# Patient Record
Sex: MALE | Race: BLACK OR AFRICAN AMERICAN | URBAN - METROPOLITAN AREA
[De-identification: names, ages, dates, MRNs, and addresses within clinical notes are randomized per-mention and may not be internally consistent; named-entity substitution may affect disease eponyms.]

---

## 2016-12-12 RX ORDER — OXYCODONE HYDROCHLORIDE 5 MG/1
1 TABLET ORAL
Qty: 0 | Refills: 0 | COMMUNITY
Start: 2016-12-12

## 2017-01-16 ENCOUNTER — INPATIENT (INPATIENT)
Facility: HOSPITAL | Age: 22
LOS: 2 days | Discharge: HOME | End: 2017-01-19
Attending: HOSPITALIST | Admitting: HOSPITALIST
Payer: COMMERCIAL

## 2017-01-16 VITALS
RESPIRATION RATE: 20 BRPM | OXYGEN SATURATION: 94 % | HEART RATE: 96 BPM | DIASTOLIC BLOOD PRESSURE: 62 MMHG | TEMPERATURE: 98 F | SYSTOLIC BLOOD PRESSURE: 134 MMHG

## 2017-01-17 DIAGNOSIS — E83.111 HEMOCHROMATOSIS DUE TO REPEATED RED BLOOD CELL TRANSFUSIONS: ICD-10-CM

## 2017-01-17 DIAGNOSIS — D57.00 HB-SS DISEASE WITH CRISIS, UNSPECIFIED: ICD-10-CM

## 2017-01-17 LAB
ALBUMIN SERPL ELPH-MCNC: 4.6 G/DL — SIGNIFICANT CHANGE UP (ref 3.3–5)
ALP SERPL-CCNC: 88 U/L — SIGNIFICANT CHANGE UP (ref 40–120)
ALT FLD-CCNC: 21 U/L — SIGNIFICANT CHANGE UP (ref 4–41)
ANISOCYTOSIS BLD QL: SIGNIFICANT CHANGE UP
ANTIBODY ID 1_1: SIGNIFICANT CHANGE UP
ANTIBODY ID 1_2: SIGNIFICANT CHANGE UP
AST SERPL-CCNC: 74 U/L — HIGH (ref 4–40)
BASOPHILS # BLD AUTO: 0.07 K/UL — SIGNIFICANT CHANGE UP (ref 0–0.2)
BASOPHILS NFR BLD AUTO: 0.6 % — SIGNIFICANT CHANGE UP (ref 0–2)
BASOPHILS NFR SPEC: 0 % — SIGNIFICANT CHANGE UP (ref 0–2)
BILIRUB SERPL-MCNC: 3.8 MG/DL — HIGH (ref 0.2–1.2)
BLD GP AB SCN SERPL QL: POSITIVE — SIGNIFICANT CHANGE UP
BUN SERPL-MCNC: 6 MG/DL — LOW (ref 7–23)
CALCIUM SERPL-MCNC: 9.2 MG/DL — SIGNIFICANT CHANGE UP (ref 8.4–10.5)
CHLORIDE SERPL-SCNC: 103 MMOL/L — SIGNIFICANT CHANGE UP (ref 98–107)
CO2 SERPL-SCNC: 24 MMOL/L — SIGNIFICANT CHANGE UP (ref 22–31)
CREAT SERPL-MCNC: 0.71 MG/DL — SIGNIFICANT CHANGE UP (ref 0.5–1.3)
EOSINOPHIL # BLD AUTO: 0.14 K/UL — SIGNIFICANT CHANGE UP (ref 0–0.5)
EOSINOPHIL NFR BLD AUTO: 1.2 % — SIGNIFICANT CHANGE UP (ref 0–6)
EOSINOPHIL NFR FLD: 1 % — SIGNIFICANT CHANGE UP (ref 0–6)
GLUCOSE SERPL-MCNC: 75 MG/DL — SIGNIFICANT CHANGE UP (ref 70–99)
HCT VFR BLD CALC: 19.6 % — CRITICAL LOW (ref 39–50)
HGB BLD-MCNC: 7 G/DL — CRITICAL LOW (ref 13–17)
HYPOCHROMIA BLD QL: SIGNIFICANT CHANGE UP
IMM GRANULOCYTES NFR BLD AUTO: 1.2 % — SIGNIFICANT CHANGE UP (ref 0–1.5)
LG PLATELETS BLD QL AUTO: SLIGHT — SIGNIFICANT CHANGE UP
LYMPHOCYTES # BLD AUTO: 42.4 % — SIGNIFICANT CHANGE UP (ref 13–44)
LYMPHOCYTES # BLD AUTO: 5.15 K/UL — HIGH (ref 1–3.3)
LYMPHOCYTES NFR SPEC AUTO: 45 % — HIGH (ref 13–44)
MANUAL SMEAR VERIFICATION: SIGNIFICANT CHANGE UP
MCHC RBC-ENTMCNC: 31.5 PG — SIGNIFICANT CHANGE UP (ref 27–34)
MCHC RBC-ENTMCNC: 35.7 % — SIGNIFICANT CHANGE UP (ref 32–36)
MCV RBC AUTO: 88.3 FL — SIGNIFICANT CHANGE UP (ref 80–100)
MONOCYTES # BLD AUTO: 1.65 K/UL — HIGH (ref 0–0.9)
MONOCYTES NFR BLD AUTO: 13.6 % — SIGNIFICANT CHANGE UP (ref 2–14)
MONOCYTES NFR BLD: 10 % — HIGH (ref 2–9)
MYELOCYTES NFR BLD: 2 % — HIGH (ref 0–0)
NEUTROPHIL AB SER-ACNC: 39 % — LOW (ref 43–77)
NEUTROPHILS # BLD AUTO: 4.99 K/UL — SIGNIFICANT CHANGE UP (ref 1.8–7.4)
NEUTROPHILS NFR BLD AUTO: 41 % — LOW (ref 43–77)
NRBC # BLD: 2 /100WBC — SIGNIFICANT CHANGE UP
PLATELET # BLD AUTO: 529 K/UL — HIGH (ref 150–400)
PLATELET COUNT - ESTIMATE: SIGNIFICANT CHANGE UP
PMV BLD: 10.2 FL — SIGNIFICANT CHANGE UP (ref 7–13)
POIKILOCYTOSIS BLD QL AUTO: SIGNIFICANT CHANGE UP
POLYCHROMASIA BLD QL SMEAR: SLIGHT — SIGNIFICANT CHANGE UP
POTASSIUM SERPL-MCNC: 4.2 MMOL/L — SIGNIFICANT CHANGE UP (ref 3.5–5.3)
POTASSIUM SERPL-SCNC: 4.2 MMOL/L — SIGNIFICANT CHANGE UP (ref 3.5–5.3)
PROT SERPL-MCNC: 7.5 G/DL — SIGNIFICANT CHANGE UP (ref 6–8.3)
RBC # BLD: 2.22 M/UL — LOW (ref 4.2–5.8)
RBC # FLD: 24.9 % — HIGH (ref 10.3–14.5)
RETICS #: 207.3 10X3/UL — HIGH (ref 17–73)
RETICS/RBC NFR: 9.3 % — HIGH (ref 0.5–2.5)
RH IG SCN BLD-IMP: POSITIVE — SIGNIFICANT CHANGE UP
SCHISTOCYTES BLD QL AUTO: SLIGHT — SIGNIFICANT CHANGE UP
SICKLE CELLS BLD QL SMEAR: SIGNIFICANT CHANGE UP
SODIUM SERPL-SCNC: 140 MMOL/L — SIGNIFICANT CHANGE UP (ref 135–145)
SPHEROCYTES BLD QL SMEAR: SLIGHT — SIGNIFICANT CHANGE UP
TARGETS BLD QL SMEAR: SLIGHT — SIGNIFICANT CHANGE UP
TROPONIN T SERPL-MCNC: < 0.06 NG/ML — SIGNIFICANT CHANGE UP (ref 0–0.06)
VARIANT LYMPHS # BLD: 3 % — SIGNIFICANT CHANGE UP
WBC # BLD: 12.15 K/UL — HIGH (ref 3.8–10.5)
WBC # FLD AUTO: 12.15 K/UL — HIGH (ref 3.8–10.5)

## 2017-01-17 PROCEDURE — 71020: CPT | Mod: 26

## 2017-01-17 PROCEDURE — 86077 PHYS BLOOD BANK SERV XMATCH: CPT

## 2017-01-17 PROCEDURE — 99223 1ST HOSP IP/OBS HIGH 75: CPT

## 2017-01-17 RX ORDER — FOLIC ACID 0.8 MG
1 TABLET ORAL DAILY
Qty: 0 | Refills: 0 | Status: DISCONTINUED | OUTPATIENT
Start: 2017-01-17 | End: 2017-01-19

## 2017-01-17 RX ORDER — ONDANSETRON 8 MG/1
4 TABLET, FILM COATED ORAL ONCE
Qty: 0 | Refills: 0 | Status: COMPLETED | OUTPATIENT
Start: 2017-01-17 | End: 2017-01-17

## 2017-01-17 RX ORDER — HYDROMORPHONE HYDROCHLORIDE 2 MG/ML
2 INJECTION INTRAMUSCULAR; INTRAVENOUS; SUBCUTANEOUS ONCE
Qty: 0 | Refills: 0 | Status: DISCONTINUED | OUTPATIENT
Start: 2017-01-17 | End: 2017-01-17

## 2017-01-17 RX ORDER — ENOXAPARIN SODIUM 100 MG/ML
40 INJECTION SUBCUTANEOUS EVERY 24 HOURS
Qty: 0 | Refills: 0 | Status: DISCONTINUED | OUTPATIENT
Start: 2017-01-17 | End: 2017-01-19

## 2017-01-17 RX ORDER — HYDROMORPHONE HYDROCHLORIDE 2 MG/ML
1 INJECTION INTRAMUSCULAR; INTRAVENOUS; SUBCUTANEOUS
Qty: 0 | Refills: 0 | Status: DISCONTINUED | OUTPATIENT
Start: 2017-01-17 | End: 2017-01-17

## 2017-01-17 RX ORDER — SODIUM CHLORIDE 9 MG/ML
1000 INJECTION, SOLUTION INTRAVENOUS
Qty: 0 | Refills: 0 | Status: DISCONTINUED | OUTPATIENT
Start: 2017-01-17 | End: 2017-01-19

## 2017-01-17 RX ORDER — HYDROMORPHONE HYDROCHLORIDE 2 MG/ML
30 INJECTION INTRAMUSCULAR; INTRAVENOUS; SUBCUTANEOUS
Qty: 0 | Refills: 0 | Status: DISCONTINUED | OUTPATIENT
Start: 2017-01-17 | End: 2017-01-19

## 2017-01-17 RX ORDER — SODIUM CHLORIDE 9 MG/ML
1000 INJECTION INTRAMUSCULAR; INTRAVENOUS; SUBCUTANEOUS
Qty: 0 | Refills: 0 | Status: DISCONTINUED | OUTPATIENT
Start: 2017-01-17 | End: 2017-01-17

## 2017-01-17 RX ORDER — KETOROLAC TROMETHAMINE 30 MG/ML
30 SYRINGE (ML) INJECTION ONCE
Qty: 0 | Refills: 0 | Status: DISCONTINUED | OUTPATIENT
Start: 2017-01-17 | End: 2017-01-17

## 2017-01-17 RX ORDER — NALOXONE HYDROCHLORIDE 4 MG/.1ML
0.1 SPRAY NASAL
Qty: 0 | Refills: 0 | Status: DISCONTINUED | OUTPATIENT
Start: 2017-01-17 | End: 2017-01-19

## 2017-01-17 RX ORDER — HYDROMORPHONE HYDROCHLORIDE 2 MG/ML
1 INJECTION INTRAMUSCULAR; INTRAVENOUS; SUBCUTANEOUS ONCE
Qty: 0 | Refills: 0 | Status: DISCONTINUED | OUTPATIENT
Start: 2017-01-17 | End: 2017-01-17

## 2017-01-17 RX ORDER — DIPHENHYDRAMINE HCL 50 MG
25 CAPSULE ORAL EVERY 4 HOURS
Qty: 0 | Refills: 0 | Status: DISCONTINUED | OUTPATIENT
Start: 2017-01-17 | End: 2017-01-19

## 2017-01-17 RX ORDER — ONDANSETRON 8 MG/1
4 TABLET, FILM COATED ORAL EVERY 6 HOURS
Qty: 0 | Refills: 0 | Status: DISCONTINUED | OUTPATIENT
Start: 2017-01-17 | End: 2017-01-17

## 2017-01-17 RX ORDER — KETOROLAC TROMETHAMINE 30 MG/ML
15 SYRINGE (ML) INJECTION EVERY 6 HOURS
Qty: 0 | Refills: 0 | Status: DISCONTINUED | OUTPATIENT
Start: 2017-01-17 | End: 2017-01-19

## 2017-01-17 RX ORDER — SODIUM CHLORIDE 9 MG/ML
1000 INJECTION INTRAMUSCULAR; INTRAVENOUS; SUBCUTANEOUS ONCE
Qty: 0 | Refills: 0 | Status: COMPLETED | OUTPATIENT
Start: 2017-01-17 | End: 2017-01-17

## 2017-01-17 RX ORDER — ONDANSETRON 8 MG/1
4 TABLET, FILM COATED ORAL EVERY 6 HOURS
Qty: 0 | Refills: 0 | Status: DISCONTINUED | OUTPATIENT
Start: 2017-01-17 | End: 2017-01-19

## 2017-01-17 RX ADMIN — HYDROMORPHONE HYDROCHLORIDE 30 MILLILITER(S): 2 INJECTION INTRAMUSCULAR; INTRAVENOUS; SUBCUTANEOUS at 20:04

## 2017-01-17 RX ADMIN — HYDROMORPHONE HYDROCHLORIDE 1 MILLIGRAM(S): 2 INJECTION INTRAMUSCULAR; INTRAVENOUS; SUBCUTANEOUS at 01:30

## 2017-01-17 RX ADMIN — HYDROMORPHONE HYDROCHLORIDE 1 MILLIGRAM(S): 2 INJECTION INTRAMUSCULAR; INTRAVENOUS; SUBCUTANEOUS at 00:49

## 2017-01-17 RX ADMIN — HYDROMORPHONE HYDROCHLORIDE 30 MILLILITER(S): 2 INJECTION INTRAMUSCULAR; INTRAVENOUS; SUBCUTANEOUS at 16:51

## 2017-01-17 RX ADMIN — ONDANSETRON 4 MILLIGRAM(S): 8 TABLET, FILM COATED ORAL at 06:22

## 2017-01-17 RX ADMIN — HYDROMORPHONE HYDROCHLORIDE 1 MILLIGRAM(S): 2 INJECTION INTRAMUSCULAR; INTRAVENOUS; SUBCUTANEOUS at 07:37

## 2017-01-17 RX ADMIN — HYDROMORPHONE HYDROCHLORIDE 1 MILLIGRAM(S): 2 INJECTION INTRAMUSCULAR; INTRAVENOUS; SUBCUTANEOUS at 08:30

## 2017-01-17 RX ADMIN — SODIUM CHLORIDE 1000 MILLILITER(S): 9 INJECTION INTRAMUSCULAR; INTRAVENOUS; SUBCUTANEOUS at 00:49

## 2017-01-17 RX ADMIN — HYDROMORPHONE HYDROCHLORIDE 2 MILLIGRAM(S): 2 INJECTION INTRAMUSCULAR; INTRAVENOUS; SUBCUTANEOUS at 02:20

## 2017-01-17 RX ADMIN — Medication 15 MILLIGRAM(S): at 15:01

## 2017-01-17 RX ADMIN — SODIUM CHLORIDE 125 MILLILITER(S): 9 INJECTION, SOLUTION INTRAVENOUS at 17:00

## 2017-01-17 RX ADMIN — SODIUM CHLORIDE 125 MILLILITER(S): 9 INJECTION, SOLUTION INTRAVENOUS at 15:02

## 2017-01-17 RX ADMIN — Medication 1 MILLIGRAM(S): at 15:01

## 2017-01-17 RX ADMIN — Medication 30 MILLIGRAM(S): at 04:05

## 2017-01-17 RX ADMIN — Medication 15 MILLIGRAM(S): at 15:30

## 2017-01-17 RX ADMIN — Medication 30 MILLIGRAM(S): at 05:00

## 2017-01-17 RX ADMIN — HYDROMORPHONE HYDROCHLORIDE 2 MILLIGRAM(S): 2 INJECTION INTRAMUSCULAR; INTRAVENOUS; SUBCUTANEOUS at 03:11

## 2017-01-17 RX ADMIN — SODIUM CHLORIDE 100 MILLILITER(S): 9 INJECTION INTRAMUSCULAR; INTRAVENOUS; SUBCUTANEOUS at 06:22

## 2017-01-17 NOTE — ED PROVIDER NOTE - PROGRESS NOTE DETAILS
Caitlyn PGY-2: Admitted patient to hospitalist, text paged MAR. Pt initially requested stay in CDU however now notifies staff he has usually required several days for admission to treat SCC. Admitted.

## 2017-01-17 NOTE — ED PROVIDER NOTE - OBJECTIVE STATEMENT
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 Pain in arms, legs, waist, chest. CP is mid-sternal. Denies fever, cough, sick contacts, nausea, vomiting, diarrhea, dysuria, hematuria. This is his typical pain crisis. History of acute chest a few years ago, last transfusion a few months ago. Does not feel like when he had acute chest.   Took motrin and oxycodone at home with some relief.  PMD: Dr Nguyễn

## 2017-01-17 NOTE — ED CDU PROVIDER NOTE - ATTENDING CONTRIBUTION TO CARE
I was physically present for the E/M service provided. I agree with above history, physical, and plan which I have reviewed and edited where appropriate. I was physically present for the key portions of the service provided.    See MDM

## 2017-01-17 NOTE — ED ADULT NURSE NOTE - OBJECTIVE STATEMENT
Jeramie Nurse Note- pt presents to 26 A&Ox4 c/o SCC pain x 2 hours. Motrin and oxycodone provided only minor relief. hx of chest syndrome in the past,. states he has only mild cp now. rsr on monitor. no ectopy. vitals as noted. iv placed, labs drawn and sent, 20 ga r fa. Dr. Brady at bedside, ordered ivf and analgesia. both given. will continue to monitor awaiting lab results. endorsed to primary RN Vale

## 2017-01-17 NOTE — H&P ADULT. - HISTORY OF PRESENT ILLNESS
21M with sickle cell (SS) disease with history of ACS, exchange transfusion, Iron overload,  presenting to the ED with one day hx of diffuse body pain. Pt states he was in his usual state of health when the diffuse pain started. He tried taking a dose of oxycodone 10mg and motrin at home, however the pain persisted. He denies CP/SOB/Nausea/Vomiting/Joint pain. Pt states he’ll like to have blood transfusion. Per pt, since his arrival to the ED, his pain has improved; he only has residual back pain, with no radiation. Last PMD visit was 12/2016.    In the ED, pt was given toradol, IVF, dilaudid 2mg IV x 1 and dilaudid 1mg x 3 and Zofran

## 2017-01-17 NOTE — H&P ADULT. - ASSESSMENT
21M with sickle cell (SS) disease with history of ACS, exchange transfusion, Iron overload,  presenting to the ED with one day hx of diffuse body pain. Pt admitted for sickle cell crisis

## 2017-01-17 NOTE — H&P ADULT. - PROBLEM SELECTOR PLAN 1
- c/w IVF and pain control with IV PCA pump  - I explained to pt that there is no indication for transfusion at this time, also explained risk of worsening iron overload and blood antibodies  - c/w Hb trending, folate

## 2017-01-17 NOTE — ED PROVIDER NOTE - ATTENDING CONTRIBUTION TO CARE
I was physically present for the E/M service provided. I agree with above history, physical, and plan which I have reviewed and edited where appropriate. I was physically present for the key portions of the service provided.    20 y/o male h/o SCC SS p/w chest + extremity pain typical of pain crisis. h/o acute chest syndrome, however, no cough, SOB or fever.  EKG + troponin + CXR r/o chest syndrome  Check Hgb + Retic Counts  Pain control + Hydration I was physically present for the E/M service provided. I agree with above history, physical, and plan which I have reviewed and edited where appropriate. I was physically present for the key portions of the service provided.    22 y/o male h/o SCC SS p/w chest + extremity pain typical of pain crisis. h/o acute chest syndrome, however, no cough, SOB or fever.  EKG + troponin + CXR r/o chest syndrome  Check Hgb + Retic Counts: stable compared to baseline  Pain control + Hydration

## 2017-01-17 NOTE — H&P ADULT. - PROBLEM SELECTOR PLAN 2
- avoid unnecessary transfusion  - c/w Jadenu if available with pharmacy, if not pt can bring own medication

## 2017-01-17 NOTE — H&P ADULT. - PMH
Acute chest syndrome    Iron overload due to repeated red blood cell transfusions    Sickle cell anemia    Sickle Cell Disease  HbSS

## 2017-01-17 NOTE — ED CDU PROVIDER NOTE - MEDICAL DECISION MAKING DETAILS
20 y/o male w/ PMH of sickle cell type SS and h/o acute chest syndrome p/w chest an d extremity pain. No SOB or cough. EKG unchanged. CXR clear. Normal respirations on RA. Hgb 7, baseline per MR 5.8-7, retic count 9, baseline per MR 8-9. Persistent pain despite 2 rounds Dilaudid. Pt requested stay in CDU for continued pain control and IVF. ERIC.

## 2017-01-18 LAB
ALBUMIN SERPL ELPH-MCNC: 3.9 G/DL — SIGNIFICANT CHANGE UP (ref 3.3–5)
ALP SERPL-CCNC: 74 U/L — SIGNIFICANT CHANGE UP (ref 40–120)
ALT FLD-CCNC: 20 U/L — SIGNIFICANT CHANGE UP (ref 4–41)
AST SERPL-CCNC: 69 U/L — HIGH (ref 4–40)
BASOPHILS # BLD AUTO: 0.05 K/UL — SIGNIFICANT CHANGE UP (ref 0–0.2)
BASOPHILS NFR BLD AUTO: 0.4 % — SIGNIFICANT CHANGE UP (ref 0–2)
BILIRUB SERPL-MCNC: 3.2 MG/DL — HIGH (ref 0.2–1.2)
BUN SERPL-MCNC: 6 MG/DL — LOW (ref 7–23)
CALCIUM SERPL-MCNC: 8.8 MG/DL — SIGNIFICANT CHANGE UP (ref 8.4–10.5)
CHLORIDE SERPL-SCNC: 101 MMOL/L — SIGNIFICANT CHANGE UP (ref 98–107)
CK MB BLD-MCNC: 1 NG/ML — SIGNIFICANT CHANGE UP (ref 1–6.6)
CK MB BLD-MCNC: SIGNIFICANT CHANGE UP (ref 0–2.5)
CK SERPL-CCNC: 139 U/L — SIGNIFICANT CHANGE UP (ref 30–200)
CO2 SERPL-SCNC: 22 MMOL/L — SIGNIFICANT CHANGE UP (ref 22–31)
CREAT SERPL-MCNC: 0.58 MG/DL — SIGNIFICANT CHANGE UP (ref 0.5–1.3)
EOSINOPHIL # BLD AUTO: 0.21 K/UL — SIGNIFICANT CHANGE UP (ref 0–0.5)
EOSINOPHIL NFR BLD AUTO: 1.8 % — SIGNIFICANT CHANGE UP (ref 0–6)
GLUCOSE SERPL-MCNC: 67 MG/DL — LOW (ref 70–99)
HCT VFR BLD CALC: 17 % — CRITICAL LOW (ref 39–50)
HGB BLD-MCNC: 6.2 G/DL — CRITICAL LOW (ref 13–17)
IMM GRANULOCYTES NFR BLD AUTO: 0.3 % — SIGNIFICANT CHANGE UP (ref 0–1.5)
LDH SERPL L TO P-CCNC: 1089 U/L — HIGH (ref 135–225)
LYMPHOCYTES # BLD AUTO: 34.9 % — SIGNIFICANT CHANGE UP (ref 13–44)
LYMPHOCYTES # BLD AUTO: 4.07 K/UL — HIGH (ref 1–3.3)
MANUAL SMEAR VERIFICATION: SIGNIFICANT CHANGE UP
MCHC RBC-ENTMCNC: 32.1 PG — SIGNIFICANT CHANGE UP (ref 27–34)
MCHC RBC-ENTMCNC: 36.5 % — HIGH (ref 32–36)
MCV RBC AUTO: 88.1 FL — SIGNIFICANT CHANGE UP (ref 80–100)
MONOCYTES # BLD AUTO: 1.44 K/UL — HIGH (ref 0–0.9)
MONOCYTES NFR BLD AUTO: 12.3 % — SIGNIFICANT CHANGE UP (ref 2–14)
NEUTROPHILS # BLD AUTO: 5.85 K/UL — SIGNIFICANT CHANGE UP (ref 1.8–7.4)
NEUTROPHILS NFR BLD AUTO: 50.3 % — SIGNIFICANT CHANGE UP (ref 43–77)
PLATELET # BLD AUTO: 464 K/UL — HIGH (ref 150–400)
PMV BLD: 9.9 FL — SIGNIFICANT CHANGE UP (ref 7–13)
POTASSIUM SERPL-MCNC: 4.2 MMOL/L — SIGNIFICANT CHANGE UP (ref 3.5–5.3)
POTASSIUM SERPL-SCNC: 4.2 MMOL/L — SIGNIFICANT CHANGE UP (ref 3.5–5.3)
PROT SERPL-MCNC: 6.7 G/DL — SIGNIFICANT CHANGE UP (ref 6–8.3)
RBC # BLD: 1.93 M/UL — LOW (ref 4.2–5.8)
RBC # FLD: 24.4 % — HIGH (ref 10.3–14.5)
RETICS #: 244.3 10X3/UL — HIGH (ref 17–73)
RETICS/RBC NFR: 12.7 % — HIGH (ref 0.5–2.5)
SODIUM SERPL-SCNC: 140 MMOL/L — SIGNIFICANT CHANGE UP (ref 135–145)
TROPONIN T SERPL-MCNC: < 0.06 NG/ML — SIGNIFICANT CHANGE UP (ref 0–0.06)
WBC # BLD: 11.66 K/UL — HIGH (ref 3.8–10.5)
WBC # FLD AUTO: 11.66 K/UL — HIGH (ref 3.8–10.5)

## 2017-01-18 PROCEDURE — 99222 1ST HOSP IP/OBS MODERATE 55: CPT | Mod: GC

## 2017-01-18 PROCEDURE — 99233 SBSQ HOSP IP/OBS HIGH 50: CPT

## 2017-01-18 RX ORDER — SENNA PLUS 8.6 MG/1
2 TABLET ORAL AT BEDTIME
Qty: 0 | Refills: 0 | Status: DISCONTINUED | OUTPATIENT
Start: 2017-01-18 | End: 2017-01-19

## 2017-01-18 RX ORDER — HYDROXYUREA 500 MG/1
2000 CAPSULE ORAL DAILY
Qty: 0 | Refills: 0 | Status: DISCONTINUED | OUTPATIENT
Start: 2017-01-18 | End: 2017-01-19

## 2017-01-18 RX ORDER — DOCUSATE SODIUM 100 MG
100 CAPSULE ORAL THREE TIMES A DAY
Qty: 0 | Refills: 0 | Status: DISCONTINUED | OUTPATIENT
Start: 2017-01-18 | End: 2017-01-19

## 2017-01-18 RX ADMIN — HYDROMORPHONE HYDROCHLORIDE 30 MILLILITER(S): 2 INJECTION INTRAMUSCULAR; INTRAVENOUS; SUBCUTANEOUS at 08:09

## 2017-01-18 RX ADMIN — Medication 100 MILLIGRAM(S): at 23:02

## 2017-01-18 RX ADMIN — Medication 15 MILLIGRAM(S): at 06:00

## 2017-01-18 RX ADMIN — SODIUM CHLORIDE 125 MILLILITER(S): 9 INJECTION, SOLUTION INTRAVENOUS at 08:30

## 2017-01-18 RX ADMIN — Medication 15 MILLIGRAM(S): at 00:11

## 2017-01-18 RX ADMIN — HYDROXYUREA 2000 MILLIGRAM(S): 500 CAPSULE ORAL at 23:02

## 2017-01-18 RX ADMIN — SENNA PLUS 2 TABLET(S): 8.6 TABLET ORAL at 23:02

## 2017-01-18 RX ADMIN — Medication 1 MILLIGRAM(S): at 12:02

## 2017-01-18 RX ADMIN — HYDROMORPHONE HYDROCHLORIDE 30 MILLILITER(S): 2 INJECTION INTRAMUSCULAR; INTRAVENOUS; SUBCUTANEOUS at 19:59

## 2017-01-18 RX ADMIN — SODIUM CHLORIDE 125 MILLILITER(S): 9 INJECTION, SOLUTION INTRAVENOUS at 06:01

## 2017-01-19 ENCOUNTER — TRANSCRIPTION ENCOUNTER (OUTPATIENT)
Age: 22
End: 2017-01-19

## 2017-01-19 VITALS
SYSTOLIC BLOOD PRESSURE: 109 MMHG | OXYGEN SATURATION: 96 % | TEMPERATURE: 98 F | RESPIRATION RATE: 18 BRPM | DIASTOLIC BLOOD PRESSURE: 51 MMHG | HEART RATE: 65 BPM

## 2017-01-19 LAB
ALBUMIN SERPL ELPH-MCNC: 4.2 G/DL — SIGNIFICANT CHANGE UP (ref 3.3–5)
ALP SERPL-CCNC: 72 U/L — SIGNIFICANT CHANGE UP (ref 40–120)
ALT FLD-CCNC: 16 U/L — SIGNIFICANT CHANGE UP (ref 4–41)
AST SERPL-CCNC: 65 U/L — HIGH (ref 4–40)
BASOPHILS # BLD AUTO: 0.04 K/UL — SIGNIFICANT CHANGE UP (ref 0–0.2)
BASOPHILS NFR BLD AUTO: 0.4 % — SIGNIFICANT CHANGE UP (ref 0–2)
BILIRUB SERPL-MCNC: 3.5 MG/DL — HIGH (ref 0.2–1.2)
BUN SERPL-MCNC: 8 MG/DL — SIGNIFICANT CHANGE UP (ref 7–23)
CALCIUM SERPL-MCNC: 9 MG/DL — SIGNIFICANT CHANGE UP (ref 8.4–10.5)
CHLORIDE SERPL-SCNC: 101 MMOL/L — SIGNIFICANT CHANGE UP (ref 98–107)
CO2 SERPL-SCNC: 24 MMOL/L — SIGNIFICANT CHANGE UP (ref 22–31)
CREAT SERPL-MCNC: 0.6 MG/DL — SIGNIFICANT CHANGE UP (ref 0.5–1.3)
EOSINOPHIL # BLD AUTO: 0.19 K/UL — SIGNIFICANT CHANGE UP (ref 0–0.5)
EOSINOPHIL NFR BLD AUTO: 1.8 % — SIGNIFICANT CHANGE UP (ref 0–6)
GLUCOSE SERPL-MCNC: 83 MG/DL — SIGNIFICANT CHANGE UP (ref 70–99)
HCT VFR BLD CALC: 17.9 % — CRITICAL LOW (ref 39–50)
HGB BLD-MCNC: 6.5 G/DL — CRITICAL LOW (ref 13–17)
IMM GRANULOCYTES NFR BLD AUTO: 0.5 % — SIGNIFICANT CHANGE UP (ref 0–1.5)
LDH SERPL L TO P-CCNC: 1083 U/L — HIGH (ref 135–225)
LYMPHOCYTES # BLD AUTO: 3.9 K/UL — HIGH (ref 1–3.3)
LYMPHOCYTES # BLD AUTO: 36.5 % — SIGNIFICANT CHANGE UP (ref 13–44)
MAGNESIUM SERPL-MCNC: 1.8 MG/DL — SIGNIFICANT CHANGE UP (ref 1.6–2.6)
MANUAL SMEAR VERIFICATION: SIGNIFICANT CHANGE UP
MCHC RBC-ENTMCNC: 31.9 PG — SIGNIFICANT CHANGE UP (ref 27–34)
MCHC RBC-ENTMCNC: 36.3 % — HIGH (ref 32–36)
MCV RBC AUTO: 87.7 FL — SIGNIFICANT CHANGE UP (ref 80–100)
MONOCYTES # BLD AUTO: 1.66 K/UL — HIGH (ref 0–0.9)
MONOCYTES NFR BLD AUTO: 15.5 % — HIGH (ref 2–14)
NEUTROPHILS # BLD AUTO: 4.84 K/UL — SIGNIFICANT CHANGE UP (ref 1.8–7.4)
NEUTROPHILS NFR BLD AUTO: 45.3 % — SIGNIFICANT CHANGE UP (ref 43–77)
PHOSPHATE SERPL-MCNC: 5.6 MG/DL — HIGH (ref 2.5–4.5)
PLATELET # BLD AUTO: 488 K/UL — HIGH (ref 150–400)
PMV BLD: 10 FL — SIGNIFICANT CHANGE UP (ref 7–13)
POTASSIUM SERPL-MCNC: 4.1 MMOL/L — SIGNIFICANT CHANGE UP (ref 3.5–5.3)
POTASSIUM SERPL-SCNC: 4.1 MMOL/L — SIGNIFICANT CHANGE UP (ref 3.5–5.3)
PROT SERPL-MCNC: 7.1 G/DL — SIGNIFICANT CHANGE UP (ref 6–8.3)
RBC # BLD: 2.04 M/UL — LOW (ref 4.2–5.8)
RBC # FLD: 24.9 % — HIGH (ref 10.3–14.5)
RETICS #: 252.3 10X3/UL — HIGH (ref 17–73)
RETICS/RBC NFR: 12.4 % — HIGH (ref 0.5–2.5)
SODIUM SERPL-SCNC: 139 MMOL/L — SIGNIFICANT CHANGE UP (ref 135–145)
WBC # BLD: 10.68 K/UL — HIGH (ref 3.8–10.5)
WBC # FLD AUTO: 10.68 K/UL — HIGH (ref 3.8–10.5)

## 2017-01-19 PROCEDURE — 99239 HOSP IP/OBS DSCHRG MGMT >30: CPT

## 2017-01-19 RX ORDER — OXYCODONE HYDROCHLORIDE 5 MG/1
1 TABLET ORAL
Qty: 30 | Refills: 0 | OUTPATIENT
Start: 2017-01-19 | End: 2017-01-24

## 2017-01-19 RX ORDER — IBUPROFEN 200 MG
1 TABLET ORAL
Qty: 0 | Refills: 0 | COMMUNITY

## 2017-01-19 RX ORDER — OXYCODONE HYDROCHLORIDE 5 MG/1
2 TABLET ORAL
Qty: 30 | Refills: 0 | COMMUNITY
Start: 2017-01-19 | End: 2017-01-24

## 2017-01-19 RX ORDER — FOLIC ACID 0.8 MG
1 TABLET ORAL
Qty: 30 | Refills: 0 | OUTPATIENT
Start: 2017-01-19 | End: 2017-02-18

## 2017-01-19 RX ADMIN — HYDROMORPHONE HYDROCHLORIDE 30 MILLILITER(S): 2 INJECTION INTRAMUSCULAR; INTRAVENOUS; SUBCUTANEOUS at 08:12

## 2017-01-19 RX ADMIN — Medication 1 MILLIGRAM(S): at 12:42

## 2017-01-19 NOTE — DISCHARGE NOTE ADULT - CARE PROVIDER_API CALL
Kenia Nguyễn), Internal Medicine  [address] [place] [address]  Phone: [phone]  Fax: [phone]

## 2017-01-19 NOTE — DISCHARGE NOTE ADULT - PLAN OF CARE
contol Keep hydrated .   C/w folic acid and hydrea.  Moltrin prn and oxy 5 mg prn pain. Please f/e with SS MD Rivas in 1 week control Keep hydrated .   C/w folic acid and Hydrea.  Motrin prn and oxy 5 mg prn pain. Please f/e with SS MD Rivas in 1 week

## 2017-01-19 NOTE — DISCHARGE NOTE ADULT - HOSPITAL COURSE
21M with sickle cell (SS) disease with history of ACS, exchange transfusion, Iron overload,  presenting to the ED with one day hx of diffuse body pain. Pt states he was in his usual state of health when the diffuse pain started. He tried taking a dose of oxycodone 10mg and motrin at home, however the pain persisted. He denies CP/SOB/Nausea/Vomiting/Joint pain. Pt states he’ll like to have blood transfusion. Per pt, since his arrival to the ED, his pain has improved; he only has residual back pain, with no radiation. Last PMD visit was 12/2016.    In the ED, pt was given toradol, IVF, dilaudid 2mg IV x 1 and dilaudid 1mg x 3 and Zofran  HOSPITAL COURSE:  Patient was seen by Hematolohy and jennifer rausch.  He was kept on IV / folic acid and pain mng with dilaudid pca at 4 mg /4 hrs.  He noted cp and pain to arms and legs resolved  1/19/17.  He was d/c home 1/19/17 with out patient f/u with Dr Nguyễn 21M with sickle cell (SS) disease with history of ACS, exchange transfusion, Iron overload,  presenting to the ED with one day hx of diffuse body pain. Pt states he was in his usual state of health when the diffuse pain started. He tried taking a dose of oxycodone 10mg and Motrin at home, however the pain persisted. He denies CP/SOB/Nausea/Vomiting/Joint pain. Pt states he’ll like to have blood transfusion. Per pt, since his arrival to the ED, his pain has improved; he only has residual back pain, with no radiation. Last PMD visit was 12/2016.    In the ED, pt was given Toradol IVF, Dilaudid 2mg IV x 1 and Dilaudid 1mg x 3 and Zofran  HOSPITAL COURSE:  Patient was seen by Hematolohy and jennifer rausch.  He was kept on IV / folic acid and pain mng with dilaudid pca at 4 mg /4 hrs.  He noted cp and pain to arms and legs resolved  1/19/17.  He was d/c home 1/19/17 with out patient f/u with Dr Nguyễn  5 days supply for OxyIR issued, ISTOP #[id number]

## 2017-01-19 NOTE — DISCHARGE NOTE ADULT - CARE PROVIDERS DIRECT ADDRESSES
[email or web address]

## 2017-01-19 NOTE — DISCHARGE NOTE ADULT - MEDICATION SUMMARY - MEDICATIONS TO TAKE
I will START or STAY ON the medications listed below when I get home from the hospital:    oxyCODONE 5 mg oral tablet  -- 1 tab(s) by mouth every 4 hours, As Needed ; MDD:6, Moderate and Severe pain  -- Indication: For Pain management    ibuprofen 600 mg oral tablet  -- 1 tab(s) by mouth 3 times a day (with meals), As Needed, Mild pain  -- Indication: For Pain management    hydroxyurea 500 mg oral capsule  -- 4 cap(s) by mouth once a day  -- Indication: For sickle cell crisis    folic acid 1 mg oral tablet  -- 1 tab(s) by mouth once a day  -- Indication: For supplement

## 2017-01-19 NOTE — DISCHARGE NOTE ADULT - CARE PLAN
Principal Discharge DX:	Vasoocclusive sickle cell crisis  Goal:	contol  Instructions for follow-up, activity and diet:	Keep hydrated .   C/w folic acid and hydrea.  Moltrin prn and oxy 5 mg prn pain. Please f/e with SS MD Rivas in 1 week Principal Discharge DX:	Vasoocclusive sickle cell crisis  Goal:	control  Instructions for follow-up, activity and diet:	Keep hydrated .   C/w folic acid and Hydrea.  Motrin prn and oxy 5 mg prn pain. Please f/e with SS MD Rivas in 1 week

## 2017-01-19 NOTE — DISCHARGE NOTE ADULT - PATIENT PORTAL LINK FT
“You can access the [facility] Patient Portal, offered by [facility], by registering with the following website: [email or web address]”

## 2017-01-27 ENCOUNTER — INPATIENT (INPATIENT)
Facility: HOSPITAL | Age: 22
LOS: 13 days | Discharge: HOME | End: 2017-02-10
Attending: INTERNAL MEDICINE | Admitting: INTERNAL MEDICINE
Payer: COMMERCIAL

## 2017-01-27 VITALS
SYSTOLIC BLOOD PRESSURE: 138 MMHG | RESPIRATION RATE: 28 BRPM | DIASTOLIC BLOOD PRESSURE: 63 MMHG | HEART RATE: 69 BPM | OXYGEN SATURATION: 96 %

## 2017-01-27 DIAGNOSIS — E83.111 HEMOCHROMATOSIS DUE TO REPEATED RED BLOOD CELL TRANSFUSIONS: ICD-10-CM

## 2017-01-27 DIAGNOSIS — D57.00 HB-SS DISEASE WITH CRISIS, UNSPECIFIED: ICD-10-CM

## 2017-01-27 LAB
ALBUMIN SERPL ELPH-MCNC: 4.7 G/DL — SIGNIFICANT CHANGE UP (ref 3.3–5)
ALP SERPL-CCNC: 78 U/L — SIGNIFICANT CHANGE UP (ref 40–120)
ALT FLD-CCNC: 19 U/L — SIGNIFICANT CHANGE UP (ref 4–41)
ANISOCYTOSIS BLD QL: SIGNIFICANT CHANGE UP
AST SERPL-CCNC: 90 U/L — HIGH (ref 4–40)
BASE EXCESS BLDV CALC-SCNC: -0.5 MMOL/L — SIGNIFICANT CHANGE UP
BASOPHILS NFR SPEC: 1 % — SIGNIFICANT CHANGE UP (ref 0–2)
BILIRUB SERPL-MCNC: 3.6 MG/DL — HIGH (ref 0.2–1.2)
BLD GP AB SCN SERPL QL: POSITIVE — SIGNIFICANT CHANGE UP
BLOOD GAS VENOUS - CREATININE: 0.62 MG/DL — SIGNIFICANT CHANGE UP (ref 0.5–1.3)
BUN SERPL-MCNC: 8 MG/DL — SIGNIFICANT CHANGE UP (ref 7–23)
CALCIUM SERPL-MCNC: 9.4 MG/DL — SIGNIFICANT CHANGE UP (ref 8.4–10.5)
CHLORIDE BLDV-SCNC: 102 MMOL/L — SIGNIFICANT CHANGE UP (ref 96–108)
CHLORIDE SERPL-SCNC: 101 MMOL/L — SIGNIFICANT CHANGE UP (ref 98–107)
CO2 SERPL-SCNC: 22 MMOL/L — SIGNIFICANT CHANGE UP (ref 22–31)
CREAT SERPL-MCNC: 0.67 MG/DL — SIGNIFICANT CHANGE UP (ref 0.5–1.3)
ELLIPTOCYTES BLD QL SMEAR: SIGNIFICANT CHANGE UP
EOSINOPHIL NFR FLD: 0 % — SIGNIFICANT CHANGE UP (ref 0–6)
GAS PNL BLDV: 139 MMOL/L — SIGNIFICANT CHANGE UP (ref 136–146)
GLUCOSE BLDV-MCNC: 154 — HIGH (ref 70–99)
GLUCOSE SERPL-MCNC: 156 MG/DL — HIGH (ref 70–99)
HCO3 BLDV-SCNC: 23 MMOL/L — SIGNIFICANT CHANGE UP (ref 20–27)
HCT VFR BLD CALC: 18.1 % — CRITICAL LOW (ref 39–50)
HCT VFR BLDV CALC: 20.4 % — CRITICAL LOW (ref 39–51)
HGB BLD-MCNC: 6.5 G/DL — CRITICAL LOW (ref 13–17)
HGB BLDV-MCNC: 6.5 G/DL — CRITICAL LOW (ref 13–17)
HYPOCHROMIA BLD QL: SIGNIFICANT CHANGE UP
LACTATE BLDV-MCNC: 2.3 MMOL/L — HIGH (ref 0.5–2)
LDH SERPL L TO P-CCNC: 1202 U/L — HIGH (ref 135–225)
LYMPHOCYTES NFR SPEC AUTO: 52 % — HIGH (ref 13–44)
MACROCYTES BLD QL: SIGNIFICANT CHANGE UP
MANUAL SMEAR VERIFICATION: SIGNIFICANT CHANGE UP
MCHC RBC-ENTMCNC: 32.5 PG — SIGNIFICANT CHANGE UP (ref 27–34)
MCHC RBC-ENTMCNC: 35.9 % — SIGNIFICANT CHANGE UP (ref 32–36)
MCV RBC AUTO: 90.5 FL — SIGNIFICANT CHANGE UP (ref 80–100)
METAMYELOCYTES # FLD: 1 % — SIGNIFICANT CHANGE UP (ref 0–1)
MONOCYTES NFR BLD: 4 % — SIGNIFICANT CHANGE UP (ref 2–9)
NEUTROPHIL AB SER-ACNC: 40 % — LOW (ref 43–77)
NEUTS BAND # BLD: 2 % — SIGNIFICANT CHANGE UP (ref 0–6)
NRBC # BLD: 16 /100WBC — SIGNIFICANT CHANGE UP
PCO2 BLDV: 61 MMHG — HIGH (ref 41–51)
PH BLDV: 7.25 PH — LOW (ref 7.32–7.43)
PLATELET # BLD AUTO: 542 K/UL — HIGH (ref 150–400)
PLATELET COUNT - ESTIMATE: SIGNIFICANT CHANGE UP
PMV BLD: 9.7 FL — SIGNIFICANT CHANGE UP (ref 7–13)
PO2 BLDV: SIGNIFICANT CHANGE UP MMHG (ref 35–40)
POIKILOCYTOSIS BLD QL AUTO: SIGNIFICANT CHANGE UP
POLYCHROMASIA BLD QL SMEAR: SIGNIFICANT CHANGE UP
POTASSIUM BLDV-SCNC: 3.8 MMOL/L — SIGNIFICANT CHANGE UP (ref 3.4–4.5)
POTASSIUM SERPL-MCNC: 4.1 MMOL/L — SIGNIFICANT CHANGE UP (ref 3.5–5.3)
POTASSIUM SERPL-SCNC: 4.1 MMOL/L — SIGNIFICANT CHANGE UP (ref 3.5–5.3)
PROT SERPL-MCNC: 8 G/DL — SIGNIFICANT CHANGE UP (ref 6–8.3)
RBC # BLD: 2 M/UL — LOW (ref 4.2–5.8)
RBC # FLD: 28 % — HIGH (ref 10.3–14.5)
RETICS #: 321.4 10X3/UL — HIGH (ref 17–73)
RETICS/RBC NFR: 16.4 % — HIGH (ref 0.5–2.5)
RH IG SCN BLD-IMP: POSITIVE — SIGNIFICANT CHANGE UP
SAO2 % BLDV: 38 % — LOW (ref 60–85)
SICKLE CELLS BLD QL SMEAR: SIGNIFICANT CHANGE UP
SODIUM SERPL-SCNC: 138 MMOL/L — SIGNIFICANT CHANGE UP (ref 135–145)
TARGETS BLD QL SMEAR: SIGNIFICANT CHANGE UP
WBC # BLD: 14.57 K/UL — HIGH (ref 3.8–10.5)
WBC # FLD AUTO: 14.57 K/UL — HIGH (ref 3.8–10.5)

## 2017-01-27 PROCEDURE — 71010: CPT | Mod: 26

## 2017-01-27 PROCEDURE — 99223 1ST HOSP IP/OBS HIGH 75: CPT

## 2017-01-27 RX ORDER — MORPHINE SULFATE 50 MG/1
10 CAPSULE, EXTENDED RELEASE ORAL ONCE
Qty: 0 | Refills: 0 | Status: DISCONTINUED | OUTPATIENT
Start: 2017-01-27 | End: 2017-01-27

## 2017-01-27 RX ORDER — NALOXONE HYDROCHLORIDE 4 MG/.1ML
0.1 SPRAY NASAL
Qty: 0 | Refills: 0 | Status: DISCONTINUED | OUTPATIENT
Start: 2017-01-27 | End: 2017-02-10

## 2017-01-27 RX ORDER — SODIUM CHLORIDE 9 MG/ML
1000 INJECTION INTRAMUSCULAR; INTRAVENOUS; SUBCUTANEOUS ONCE
Qty: 0 | Refills: 0 | Status: COMPLETED | OUTPATIENT
Start: 2017-01-27 | End: 2017-01-27

## 2017-01-27 RX ORDER — ACETAMINOPHEN 500 MG
650 TABLET ORAL ONCE
Qty: 0 | Refills: 0 | Status: COMPLETED | OUTPATIENT
Start: 2017-01-27 | End: 2017-01-27

## 2017-01-27 RX ORDER — KETOROLAC TROMETHAMINE 30 MG/ML
30 SYRINGE (ML) INJECTION ONCE
Qty: 0 | Refills: 0 | Status: DISCONTINUED | OUTPATIENT
Start: 2017-01-27 | End: 2017-01-27

## 2017-01-27 RX ORDER — HYDROMORPHONE HYDROCHLORIDE 2 MG/ML
30 INJECTION INTRAMUSCULAR; INTRAVENOUS; SUBCUTANEOUS
Qty: 0 | Refills: 0 | Status: DISCONTINUED | OUTPATIENT
Start: 2017-01-27 | End: 2017-01-28

## 2017-01-27 RX ORDER — ONDANSETRON 8 MG/1
4 TABLET, FILM COATED ORAL EVERY 6 HOURS
Qty: 0 | Refills: 0 | Status: DISCONTINUED | OUTPATIENT
Start: 2017-01-27 | End: 2017-02-10

## 2017-01-27 RX ORDER — FOLIC ACID 0.8 MG
1 TABLET ORAL DAILY
Qty: 0 | Refills: 0 | Status: DISCONTINUED | OUTPATIENT
Start: 2017-01-27 | End: 2017-02-06

## 2017-01-27 RX ORDER — HYDROMORPHONE HYDROCHLORIDE 2 MG/ML
1 INJECTION INTRAMUSCULAR; INTRAVENOUS; SUBCUTANEOUS ONCE
Qty: 0 | Refills: 0 | Status: DISCONTINUED | OUTPATIENT
Start: 2017-01-27 | End: 2017-01-27

## 2017-01-27 RX ORDER — SODIUM CHLORIDE 9 MG/ML
1000 INJECTION, SOLUTION INTRAVENOUS
Qty: 0 | Refills: 0 | Status: DISCONTINUED | OUTPATIENT
Start: 2017-01-27 | End: 2017-02-04

## 2017-01-27 RX ORDER — FENTANYL CITRATE 50 UG/ML
50 INJECTION INTRAVENOUS ONCE
Qty: 0 | Refills: 0 | Status: DISCONTINUED | OUTPATIENT
Start: 2017-01-27 | End: 2017-01-27

## 2017-01-27 RX ORDER — HYDROMORPHONE HYDROCHLORIDE 2 MG/ML
2 INJECTION INTRAMUSCULAR; INTRAVENOUS; SUBCUTANEOUS ONCE
Qty: 0 | Refills: 0 | Status: DISCONTINUED | OUTPATIENT
Start: 2017-01-27 | End: 2017-01-27

## 2017-01-27 RX ORDER — HYDROMORPHONE HYDROCHLORIDE 2 MG/ML
1 INJECTION INTRAMUSCULAR; INTRAVENOUS; SUBCUTANEOUS
Qty: 0 | Refills: 0 | Status: DISCONTINUED | OUTPATIENT
Start: 2017-01-27 | End: 2017-01-28

## 2017-01-27 RX ORDER — KETOROLAC TROMETHAMINE 30 MG/ML
15 SYRINGE (ML) INJECTION EVERY 6 HOURS
Qty: 0 | Refills: 0 | Status: DISCONTINUED | OUTPATIENT
Start: 2017-01-27 | End: 2017-02-01

## 2017-01-27 RX ORDER — DIPHENHYDRAMINE HCL 50 MG
50 CAPSULE ORAL ONCE
Qty: 0 | Refills: 0 | Status: COMPLETED | OUTPATIENT
Start: 2017-01-27 | End: 2017-01-28

## 2017-01-27 RX ORDER — ACETAMINOPHEN 500 MG
650 TABLET ORAL EVERY 6 HOURS
Qty: 0 | Refills: 0 | Status: DISCONTINUED | OUTPATIENT
Start: 2017-01-27 | End: 2017-02-10

## 2017-01-27 RX ORDER — HYDROXYUREA 500 MG/1
2000 CAPSULE ORAL DAILY
Qty: 0 | Refills: 0 | Status: DISCONTINUED | OUTPATIENT
Start: 2017-01-27 | End: 2017-02-10

## 2017-01-27 RX ORDER — ENOXAPARIN SODIUM 100 MG/ML
40 INJECTION SUBCUTANEOUS EVERY 24 HOURS
Qty: 0 | Refills: 0 | Status: DISCONTINUED | OUTPATIENT
Start: 2017-01-27 | End: 2017-02-10

## 2017-01-27 RX ORDER — LIDOCAINE 4 G/100G
1 CREAM TOPICAL ONCE
Qty: 0 | Refills: 0 | Status: COMPLETED | OUTPATIENT
Start: 2017-01-27 | End: 2017-01-27

## 2017-01-27 RX ADMIN — HYDROMORPHONE HYDROCHLORIDE 2 MILLIGRAM(S): 2 INJECTION INTRAMUSCULAR; INTRAVENOUS; SUBCUTANEOUS at 10:24

## 2017-01-27 RX ADMIN — Medication 15 MILLIGRAM(S): at 19:59

## 2017-01-27 RX ADMIN — HYDROMORPHONE HYDROCHLORIDE 2 MILLIGRAM(S): 2 INJECTION INTRAMUSCULAR; INTRAVENOUS; SUBCUTANEOUS at 14:37

## 2017-01-27 RX ADMIN — SODIUM CHLORIDE 1000 MILLILITER(S): 9 INJECTION INTRAMUSCULAR; INTRAVENOUS; SUBCUTANEOUS at 10:24

## 2017-01-27 RX ADMIN — HYDROMORPHONE HYDROCHLORIDE 2 MILLIGRAM(S): 2 INJECTION INTRAMUSCULAR; INTRAVENOUS; SUBCUTANEOUS at 10:45

## 2017-01-27 RX ADMIN — HYDROMORPHONE HYDROCHLORIDE 2 MILLIGRAM(S): 2 INJECTION INTRAMUSCULAR; INTRAVENOUS; SUBCUTANEOUS at 12:30

## 2017-01-27 RX ADMIN — HYDROMORPHONE HYDROCHLORIDE 2 MILLIGRAM(S): 2 INJECTION INTRAMUSCULAR; INTRAVENOUS; SUBCUTANEOUS at 18:21

## 2017-01-27 RX ADMIN — Medication 30 MILLIGRAM(S): at 12:50

## 2017-01-27 RX ADMIN — MORPHINE SULFATE 10 MILLIGRAM(S): 50 CAPSULE, EXTENDED RELEASE ORAL at 17:22

## 2017-01-27 RX ADMIN — Medication 1 MILLIGRAM(S): at 22:28

## 2017-01-27 RX ADMIN — Medication 30 MILLIGRAM(S): at 12:30

## 2017-01-27 RX ADMIN — FENTANYL CITRATE 50 MICROGRAM(S): 50 INJECTION INTRAVENOUS at 17:01

## 2017-01-27 RX ADMIN — HYDROMORPHONE HYDROCHLORIDE 2 MILLIGRAM(S): 2 INJECTION INTRAMUSCULAR; INTRAVENOUS; SUBCUTANEOUS at 12:13

## 2017-01-27 RX ADMIN — Medication 650 MILLIGRAM(S): at 22:29

## 2017-01-27 RX ADMIN — HYDROMORPHONE HYDROCHLORIDE 1 MILLIGRAM(S): 2 INJECTION INTRAMUSCULAR; INTRAVENOUS; SUBCUTANEOUS at 19:10

## 2017-01-27 RX ADMIN — HYDROMORPHONE HYDROCHLORIDE 1 MILLIGRAM(S): 2 INJECTION INTRAMUSCULAR; INTRAVENOUS; SUBCUTANEOUS at 18:30

## 2017-01-27 RX ADMIN — Medication 650 MILLIGRAM(S): at 16:43

## 2017-01-27 RX ADMIN — HYDROMORPHONE HYDROCHLORIDE 1 MILLIGRAM(S): 2 INJECTION INTRAMUSCULAR; INTRAVENOUS; SUBCUTANEOUS at 19:40

## 2017-01-27 RX ADMIN — Medication 15 MILLIGRAM(S): at 21:29

## 2017-01-27 RX ADMIN — HYDROMORPHONE HYDROCHLORIDE 2 MILLIGRAM(S): 2 INJECTION INTRAMUSCULAR; INTRAVENOUS; SUBCUTANEOUS at 15:49

## 2017-01-27 RX ADMIN — HYDROMORPHONE HYDROCHLORIDE 2 MILLIGRAM(S): 2 INJECTION INTRAMUSCULAR; INTRAVENOUS; SUBCUTANEOUS at 15:07

## 2017-01-27 RX ADMIN — LIDOCAINE 1 PATCH: 4 CREAM TOPICAL at 17:22

## 2017-01-27 RX ADMIN — FENTANYL CITRATE 50 MICROGRAM(S): 50 INJECTION INTRAVENOUS at 17:02

## 2017-01-27 RX ADMIN — HYDROMORPHONE HYDROCHLORIDE 1 MILLIGRAM(S): 2 INJECTION INTRAMUSCULAR; INTRAVENOUS; SUBCUTANEOUS at 18:31

## 2017-01-27 RX ADMIN — HYDROMORPHONE HYDROCHLORIDE 30 MILLILITER(S): 2 INJECTION INTRAMUSCULAR; INTRAVENOUS; SUBCUTANEOUS at 20:27

## 2017-01-27 RX ADMIN — Medication 650 MILLIGRAM(S): at 17:23

## 2017-01-27 RX ADMIN — HYDROMORPHONE HYDROCHLORIDE 1 MILLIGRAM(S): 2 INJECTION INTRAMUSCULAR; INTRAVENOUS; SUBCUTANEOUS at 19:47

## 2017-01-27 RX ADMIN — HYDROMORPHONE HYDROCHLORIDE 1 MILLIGRAM(S): 2 INJECTION INTRAMUSCULAR; INTRAVENOUS; SUBCUTANEOUS at 20:17

## 2017-01-27 RX ADMIN — MORPHINE SULFATE 10 MILLIGRAM(S): 50 CAPSULE, EXTENDED RELEASE ORAL at 17:23

## 2017-01-27 NOTE — H&P ADULT. - HISTORY OF PRESENT ILLNESS
21M hx HbSS, acute chest in past (most recently as 1 year ago), s/p splenectomy and lap elsa, previous monthly exchange transfusions (no longer receiving), presents with severe lower back pain and buttock pain. The areas of pain are typical of his sickle cell crises and he believes the trigger this time was the fact that he had no heating in his house.  Has been compliant with all medication including Jadenu, Hydrea and Folate; pain meds were not controlling his pain at all.  Denies dysuria, cough, SOB, fevers, URI symptoms, and no sick contacts.  Pt was recently here earlier this month for a short duration crisis, but states that his pain now is 10x worse than before.  The patient currently is in intolerable pain, barely able to speak to me in complete sentences.      In ER vitals: T 98 , /82 RR 18 O2 sat 100%RA  Pertinent labs: H/H 6.5/18 (Baseline Hb ~7.5) Retic 16%, LDH 1200   Pt received: Total of 8mg IV dilaudid, 10mg IV morphine, Valium 5mgX1 and Fentanyl 50X1 with minimal relief of pain

## 2017-01-27 NOTE — ED ADULT NURSE NOTE - OBJECTIVE STATEMENT
Received pt A&Ox3 in no apparent distress at this time.#20g IVL to R hand, bloods drawn and sent to the lab. no s/s of infiltration noted at this time. medicated for pain as per MD order. vss. family at bedside. dispo pending

## 2017-01-27 NOTE — ED PROVIDER NOTE - ATTENDING CONTRIBUTION TO CARE
Evaluated, examined and discussed at length with staff and agree with continued evaluation and management plans....Iman

## 2017-01-27 NOTE — H&P ADULT. - PROBLEM SELECTOR PLAN 1
- Continue with aggressive IVF hydration with 1/2 normal saline  - IVP Dilaudid PRN while in ER, will initiate orders for PCA pump once on floors   - Folic Acid/Hydrea (needs heme approval)  - Incentive Spirometry   - Spoke with outpatient heme: agree with 1U pRBCs,  keep T&S active; no overt signs of acute chest   - Will need to confirm dose of Jadenu (for iron overload)

## 2017-01-27 NOTE — H&P ADULT. - ASSESSMENT
21M hx HbSS, acute chest in past (most recently as 1 year ago), s/p splenectomy and lap elsa, previous monthly exchange transfusions (no longer receiving), presents with severe lower back pain and buttock pain c/w sickle cell crisis

## 2017-01-27 NOTE — ED ADULT TRIAGE NOTE - CHIEF COMPLAINT QUOTE
Left sided back pain since this am. SSC. Pt endorses taking Oxycodone 10 mg.  Pt appears uncomfortable at this time.

## 2017-01-27 NOTE — ED PROVIDER NOTE - PROGRESS NOTE DETAILS
CRYSTAL Pt with persistent pain medication requirement likely requires hydromorphone pump.  Switched to fentanyl in the interim.  Discussed case with Pt's hematologist Dr. Nguyễn given Pt's H/H at [facility] with no sign of acute chest and labs suggestive of VOC will not transfuse.  Discussed case with hospitalist will likely transition to patient controlled pain medication.  Pt admitted to DR. Main. MAR text paged. Discussed with Dr. Nguyễn regarding recent admission, recurrent symptoms and current painful crisis characteristics, noting H/H levels similar to previous admission and retic count increasing...consideration of transfusion even with stable H/H considering clinical picture may well be warranted.  Will continue hydration, recheck H/H in this regard and plan for this possibility as well as more aggressive pain management.  Discussed with Dr. Valentine and pt admitted accordingly.

## 2017-01-27 NOTE — ED PROVIDER NOTE - OBJECTIVE STATEMENT
21 YOM PMH sickle cell, +acute chest, s/p lap elsa, s/p lap splenectomy p/w lower back pain radiating to the buttocks since last night. Pain typical of sickle cell crisis.  No fevers, chills, sweats, weight loss, fatigue, or malaise. No chest pain or SOB.  Pain typical of prior sickle cell crisis.

## 2017-01-27 NOTE — ED PROVIDER NOTE - MEDICAL DECISION MAKING DETAILS
22 yo man with sickle cell disease presents with pain in low back and left buttock/leg since last night.  This is a typical presentation of his crisis pain, familiar to him from past.  Last admission was just over a week ago.  No cough, fever, chills, chest pain or shortness of breath.  "I think I might need a transfusion"...  Exam here with stable vitals, afebrile.  Pale conj. anicteric, dry muc membs.  CN intact.  Neck supple no nodes.  Lungs clear throughout.  Heart with gr 1-2/6 murmur precordial without radiation.  Back nontneder to palpation.  Abdomen nontender, nondistended.  Extremities withtout focal pain to palpation, weakness, numbness or swelling.  Clinically with worsening sickle disease.  Will hydrate, pain meds, consider admission and will follow with staff...Iman

## 2017-01-28 PROBLEM — E83.111 HEMOCHROMATOSIS DUE TO REPEATED RED BLOOD CELL TRANSFUSIONS: Chronic | Status: ACTIVE | Noted: 2017-01-17

## 2017-01-28 LAB
ALBUMIN SERPL ELPH-MCNC: 4.3 G/DL — SIGNIFICANT CHANGE UP (ref 3.3–5)
ALP SERPL-CCNC: 83 U/L — SIGNIFICANT CHANGE UP (ref 40–120)
ALT FLD-CCNC: 31 U/L — SIGNIFICANT CHANGE UP (ref 4–41)
AST SERPL-CCNC: 188 U/L — HIGH (ref 4–40)
BASOPHILS # BLD AUTO: 0.25 K/UL — HIGH (ref 0–0.2)
BASOPHILS NFR BLD AUTO: 1.4 % — SIGNIFICANT CHANGE UP (ref 0–2)
BILIRUB SERPL-MCNC: 4.4 MG/DL — HIGH (ref 0.2–1.2)
BUN SERPL-MCNC: 18 MG/DL — SIGNIFICANT CHANGE UP (ref 7–23)
CALCIUM SERPL-MCNC: 8.8 MG/DL — SIGNIFICANT CHANGE UP (ref 8.4–10.5)
CHLORIDE SERPL-SCNC: 101 MMOL/L — SIGNIFICANT CHANGE UP (ref 98–107)
CO2 SERPL-SCNC: 22 MMOL/L — SIGNIFICANT CHANGE UP (ref 22–31)
CREAT SERPL-MCNC: 0.91 MG/DL — SIGNIFICANT CHANGE UP (ref 0.5–1.3)
EOSINOPHIL # BLD AUTO: 0.01 K/UL — SIGNIFICANT CHANGE UP (ref 0–0.5)
EOSINOPHIL NFR BLD AUTO: 0.1 % — SIGNIFICANT CHANGE UP (ref 0–6)
GLUCOSE SERPL-MCNC: 101 MG/DL — HIGH (ref 70–99)
HCT VFR BLD CALC: 17.9 % — CRITICAL LOW (ref 39–50)
HGB BLD-MCNC: 6.5 G/DL — CRITICAL LOW (ref 13–17)
IMM GRANULOCYTES NFR BLD AUTO: 2.1 % — HIGH (ref 0–1.5)
LDH SERPL L TO P-CCNC: 2077 U/L — HIGH (ref 135–225)
LYMPHOCYTES # BLD AUTO: 1.55 K/UL — SIGNIFICANT CHANGE UP (ref 1–3.3)
LYMPHOCYTES # BLD AUTO: 8.4 % — LOW (ref 13–44)
MAGNESIUM SERPL-MCNC: 1.6 MG/DL — SIGNIFICANT CHANGE UP (ref 1.6–2.6)
MANUAL SMEAR VERIFICATION: SIGNIFICANT CHANGE UP
MCHC RBC-ENTMCNC: 32.3 PG — SIGNIFICANT CHANGE UP (ref 27–34)
MCHC RBC-ENTMCNC: 36.3 % — HIGH (ref 32–36)
MCV RBC AUTO: 89.1 FL — SIGNIFICANT CHANGE UP (ref 80–100)
MONOCYTES # BLD AUTO: 2.16 K/UL — HIGH (ref 0–0.9)
MONOCYTES NFR BLD AUTO: 11.7 % — SIGNIFICANT CHANGE UP (ref 2–14)
NEUTROPHILS # BLD AUTO: 14.05 K/UL — HIGH (ref 1.8–7.4)
NEUTROPHILS NFR BLD AUTO: 76.3 % — SIGNIFICANT CHANGE UP (ref 43–77)
PHOSPHATE SERPL-MCNC: 5.4 MG/DL — HIGH (ref 2.5–4.5)
PLATELET # BLD AUTO: 403 K/UL — HIGH (ref 150–400)
PMV BLD: 9.5 FL — SIGNIFICANT CHANGE UP (ref 7–13)
POTASSIUM SERPL-MCNC: 4.5 MMOL/L — SIGNIFICANT CHANGE UP (ref 3.5–5.3)
POTASSIUM SERPL-SCNC: 4.5 MMOL/L — SIGNIFICANT CHANGE UP (ref 3.5–5.3)
PROT SERPL-MCNC: 7.4 G/DL — SIGNIFICANT CHANGE UP (ref 6–8.3)
RBC # BLD: 2.01 M/UL — LOW (ref 4.2–5.8)
RBC # FLD: 24.2 % — HIGH (ref 10.3–14.5)
RETICS #: 289 10X3/UL — HIGH (ref 17–73)
RETICS/RBC NFR: 14.4 % — HIGH (ref 0.5–2.5)
SODIUM SERPL-SCNC: 139 MMOL/L — SIGNIFICANT CHANGE UP (ref 135–145)
SPECIMEN SOURCE: SIGNIFICANT CHANGE UP
SPECIMEN SOURCE: SIGNIFICANT CHANGE UP
WBC # BLD: 18.4 K/UL — HIGH (ref 3.8–10.5)
WBC # FLD AUTO: 18.4 K/UL — HIGH (ref 3.8–10.5)

## 2017-01-28 PROCEDURE — 99233 SBSQ HOSP IP/OBS HIGH 50: CPT

## 2017-01-28 RX ORDER — HYDROMORPHONE HYDROCHLORIDE 2 MG/ML
30 INJECTION INTRAMUSCULAR; INTRAVENOUS; SUBCUTANEOUS
Qty: 0 | Refills: 0 | Status: DISCONTINUED | OUTPATIENT
Start: 2017-01-28 | End: 2017-02-02

## 2017-01-28 RX ORDER — HYDROMORPHONE HYDROCHLORIDE 2 MG/ML
30 INJECTION INTRAMUSCULAR; INTRAVENOUS; SUBCUTANEOUS
Qty: 0 | Refills: 0 | Status: DISCONTINUED | OUTPATIENT
Start: 2017-01-28 | End: 2017-01-28

## 2017-01-28 RX ADMIN — LIDOCAINE 1 PATCH: 4 CREAM TOPICAL at 05:36

## 2017-01-28 RX ADMIN — HYDROMORPHONE HYDROCHLORIDE 1 MILLIGRAM(S): 2 INJECTION INTRAMUSCULAR; INTRAVENOUS; SUBCUTANEOUS at 00:38

## 2017-01-28 RX ADMIN — Medication 650 MILLIGRAM(S): at 12:18

## 2017-01-28 RX ADMIN — HYDROMORPHONE HYDROCHLORIDE 30 MILLILITER(S): 2 INJECTION INTRAMUSCULAR; INTRAVENOUS; SUBCUTANEOUS at 07:19

## 2017-01-28 RX ADMIN — HYDROMORPHONE HYDROCHLORIDE 1 MILLIGRAM(S): 2 INJECTION INTRAMUSCULAR; INTRAVENOUS; SUBCUTANEOUS at 00:08

## 2017-01-28 RX ADMIN — SODIUM CHLORIDE 150 MILLILITER(S): 9 INJECTION, SOLUTION INTRAVENOUS at 09:34

## 2017-01-28 RX ADMIN — Medication 50 MILLIGRAM(S): at 00:44

## 2017-01-28 RX ADMIN — Medication 15 MILLIGRAM(S): at 20:05

## 2017-01-28 RX ADMIN — HYDROMORPHONE HYDROCHLORIDE 30 MILLILITER(S): 2 INJECTION INTRAMUSCULAR; INTRAVENOUS; SUBCUTANEOUS at 09:31

## 2017-01-28 RX ADMIN — Medication 650 MILLIGRAM(S): at 05:38

## 2017-01-28 RX ADMIN — Medication 15 MILLIGRAM(S): at 12:47

## 2017-01-28 RX ADMIN — HYDROMORPHONE HYDROCHLORIDE 30 MILLILITER(S): 2 INJECTION INTRAMUSCULAR; INTRAVENOUS; SUBCUTANEOUS at 01:44

## 2017-01-28 RX ADMIN — Medication 1 MILLIGRAM(S): at 12:19

## 2017-01-28 RX ADMIN — HYDROMORPHONE HYDROCHLORIDE 30 MILLILITER(S): 2 INJECTION INTRAMUSCULAR; INTRAVENOUS; SUBCUTANEOUS at 19:08

## 2017-01-28 RX ADMIN — Medication 15 MILLIGRAM(S): at 19:35

## 2017-01-28 RX ADMIN — Medication 15 MILLIGRAM(S): at 12:19

## 2017-01-29 LAB
ALBUMIN SERPL ELPH-MCNC: 4 G/DL — SIGNIFICANT CHANGE UP (ref 3.3–5)
ALP SERPL-CCNC: 129 U/L — HIGH (ref 40–120)
ALT FLD-CCNC: 35 U/L — SIGNIFICANT CHANGE UP (ref 4–41)
AMORPH CRY # UR COMP ASSIST: SIGNIFICANT CHANGE UP (ref 0–0)
APPEARANCE UR: SIGNIFICANT CHANGE UP
AST SERPL-CCNC: 185 U/L — HIGH (ref 4–40)
BASOPHILS # BLD AUTO: 0.29 K/UL — HIGH (ref 0–0.2)
BASOPHILS NFR BLD AUTO: 1.9 % — SIGNIFICANT CHANGE UP (ref 0–2)
BILIRUB DIRECT SERPL-MCNC: 3.1 MG/DL — HIGH (ref 0.1–0.2)
BILIRUB SERPL-MCNC: 8.3 MG/DL — HIGH (ref 0.2–1.2)
BILIRUB UR-MCNC: NEGATIVE — SIGNIFICANT CHANGE UP
BLOOD UR QL VISUAL: HIGH
BUN SERPL-MCNC: 33 MG/DL — HIGH (ref 7–23)
CALCIUM SERPL-MCNC: 9 MG/DL — SIGNIFICANT CHANGE UP (ref 8.4–10.5)
CHLORIDE SERPL-SCNC: 103 MMOL/L — SIGNIFICANT CHANGE UP (ref 98–107)
CO2 SERPL-SCNC: 21 MMOL/L — LOW (ref 22–31)
COLOR SPEC: YELLOW — SIGNIFICANT CHANGE UP
CREAT SERPL-MCNC: 1.46 MG/DL — HIGH (ref 0.5–1.3)
EOSINOPHIL # BLD AUTO: 0 K/UL — SIGNIFICANT CHANGE UP (ref 0–0.5)
EOSINOPHIL NFR BLD AUTO: 0 % — SIGNIFICANT CHANGE UP (ref 0–6)
GLUCOSE SERPL-MCNC: 111 MG/DL — HIGH (ref 70–99)
GLUCOSE UR-MCNC: NEGATIVE — SIGNIFICANT CHANGE UP
HCT VFR BLD CALC: 13.8 % — CRITICAL LOW (ref 39–50)
HGB BLD-MCNC: 5.1 G/DL — CRITICAL LOW (ref 13–17)
IMM GRANULOCYTES NFR BLD AUTO: 0.9 % — SIGNIFICANT CHANGE UP (ref 0–1.5)
KETONES UR-MCNC: NEGATIVE — SIGNIFICANT CHANGE UP
LDH SERPL L TO P-CCNC: > 2500 U/L — HIGH (ref 135–225)
LEUKOCYTE ESTERASE UR-ACNC: NEGATIVE — SIGNIFICANT CHANGE UP
LYMPHOCYTES # BLD AUTO: 1.22 K/UL — SIGNIFICANT CHANGE UP (ref 1–3.3)
LYMPHOCYTES # BLD AUTO: 7.9 % — LOW (ref 13–44)
MAGNESIUM SERPL-MCNC: 1.9 MG/DL — SIGNIFICANT CHANGE UP (ref 1.6–2.6)
MANUAL SMEAR VERIFICATION: SIGNIFICANT CHANGE UP
MCHC RBC-ENTMCNC: 31.9 PG — SIGNIFICANT CHANGE UP (ref 27–34)
MCHC RBC-ENTMCNC: 37 % — HIGH (ref 32–36)
MCV RBC AUTO: 86.3 FL — SIGNIFICANT CHANGE UP (ref 80–100)
MONOCYTES # BLD AUTO: 1.37 K/UL — HIGH (ref 0–0.9)
MONOCYTES NFR BLD AUTO: 8.8 % — SIGNIFICANT CHANGE UP (ref 2–14)
NEUTROPHILS # BLD AUTO: 12.47 K/UL — HIGH (ref 1.8–7.4)
NEUTROPHILS NFR BLD AUTO: 80.5 % — HIGH (ref 43–77)
NITRITE UR-MCNC: NEGATIVE — SIGNIFICANT CHANGE UP
NON-SQ EPI CELLS # UR AUTO: <1 — SIGNIFICANT CHANGE UP
PH UR: 6 — SIGNIFICANT CHANGE UP (ref 4.6–8)
PHOSPHATE SERPL-MCNC: 4.5 MG/DL — SIGNIFICANT CHANGE UP (ref 2.5–4.5)
PLATELET # BLD AUTO: 293 K/UL — SIGNIFICANT CHANGE UP (ref 150–400)
PMV BLD: 10.2 FL — SIGNIFICANT CHANGE UP (ref 7–13)
POTASSIUM SERPL-MCNC: 5.1 MMOL/L — SIGNIFICANT CHANGE UP (ref 3.5–5.3)
POTASSIUM SERPL-SCNC: 5.1 MMOL/L — SIGNIFICANT CHANGE UP (ref 3.5–5.3)
PROT SERPL-MCNC: 7 G/DL — SIGNIFICANT CHANGE UP (ref 6–8.3)
PROT UR-MCNC: 30 — SIGNIFICANT CHANGE UP
RBC # BLD: 1.6 M/UL — LOW (ref 4.2–5.8)
RBC # FLD: 25.5 % — HIGH (ref 10.3–14.5)
RBC CASTS # UR COMP ASSIST: SIGNIFICANT CHANGE UP (ref 0–?)
RETICS #: 273.9 10X3/UL — HIGH (ref 17–73)
RETICS/RBC NFR: 17.1 % — HIGH (ref 0.5–2.5)
SODIUM SERPL-SCNC: 139 MMOL/L — SIGNIFICANT CHANGE UP (ref 135–145)
SP GR SPEC: 1.01 — SIGNIFICANT CHANGE UP (ref 1–1.03)
SQUAMOUS # UR AUTO: SIGNIFICANT CHANGE UP
UROBILINOGEN FLD QL: 1 E.U. — SIGNIFICANT CHANGE UP (ref 0.1–0.2)
WBC # BLD: 15.49 K/UL — HIGH (ref 3.8–10.5)
WBC # FLD AUTO: 15.49 K/UL — HIGH (ref 3.8–10.5)

## 2017-01-29 PROCEDURE — 99233 SBSQ HOSP IP/OBS HIGH 50: CPT | Mod: GC

## 2017-01-29 PROCEDURE — 71010: CPT | Mod: 26

## 2017-01-29 RX ORDER — IBUPROFEN 200 MG
400 TABLET ORAL ONCE
Qty: 0 | Refills: 0 | Status: COMPLETED | OUTPATIENT
Start: 2017-01-29 | End: 2017-01-29

## 2017-01-29 RX ORDER — ACETAMINOPHEN 500 MG
650 TABLET ORAL ONCE
Qty: 0 | Refills: 0 | Status: COMPLETED | OUTPATIENT
Start: 2017-01-29 | End: 2017-01-29

## 2017-01-29 RX ORDER — DOCUSATE SODIUM 100 MG
100 CAPSULE ORAL THREE TIMES A DAY
Qty: 0 | Refills: 0 | Status: DISCONTINUED | OUTPATIENT
Start: 2017-01-29 | End: 2017-02-10

## 2017-01-29 RX ORDER — SODIUM CHLORIDE 9 MG/ML
1000 INJECTION INTRAMUSCULAR; INTRAVENOUS; SUBCUTANEOUS ONCE
Qty: 0 | Refills: 0 | Status: COMPLETED | OUTPATIENT
Start: 2017-01-29 | End: 2017-01-29

## 2017-01-29 RX ORDER — SENNA PLUS 8.6 MG/1
2 TABLET ORAL AT BEDTIME
Qty: 0 | Refills: 0 | Status: DISCONTINUED | OUTPATIENT
Start: 2017-01-29 | End: 2017-02-10

## 2017-01-29 RX ORDER — DIPHENHYDRAMINE HCL 50 MG
25 CAPSULE ORAL ONCE
Qty: 0 | Refills: 0 | Status: COMPLETED | OUTPATIENT
Start: 2017-01-29 | End: 2017-01-29

## 2017-01-29 RX ADMIN — SODIUM CHLORIDE 150 MILLILITER(S): 9 INJECTION, SOLUTION INTRAVENOUS at 10:41

## 2017-01-29 RX ADMIN — Medication 650 MILLIGRAM(S): at 02:08

## 2017-01-29 RX ADMIN — HYDROMORPHONE HYDROCHLORIDE 30 MILLILITER(S): 2 INJECTION INTRAMUSCULAR; INTRAVENOUS; SUBCUTANEOUS at 13:13

## 2017-01-29 RX ADMIN — Medication 15 MILLIGRAM(S): at 02:39

## 2017-01-29 RX ADMIN — Medication 400 MILLIGRAM(S): at 21:02

## 2017-01-29 RX ADMIN — HYDROMORPHONE HYDROCHLORIDE 30 MILLILITER(S): 2 INJECTION INTRAMUSCULAR; INTRAVENOUS; SUBCUTANEOUS at 07:25

## 2017-01-29 RX ADMIN — Medication 650 MILLIGRAM(S): at 18:44

## 2017-01-29 RX ADMIN — ONDANSETRON 4 MILLIGRAM(S): 8 TABLET, FILM COATED ORAL at 01:37

## 2017-01-29 RX ADMIN — Medication 1 MILLIGRAM(S): at 11:40

## 2017-01-29 RX ADMIN — Medication 15 MILLIGRAM(S): at 02:09

## 2017-01-29 RX ADMIN — HYDROMORPHONE HYDROCHLORIDE 30 MILLILITER(S): 2 INJECTION INTRAMUSCULAR; INTRAVENOUS; SUBCUTANEOUS at 20:07

## 2017-01-29 RX ADMIN — SODIUM CHLORIDE 2000 MILLILITER(S): 9 INJECTION INTRAMUSCULAR; INTRAVENOUS; SUBCUTANEOUS at 09:47

## 2017-01-29 RX ADMIN — Medication 25 MILLIGRAM(S): at 18:43

## 2017-01-29 NOTE — PROVIDER CONTACT NOTE (OTHER) - SITUATION
Patient's parents are concerned regarding recent hemoglobin and hematocrit; requesting that he be transfused.

## 2017-01-30 LAB
ALBUMIN SERPL ELPH-MCNC: 3.6 G/DL — SIGNIFICANT CHANGE UP (ref 3.3–5)
ALP SERPL-CCNC: 128 U/L — HIGH (ref 40–120)
ALT FLD-CCNC: 29 U/L — SIGNIFICANT CHANGE UP (ref 4–41)
ANISOCYTOSIS BLD QL: SLIGHT — SIGNIFICANT CHANGE UP
AST SERPL-CCNC: 96 U/L — HIGH (ref 4–40)
BASOPHILS # BLD AUTO: 0.12 K/UL — SIGNIFICANT CHANGE UP (ref 0–0.2)
BASOPHILS NFR BLD AUTO: 0.9 % — SIGNIFICANT CHANGE UP (ref 0–2)
BASOPHILS NFR SPEC: 0 % — SIGNIFICANT CHANGE UP (ref 0–2)
BILIRUB DIRECT SERPL-MCNC: 1.3 MG/DL — HIGH (ref 0.1–0.2)
BILIRUB SERPL-MCNC: 6 MG/DL — HIGH (ref 0.2–1.2)
BLD GP AB SCN SERPL QL: POSITIVE — SIGNIFICANT CHANGE UP
BUN SERPL-MCNC: 21 MG/DL — SIGNIFICANT CHANGE UP (ref 7–23)
CALCIUM SERPL-MCNC: 8.8 MG/DL — SIGNIFICANT CHANGE UP (ref 8.4–10.5)
CHLORIDE SERPL-SCNC: 106 MMOL/L — SIGNIFICANT CHANGE UP (ref 98–107)
CO2 SERPL-SCNC: 23 MMOL/L — SIGNIFICANT CHANGE UP (ref 22–31)
CREAT SERPL-MCNC: 0.97 MG/DL — SIGNIFICANT CHANGE UP (ref 0.5–1.3)
EOSINOPHIL # BLD AUTO: 0.01 K/UL — SIGNIFICANT CHANGE UP (ref 0–0.5)
EOSINOPHIL NFR BLD AUTO: 0.1 % — SIGNIFICANT CHANGE UP (ref 0–6)
EOSINOPHIL NFR FLD: 0 % — SIGNIFICANT CHANGE UP (ref 0–6)
GLUCOSE SERPL-MCNC: 123 MG/DL — HIGH (ref 70–99)
HCT VFR BLD CALC: 14 % — CRITICAL LOW (ref 39–50)
HGB A MFR BLD: 23.8 % — LOW
HGB A2 MFR BLD: 4.2 % — HIGH (ref 2.4–3.5)
HGB BLD-MCNC: 5.1 G/DL — CRITICAL LOW (ref 13–17)
HGB F MFR BLD: 3.4 % — HIGH (ref 0–1.5)
HGB S MFR BLD: 68.6 % — HIGH (ref 0–0)
IMM GRANULOCYTES NFR BLD AUTO: 1 % — SIGNIFICANT CHANGE UP (ref 0–1.5)
LDH SERPL L TO P-CCNC: 2430 U/L — HIGH (ref 135–225)
LYMPHOCYTES # BLD AUTO: 1.51 K/UL — SIGNIFICANT CHANGE UP (ref 1–3.3)
LYMPHOCYTES # BLD AUTO: 11.3 % — LOW (ref 13–44)
LYMPHOCYTES NFR SPEC AUTO: 14 % — SIGNIFICANT CHANGE UP (ref 13–44)
MACROCYTES BLD QL: SLIGHT — SIGNIFICANT CHANGE UP
MAGNESIUM SERPL-MCNC: 2.1 MG/DL — SIGNIFICANT CHANGE UP (ref 1.6–2.6)
MANUAL SMEAR VERIFICATION: SIGNIFICANT CHANGE UP
MCHC RBC-ENTMCNC: 31.5 PG — SIGNIFICANT CHANGE UP (ref 27–34)
MCHC RBC-ENTMCNC: 36.4 % — HIGH (ref 32–36)
MCV RBC AUTO: 86.4 FL — SIGNIFICANT CHANGE UP (ref 80–100)
MONOCYTES # BLD AUTO: 1.67 K/UL — HIGH (ref 0–0.9)
MONOCYTES NFR BLD AUTO: 12.5 % — SIGNIFICANT CHANGE UP (ref 2–14)
MONOCYTES NFR BLD: 10 % — HIGH (ref 2–9)
NEUTROPHIL AB SER-ACNC: 76 % — SIGNIFICANT CHANGE UP (ref 43–77)
NEUTROPHILS # BLD AUTO: 9.96 K/UL — HIGH (ref 1.8–7.4)
NEUTROPHILS NFR BLD AUTO: 74.2 % — SIGNIFICANT CHANGE UP (ref 43–77)
NRBC # BLD: 88 /100WBC — SIGNIFICANT CHANGE UP
PHOSPHATE SERPL-MCNC: 3.2 MG/DL — SIGNIFICANT CHANGE UP (ref 2.5–4.5)
PLATELET # BLD AUTO: 262 K/UL — SIGNIFICANT CHANGE UP (ref 150–400)
PLATELET COUNT - ESTIMATE: NORMAL — SIGNIFICANT CHANGE UP
PMV BLD: 9.5 FL — SIGNIFICANT CHANGE UP (ref 7–13)
POIKILOCYTOSIS BLD QL AUTO: SIGNIFICANT CHANGE UP
POLYCHROMASIA BLD QL SMEAR: SLIGHT — SIGNIFICANT CHANGE UP
POTASSIUM SERPL-MCNC: 4.5 MMOL/L — SIGNIFICANT CHANGE UP (ref 3.5–5.3)
POTASSIUM SERPL-SCNC: 4.5 MMOL/L — SIGNIFICANT CHANGE UP (ref 3.5–5.3)
PROT SERPL-MCNC: 6.6 G/DL — SIGNIFICANT CHANGE UP (ref 6–8.3)
RBC # BLD: 1.62 M/UL — LOW (ref 4.2–5.8)
RBC # FLD: 23.9 % — HIGH (ref 10.3–14.5)
RETICS #: 239.9 10X3/UL — HIGH (ref 17–73)
RETICS/RBC NFR: 14.8 % — HIGH (ref 0.5–2.5)
RH IG SCN BLD-IMP: POSITIVE — SIGNIFICANT CHANGE UP
SICKLE CELLS BLD QL SMEAR: SIGNIFICANT CHANGE UP
SODIUM SERPL-SCNC: 142 MMOL/L — SIGNIFICANT CHANGE UP (ref 135–145)
TARGETS BLD QL SMEAR: SLIGHT — SIGNIFICANT CHANGE UP
WBC # BLD: 13.4 K/UL — HIGH (ref 3.8–10.5)
WBC # FLD AUTO: 13.4 K/UL — HIGH (ref 3.8–10.5)

## 2017-01-30 PROCEDURE — 86077 PHYS BLOOD BANK SERV XMATCH: CPT

## 2017-01-30 PROCEDURE — 99233 SBSQ HOSP IP/OBS HIGH 50: CPT

## 2017-01-30 RX ORDER — KETOROLAC TROMETHAMINE 30 MG/ML
15 SYRINGE (ML) INJECTION ONCE
Qty: 0 | Refills: 0 | Status: DISCONTINUED | OUTPATIENT
Start: 2017-01-30 | End: 2017-01-30

## 2017-01-30 RX ADMIN — Medication 100 MILLIGRAM(S): at 15:01

## 2017-01-30 RX ADMIN — Medication 15 MILLIGRAM(S): at 22:40

## 2017-01-30 RX ADMIN — HYDROMORPHONE HYDROCHLORIDE 30 MILLILITER(S): 2 INJECTION INTRAMUSCULAR; INTRAVENOUS; SUBCUTANEOUS at 19:20

## 2017-01-30 RX ADMIN — Medication 100 MILLIGRAM(S): at 00:00

## 2017-01-30 RX ADMIN — Medication 100 MILLIGRAM(S): at 22:20

## 2017-01-30 RX ADMIN — Medication 100 MILLIGRAM(S): at 06:45

## 2017-01-30 RX ADMIN — HYDROXYUREA 2000 MILLIGRAM(S): 500 CAPSULE ORAL at 22:10

## 2017-01-30 RX ADMIN — Medication 15 MILLIGRAM(S): at 10:40

## 2017-01-30 RX ADMIN — HYDROMORPHONE HYDROCHLORIDE 30 MILLILITER(S): 2 INJECTION INTRAMUSCULAR; INTRAVENOUS; SUBCUTANEOUS at 14:20

## 2017-01-30 RX ADMIN — HYDROXYUREA 2000 MILLIGRAM(S): 500 CAPSULE ORAL at 00:00

## 2017-01-30 RX ADMIN — Medication 650 MILLIGRAM(S): at 22:10

## 2017-01-30 RX ADMIN — SENNA PLUS 2 TABLET(S): 8.6 TABLET ORAL at 00:00

## 2017-01-30 RX ADMIN — SENNA PLUS 2 TABLET(S): 8.6 TABLET ORAL at 22:20

## 2017-01-30 RX ADMIN — Medication 1 MILLIGRAM(S): at 15:01

## 2017-01-30 RX ADMIN — HYDROMORPHONE HYDROCHLORIDE 30 MILLILITER(S): 2 INJECTION INTRAMUSCULAR; INTRAVENOUS; SUBCUTANEOUS at 08:19

## 2017-01-30 RX ADMIN — Medication 15 MILLIGRAM(S): at 22:10

## 2017-01-30 RX ADMIN — Medication 15 MILLIGRAM(S): at 10:55

## 2017-01-31 LAB
ALBUMIN SERPL ELPH-MCNC: 3.6 G/DL — SIGNIFICANT CHANGE UP (ref 3.3–5)
ALP SERPL-CCNC: 138 U/L — HIGH (ref 40–120)
ALT FLD-CCNC: 27 U/L — SIGNIFICANT CHANGE UP (ref 4–41)
AST SERPL-CCNC: 80 U/L — HIGH (ref 4–40)
BASOPHILS # BLD AUTO: 0.14 K/UL — SIGNIFICANT CHANGE UP (ref 0–0.2)
BASOPHILS NFR BLD AUTO: 0.5 % — SIGNIFICANT CHANGE UP (ref 0–2)
BILIRUB DIRECT SERPL-MCNC: 1.6 MG/DL — HIGH (ref 0.1–0.2)
BILIRUB SERPL-MCNC: 5.2 MG/DL — HIGH (ref 0.2–1.2)
BUN SERPL-MCNC: 16 MG/DL — SIGNIFICANT CHANGE UP (ref 7–23)
CALCIUM SERPL-MCNC: 8.6 MG/DL — SIGNIFICANT CHANGE UP (ref 8.4–10.5)
CHLORIDE SERPL-SCNC: 102 MMOL/L — SIGNIFICANT CHANGE UP (ref 98–107)
CO2 SERPL-SCNC: 21 MMOL/L — LOW (ref 22–31)
CREAT SERPL-MCNC: 0.76 MG/DL — SIGNIFICANT CHANGE UP (ref 0.5–1.3)
EOSINOPHIL # BLD AUTO: 0.01 K/UL — SIGNIFICANT CHANGE UP (ref 0–0.5)
EOSINOPHIL NFR BLD AUTO: 0 % — SIGNIFICANT CHANGE UP (ref 0–6)
GLUCOSE SERPL-MCNC: 101 MG/DL — HIGH (ref 70–99)
HCT VFR BLD CALC: 14.7 % — CRITICAL LOW (ref 39–50)
HGB A MFR BLD: 0 % — LOW
HGB A MFR BLD: 10.8 % — LOW
HGB A MFR BLD: 9.6 % — LOW
HGB A2 MFR BLD: 4.3 % — HIGH (ref 2.4–3.5)
HGB A2 MFR BLD: 4.4 % — HIGH (ref 2.4–3.5)
HGB A2 MFR BLD: 4.6 % — HIGH (ref 2.4–3.5)
HGB BLD-MCNC: 5.2 G/DL — CRITICAL LOW (ref 13–17)
HGB ELECT COMMENT: SIGNIFICANT CHANGE UP
HGB F MFR BLD: 3.1 % — HIGH (ref 0–1.5)
HGB F MFR BLD: 3.3 % — HIGH (ref 0–1.5)
HGB F MFR BLD: 3.3 % — HIGH (ref 0–1.5)
HGB S MFR BLD: 81.5 % — HIGH (ref 0–0)
HGB S MFR BLD: 83 % — HIGH (ref 0–0)
HGB S MFR BLD: 92.1 % — HIGH (ref 0–0)
IMM GRANULOCYTES NFR BLD AUTO: 0.5 % — SIGNIFICANT CHANGE UP (ref 0–1.5)
LDH SERPL L TO P-CCNC: 2170 U/L — HIGH (ref 135–225)
LYMPHOCYTES # BLD AUTO: 15 K/UL — HIGH (ref 1–3.3)
LYMPHOCYTES # BLD AUTO: 58 % — HIGH (ref 13–44)
MANUAL SMEAR VERIFICATION: SIGNIFICANT CHANGE UP
MCHC RBC-ENTMCNC: 31 PG — SIGNIFICANT CHANGE UP (ref 27–34)
MCHC RBC-ENTMCNC: 35.4 % — SIGNIFICANT CHANGE UP (ref 32–36)
MCV RBC AUTO: 87.5 FL — SIGNIFICANT CHANGE UP (ref 80–100)
MONOCYTES # BLD AUTO: 1.67 K/UL — HIGH (ref 0–0.9)
MONOCYTES NFR BLD AUTO: 6.5 % — SIGNIFICANT CHANGE UP (ref 2–14)
NEUTROPHILS # BLD AUTO: 8.94 K/UL — HIGH (ref 1.8–7.4)
NEUTROPHILS NFR BLD AUTO: 34.5 % — LOW (ref 43–77)
PLATELET # BLD AUTO: 314 K/UL — SIGNIFICANT CHANGE UP (ref 150–400)
PMV BLD: 9.6 FL — SIGNIFICANT CHANGE UP (ref 7–13)
POTASSIUM SERPL-MCNC: 4.6 MMOL/L — SIGNIFICANT CHANGE UP (ref 3.5–5.3)
POTASSIUM SERPL-SCNC: 4.6 MMOL/L — SIGNIFICANT CHANGE UP (ref 3.5–5.3)
PROT SERPL-MCNC: 7 G/DL — SIGNIFICANT CHANGE UP (ref 6–8.3)
RBC # BLD: 1.68 M/UL — LOW (ref 4.2–5.8)
RBC # FLD: 24.2 % — HIGH (ref 10.3–14.5)
RETICS #: 280.9 10X3/UL — HIGH (ref 17–73)
RETICS/RBC NFR: 16.7 % — HIGH (ref 0.5–2.5)
SODIUM SERPL-SCNC: 141 MMOL/L — SIGNIFICANT CHANGE UP (ref 135–145)
WBC # BLD: 25.88 K/UL — HIGH (ref 3.8–10.5)
WBC # FLD AUTO: 25.88 K/UL — HIGH (ref 3.8–10.5)

## 2017-01-31 PROCEDURE — 30903 CONTROL OF NOSEBLEED: CPT

## 2017-01-31 PROCEDURE — 76700 US EXAM ABDOM COMPLETE: CPT | Mod: 26

## 2017-01-31 PROCEDURE — 99233 SBSQ HOSP IP/OBS HIGH 50: CPT

## 2017-01-31 RX ORDER — AZITHROMYCIN 500 MG/1
TABLET, FILM COATED ORAL
Qty: 0 | Refills: 0 | Status: DISCONTINUED | OUTPATIENT
Start: 2017-01-31 | End: 2017-02-03

## 2017-01-31 RX ORDER — OXYMETAZOLINE HYDROCHLORIDE 0.5 MG/ML
2 SPRAY NASAL THREE TIMES A DAY
Qty: 0 | Refills: 0 | Status: COMPLETED | OUTPATIENT
Start: 2017-01-31 | End: 2017-02-02

## 2017-01-31 RX ORDER — AMPICILLIN SODIUM AND SULBACTAM SODIUM 250; 125 MG/ML; MG/ML
3 INJECTION, POWDER, FOR SUSPENSION INTRAMUSCULAR; INTRAVENOUS EVERY 6 HOURS
Qty: 0 | Refills: 0 | Status: DISCONTINUED | OUTPATIENT
Start: 2017-01-31 | End: 2017-02-05

## 2017-01-31 RX ORDER — AMPICILLIN SODIUM AND SULBACTAM SODIUM 250; 125 MG/ML; MG/ML
3 INJECTION, POWDER, FOR SUSPENSION INTRAMUSCULAR; INTRAVENOUS ONCE
Qty: 0 | Refills: 0 | Status: COMPLETED | OUTPATIENT
Start: 2017-01-31 | End: 2017-01-31

## 2017-01-31 RX ORDER — AZITHROMYCIN 500 MG/1
500 TABLET, FILM COATED ORAL ONCE
Qty: 0 | Refills: 0 | Status: COMPLETED | OUTPATIENT
Start: 2017-01-31 | End: 2017-01-31

## 2017-01-31 RX ORDER — AZITHROMYCIN 500 MG/1
500 TABLET, FILM COATED ORAL EVERY 24 HOURS
Qty: 0 | Refills: 0 | Status: DISCONTINUED | OUTPATIENT
Start: 2017-02-01 | End: 2017-02-03

## 2017-01-31 RX ORDER — AMPICILLIN SODIUM AND SULBACTAM SODIUM 250; 125 MG/ML; MG/ML
INJECTION, POWDER, FOR SUSPENSION INTRAMUSCULAR; INTRAVENOUS
Qty: 0 | Refills: 0 | Status: DISCONTINUED | OUTPATIENT
Start: 2017-01-31 | End: 2017-02-05

## 2017-01-31 RX ADMIN — Medication 100 MILLIGRAM(S): at 21:30

## 2017-01-31 RX ADMIN — Medication 650 MILLIGRAM(S): at 22:22

## 2017-01-31 RX ADMIN — HYDROMORPHONE HYDROCHLORIDE 30 MILLILITER(S): 2 INJECTION INTRAMUSCULAR; INTRAVENOUS; SUBCUTANEOUS at 19:19

## 2017-01-31 RX ADMIN — SODIUM CHLORIDE 150 MILLILITER(S): 9 INJECTION, SOLUTION INTRAVENOUS at 22:36

## 2017-01-31 RX ADMIN — AMPICILLIN SODIUM AND SULBACTAM SODIUM 200 GRAM(S): 250; 125 INJECTION, POWDER, FOR SUSPENSION INTRAMUSCULAR; INTRAVENOUS at 12:34

## 2017-01-31 RX ADMIN — Medication 15 MILLIGRAM(S): at 14:42

## 2017-01-31 RX ADMIN — Medication 1 MILLIGRAM(S): at 11:36

## 2017-01-31 RX ADMIN — OXYMETAZOLINE HYDROCHLORIDE 2 SPRAY(S): 0.5 SPRAY NASAL at 16:13

## 2017-01-31 RX ADMIN — Medication 15 MILLIGRAM(S): at 22:00

## 2017-01-31 RX ADMIN — Medication 15 MILLIGRAM(S): at 21:30

## 2017-01-31 RX ADMIN — Medication 15 MILLIGRAM(S): at 06:47

## 2017-01-31 RX ADMIN — OXYMETAZOLINE HYDROCHLORIDE 2 SPRAY(S): 0.5 SPRAY NASAL at 08:44

## 2017-01-31 RX ADMIN — HYDROMORPHONE HYDROCHLORIDE 30 MILLILITER(S): 2 INJECTION INTRAMUSCULAR; INTRAVENOUS; SUBCUTANEOUS at 07:14

## 2017-01-31 RX ADMIN — Medication 15 MILLIGRAM(S): at 15:00

## 2017-01-31 RX ADMIN — HYDROXYUREA 2000 MILLIGRAM(S): 500 CAPSULE ORAL at 21:30

## 2017-01-31 RX ADMIN — AZITHROMYCIN 250 MILLIGRAM(S): 500 TABLET, FILM COATED ORAL at 11:36

## 2017-01-31 RX ADMIN — Medication 15 MILLIGRAM(S): at 06:17

## 2017-01-31 RX ADMIN — Medication 100 MILLIGRAM(S): at 05:04

## 2017-01-31 RX ADMIN — AMPICILLIN SODIUM AND SULBACTAM SODIUM 200 GRAM(S): 250; 125 INJECTION, POWDER, FOR SUSPENSION INTRAMUSCULAR; INTRAVENOUS at 18:15

## 2017-01-31 RX ADMIN — SENNA PLUS 2 TABLET(S): 8.6 TABLET ORAL at 21:30

## 2017-01-31 RX ADMIN — AMPICILLIN SODIUM AND SULBACTAM SODIUM 200 GRAM(S): 250; 125 INJECTION, POWDER, FOR SUSPENSION INTRAMUSCULAR; INTRAVENOUS at 23:15

## 2017-02-01 LAB
ALBUMIN SERPL ELPH-MCNC: 3.7 G/DL — SIGNIFICANT CHANGE UP (ref 3.3–5)
ALP SERPL-CCNC: 129 U/L — HIGH (ref 40–120)
ALT FLD-CCNC: 27 U/L — SIGNIFICANT CHANGE UP (ref 4–41)
AST SERPL-CCNC: 63 U/L — HIGH (ref 4–40)
BACTERIA BLD CULT: SIGNIFICANT CHANGE UP
BACTERIA BLD CULT: SIGNIFICANT CHANGE UP
BILIRUB SERPL-MCNC: 4.6 MG/DL — HIGH (ref 0.2–1.2)
BUN SERPL-MCNC: 13 MG/DL — SIGNIFICANT CHANGE UP (ref 7–23)
CALCIUM SERPL-MCNC: 8.8 MG/DL — SIGNIFICANT CHANGE UP (ref 8.4–10.5)
CHLORIDE SERPL-SCNC: 102 MMOL/L — SIGNIFICANT CHANGE UP (ref 98–107)
CMV IGG FLD QL: <0.2 U/ML — SIGNIFICANT CHANGE UP
CMV IGG SERPL-IMP: NEGATIVE — SIGNIFICANT CHANGE UP
CMV IGM FLD-ACNC: <8 AU/ML — SIGNIFICANT CHANGE UP
CMV IGM SERPL QL: NEGATIVE — SIGNIFICANT CHANGE UP
CO2 SERPL-SCNC: 20 MMOL/L — LOW (ref 22–31)
CREAT SERPL-MCNC: 0.7 MG/DL — SIGNIFICANT CHANGE UP (ref 0.5–1.3)
EBV EA AB TITR SER IF: POSITIVE — SIGNIFICANT CHANGE UP
EBV EA IGG SER-ACNC: NEGATIVE — SIGNIFICANT CHANGE UP
EBV PATRN SPEC IB-IMP: SIGNIFICANT CHANGE UP
EBV VCA IGG AVIDITY SER QL IA: POSITIVE — SIGNIFICANT CHANGE UP
EBV VCA IGM TITR FLD: NEGATIVE — SIGNIFICANT CHANGE UP
GLUCOSE SERPL-MCNC: 109 MG/DL — HIGH (ref 70–99)
HAV IGM SER-ACNC: NONREACTIVE — SIGNIFICANT CHANGE UP
HBV CORE IGM SER-ACNC: NONREACTIVE — SIGNIFICANT CHANGE UP
HBV SURFACE AG SER-ACNC: NONREACTIVE — SIGNIFICANT CHANGE UP
HCT VFR BLD CALC: 13.8 % — CRITICAL LOW (ref 39–50)
HCV AB S/CO SERPL IA: 0.18 S/CO — SIGNIFICANT CHANGE UP
HCV AB SERPL-IMP: SIGNIFICANT CHANGE UP
HGB BLD-MCNC: 4.7 G/DL — CRITICAL LOW (ref 13–17)
HIV1 AG SER QL: SIGNIFICANT CHANGE UP
HIV1+2 AB SPEC QL: SIGNIFICANT CHANGE UP
LDH SERPL L TO P-CCNC: 1852 U/L — HIGH (ref 135–225)
MCHC RBC-ENTMCNC: 30.5 PG — SIGNIFICANT CHANGE UP (ref 27–34)
MCHC RBC-ENTMCNC: 34.1 % — SIGNIFICANT CHANGE UP (ref 32–36)
MCV RBC AUTO: 89.6 FL — SIGNIFICANT CHANGE UP (ref 80–100)
PLATELET # BLD AUTO: 332 K/UL — SIGNIFICANT CHANGE UP (ref 150–400)
PMV BLD: 10.3 FL — SIGNIFICANT CHANGE UP (ref 7–13)
POTASSIUM SERPL-MCNC: 3.9 MMOL/L — SIGNIFICANT CHANGE UP (ref 3.5–5.3)
POTASSIUM SERPL-SCNC: 3.9 MMOL/L — SIGNIFICANT CHANGE UP (ref 3.5–5.3)
PROT SERPL-MCNC: 7.2 G/DL — SIGNIFICANT CHANGE UP (ref 6–8.3)
RBC # BLD: 1.54 M/UL — LOW (ref 4.2–5.8)
RBC # FLD: 24.4 % — HIGH (ref 10.3–14.5)
RETICS #: 196.8 10X3/UL — HIGH (ref 17–73)
RETICS/RBC NFR: 12.7 % — HIGH (ref 0.5–2.5)
SODIUM SERPL-SCNC: 141 MMOL/L — SIGNIFICANT CHANGE UP (ref 135–145)
WBC # BLD: 16.35 K/UL — HIGH (ref 3.8–10.5)
WBC # FLD AUTO: 16.35 K/UL — HIGH (ref 3.8–10.5)

## 2017-02-01 PROCEDURE — 99233 SBSQ HOSP IP/OBS HIGH 50: CPT

## 2017-02-01 RX ADMIN — AMPICILLIN SODIUM AND SULBACTAM SODIUM 200 GRAM(S): 250; 125 INJECTION, POWDER, FOR SUSPENSION INTRAMUSCULAR; INTRAVENOUS at 18:00

## 2017-02-01 RX ADMIN — Medication 100 MILLIGRAM(S): at 05:52

## 2017-02-01 RX ADMIN — AMPICILLIN SODIUM AND SULBACTAM SODIUM 200 GRAM(S): 250; 125 INJECTION, POWDER, FOR SUSPENSION INTRAMUSCULAR; INTRAVENOUS at 23:10

## 2017-02-01 RX ADMIN — Medication 100 MILLIGRAM(S): at 15:12

## 2017-02-01 RX ADMIN — AMPICILLIN SODIUM AND SULBACTAM SODIUM 200 GRAM(S): 250; 125 INJECTION, POWDER, FOR SUSPENSION INTRAMUSCULAR; INTRAVENOUS at 05:53

## 2017-02-01 RX ADMIN — AZITHROMYCIN 250 MILLIGRAM(S): 500 TABLET, FILM COATED ORAL at 10:34

## 2017-02-01 RX ADMIN — HYDROXYUREA 2000 MILLIGRAM(S): 500 CAPSULE ORAL at 21:15

## 2017-02-01 RX ADMIN — Medication 15 MILLIGRAM(S): at 18:00

## 2017-02-01 RX ADMIN — HYDROMORPHONE HYDROCHLORIDE 30 MILLILITER(S): 2 INJECTION INTRAMUSCULAR; INTRAVENOUS; SUBCUTANEOUS at 04:32

## 2017-02-01 RX ADMIN — Medication 100 MILLIGRAM(S): at 21:15

## 2017-02-01 RX ADMIN — HYDROMORPHONE HYDROCHLORIDE 30 MILLILITER(S): 2 INJECTION INTRAMUSCULAR; INTRAVENOUS; SUBCUTANEOUS at 08:05

## 2017-02-01 RX ADMIN — AMPICILLIN SODIUM AND SULBACTAM SODIUM 200 GRAM(S): 250; 125 INJECTION, POWDER, FOR SUSPENSION INTRAMUSCULAR; INTRAVENOUS at 11:58

## 2017-02-01 RX ADMIN — SENNA PLUS 2 TABLET(S): 8.6 TABLET ORAL at 21:15

## 2017-02-01 RX ADMIN — HYDROMORPHONE HYDROCHLORIDE 30 MILLILITER(S): 2 INJECTION INTRAMUSCULAR; INTRAVENOUS; SUBCUTANEOUS at 19:25

## 2017-02-01 RX ADMIN — Medication 15 MILLIGRAM(S): at 18:13

## 2017-02-01 RX ADMIN — Medication 10 MILLIGRAM(S): at 15:03

## 2017-02-01 RX ADMIN — Medication 1 MILLIGRAM(S): at 11:58

## 2017-02-02 LAB
ALBUMIN SERPL ELPH-MCNC: 3.4 G/DL — SIGNIFICANT CHANGE UP (ref 3.3–5)
ALP SERPL-CCNC: 135 U/L — HIGH (ref 40–120)
ALT FLD-CCNC: 25 U/L — SIGNIFICANT CHANGE UP (ref 4–41)
AMORPH CRY # UR COMP ASSIST: SIGNIFICANT CHANGE UP (ref 0–0)
ANISOCYTOSIS BLD QL: SIGNIFICANT CHANGE UP
APPEARANCE UR: CLEAR — SIGNIFICANT CHANGE UP
AST SERPL-CCNC: 49 U/L — HIGH (ref 4–40)
BASOPHILS NFR SPEC: 0 % — SIGNIFICANT CHANGE UP (ref 0–2)
BILIRUB SERPL-MCNC: 4.6 MG/DL — HIGH (ref 0.2–1.2)
BILIRUB UR-MCNC: NEGATIVE — SIGNIFICANT CHANGE UP
BLOOD UR QL VISUAL: NEGATIVE — SIGNIFICANT CHANGE UP
BUN SERPL-MCNC: 10 MG/DL — SIGNIFICANT CHANGE UP (ref 7–23)
CALCIUM SERPL-MCNC: 8.8 MG/DL — SIGNIFICANT CHANGE UP (ref 8.4–10.5)
CHLORIDE SERPL-SCNC: 100 MMOL/L — SIGNIFICANT CHANGE UP (ref 98–107)
CO2 SERPL-SCNC: 23 MMOL/L — SIGNIFICANT CHANGE UP (ref 22–31)
COLOR SPEC: YELLOW — SIGNIFICANT CHANGE UP
CREAT SERPL-MCNC: 0.67 MG/DL — SIGNIFICANT CHANGE UP (ref 0.5–1.3)
EOSINOPHIL NFR FLD: 0 % — SIGNIFICANT CHANGE UP (ref 0–6)
GLUCOSE SERPL-MCNC: 96 MG/DL — SIGNIFICANT CHANGE UP (ref 70–99)
GLUCOSE UR-MCNC: NEGATIVE — SIGNIFICANT CHANGE UP
HCT VFR BLD CALC: 11.5 % — CRITICAL LOW (ref 39–50)
HGB A MFR BLD: 24.9 % — LOW
HGB A MFR BLD: 28.5 % — LOW
HGB A MFR BLD: 30.3 % — LOW
HGB A2 MFR BLD: 3.9 % — HIGH (ref 2.4–3.5)
HGB A2 MFR BLD: 3.9 % — HIGH (ref 2.4–3.5)
HGB A2 MFR BLD: 4 % — HIGH (ref 2.4–3.5)
HGB BLD-MCNC: 4 G/DL — CRITICAL LOW (ref 13–17)
HGB F MFR BLD: 4.1 % — HIGH (ref 0–1.5)
HGB F MFR BLD: 4.8 % — HIGH (ref 0–1.5)
HGB F MFR BLD: 5.1 % — HIGH (ref 0–1.5)
HGB S MFR BLD: 60.6 % — HIGH (ref 0–0)
HGB S MFR BLD: 62.8 % — HIGH (ref 0–0)
HGB S MFR BLD: 67.1 % — HIGH (ref 0–0)
HYPOCHROMIA BLD QL: SIGNIFICANT CHANGE UP
KETONES UR-MCNC: NEGATIVE — SIGNIFICANT CHANGE UP
LDH SERPL L TO P-CCNC: 1438 U/L — HIGH (ref 135–225)
LEUKOCYTE ESTERASE UR-ACNC: NEGATIVE — SIGNIFICANT CHANGE UP
LYMPHOCYTES NFR SPEC AUTO: 23 % — SIGNIFICANT CHANGE UP (ref 13–44)
MCHC RBC-ENTMCNC: 31.3 PG — SIGNIFICANT CHANGE UP (ref 27–34)
MCHC RBC-ENTMCNC: 34.8 % — SIGNIFICANT CHANGE UP (ref 32–36)
MCV RBC AUTO: 89.8 FL — SIGNIFICANT CHANGE UP (ref 80–100)
MONOCYTES NFR BLD: 12 % — HIGH (ref 2–9)
NEUTROPHIL AB SER-ACNC: 65 % — SIGNIFICANT CHANGE UP (ref 43–77)
NITRITE UR-MCNC: NEGATIVE — SIGNIFICANT CHANGE UP
NON-SQ EPI CELLS # UR AUTO: <1 — SIGNIFICANT CHANGE UP
NRBC # BLD: 121 /100WBC — SIGNIFICANT CHANGE UP
PH UR: 6 — SIGNIFICANT CHANGE UP (ref 4.6–8)
PLATELET # BLD AUTO: 238 K/UL — SIGNIFICANT CHANGE UP (ref 150–400)
PLATELET COUNT - ESTIMATE: NORMAL — SIGNIFICANT CHANGE UP
PMV BLD: 10.5 FL — SIGNIFICANT CHANGE UP (ref 7–13)
POIKILOCYTOSIS BLD QL AUTO: SIGNIFICANT CHANGE UP
POTASSIUM SERPL-MCNC: 3.6 MMOL/L — SIGNIFICANT CHANGE UP (ref 3.5–5.3)
POTASSIUM SERPL-SCNC: 3.6 MMOL/L — SIGNIFICANT CHANGE UP (ref 3.5–5.3)
PROT SERPL-MCNC: 6.9 G/DL — SIGNIFICANT CHANGE UP (ref 6–8.3)
PROT UR-MCNC: NEGATIVE — SIGNIFICANT CHANGE UP
RBC # BLD: 1.28 M/UL — LOW (ref 4.2–5.8)
RBC # FLD: 25.6 % — HIGH (ref 10.3–14.5)
RETICS #: 190 10X3/UL — HIGH (ref 17–73)
RETICS/RBC NFR: 14.8 % — HIGH (ref 0.5–2.5)
SCHISTOCYTES BLD QL AUTO: SLIGHT — SIGNIFICANT CHANGE UP
SICKLE CELLS BLD QL SMEAR: SLIGHT — SIGNIFICANT CHANGE UP
SODIUM SERPL-SCNC: 139 MMOL/L — SIGNIFICANT CHANGE UP (ref 135–145)
SP GR SPEC: 1.01 — SIGNIFICANT CHANGE UP (ref 1–1.03)
TARGETS BLD QL SMEAR: SLIGHT — SIGNIFICANT CHANGE UP
UROBILINOGEN FLD QL: 8 E.U. — HIGH (ref 0.1–0.2)
WBC # BLD: 15.76 K/UL — HIGH (ref 3.8–10.5)
WBC # FLD AUTO: 15.76 K/UL — HIGH (ref 3.8–10.5)
WBC UR QL: SIGNIFICANT CHANGE UP (ref 0–?)

## 2017-02-02 PROCEDURE — 71010: CPT | Mod: 26

## 2017-02-02 PROCEDURE — 99233 SBSQ HOSP IP/OBS HIGH 50: CPT

## 2017-02-02 PROCEDURE — 99232 SBSQ HOSP IP/OBS MODERATE 35: CPT | Mod: GC

## 2017-02-02 RX ORDER — HYDROMORPHONE HYDROCHLORIDE 2 MG/ML
30 INJECTION INTRAMUSCULAR; INTRAVENOUS; SUBCUTANEOUS
Qty: 0 | Refills: 0 | Status: DISCONTINUED | OUTPATIENT
Start: 2017-02-02 | End: 2017-02-03

## 2017-02-02 RX ADMIN — OXYMETAZOLINE HYDROCHLORIDE 2 SPRAY(S): 0.5 SPRAY NASAL at 05:05

## 2017-02-02 RX ADMIN — SODIUM CHLORIDE 150 MILLILITER(S): 9 INJECTION, SOLUTION INTRAVENOUS at 22:14

## 2017-02-02 RX ADMIN — Medication 650 MILLIGRAM(S): at 09:53

## 2017-02-02 RX ADMIN — HYDROMORPHONE HYDROCHLORIDE 30 MILLILITER(S): 2 INJECTION INTRAMUSCULAR; INTRAVENOUS; SUBCUTANEOUS at 01:39

## 2017-02-02 RX ADMIN — Medication 100 MILLIGRAM(S): at 05:05

## 2017-02-02 RX ADMIN — HYDROMORPHONE HYDROCHLORIDE 30 MILLILITER(S): 2 INJECTION INTRAMUSCULAR; INTRAVENOUS; SUBCUTANEOUS at 17:49

## 2017-02-02 RX ADMIN — AZITHROMYCIN 250 MILLIGRAM(S): 500 TABLET, FILM COATED ORAL at 09:44

## 2017-02-02 RX ADMIN — OXYMETAZOLINE HYDROCHLORIDE 2 SPRAY(S): 0.5 SPRAY NASAL at 13:16

## 2017-02-02 RX ADMIN — Medication 650 MILLIGRAM(S): at 20:48

## 2017-02-02 RX ADMIN — AMPICILLIN SODIUM AND SULBACTAM SODIUM 200 GRAM(S): 250; 125 INJECTION, POWDER, FOR SUSPENSION INTRAMUSCULAR; INTRAVENOUS at 05:06

## 2017-02-02 RX ADMIN — Medication 1 MILLIGRAM(S): at 12:18

## 2017-02-02 RX ADMIN — AMPICILLIN SODIUM AND SULBACTAM SODIUM 200 GRAM(S): 250; 125 INJECTION, POWDER, FOR SUSPENSION INTRAMUSCULAR; INTRAVENOUS at 17:54

## 2017-02-02 RX ADMIN — AMPICILLIN SODIUM AND SULBACTAM SODIUM 200 GRAM(S): 250; 125 INJECTION, POWDER, FOR SUSPENSION INTRAMUSCULAR; INTRAVENOUS at 12:18

## 2017-02-02 RX ADMIN — HYDROMORPHONE HYDROCHLORIDE 30 MILLILITER(S): 2 INJECTION INTRAMUSCULAR; INTRAVENOUS; SUBCUTANEOUS at 19:14

## 2017-02-02 RX ADMIN — HYDROXYUREA 2000 MILLIGRAM(S): 500 CAPSULE ORAL at 21:28

## 2017-02-02 RX ADMIN — OXYMETAZOLINE HYDROCHLORIDE 2 SPRAY(S): 0.5 SPRAY NASAL at 21:29

## 2017-02-02 RX ADMIN — Medication 100 MILLIGRAM(S): at 13:16

## 2017-02-03 LAB
ALBUMIN SERPL ELPH-MCNC: 3.7 G/DL — SIGNIFICANT CHANGE UP (ref 3.3–5)
ALP SERPL-CCNC: 160 U/L — HIGH (ref 40–120)
ALT FLD-CCNC: 26 U/L — SIGNIFICANT CHANGE UP (ref 4–41)
AST SERPL-CCNC: 52 U/L — HIGH (ref 4–40)
BILIRUB SERPL-MCNC: 3.9 MG/DL — HIGH (ref 0.2–1.2)
BLD GP AB SCN SERPL QL: POSITIVE — SIGNIFICANT CHANGE UP
BUN SERPL-MCNC: 12 MG/DL — SIGNIFICANT CHANGE UP (ref 7–23)
CALCIUM SERPL-MCNC: 8.9 MG/DL — SIGNIFICANT CHANGE UP (ref 8.4–10.5)
CHLORIDE SERPL-SCNC: 98 MMOL/L — SIGNIFICANT CHANGE UP (ref 98–107)
CO2 SERPL-SCNC: 23 MMOL/L — SIGNIFICANT CHANGE UP (ref 22–31)
CREAT SERPL-MCNC: 0.62 MG/DL — SIGNIFICANT CHANGE UP (ref 0.5–1.3)
FERRITIN SERPL-MCNC: 3143 NG/ML — HIGH (ref 30–400)
GLUCOSE SERPL-MCNC: 91 MG/DL — SIGNIFICANT CHANGE UP (ref 70–99)
HCT VFR BLD CALC: 13.1 % — CRITICAL LOW (ref 39–50)
HGB BLD-MCNC: 4.3 G/DL — CRITICAL LOW (ref 13–17)
HGB ELECT COMMENT: SIGNIFICANT CHANGE UP
LDH SERPL L TO P-CCNC: 1361 U/L — HIGH (ref 135–225)
MCHC RBC-ENTMCNC: 31.6 PG — SIGNIFICANT CHANGE UP (ref 27–34)
MCHC RBC-ENTMCNC: 32.8 % — SIGNIFICANT CHANGE UP (ref 32–36)
MCV RBC AUTO: 96.3 FL — SIGNIFICANT CHANGE UP (ref 80–100)
NRBC FLD-RTO: 177 — SIGNIFICANT CHANGE UP
PLATELET # BLD AUTO: 373 K/UL — SIGNIFICANT CHANGE UP (ref 150–400)
PMV BLD: 10.9 FL — SIGNIFICANT CHANGE UP (ref 7–13)
POTASSIUM SERPL-MCNC: 3.7 MMOL/L — SIGNIFICANT CHANGE UP (ref 3.5–5.3)
POTASSIUM SERPL-SCNC: 3.7 MMOL/L — SIGNIFICANT CHANGE UP (ref 3.5–5.3)
PROT SERPL-MCNC: 7.4 G/DL — SIGNIFICANT CHANGE UP (ref 6–8.3)
RBC # BLD: 1.36 M/UL — LOW (ref 4.2–5.8)
RBC # FLD: 28.1 % — HIGH (ref 10.3–14.5)
RETICS #: 226 10X3/UL — HIGH (ref 17–73)
RETICS/RBC NFR: 16.6 % — HIGH (ref 0.5–2.5)
RH IG SCN BLD-IMP: POSITIVE — SIGNIFICANT CHANGE UP
SODIUM SERPL-SCNC: 139 MMOL/L — SIGNIFICANT CHANGE UP (ref 135–145)
SPECIMEN SOURCE: SIGNIFICANT CHANGE UP
SPECIMEN SOURCE: SIGNIFICANT CHANGE UP
WBC # BLD: 15.12 K/UL — HIGH (ref 3.8–10.5)
WBC # FLD AUTO: 15.12 K/UL — HIGH (ref 3.8–10.5)

## 2017-02-03 PROCEDURE — 99233 SBSQ HOSP IP/OBS HIGH 50: CPT

## 2017-02-03 PROCEDURE — 99231 SBSQ HOSP IP/OBS SF/LOW 25: CPT | Mod: GC

## 2017-02-03 RX ORDER — HYDROMORPHONE HYDROCHLORIDE 2 MG/ML
30 INJECTION INTRAMUSCULAR; INTRAVENOUS; SUBCUTANEOUS
Qty: 0 | Refills: 0 | Status: DISCONTINUED | OUTPATIENT
Start: 2017-02-03 | End: 2017-02-08

## 2017-02-03 RX ADMIN — Medication 650 MILLIGRAM(S): at 10:06

## 2017-02-03 RX ADMIN — HYDROMORPHONE HYDROCHLORIDE 30 MILLILITER(S): 2 INJECTION INTRAMUSCULAR; INTRAVENOUS; SUBCUTANEOUS at 09:51

## 2017-02-03 RX ADMIN — SODIUM CHLORIDE 150 MILLILITER(S): 9 INJECTION, SOLUTION INTRAVENOUS at 04:44

## 2017-02-03 RX ADMIN — AMPICILLIN SODIUM AND SULBACTAM SODIUM 200 GRAM(S): 250; 125 INJECTION, POWDER, FOR SUSPENSION INTRAMUSCULAR; INTRAVENOUS at 17:48

## 2017-02-03 RX ADMIN — AMPICILLIN SODIUM AND SULBACTAM SODIUM 200 GRAM(S): 250; 125 INJECTION, POWDER, FOR SUSPENSION INTRAMUSCULAR; INTRAVENOUS at 00:39

## 2017-02-03 RX ADMIN — HYDROMORPHONE HYDROCHLORIDE 30 MILLILITER(S): 2 INJECTION INTRAMUSCULAR; INTRAVENOUS; SUBCUTANEOUS at 19:21

## 2017-02-03 RX ADMIN — HYDROMORPHONE HYDROCHLORIDE 30 MILLILITER(S): 2 INJECTION INTRAMUSCULAR; INTRAVENOUS; SUBCUTANEOUS at 11:08

## 2017-02-03 RX ADMIN — HYDROXYUREA 2000 MILLIGRAM(S): 500 CAPSULE ORAL at 21:04

## 2017-02-03 RX ADMIN — Medication 100 MILLIGRAM(S): at 21:03

## 2017-02-03 RX ADMIN — Medication 100 MILLIGRAM(S): at 13:27

## 2017-02-03 RX ADMIN — Medication 1 MILLIGRAM(S): at 11:15

## 2017-02-03 RX ADMIN — Medication 100 MILLIGRAM(S): at 06:09

## 2017-02-03 RX ADMIN — SENNA PLUS 2 TABLET(S): 8.6 TABLET ORAL at 21:03

## 2017-02-03 RX ADMIN — AMPICILLIN SODIUM AND SULBACTAM SODIUM 200 GRAM(S): 250; 125 INJECTION, POWDER, FOR SUSPENSION INTRAMUSCULAR; INTRAVENOUS at 11:15

## 2017-02-03 RX ADMIN — HYDROMORPHONE HYDROCHLORIDE 30 MILLILITER(S): 2 INJECTION INTRAMUSCULAR; INTRAVENOUS; SUBCUTANEOUS at 07:21

## 2017-02-03 RX ADMIN — AMPICILLIN SODIUM AND SULBACTAM SODIUM 200 GRAM(S): 250; 125 INJECTION, POWDER, FOR SUSPENSION INTRAMUSCULAR; INTRAVENOUS at 06:08

## 2017-02-03 RX ADMIN — AZITHROMYCIN 250 MILLIGRAM(S): 500 TABLET, FILM COATED ORAL at 10:01

## 2017-02-03 NOTE — PROVIDER CONTACT NOTE (OTHER) - RECOMMENDATIONS
Medication for temperature.
As per ADS
As per ADS/Hematology
Give tylenol and torodol, continue to monitor
Give tylenol and torodol, continue to monitor
NP made aware
NP to assess patient
give tylenol and send cultures
give tylenol.  blood cultures and urine cultures sent 2/02/17

## 2017-02-03 NOTE — PROVIDER CONTACT NOTE (OTHER) - ASSESSMENT
Temp - 101.1  HR palpated radially - 104
Temp 102    /72  SPO2 88 - ADS aware patient sats low, no signs of respiratory distress.
o2 sat 88, respirations 18
PT has lower back pain 6/10 no SOB or chest pain
Pain 8/10. Past 4 hours 3.6 mg used (max 12)
Patient appears comfortable, no s/s of acute distress, no chills
Patient asymptomatic. VS /77 HR 96 RR 16 Spo2 94% room air
Patient complains of weakness.  VS /67 HR 98 RR16 Spo2 90% on room air
Patient is stable, no respiratory distress on 2L NC, maintained on PCA pump
VSS. no resp. distress noted and  patient denies any shortness of breathe
no distress noted
pt a&Ox4. no s/s of acute distress noted. /64.  RR 20 o2 sat 99
pt. hot to touch, temp 103.5, , /61, O2 sat 96. Pt. A&Ox4, in no acute distress
pt. in no acute distress, temp. 103, , O2sat 100, /52

## 2017-02-03 NOTE — PROVIDER CONTACT NOTE (OTHER) - SITUATION
Pt claims they need a blood transfusion, was told in report providers are aware of low h&h but do not want to transfuse due to high iron levels. There is no iron level in the chart

## 2017-02-03 NOTE — PROVIDER CONTACT NOTE (OTHER) - ACTION/TREATMENT ORDERED:
Continue to monitor.
Provider aware, iron level ordered for am
continue to monitor respirations and o2 sat
Motrin, icepacks. Continue to monitor
NP requested that RN recheck HR and temperature.
Provider aware, further educate on PCA pump use tordol is not good for his kidneys
Provider aware, give tylenol and reassess
Continue with transfusion and pre-medicate with Tylenol
Give tylenol and torodol, continue to monitor
Give tylenol and torodol, continue to monitor
NP states ok with this saturation as long is no resp.distress noted,  NP wants patient to have nasal canula in patient today to irritate nosebleed
Sarabjit aware of concerns and will speak with family. Hematology does not recommend transfusion at this time.
blood culture, UA/UCX ordered
pressure and ice pack applied,
tylenol 650mg po given.

## 2017-02-04 LAB
ALBUMIN SERPL ELPH-MCNC: 3.4 G/DL — SIGNIFICANT CHANGE UP (ref 3.3–5)
ALP SERPL-CCNC: 171 U/L — HIGH (ref 40–120)
ALT FLD-CCNC: 26 U/L — SIGNIFICANT CHANGE UP (ref 4–41)
AST SERPL-CCNC: 36 U/L — SIGNIFICANT CHANGE UP (ref 4–40)
BACTERIA UR CULT: SIGNIFICANT CHANGE UP
BILIRUB SERPL-MCNC: 4.5 MG/DL — HIGH (ref 0.2–1.2)
BUN SERPL-MCNC: 7 MG/DL — SIGNIFICANT CHANGE UP (ref 7–23)
CALCIUM SERPL-MCNC: 9.2 MG/DL — SIGNIFICANT CHANGE UP (ref 8.4–10.5)
CHLORIDE SERPL-SCNC: 98 MMOL/L — SIGNIFICANT CHANGE UP (ref 98–107)
CO2 SERPL-SCNC: 25 MMOL/L — SIGNIFICANT CHANGE UP (ref 22–31)
CREAT SERPL-MCNC: 0.64 MG/DL — SIGNIFICANT CHANGE UP (ref 0.5–1.3)
CRP SERPL-MCNC: 158.4 MG/L — HIGH (ref 0.3–5)
ERYTHROCYTE [SEDIMENTATION RATE] IN BLOOD: > 130 MM/HR — HIGH (ref 1–15)
GLUCOSE SERPL-MCNC: 140 MG/DL — HIGH (ref 70–99)
HCT VFR BLD CALC: 11.4 % — CRITICAL LOW (ref 39–50)
HCT VFR BLD CALC: 11.4 % — CRITICAL LOW (ref 39–50)
HCT VFR BLD CALC: 11.7 % — CRITICAL LOW (ref 39–50)
HGB BLD-MCNC: 3.6 G/DL — CRITICAL LOW (ref 13–17)
HGB BLD-MCNC: 3.6 G/DL — CRITICAL LOW (ref 13–17)
HGB BLD-MCNC: 3.7 G/DL — CRITICAL LOW (ref 13–17)
LDH SERPL L TO P-CCNC: 1003 U/L — HIGH (ref 135–225)
MANUAL SMEAR VERIFICATION: SIGNIFICANT CHANGE UP
MANUAL SMEAR VERIFICATION: SIGNIFICANT CHANGE UP
MCHC RBC-ENTMCNC: 31.6 % — LOW (ref 32–36)
MCHC RBC-ENTMCNC: 31.6 PG — SIGNIFICANT CHANGE UP (ref 27–34)
MCHC RBC-ENTMCNC: 31.6 PG — SIGNIFICANT CHANGE UP (ref 27–34)
MCHC RBC-ENTMCNC: 31.9 PG — SIGNIFICANT CHANGE UP (ref 27–34)
MCV RBC AUTO: 100 FL — SIGNIFICANT CHANGE UP (ref 80–100)
MCV RBC AUTO: 100 FL — SIGNIFICANT CHANGE UP (ref 80–100)
MCV RBC AUTO: 100.9 FL — HIGH (ref 80–100)
NRBC FLD-RTO: 228 — SIGNIFICANT CHANGE UP
NRBC FLD-RTO: 232.2 — SIGNIFICANT CHANGE UP
PLATELET # BLD AUTO: 386 K/UL — SIGNIFICANT CHANGE UP (ref 150–400)
PLATELET # BLD AUTO: 386 K/UL — SIGNIFICANT CHANGE UP (ref 150–400)
PLATELET # BLD AUTO: 422 K/UL — HIGH (ref 150–400)
PMV BLD: 10.7 FL — SIGNIFICANT CHANGE UP (ref 7–13)
PMV BLD: 10.7 FL — SIGNIFICANT CHANGE UP (ref 7–13)
PMV BLD: 10.9 FL — SIGNIFICANT CHANGE UP (ref 7–13)
POTASSIUM SERPL-MCNC: 3 MMOL/L — LOW (ref 3.5–5.3)
POTASSIUM SERPL-SCNC: 3 MMOL/L — LOW (ref 3.5–5.3)
PROT SERPL-MCNC: 6.9 G/DL — SIGNIFICANT CHANGE UP (ref 6–8.3)
RBC # BLD: 1.14 M/UL — LOW (ref 4.2–5.8)
RBC # BLD: 1.14 M/UL — LOW (ref 4.2–5.8)
RBC # BLD: 1.16 M/UL — LOW (ref 4.2–5.8)
RBC # FLD: 28.3 % — HIGH (ref 10.3–14.5)
RBC # FLD: 28.3 % — HIGH (ref 10.3–14.5)
RBC # FLD: 29.1 % — HIGH (ref 10.3–14.5)
RETICS #: 321.5 10X3/UL — HIGH (ref 17–73)
RETICS/RBC NFR: > 22.2 % — HIGH (ref 0.5–2.5)
SODIUM SERPL-SCNC: 140 MMOL/L — SIGNIFICANT CHANGE UP (ref 135–145)
WBC # BLD: 12.28 K/UL — HIGH (ref 3.8–10.5)
WBC # BLD: 12.28 K/UL — HIGH (ref 3.8–10.5)
WBC # BLD: 12.32 K/UL — HIGH (ref 3.8–10.5)
WBC # FLD AUTO: 12.28 K/UL — HIGH (ref 3.8–10.5)
WBC # FLD AUTO: 12.28 K/UL — HIGH (ref 3.8–10.5)
WBC # FLD AUTO: 12.32 K/UL — HIGH (ref 3.8–10.5)

## 2017-02-04 PROCEDURE — 99232 SBSQ HOSP IP/OBS MODERATE 35: CPT | Mod: GC

## 2017-02-04 PROCEDURE — 99233 SBSQ HOSP IP/OBS HIGH 50: CPT

## 2017-02-04 RX ORDER — ACETAMINOPHEN 500 MG
650 TABLET ORAL ONCE
Qty: 0 | Refills: 0 | Status: DISCONTINUED | OUTPATIENT
Start: 2017-02-04 | End: 2017-02-04

## 2017-02-04 RX ORDER — DIPHENHYDRAMINE HCL 50 MG
25 CAPSULE ORAL ONCE
Qty: 0 | Refills: 0 | Status: DISCONTINUED | OUTPATIENT
Start: 2017-02-04 | End: 2017-02-04

## 2017-02-04 RX ORDER — DEXTROSE MONOHYDRATE, SODIUM CHLORIDE, AND POTASSIUM CHLORIDE 50; .745; 4.5 G/1000ML; G/1000ML; G/1000ML
1000 INJECTION, SOLUTION INTRAVENOUS
Qty: 0 | Refills: 0 | Status: DISCONTINUED | OUTPATIENT
Start: 2017-02-04 | End: 2017-02-09

## 2017-02-04 RX ORDER — POTASSIUM CHLORIDE 20 MEQ
40 PACKET (EA) ORAL EVERY 4 HOURS
Qty: 0 | Refills: 0 | Status: COMPLETED | OUTPATIENT
Start: 2017-02-04 | End: 2017-02-04

## 2017-02-04 RX ORDER — POTASSIUM CHLORIDE 20 MEQ
10 PACKET (EA) ORAL
Qty: 0 | Refills: 0 | Status: COMPLETED | OUTPATIENT
Start: 2017-02-04 | End: 2017-02-04

## 2017-02-04 RX ORDER — POTASSIUM CHLORIDE 20 MEQ
20 PACKET (EA) ORAL ONCE
Qty: 0 | Refills: 0 | Status: DISCONTINUED | OUTPATIENT
Start: 2017-02-04 | End: 2017-02-04

## 2017-02-04 RX ADMIN — HYDROMORPHONE HYDROCHLORIDE 30 MILLILITER(S): 2 INJECTION INTRAMUSCULAR; INTRAVENOUS; SUBCUTANEOUS at 04:15

## 2017-02-04 RX ADMIN — HYDROMORPHONE HYDROCHLORIDE 30 MILLILITER(S): 2 INJECTION INTRAMUSCULAR; INTRAVENOUS; SUBCUTANEOUS at 07:02

## 2017-02-04 RX ADMIN — AMPICILLIN SODIUM AND SULBACTAM SODIUM 200 GRAM(S): 250; 125 INJECTION, POWDER, FOR SUSPENSION INTRAMUSCULAR; INTRAVENOUS at 12:36

## 2017-02-04 RX ADMIN — Medication 100 MILLIEQUIVALENT(S): at 15:05

## 2017-02-04 RX ADMIN — DEXTROSE MONOHYDRATE, SODIUM CHLORIDE, AND POTASSIUM CHLORIDE 150 MILLILITER(S): 50; .745; 4.5 INJECTION, SOLUTION INTRAVENOUS at 16:32

## 2017-02-04 RX ADMIN — AMPICILLIN SODIUM AND SULBACTAM SODIUM 200 GRAM(S): 250; 125 INJECTION, POWDER, FOR SUSPENSION INTRAMUSCULAR; INTRAVENOUS at 00:09

## 2017-02-04 RX ADMIN — HYDROMORPHONE HYDROCHLORIDE 30 MILLILITER(S): 2 INJECTION INTRAMUSCULAR; INTRAVENOUS; SUBCUTANEOUS at 17:10

## 2017-02-04 RX ADMIN — SENNA PLUS 2 TABLET(S): 8.6 TABLET ORAL at 21:23

## 2017-02-04 RX ADMIN — Medication 1 MILLIGRAM(S): at 12:37

## 2017-02-04 RX ADMIN — Medication 100 MILLIGRAM(S): at 05:12

## 2017-02-04 RX ADMIN — Medication 100 MILLIEQUIVALENT(S): at 16:32

## 2017-02-04 RX ADMIN — AMPICILLIN SODIUM AND SULBACTAM SODIUM 200 GRAM(S): 250; 125 INJECTION, POWDER, FOR SUSPENSION INTRAMUSCULAR; INTRAVENOUS at 17:23

## 2017-02-04 RX ADMIN — AMPICILLIN SODIUM AND SULBACTAM SODIUM 200 GRAM(S): 250; 125 INJECTION, POWDER, FOR SUSPENSION INTRAMUSCULAR; INTRAVENOUS at 05:12

## 2017-02-04 RX ADMIN — HYDROXYUREA 2000 MILLIGRAM(S): 500 CAPSULE ORAL at 21:23

## 2017-02-04 RX ADMIN — Medication 100 MILLIEQUIVALENT(S): at 14:11

## 2017-02-04 RX ADMIN — HYDROMORPHONE HYDROCHLORIDE 30 MILLILITER(S): 2 INJECTION INTRAMUSCULAR; INTRAVENOUS; SUBCUTANEOUS at 19:16

## 2017-02-04 RX ADMIN — ONDANSETRON 4 MILLIGRAM(S): 8 TABLET, FILM COATED ORAL at 00:15

## 2017-02-04 RX ADMIN — Medication 100 MILLIGRAM(S): at 21:23

## 2017-02-04 RX ADMIN — Medication 100 MILLIGRAM(S): at 14:11

## 2017-02-05 ENCOUNTER — TRANSCRIPTION ENCOUNTER (OUTPATIENT)
Age: 22
End: 2017-02-05

## 2017-02-05 LAB
ALBUMIN SERPL ELPH-MCNC: 3.4 G/DL — SIGNIFICANT CHANGE UP (ref 3.3–5)
ALP SERPL-CCNC: 152 U/L — HIGH (ref 40–120)
ALT FLD-CCNC: 25 U/L — SIGNIFICANT CHANGE UP (ref 4–41)
AST SERPL-CCNC: 33 U/L — SIGNIFICANT CHANGE UP (ref 4–40)
BILIRUB SERPL-MCNC: 2.7 MG/DL — HIGH (ref 0.2–1.2)
BUN SERPL-MCNC: 4 MG/DL — LOW (ref 7–23)
CALCIUM SERPL-MCNC: 8.8 MG/DL — SIGNIFICANT CHANGE UP (ref 8.4–10.5)
CHLORIDE SERPL-SCNC: 100 MMOL/L — SIGNIFICANT CHANGE UP (ref 98–107)
CO2 SERPL-SCNC: 27 MMOL/L — SIGNIFICANT CHANGE UP (ref 22–31)
CREAT SERPL-MCNC: 0.59 MG/DL — SIGNIFICANT CHANGE UP (ref 0.5–1.3)
GLUCOSE SERPL-MCNC: 98 MG/DL — SIGNIFICANT CHANGE UP (ref 70–99)
HCT VFR BLD CALC: 11.7 % — CRITICAL LOW (ref 39–50)
HGB BLD-MCNC: 3.6 G/DL — CRITICAL LOW (ref 13–17)
LDH SERPL L TO P-CCNC: 838 U/L — HIGH (ref 135–225)
MCHC RBC-ENTMCNC: 30.8 % — LOW (ref 32–36)
MCHC RBC-ENTMCNC: 31.9 PG — SIGNIFICANT CHANGE UP (ref 27–34)
MCV RBC AUTO: 103.5 FL — HIGH (ref 80–100)
NRBC FLD-RTO: 237.3 — SIGNIFICANT CHANGE UP
PLATELET # BLD AUTO: 475 K/UL — HIGH (ref 150–400)
PMV BLD: 10.7 FL — SIGNIFICANT CHANGE UP (ref 7–13)
POTASSIUM SERPL-MCNC: 3.6 MMOL/L — SIGNIFICANT CHANGE UP (ref 3.5–5.3)
POTASSIUM SERPL-SCNC: 3.6 MMOL/L — SIGNIFICANT CHANGE UP (ref 3.5–5.3)
PROT SERPL-MCNC: 6.5 G/DL — SIGNIFICANT CHANGE UP (ref 6–8.3)
RBC # BLD: 1.13 M/UL — LOW (ref 4.2–5.8)
RBC # FLD: 29.1 % — HIGH (ref 10.3–14.5)
RETICS #: 391.9 10X3/UL — HIGH (ref 17–73)
RETICS/RBC NFR: > 22.2 % — HIGH (ref 0.5–2.5)
SODIUM SERPL-SCNC: 141 MMOL/L — SIGNIFICANT CHANGE UP (ref 135–145)
WBC # BLD: 9.7 K/UL — SIGNIFICANT CHANGE UP (ref 3.8–10.5)
WBC # FLD AUTO: 9.7 K/UL — SIGNIFICANT CHANGE UP (ref 3.8–10.5)

## 2017-02-05 PROCEDURE — 73502 X-RAY EXAM HIP UNI 2-3 VIEWS: CPT | Mod: 26,RT

## 2017-02-05 PROCEDURE — 99232 SBSQ HOSP IP/OBS MODERATE 35: CPT | Mod: GC

## 2017-02-05 PROCEDURE — 99233 SBSQ HOSP IP/OBS HIGH 50: CPT

## 2017-02-05 RX ADMIN — SENNA PLUS 2 TABLET(S): 8.6 TABLET ORAL at 21:28

## 2017-02-05 RX ADMIN — Medication 100 MILLIGRAM(S): at 21:28

## 2017-02-05 RX ADMIN — DEXTROSE MONOHYDRATE, SODIUM CHLORIDE, AND POTASSIUM CHLORIDE 150 MILLILITER(S): 50; .745; 4.5 INJECTION, SOLUTION INTRAVENOUS at 06:26

## 2017-02-05 RX ADMIN — HYDROXYUREA 2000 MILLIGRAM(S): 500 CAPSULE ORAL at 21:29

## 2017-02-05 RX ADMIN — Medication 100 MILLIGRAM(S): at 12:58

## 2017-02-05 RX ADMIN — DEXTROSE MONOHYDRATE, SODIUM CHLORIDE, AND POTASSIUM CHLORIDE 150 MILLILITER(S): 50; .745; 4.5 INJECTION, SOLUTION INTRAVENOUS at 12:58

## 2017-02-05 RX ADMIN — ONDANSETRON 4 MILLIGRAM(S): 8 TABLET, FILM COATED ORAL at 11:08

## 2017-02-05 RX ADMIN — HYDROMORPHONE HYDROCHLORIDE 30 MILLILITER(S): 2 INJECTION INTRAMUSCULAR; INTRAVENOUS; SUBCUTANEOUS at 19:33

## 2017-02-05 RX ADMIN — HYDROMORPHONE HYDROCHLORIDE 30 MILLILITER(S): 2 INJECTION INTRAMUSCULAR; INTRAVENOUS; SUBCUTANEOUS at 22:42

## 2017-02-05 RX ADMIN — AMPICILLIN SODIUM AND SULBACTAM SODIUM 200 GRAM(S): 250; 125 INJECTION, POWDER, FOR SUSPENSION INTRAMUSCULAR; INTRAVENOUS at 11:05

## 2017-02-05 RX ADMIN — HYDROMORPHONE HYDROCHLORIDE 30 MILLILITER(S): 2 INJECTION INTRAMUSCULAR; INTRAVENOUS; SUBCUTANEOUS at 11:08

## 2017-02-05 RX ADMIN — AMPICILLIN SODIUM AND SULBACTAM SODIUM 200 GRAM(S): 250; 125 INJECTION, POWDER, FOR SUSPENSION INTRAMUSCULAR; INTRAVENOUS at 01:15

## 2017-02-05 RX ADMIN — DEXTROSE MONOHYDRATE, SODIUM CHLORIDE, AND POTASSIUM CHLORIDE 150 MILLILITER(S): 50; .745; 4.5 INJECTION, SOLUTION INTRAVENOUS at 00:13

## 2017-02-05 RX ADMIN — AMPICILLIN SODIUM AND SULBACTAM SODIUM 200 GRAM(S): 250; 125 INJECTION, POWDER, FOR SUSPENSION INTRAMUSCULAR; INTRAVENOUS at 05:10

## 2017-02-05 RX ADMIN — HYDROMORPHONE HYDROCHLORIDE 30 MILLILITER(S): 2 INJECTION INTRAMUSCULAR; INTRAVENOUS; SUBCUTANEOUS at 07:41

## 2017-02-05 RX ADMIN — Medication 1 MILLIGRAM(S): at 11:12

## 2017-02-05 NOTE — DISCHARGE NOTE ADULT - MEDICATION SUMMARY - MEDICATIONS TO TAKE
I will START or STAY ON the medications listed below when I get home from the hospital:    oxyCODONE 5 mg oral tablet  -- 2 tab(s) by mouth every 4 - 6 hours, As Needed  -- Indication: For pain control     acetaminophen 325 mg oral tablet  -- 2 tab(s) by mouth every 6 hours, As needed, For Temp greater than 38 C (100.4 F)  -- Indication: For pain control     hydroxyurea 500 mg oral capsule  -- 4 cap(s) by mouth once a day  -- Indication: For Hb-SS disease with crisis    oseltamivir 75 mg oral capsule  -- 1 cap(s) by mouth once a day  -- Indication: For Flu exposure    senna oral tablet  -- 2 tab(s) by mouth once a day (at bedtime)  -- Indication: For constipation     docusate sodium 100 mg oral capsule  -- 1 cap(s) by mouth 3 times a day  -- Indication: For constipation     folic acid 1 mg oral tablet  -- 1 tab(s) by mouth once a day  -- Indication: For Supplement I will START or STAY ON the medications listed below when I get home from the hospital:    acetaminophen 325 mg oral tablet  -- 2 tab(s) by mouth every 6 hours, As needed, For Temp greater than 38 C (100.4 F)  -- Indication: For pain control     oxyCODONE 10 mg oral tablet  -- 1 tab(s) by mouth every 4 hours, As needed for severe Pain MDD:6  -- Indication: For pain control     hydroxyurea 500 mg oral capsule  -- 4 cap(s) by mouth once a day  -- Indication: For Hb-SS disease with crisis    oseltamivir 75 mg oral capsule  -- 1 cap(s) by mouth once a day  -- Indication: For Flu exposure    senna oral tablet  -- 2 tab(s) by mouth once a day (at bedtime)  -- Indication: For constipation     docusate sodium 100 mg oral capsule  -- 1 cap(s) by mouth 3 times a day  -- Indication: For constipation     folic acid 1 mg oral tablet  -- 1 tab(s) by mouth once a day  -- Indication: For Supplement

## 2017-02-05 NOTE — DISCHARGE NOTE ADULT - CARE PROVIDER_API CALL
Kenia Nguyễn), Internal Medicine  [address] [facility] [place] [address]  Phone: [phone]  Fax: [phone]

## 2017-02-05 NOTE — DISCHARGE NOTE ADULT - MEDICATION SUMMARY - MEDICATIONS TO STOP TAKING
I will STOP taking the medications listed below when I get home from the hospital:  None I will STOP taking the medications listed below when I get home from the hospital:    ibuprofen 600 mg oral tablet  -- 1 tab(s) by mouth 3 times a day (with meals), As Needed, Mild pain    oxyCODONE 5 mg oral tablet  -- 2 tab(s) by mouth every 4 - 6 hours, As Needed

## 2017-02-05 NOTE — DISCHARGE NOTE ADULT - CARE PLAN
Principal Discharge DX:	Acute chest syndrome  Goal:	symptoms control  Instructions for follow-up, activity and diet:	Avoid dehydration, exposure to extreme temperatures. Stephon to make a follow up appt with Dr Nguyễn in 1 week, continue medications as prescribed  Secondary Diagnosis:	Sickle cell crisis  Goal:	symptoms control  Instructions for follow-up, activity and diet:	Avoid dehydration, exposure to extreme temperatures. Stephon to make a follow up appt with Dr Nguyễn in 1 week, continue medications as prescribed  Secondary Diagnosis:	Iron overload due to repeated red blood cell transfusions Principal Discharge DX:	Acute chest syndrome  Goal:	symptoms control  Instructions for follow-up, activity and diet:	Avoid dehydration, exposure to extreme temperatures. Stephon to make a follow up appt with Dr Nguyễn in 1 week, continue medications as prescribed  Secondary Diagnosis:	Sickle cell crisis  Goal:	symptoms control  Instructions for follow-up, activity and diet:	Avoid dehydration, exposure to extreme temperatures. Stephon to make a follow up appt with Dr Nguyễn in 1 week, continue medications as prescribed  Secondary Diagnosis:	Iron overload due to repeated red blood cell transfusions  Instructions for follow-up, activity and diet:	Discuss with Dr Nguyễn when to resume JADENU Principal Discharge DX:	Acute chest syndrome  Goal:	symptoms control  Instructions for follow-up, activity and diet:	Avoid dehydration, exposure to extreme temperatures. Stephon to make a follow up appt with Dr Nguyễn in 1 week, continue medications as prescribed  Secondary Diagnosis:	Sickle cell crisis  Goal:	symptoms control  Instructions for follow-up, activity and diet:	Avoid dehydration, exposure to extreme temperatures. Stephon to make a follow up appt with Dr Nguyễn in 1 week, continue medications as prescribed  Secondary Diagnosis:	Iron overload due to repeated red blood cell transfusions  Goal:	prevent complications  Instructions for follow-up, activity and diet:	Discuss with Dr Nguyễn when to resume JADENU  Secondary Diagnosis:	ARTURO (acute kidney injury)  Goal:	resolved  Instructions for follow-up, activity and diet:	Avoid dehydration  Secondary Diagnosis:	Transaminitis  Goal:	stable lab work  Instructions for follow-up, activity and diet:	Elevated Bilirubin, transaminitis-RUQ US- Hepatomegaly. Principal Discharge DX:	Acute chest syndrome  Goal:	symptoms control  Instructions for follow-up, activity and diet:	Avoid dehydration, exposure to extreme temperatures. Stephon to make a follow up appt with Dr Nguyễn in 1 week, continue medications as prescribed  Secondary Diagnosis:	Sickle cell crisis  Goal:	symptoms control  Instructions for follow-up, activity and diet:	Avoid dehydration, exposure to extreme temperatures. Stephon to make a follow up appt with Dr Nguyễn in 1 week, continue medications as prescribed  Secondary Diagnosis:	Iron overload due to repeated red blood cell transfusions  Goal:	prevent complications  Instructions for follow-up, activity and diet:	Discuss with Dr Nguyễn when to resume JADENU  Secondary Diagnosis:	ARTURO (acute kidney injury)  Goal:	resolved  Instructions for follow-up, activity and diet:	Avoid dehydration  Secondary Diagnosis:	Transaminitis  Goal:	stable lab work  Instructions for follow-up, activity and diet:	Elevated Bilirubin, transaminitis-RUQ US- Hepatomegaly.  Secondary Diagnosis:	Flu  Goal:	symptom control  Instructions for follow-up, activity and diet:	Complete Tamiflu Principal Discharge DX:	Acute chest syndrome  Goal:	symptoms control  Instructions for follow-up, activity and diet:	Avoid dehydration, exposure to extreme temperatures. Stephon to make a follow up appt with Dr Nguyễn in 1 week, continue medications as prescribed  Secondary Diagnosis:	Sickle cell crisis  Goal:	symptoms control  Instructions for follow-up, activity and diet:	Avoid dehydration, exposure to extreme temperatures. Stepohn to make a follow up appt with Dr Nguyễn in 1 week, continue medications as prescribed  Secondary Diagnosis:	Iron overload due to repeated red blood cell transfusions  Goal:	prevent complications  Instructions for follow-up, activity and diet:	Discuss with Dr Nguyễn when to resume JADENU  Secondary Diagnosis:	ARTURO (acute kidney injury)  Goal:	resolved  Instructions for follow-up, activity and diet:	Avoid dehydration  Secondary Diagnosis:	Transaminitis  Goal:	stable lab work  Instructions for follow-up, activity and diet:	Elevated Bilirubin, transaminitis-RUQ US- Hepatomegaly.  Secondary Diagnosis:	Flu  Goal:	symptom control  Instructions for follow-up, activity and diet:	Complete Tamiflu Principal Discharge DX:	Acute chest syndrome  Goal:	symptoms control  Instructions for follow-up, activity and diet:	Avoid dehydration, exposure to extreme temperatures, continue with Incentive Spirometer. Call to make a follow up appt with Dr Nguyễn in 1 week, continue medications as prescribed.  Secondary Diagnosis:	Sickle cell crisis  Goal:	symptoms control  Instructions for follow-up, activity and diet:	Avoid dehydration, exposure to extreme temperatures. Stephon to make a follow up appt with Dr Nguyễn in 1 week, continue medications as prescribed  Secondary Diagnosis:	Iron overload due to repeated red blood cell transfusions  Goal:	prevent complications  Instructions for follow-up, activity and diet:	Discuss with Dr Nguyễn when to resume JADENU.  Secondary Diagnosis:	ARTURO (acute kidney injury)  Goal:	resolved  Instructions for follow-up, activity and diet:	Avoid dehydration  Secondary Diagnosis:	Transaminitis  Goal:	stable lab work  Instructions for follow-up, activity and diet:	Elevated Bilirubin, transaminitis-RUQ US- Hepatomegaly. Follow up with PCP for routine labs.  Secondary Diagnosis:	Flu  Goal:	symptom control  Instructions for follow-up, activity and diet:	Complete Tamiflu daily for 10 days thru 2/17/17. Follow up with PCP as outpatient.

## 2017-02-05 NOTE — DISCHARGE NOTE ADULT - HOSPITAL COURSE
21M hx HbSS, acute chest in past (most recently as 1 year ago), s/p splenectomy and lap elsa, previous monthly exchange transfusions (no longer receiving), presents with severe lower back pain and buttock pain c/w sickle cell crisis 21M hx HbSS, acute chest in past (most recently as 1 year ago), s/p splenectomy and lap elsa, previous monthly exchange transfusions (no longer receiving), presents with severe lower back pain and buttock pain c/w sickle cell crisis  Sickle cell crisis.    - IVF  1/2 normal saline  - PCA pump   - Folic Acid/Hydrea    -1U pRBCs, s/p 1 unit PRBC 1/27, 1/29-s/p 1 unit PRBC  - CXR-neg, UA-neg  -2/2 fever 101- repeat CXR- nothing acute  ID (Montero)- cont azithro for total 5 days and unasyn (thru 2/4)  -CxR-neg  -UA-neg  2/2 repeat BCx & UCx- negative  -hip xray- no acute injury  -2/6 ID (Montero)- stable off abx   -2/6 heme- no need for transfusion at this time    Epitaxis  -ENT - plan for decrease in pressure in packing on 2/1 and d/c packing on 2/2- wanted keflex for abx coverage but ID cs (Montero) wanted azithro and unasyn for better coverage due to fevers    Iron overload due to repeated red blood cell transfusions.     - As per Ray Olivas in- hospital     ARUTRO [place] 2/2 dehydration  -1/29-IVF bolus     Elevated Bilirubin, transaminitis  --RUQ US- Hepatomegaly. 21M hx HbSS, acute chest in past (most recently as 1 year ago), s/p splenectomy and lap elsa, previous monthly exchange transfusions (no longer receiving), presents with severe lower back pain and buttock pain c/w sickle cell crisis  Sickle cell crisis.    - IVF  1/2 normal saline  - PCA pump   - Folic Acid/Hydrea    -1U pRBCs, s/p 1 unit PRBC 1/27, 1/29-s/p 1 unit PRBC  - CXR-neg, UA-neg  -2/2 fever 101- repeat CXR- nothing acute  ID (Montero)- cont azithro for total 5 days and unasyn (thru 2/4)  -CxR-neg  -UA-neg  2/2 repeat BCx & UCx- negative  -hip xray- no acute injury  -2/6 ID (Montero)- stable off abx   -2/6 heme- no need for transfusion at this time    Epitaxis  -ENT - plan for decrease in pressure in packing on 2/1 and d/c packing on 2/2- wanted keflex for abx coverage but ID cs (Montero) wanted azithro and unasyn for better coverage due to fevers    Iron overload due to repeated red blood cell transfusions.     - As per Bell hold Jadenu during hospitalization  hospital     [facility] 2/2 dehydration  -1/29-IVF bolus     Elevated Bilirubin, transaminitis- RUQ US- Hepatomegaly.   Pt is optimized for discharge 21M hx HbSS, acute chest in past (most recently as 1 year ago), s/p splenectomy and lap elsa, previous monthly exchange transfusions (no longer receiving), presents with severe lower back pain and buttock pain c/w sickle cell crisis  Sickle cell crisis.    - IVF  1/2 normal saline  - PCA pump   - Folic Acid/Hydrea    -1U pRBCs, s/p 1 unit PRBC 1/27, 1/29-s/p 1 unit PRBC  - CXR-neg, UA-neg  -2/2 fever 101- repeat CXR- nothing acute  ID (Montero)- cont azithro for total 5 days and unasyn (thru 2/4)  -CxR-neg  -UA-neg  2/2 repeat BCx & UCx- negative  -hip xray- no acute injury  -2/6 ID (Montero)- stable off abx   -2/6 heme- no need for transfusion at this time    Epitaxis  -ENT - plan for decrease in pressure in packing on 2/1 and d/c packing on 2/2- wanted keflex for abx coverage but ID cs (Montero) wanted azithro and unasyn for better coverage due to fevers    Iron overload due to repeated red blood cell transfusions.     - As per [facility] hold Jadenu during hospitalization  hospital     ARTURO [place] 2/2 dehydration, resolved  -1/29-IVF bolus     Elevated Bilirubin, transaminitis- RUQ US- Hepatomegaly.   Pt is optimized for discharge

## 2017-02-05 NOTE — DISCHARGE NOTE ADULT - PATIENT PORTAL LINK FT
“You can access the [facility] Patient Portal, offered by [facility], by registering with the following website: [email or web address]”

## 2017-02-05 NOTE — DISCHARGE NOTE ADULT - SECONDARY DIAGNOSIS.
Sickle cell crisis Iron overload due to repeated red blood cell transfusions ARTURO (acute kidney injury) Transaminitis Flu

## 2017-02-05 NOTE — DISCHARGE NOTE ADULT - PLAN OF CARE
symptoms control Avoid dehydration, exposure to extreme temperatures. Stephon to make a follow up appt with Dr Nguyễn in 1 week, continue medications as prescribed Discuss with Dr Nguyễn when to resume HA Elevated Bilirubin, transaminitis-RUQ US- Hepatomegaly. stable lab work resolved Avoid dehydration prevent complications symptom control Complete Tamiflu Avoid dehydration, exposure to extreme temperatures, continue with Incentive Spirometer. Call to make a follow up appt with Dr Nguyễn in 1 week, continue medications as prescribed. Discuss with Dr Nguyễn when to resume HA. Elevated Bilirubin, transaminitis-RUQ US- Hepatomegaly. Follow up with PCP for routine labs. Complete Tamiflu daily for 10 days thru 2/17/17. Follow up with PCP as outpatient.

## 2017-02-05 NOTE — DISCHARGE NOTE ADULT - NS AS ACTIVITY OBS
DO NOT DRIVE IF TAKING PAIN MEDICATIONS/Walking-Outdoors allowed/Showering allowed/Walking-Indoors allowed

## 2017-02-06 LAB
ALBUMIN SERPL ELPH-MCNC: 3.5 G/DL — SIGNIFICANT CHANGE UP (ref 3.3–5)
ALP SERPL-CCNC: 150 U/L — HIGH (ref 40–120)
ALT FLD-CCNC: 21 U/L — SIGNIFICANT CHANGE UP (ref 4–41)
AST SERPL-CCNC: 26 U/L — SIGNIFICANT CHANGE UP (ref 4–40)
BILIRUB SERPL-MCNC: 2.6 MG/DL — HIGH (ref 0.2–1.2)
BUN SERPL-MCNC: 5 MG/DL — LOW (ref 7–23)
CALCIUM SERPL-MCNC: 9.5 MG/DL — SIGNIFICANT CHANGE UP (ref 8.4–10.5)
CHLORIDE SERPL-SCNC: 98 MMOL/L — SIGNIFICANT CHANGE UP (ref 98–107)
CO2 SERPL-SCNC: 28 MMOL/L — SIGNIFICANT CHANGE UP (ref 22–31)
CREAT SERPL-MCNC: 0.57 MG/DL — SIGNIFICANT CHANGE UP (ref 0.5–1.3)
GLUCOSE SERPL-MCNC: 102 MG/DL — HIGH (ref 70–99)
HCT VFR BLD CALC: 13.5 % — CRITICAL LOW (ref 39–50)
HGB BLD-MCNC: 4.1 G/DL — CRITICAL LOW (ref 13–17)
LDH SERPL L TO P-CCNC: 807 U/L — HIGH (ref 135–225)
MCHC RBC-ENTMCNC: 30.4 % — LOW (ref 32–36)
MCHC RBC-ENTMCNC: 32 PG — SIGNIFICANT CHANGE UP (ref 27–34)
MCV RBC AUTO: 105.5 FL — HIGH (ref 80–100)
NRBC FLD-RTO: 122.4 — SIGNIFICANT CHANGE UP
PLATELET # BLD AUTO: 439 K/UL — HIGH (ref 150–400)
PMV BLD: 11.4 FL — SIGNIFICANT CHANGE UP (ref 7–13)
POTASSIUM SERPL-MCNC: 4.4 MMOL/L — SIGNIFICANT CHANGE UP (ref 3.5–5.3)
POTASSIUM SERPL-SCNC: 4.4 MMOL/L — SIGNIFICANT CHANGE UP (ref 3.5–5.3)
PROT SERPL-MCNC: 7 G/DL — SIGNIFICANT CHANGE UP (ref 6–8.3)
RBC # BLD: 1.28 M/UL — LOW (ref 4.2–5.8)
RBC # FLD: 28.8 % — HIGH (ref 10.3–14.5)
RETICS #: 464.6 10X3/UL — HIGH (ref 17–73)
RETICS/RBC NFR: > 22.2 % — HIGH (ref 0.5–2.5)
SODIUM SERPL-SCNC: 141 MMOL/L — SIGNIFICANT CHANGE UP (ref 135–145)
WBC # BLD: 12.39 K/UL — HIGH (ref 3.8–10.5)
WBC # FLD AUTO: 12.39 K/UL — HIGH (ref 3.8–10.5)

## 2017-02-06 PROCEDURE — 99232 SBSQ HOSP IP/OBS MODERATE 35: CPT | Mod: GC

## 2017-02-06 PROCEDURE — 99232 SBSQ HOSP IP/OBS MODERATE 35: CPT

## 2017-02-06 RX ORDER — FOLIC ACID 0.8 MG
2 TABLET ORAL DAILY
Qty: 0 | Refills: 0 | Status: DISCONTINUED | OUTPATIENT
Start: 2017-02-06 | End: 2017-02-10

## 2017-02-06 RX ADMIN — Medication 100 MILLIGRAM(S): at 22:17

## 2017-02-06 RX ADMIN — Medication 100 MILLIGRAM(S): at 13:38

## 2017-02-06 RX ADMIN — HYDROMORPHONE HYDROCHLORIDE 30 MILLILITER(S): 2 INJECTION INTRAMUSCULAR; INTRAVENOUS; SUBCUTANEOUS at 19:20

## 2017-02-06 RX ADMIN — HYDROXYUREA 2000 MILLIGRAM(S): 500 CAPSULE ORAL at 22:17

## 2017-02-06 RX ADMIN — Medication 100 MILLIGRAM(S): at 05:19

## 2017-02-06 RX ADMIN — SENNA PLUS 2 TABLET(S): 8.6 TABLET ORAL at 22:17

## 2017-02-06 RX ADMIN — HYDROMORPHONE HYDROCHLORIDE 30 MILLILITER(S): 2 INJECTION INTRAMUSCULAR; INTRAVENOUS; SUBCUTANEOUS at 15:18

## 2017-02-06 RX ADMIN — HYDROMORPHONE HYDROCHLORIDE 30 MILLILITER(S): 2 INJECTION INTRAMUSCULAR; INTRAVENOUS; SUBCUTANEOUS at 07:23

## 2017-02-06 RX ADMIN — HYDROMORPHONE HYDROCHLORIDE 30 MILLILITER(S): 2 INJECTION INTRAMUSCULAR; INTRAVENOUS; SUBCUTANEOUS at 17:16

## 2017-02-06 RX ADMIN — DEXTROSE MONOHYDRATE, SODIUM CHLORIDE, AND POTASSIUM CHLORIDE 150 MILLILITER(S): 50; .745; 4.5 INJECTION, SOLUTION INTRAVENOUS at 10:22

## 2017-02-06 RX ADMIN — Medication 2 MILLIGRAM(S): at 17:41

## 2017-02-06 RX ADMIN — Medication 1 MILLIGRAM(S): at 11:32

## 2017-02-06 RX ADMIN — HYDROMORPHONE HYDROCHLORIDE 30 MILLILITER(S): 2 INJECTION INTRAMUSCULAR; INTRAVENOUS; SUBCUTANEOUS at 04:48

## 2017-02-07 LAB
BACTERIA BLD CULT: SIGNIFICANT CHANGE UP
BUN SERPL-MCNC: 7 MG/DL — SIGNIFICANT CHANGE UP (ref 7–23)
CALCIUM SERPL-MCNC: 9.4 MG/DL — SIGNIFICANT CHANGE UP (ref 8.4–10.5)
CHLORIDE SERPL-SCNC: 97 MMOL/L — LOW (ref 98–107)
CO2 SERPL-SCNC: 30 MMOL/L — SIGNIFICANT CHANGE UP (ref 22–31)
CREAT SERPL-MCNC: 0.58 MG/DL — SIGNIFICANT CHANGE UP (ref 0.5–1.3)
GLUCOSE SERPL-MCNC: 93 MG/DL — SIGNIFICANT CHANGE UP (ref 70–99)
HCT VFR BLD CALC: 16.2 % — CRITICAL LOW (ref 39–50)
HGB BLD-MCNC: 5 G/DL — CRITICAL LOW (ref 13–17)
LDH SERPL L TO P-CCNC: 709 U/L — HIGH (ref 135–225)
MCHC RBC-ENTMCNC: 30.9 % — LOW (ref 32–36)
MCHC RBC-ENTMCNC: 32.5 PG — SIGNIFICANT CHANGE UP (ref 27–34)
MCV RBC AUTO: 105.2 FL — HIGH (ref 80–100)
NRBC FLD-RTO: 39.6 — SIGNIFICANT CHANGE UP
PLATELET # BLD AUTO: 553 K/UL — HIGH (ref 150–400)
PMV BLD: 10.1 FL — SIGNIFICANT CHANGE UP (ref 7–13)
POTASSIUM SERPL-MCNC: 4.6 MMOL/L — SIGNIFICANT CHANGE UP (ref 3.5–5.3)
POTASSIUM SERPL-SCNC: 4.6 MMOL/L — SIGNIFICANT CHANGE UP (ref 3.5–5.3)
RBC # BLD: 1.54 M/UL — LOW (ref 4.2–5.8)
RBC # FLD: 26.2 % — HIGH (ref 10.3–14.5)
RETICS #: 519 10X3/UL — HIGH (ref 17–73)
RETICS/RBC NFR: > 22.2 % — HIGH (ref 0.5–2.5)
SODIUM SERPL-SCNC: 139 MMOL/L — SIGNIFICANT CHANGE UP (ref 135–145)
WBC # BLD: 13.86 K/UL — HIGH (ref 3.8–10.5)
WBC # FLD AUTO: 13.86 K/UL — HIGH (ref 3.8–10.5)

## 2017-02-07 PROCEDURE — 99231 SBSQ HOSP IP/OBS SF/LOW 25: CPT | Mod: GC

## 2017-02-07 PROCEDURE — 99232 SBSQ HOSP IP/OBS MODERATE 35: CPT

## 2017-02-07 RX ADMIN — Medication 75 MILLIGRAM(S): at 18:31

## 2017-02-07 RX ADMIN — Medication 100 MILLIGRAM(S): at 21:39

## 2017-02-07 RX ADMIN — Medication 100 MILLIGRAM(S): at 13:37

## 2017-02-07 RX ADMIN — HYDROMORPHONE HYDROCHLORIDE 30 MILLILITER(S): 2 INJECTION INTRAMUSCULAR; INTRAVENOUS; SUBCUTANEOUS at 13:42

## 2017-02-07 RX ADMIN — HYDROMORPHONE HYDROCHLORIDE 30 MILLILITER(S): 2 INJECTION INTRAMUSCULAR; INTRAVENOUS; SUBCUTANEOUS at 07:03

## 2017-02-07 RX ADMIN — ONDANSETRON 4 MILLIGRAM(S): 8 TABLET, FILM COATED ORAL at 12:24

## 2017-02-07 RX ADMIN — HYDROMORPHONE HYDROCHLORIDE 30 MILLILITER(S): 2 INJECTION INTRAMUSCULAR; INTRAVENOUS; SUBCUTANEOUS at 19:03

## 2017-02-07 RX ADMIN — DEXTROSE MONOHYDRATE, SODIUM CHLORIDE, AND POTASSIUM CHLORIDE 150 MILLILITER(S): 50; .745; 4.5 INJECTION, SOLUTION INTRAVENOUS at 12:23

## 2017-02-07 RX ADMIN — DEXTROSE MONOHYDRATE, SODIUM CHLORIDE, AND POTASSIUM CHLORIDE 150 MILLILITER(S): 50; .745; 4.5 INJECTION, SOLUTION INTRAVENOUS at 19:05

## 2017-02-07 RX ADMIN — SENNA PLUS 2 TABLET(S): 8.6 TABLET ORAL at 21:39

## 2017-02-07 RX ADMIN — Medication 100 MILLIGRAM(S): at 05:42

## 2017-02-07 RX ADMIN — HYDROMORPHONE HYDROCHLORIDE 30 MILLILITER(S): 2 INJECTION INTRAMUSCULAR; INTRAVENOUS; SUBCUTANEOUS at 02:09

## 2017-02-07 RX ADMIN — HYDROXYUREA 2000 MILLIGRAM(S): 500 CAPSULE ORAL at 21:39

## 2017-02-07 RX ADMIN — Medication 2 MILLIGRAM(S): at 12:25

## 2017-02-07 NOTE — DIETITIAN INITIAL EVALUATION ADULT. - ENERGY NEEDS
Admission weight of 86.1kg (189.8lbs) questionable accurate, based on Pt.'s stated usual body weight.  BMI=24.0  SBR=[id number] (+/- 10%)

## 2017-02-07 NOTE — DIETITIAN INITIAL EVALUATION ADULT. - OTHER INFO
Pt. reports good appetite/PO intake & denies food allergies, vomiting/diarrhea/constipation, or issues with chewing/swallowing.  C/o nausea (informed RN).  Pt. typically consumes Ensure Plus 2-3x/day.  Encouraged intake of nutrient dense food choices & offered to obtain/provide food preferences.

## 2017-02-08 LAB
HGB A MFR BLD: 22.4 % — LOW
HGB A2 MFR BLD: 4.1 % — HIGH (ref 2.4–3.5)
HGB ELECT COMMENT: SIGNIFICANT CHANGE UP
HGB F MFR BLD: 4.7 % — HIGH (ref 0–1.5)
HGB S MFR BLD: 68.8 % — HIGH (ref 0–0)

## 2017-02-08 PROCEDURE — 99232 SBSQ HOSP IP/OBS MODERATE 35: CPT

## 2017-02-08 RX ORDER — HYDROMORPHONE HYDROCHLORIDE 2 MG/ML
4 INJECTION INTRAMUSCULAR; INTRAVENOUS; SUBCUTANEOUS EVERY 4 HOURS
Qty: 0 | Refills: 0 | Status: DISCONTINUED | OUTPATIENT
Start: 2017-02-08 | End: 2017-02-09

## 2017-02-08 RX ORDER — OXYCODONE HYDROCHLORIDE 5 MG/1
5 TABLET ORAL EVERY 4 HOURS
Qty: 0 | Refills: 0 | Status: DISCONTINUED | OUTPATIENT
Start: 2017-02-08 | End: 2017-02-09

## 2017-02-08 RX ADMIN — DEXTROSE MONOHYDRATE, SODIUM CHLORIDE, AND POTASSIUM CHLORIDE 150 MILLILITER(S): 50; .745; 4.5 INJECTION, SOLUTION INTRAVENOUS at 16:46

## 2017-02-08 RX ADMIN — OXYCODONE HYDROCHLORIDE 5 MILLIGRAM(S): 5 TABLET ORAL at 20:59

## 2017-02-08 RX ADMIN — OXYCODONE HYDROCHLORIDE 5 MILLIGRAM(S): 5 TABLET ORAL at 21:29

## 2017-02-08 RX ADMIN — Medication 100 MILLIGRAM(S): at 05:11

## 2017-02-08 RX ADMIN — HYDROMORPHONE HYDROCHLORIDE 30 MILLILITER(S): 2 INJECTION INTRAMUSCULAR; INTRAVENOUS; SUBCUTANEOUS at 16:37

## 2017-02-08 RX ADMIN — Medication 75 MILLIGRAM(S): at 11:22

## 2017-02-08 RX ADMIN — Medication 2 MILLIGRAM(S): at 11:22

## 2017-02-08 RX ADMIN — HYDROXYUREA 2000 MILLIGRAM(S): 500 CAPSULE ORAL at 21:13

## 2017-02-08 RX ADMIN — Medication 100 MILLIGRAM(S): at 13:17

## 2017-02-08 RX ADMIN — DEXTROSE MONOHYDRATE, SODIUM CHLORIDE, AND POTASSIUM CHLORIDE 150 MILLILITER(S): 50; .745; 4.5 INJECTION, SOLUTION INTRAVENOUS at 10:22

## 2017-02-08 RX ADMIN — Medication 100 MILLIGRAM(S): at 21:13

## 2017-02-08 RX ADMIN — HYDROMORPHONE HYDROCHLORIDE 30 MILLILITER(S): 2 INJECTION INTRAMUSCULAR; INTRAVENOUS; SUBCUTANEOUS at 07:37

## 2017-02-08 RX ADMIN — SENNA PLUS 2 TABLET(S): 8.6 TABLET ORAL at 21:13

## 2017-02-08 RX ADMIN — HYDROMORPHONE HYDROCHLORIDE 30 MILLILITER(S): 2 INJECTION INTRAMUSCULAR; INTRAVENOUS; SUBCUTANEOUS at 02:09

## 2017-02-09 LAB
ALBUMIN SERPL ELPH-MCNC: 4.6 G/DL — SIGNIFICANT CHANGE UP (ref 3.3–5)
ALP SERPL-CCNC: 152 U/L — HIGH (ref 40–120)
ALT FLD-CCNC: 17 U/L — SIGNIFICANT CHANGE UP (ref 4–41)
AST SERPL-CCNC: 34 U/L — SIGNIFICANT CHANGE UP (ref 4–40)
BILIRUB SERPL-MCNC: 1.9 MG/DL — HIGH (ref 0.2–1.2)
BUN SERPL-MCNC: 9 MG/DL — SIGNIFICANT CHANGE UP (ref 7–23)
CALCIUM SERPL-MCNC: 10.6 MG/DL — HIGH (ref 8.4–10.5)
CHLORIDE SERPL-SCNC: 94 MMOL/L — LOW (ref 98–107)
CO2 SERPL-SCNC: 23 MMOL/L — SIGNIFICANT CHANGE UP (ref 22–31)
CREAT SERPL-MCNC: 0.65 MG/DL — SIGNIFICANT CHANGE UP (ref 0.5–1.3)
GLUCOSE SERPL-MCNC: 93 MG/DL — SIGNIFICANT CHANGE UP (ref 70–99)
HCT VFR BLD CALC: 21.6 % — LOW (ref 39–50)
HGB BLD-MCNC: 6.6 G/DL — CRITICAL LOW (ref 13–17)
LDH SERPL L TO P-CCNC: 768 U/L — HIGH (ref 135–225)
MCHC RBC-ENTMCNC: 30.6 % — LOW (ref 32–36)
MCHC RBC-ENTMCNC: 32.5 PG — SIGNIFICANT CHANGE UP (ref 27–34)
MCV RBC AUTO: 106.4 FL — HIGH (ref 80–100)
NRBC FLD-RTO: 34.9 — SIGNIFICANT CHANGE UP
PLATELET # BLD AUTO: 754 K/UL — HIGH (ref 150–400)
PMV BLD: 10.7 FL — SIGNIFICANT CHANGE UP (ref 7–13)
POTASSIUM SERPL-MCNC: 5.3 MMOL/L — SIGNIFICANT CHANGE UP (ref 3.5–5.3)
POTASSIUM SERPL-SCNC: 5.3 MMOL/L — SIGNIFICANT CHANGE UP (ref 3.5–5.3)
PROT SERPL-MCNC: 9.3 G/DL — HIGH (ref 6–8.3)
RBC # BLD: 2.03 M/UL — LOW (ref 4.2–5.8)
RBC # FLD: 24.5 % — HIGH (ref 10.3–14.5)
RETICS #: 429.1 10X3/UL — HIGH (ref 17–73)
RETICS/RBC NFR: 21.1 % — HIGH (ref 0.5–2.5)
SODIUM SERPL-SCNC: 133 MMOL/L — LOW (ref 135–145)
WBC # BLD: 10.84 K/UL — HIGH (ref 3.8–10.5)
WBC # FLD AUTO: 10.84 K/UL — HIGH (ref 3.8–10.5)

## 2017-02-09 PROCEDURE — 99232 SBSQ HOSP IP/OBS MODERATE 35: CPT

## 2017-02-09 RX ORDER — OXYCODONE HYDROCHLORIDE 5 MG/1
10 TABLET ORAL EVERY 4 HOURS
Qty: 0 | Refills: 0 | Status: DISCONTINUED | OUTPATIENT
Start: 2017-02-09 | End: 2017-02-10

## 2017-02-09 RX ORDER — OXYCODONE HYDROCHLORIDE 5 MG/1
15 TABLET ORAL EVERY 4 HOURS
Qty: 0 | Refills: 0 | Status: DISCONTINUED | OUTPATIENT
Start: 2017-02-09 | End: 2017-02-10

## 2017-02-09 RX ORDER — IBUPROFEN 200 MG
1 TABLET ORAL
Qty: 0 | Refills: 0 | COMMUNITY

## 2017-02-09 RX ORDER — SENNA PLUS 8.6 MG/1
2 TABLET ORAL
Qty: 0 | Refills: 0 | COMMUNITY
Start: 2017-02-09

## 2017-02-09 RX ORDER — HYDROMORPHONE HYDROCHLORIDE 2 MG/ML
1 INJECTION INTRAMUSCULAR; INTRAVENOUS; SUBCUTANEOUS ONCE
Qty: 0 | Refills: 0 | Status: DISCONTINUED | OUTPATIENT
Start: 2017-02-09 | End: 2017-02-09

## 2017-02-09 RX ORDER — ACETAMINOPHEN 500 MG
2 TABLET ORAL
Qty: 0 | Refills: 0 | COMMUNITY
Start: 2017-02-09

## 2017-02-09 RX ORDER — DOCUSATE SODIUM 100 MG
1 CAPSULE ORAL
Qty: 0 | Refills: 0 | COMMUNITY
Start: 2017-02-09

## 2017-02-09 RX ADMIN — Medication 100 MILLIGRAM(S): at 05:03

## 2017-02-09 RX ADMIN — OXYCODONE HYDROCHLORIDE 15 MILLIGRAM(S): 5 TABLET ORAL at 20:05

## 2017-02-09 RX ADMIN — Medication 100 MILLIGRAM(S): at 21:36

## 2017-02-09 RX ADMIN — HYDROMORPHONE HYDROCHLORIDE 4 MILLIGRAM(S): 2 INJECTION INTRAMUSCULAR; INTRAVENOUS; SUBCUTANEOUS at 11:01

## 2017-02-09 RX ADMIN — HYDROXYUREA 2000 MILLIGRAM(S): 500 CAPSULE ORAL at 21:36

## 2017-02-09 RX ADMIN — OXYCODONE HYDROCHLORIDE 5 MILLIGRAM(S): 5 TABLET ORAL at 06:52

## 2017-02-09 RX ADMIN — HYDROMORPHONE HYDROCHLORIDE 1 MILLIGRAM(S): 2 INJECTION INTRAMUSCULAR; INTRAVENOUS; SUBCUTANEOUS at 16:30

## 2017-02-09 RX ADMIN — SENNA PLUS 2 TABLET(S): 8.6 TABLET ORAL at 21:36

## 2017-02-09 RX ADMIN — OXYCODONE HYDROCHLORIDE 15 MILLIGRAM(S): 5 TABLET ORAL at 20:35

## 2017-02-09 RX ADMIN — HYDROMORPHONE HYDROCHLORIDE 1 MILLIGRAM(S): 2 INJECTION INTRAMUSCULAR; INTRAVENOUS; SUBCUTANEOUS at 17:00

## 2017-02-09 RX ADMIN — Medication 75 MILLIGRAM(S): at 11:34

## 2017-02-09 RX ADMIN — OXYCODONE HYDROCHLORIDE 5 MILLIGRAM(S): 5 TABLET ORAL at 07:22

## 2017-02-09 RX ADMIN — DEXTROSE MONOHYDRATE, SODIUM CHLORIDE, AND POTASSIUM CHLORIDE 150 MILLILITER(S): 50; .745; 4.5 INJECTION, SOLUTION INTRAVENOUS at 13:10

## 2017-02-09 RX ADMIN — Medication 2 MILLIGRAM(S): at 11:34

## 2017-02-09 RX ADMIN — HYDROMORPHONE HYDROCHLORIDE 4 MILLIGRAM(S): 2 INJECTION INTRAMUSCULAR; INTRAVENOUS; SUBCUTANEOUS at 10:31

## 2017-02-09 RX ADMIN — DEXTROSE MONOHYDRATE, SODIUM CHLORIDE, AND POTASSIUM CHLORIDE 150 MILLILITER(S): 50; .745; 4.5 INJECTION, SOLUTION INTRAVENOUS at 05:03

## 2017-02-09 NOTE — PROVIDER CONTACT NOTE (CRITICAL VALUE NOTIFICATION) - PERSON GIVING RESULT:
Ashlee Moyer
Bridget Flowers, Lab
C Flores
Hematology- Sammi Whipple
Hematology- Yury Patterson
LAB Bridget Flowers
Lexx
Mimi Lima
Patel Brewer
Rita
Shimon Saab
Yury Patterson
ger walker
Hematology

## 2017-02-09 NOTE — PROVIDER CONTACT NOTE (CRITICAL VALUE NOTIFICATION) - ASSESSMENT
Patient asymptomatic denies chest pain , SOB or weakness
98.4, 98, 126/80, 18, 91% on room air with no s/s of distress
H&H trending up from yesterday. Pt stable. C/O pain in leg. Vitals stable.
Patient is asleep, no s/s of acute distress; nasal cannula off; put NC back on
Pt A&Ox4. no s/s of acute distress. VSS
Pt is asymptomatic.
Sleeping
VSS
alert and oriented times four V/S 99.2- 143/77- 18 Hr 82- pox 96% room air  patient denied any dizziness
no signs or symptoms of acute distress, vital signs stable
pt stable v/s/s
pt. vital signs stable, pt. in no acute distress

## 2017-02-09 NOTE — PROVIDER CONTACT NOTE (CRITICAL VALUE NOTIFICATION) - RECOMMENDATIONS
treat symptom and continue to monitor
As per ADS
Assess
CBC to repeat stat
Continue to monitor patient
Continue to monitor.
Monitor.
NP and MD to assess need for transfusion
NP to assess need for transfusion
Will continue to monitor
continue to monitor cbc/transfuse

## 2017-02-09 NOTE — PROVIDER CONTACT NOTE (CRITICAL VALUE NOTIFICATION) - TEST AND RESULT REPORTED:
3.6/ 11.4
H&H 4.3 / 13.1
H+H
H+H
H/H 4/11.5
H/H 5.1/14.0
Hbg- 6.5 Hct 17.9
Hemoglobin/Hematocrit 4.1/13.5
Hemoglobin/Hematocrit: 5.1/13.8
h/h 3.6/11.7
hemoglobin -5.0
hemoglobin-5.2, hematicrit- 14.7
hgb 3.7 and Hct 11.7
hemoglobin 6.6, hematocrit 21.6

## 2017-02-09 NOTE — PROVIDER CONTACT NOTE (CRITICAL VALUE NOTIFICATION) - NAME OF MD/NP/PA/DO NOTIFIED:
[place]ul [place] [id number]
HAJA Flowers NP
Matheus S. NP
Mira Liang, NP
NP chilango
Sarabjit Chapa
Shiaba ADS
Tiana NP Ads   [id number] and Dr. Sheets
Vera Amado ADS
YEISON Kendrick
YEISON Mcrae [id number]
YEISON donald
HAJA WIGGINS

## 2017-02-09 NOTE — PROVIDER CONTACT NOTE (CRITICAL VALUE NOTIFICATION) - BACKGROUND
Pt w/ hx of Hb-SS disease adm with crisis hx of SC anemia iron overload.
Patient admitted with sickle cell crisis
Pt admitted with SCC. Received 1unit PRBCs
Pt admitted with sickle cell crisis
Pt admitted with sickle cell crisis.
Pt is admitted with sickle cell anemia.
SCA /crisis
patient admitted in sickle cell crisis
patient admitted with sickle cell crisis
patient is admitted for sickle cell
pt. came in with sickle cell crisis
pt. came in with sickle cell crisis

## 2017-02-10 VITALS
OXYGEN SATURATION: 100 % | HEART RATE: 84 BPM | RESPIRATION RATE: 18 BRPM | SYSTOLIC BLOOD PRESSURE: 112 MMHG | TEMPERATURE: 98 F | DIASTOLIC BLOOD PRESSURE: 63 MMHG

## 2017-02-10 PROCEDURE — 99239 HOSP IP/OBS DSCHRG MGMT >30: CPT

## 2017-02-10 RX ORDER — OXYCODONE HYDROCHLORIDE 5 MG/1
1 TABLET ORAL
Qty: 24 | Refills: 0 | OUTPATIENT
Start: 2017-02-10 | End: 2017-02-14

## 2017-02-10 RX ADMIN — OXYCODONE HYDROCHLORIDE 15 MILLIGRAM(S): 5 TABLET ORAL at 04:38

## 2017-02-10 RX ADMIN — OXYCODONE HYDROCHLORIDE 15 MILLIGRAM(S): 5 TABLET ORAL at 00:14

## 2017-02-10 RX ADMIN — OXYCODONE HYDROCHLORIDE 15 MILLIGRAM(S): 5 TABLET ORAL at 05:08

## 2017-02-10 RX ADMIN — OXYCODONE HYDROCHLORIDE 15 MILLIGRAM(S): 5 TABLET ORAL at 00:44

## 2017-02-13 ENCOUNTER — APPOINTMENT (OUTPATIENT)
Dept: INTERNAL MEDICINE | Facility: HOSPITAL | Age: 22
End: 2017-02-13

## 2017-03-13 ENCOUNTER — LABORATORY RESULT (OUTPATIENT)
Age: 22
End: 2017-03-13

## 2017-03-13 ENCOUNTER — APPOINTMENT (OUTPATIENT)
Dept: INTERNAL MEDICINE | Facility: HOSPITAL | Age: 22
End: 2017-03-13

## 2017-03-13 ENCOUNTER — OUTPATIENT (OUTPATIENT)
Dept: OUTPATIENT SERVICES | Facility: HOSPITAL | Age: 22
LOS: 1 days | End: 2017-03-13

## 2017-03-13 VITALS — HEART RATE: 86 BPM | DIASTOLIC BLOOD PRESSURE: 55 MMHG | OXYGEN SATURATION: 97 % | SYSTOLIC BLOOD PRESSURE: 120 MMHG

## 2017-03-13 LAB
ALBUMIN SERPL ELPH-MCNC: 4.8 G/DL — SIGNIFICANT CHANGE UP (ref 3.3–5)
ALP SERPL-CCNC: 109 U/L — SIGNIFICANT CHANGE UP (ref 40–120)
ALT FLD-CCNC: 28 U/L — SIGNIFICANT CHANGE UP (ref 4–41)
AST SERPL-CCNC: 63 U/L — HIGH (ref 4–40)
BASOPHILS # BLD AUTO: 0.07 K/UL — SIGNIFICANT CHANGE UP (ref 0–0.2)
BASOPHILS NFR BLD AUTO: 0.6 % — SIGNIFICANT CHANGE UP (ref 0–2)
BILIRUB SERPL-MCNC: 2.4 MG/DL — HIGH (ref 0.2–1.2)
BUN SERPL-MCNC: 5 MG/DL — LOW (ref 7–23)
CALCIUM SERPL-MCNC: 9.4 MG/DL — SIGNIFICANT CHANGE UP (ref 8.4–10.5)
CHLORIDE SERPL-SCNC: 102 MMOL/L — SIGNIFICANT CHANGE UP (ref 98–107)
CO2 SERPL-SCNC: 24 MMOL/L — SIGNIFICANT CHANGE UP (ref 22–31)
CREAT SERPL-MCNC: 0.66 MG/DL — SIGNIFICANT CHANGE UP (ref 0.5–1.3)
EOSINOPHIL # BLD AUTO: 0.07 K/UL — SIGNIFICANT CHANGE UP (ref 0–0.5)
EOSINOPHIL NFR BLD AUTO: 0.6 % — SIGNIFICANT CHANGE UP (ref 0–6)
FERRITIN SERPL-MCNC: 3466 NG/ML — HIGH (ref 30–400)
GLUCOSE SERPL-MCNC: 70 MG/DL — SIGNIFICANT CHANGE UP (ref 70–99)
HCT VFR BLD CALC: 23 % — LOW (ref 39–50)
HGB BLD-MCNC: 7.9 G/DL — LOW (ref 13–17)
IMM GRANULOCYTES NFR BLD AUTO: 0.4 % — SIGNIFICANT CHANGE UP (ref 0–1.5)
LYMPHOCYTES # BLD AUTO: 3.94 K/UL — HIGH (ref 1–3.3)
LYMPHOCYTES # BLD AUTO: 35.7 % — SIGNIFICANT CHANGE UP (ref 13–44)
MCHC RBC-ENTMCNC: 31.5 PG — SIGNIFICANT CHANGE UP (ref 27–34)
MCHC RBC-ENTMCNC: 34.3 % — SIGNIFICANT CHANGE UP (ref 32–36)
MCV RBC AUTO: 91.6 FL — SIGNIFICANT CHANGE UP (ref 80–100)
MONOCYTES # BLD AUTO: 1.76 K/UL — HIGH (ref 0–0.9)
MONOCYTES NFR BLD AUTO: 15.9 % — HIGH (ref 2–14)
NEUTROPHILS # BLD AUTO: 5.16 K/UL — SIGNIFICANT CHANGE UP (ref 1.8–7.4)
NEUTROPHILS NFR BLD AUTO: 46.8 % — SIGNIFICANT CHANGE UP (ref 43–77)
PLATELET # BLD AUTO: 652 K/UL — HIGH (ref 150–400)
PMV BLD: 10.2 FL — SIGNIFICANT CHANGE UP (ref 7–13)
POTASSIUM SERPL-MCNC: 4.5 MMOL/L — SIGNIFICANT CHANGE UP (ref 3.5–5.3)
POTASSIUM SERPL-SCNC: 4.5 MMOL/L — SIGNIFICANT CHANGE UP (ref 3.5–5.3)
PROT SERPL-MCNC: 8.3 G/DL — SIGNIFICANT CHANGE UP (ref 6–8.3)
RBC # BLD: 2.51 M/UL — LOW (ref 4.2–5.8)
RBC # FLD: 22.8 % — HIGH (ref 10.3–14.5)
RETICS #: 332.8 10X3/UL — HIGH (ref 17–73)
RETICS/RBC NFR: 13.5 % — HIGH (ref 0.5–2.5)
SODIUM SERPL-SCNC: 141 MMOL/L — SIGNIFICANT CHANGE UP (ref 135–145)
WBC # BLD: 11.04 K/UL — HIGH (ref 3.8–10.5)
WBC # FLD AUTO: 11.04 K/UL — HIGH (ref 3.8–10.5)

## 2017-03-15 DIAGNOSIS — E83.19 OTHER DISORDERS OF IRON METABOLISM: ICD-10-CM

## 2017-03-15 DIAGNOSIS — Z92.89 PERSONAL HISTORY OF OTHER MEDICAL TREATMENT: ICD-10-CM

## 2017-03-15 DIAGNOSIS — D57.1 SICKLE-CELL DISEASE WITHOUT CRISIS: ICD-10-CM

## 2017-04-13 ENCOUNTER — OUTPATIENT (OUTPATIENT)
Dept: OUTPATIENT SERVICES | Facility: HOSPITAL | Age: 22
LOS: 1 days | End: 2017-04-13

## 2017-04-13 ENCOUNTER — APPOINTMENT (OUTPATIENT)
Dept: INTERNAL MEDICINE | Facility: HOSPITAL | Age: 22
End: 2017-04-13

## 2017-04-13 VITALS — DIASTOLIC BLOOD PRESSURE: 42 MMHG | OXYGEN SATURATION: 96 % | HEART RATE: 70 BPM | SYSTOLIC BLOOD PRESSURE: 129 MMHG

## 2017-04-13 DIAGNOSIS — E83.19 OTHER DISORDERS OF IRON METABOLISM: ICD-10-CM

## 2017-04-13 DIAGNOSIS — D57.1 SICKLE-CELL DISEASE WITHOUT CRISIS: ICD-10-CM

## 2017-05-11 ENCOUNTER — APPOINTMENT (OUTPATIENT)
Dept: INTERNAL MEDICINE | Facility: HOSPITAL | Age: 22
End: 2017-05-11

## 2017-05-11 ENCOUNTER — LABORATORY RESULT (OUTPATIENT)
Age: 22
End: 2017-05-11

## 2017-05-11 ENCOUNTER — OUTPATIENT (OUTPATIENT)
Dept: OUTPATIENT SERVICES | Facility: HOSPITAL | Age: 22
LOS: 1 days | End: 2017-05-11

## 2017-05-11 VITALS — SYSTOLIC BLOOD PRESSURE: 126 MMHG | OXYGEN SATURATION: 92 % | HEART RATE: 72 BPM | DIASTOLIC BLOOD PRESSURE: 60 MMHG

## 2017-05-11 LAB
ALBUMIN SERPL ELPH-MCNC: 4.6 G/DL — SIGNIFICANT CHANGE UP (ref 3.3–5)
ALP SERPL-CCNC: 96 U/L — SIGNIFICANT CHANGE UP (ref 40–120)
ALT FLD-CCNC: 18 U/L — SIGNIFICANT CHANGE UP (ref 4–41)
ANISOCYTOSIS BLD QL: SIGNIFICANT CHANGE UP
AST SERPL-CCNC: 73 U/L — HIGH (ref 4–40)
BASOPHILS # BLD AUTO: 0.09 K/UL — SIGNIFICANT CHANGE UP (ref 0–0.2)
BASOPHILS NFR BLD AUTO: 0.9 % — SIGNIFICANT CHANGE UP (ref 0–2)
BILIRUB SERPL-MCNC: 3.1 MG/DL — HIGH (ref 0.2–1.2)
BUN SERPL-MCNC: 7 MG/DL — SIGNIFICANT CHANGE UP (ref 7–23)
CALCIUM SERPL-MCNC: 9.5 MG/DL — SIGNIFICANT CHANGE UP (ref 8.4–10.5)
CHLORIDE SERPL-SCNC: 101 MMOL/L — SIGNIFICANT CHANGE UP (ref 98–107)
CO2 SERPL-SCNC: 25 MMOL/L — SIGNIFICANT CHANGE UP (ref 22–31)
CREAT SERPL-MCNC: 0.69 MG/DL — SIGNIFICANT CHANGE UP (ref 0.5–1.3)
EOSINOPHIL # BLD AUTO: 0.14 K/UL — SIGNIFICANT CHANGE UP (ref 0–0.5)
EOSINOPHIL NFR BLD AUTO: 1.3 % — SIGNIFICANT CHANGE UP (ref 0–6)
GLUCOSE SERPL-MCNC: 73 MG/DL — SIGNIFICANT CHANGE UP (ref 70–99)
HCT VFR BLD CALC: 19.6 % — CRITICAL LOW (ref 39–50)
HGB BLD-MCNC: 6.8 G/DL — CRITICAL LOW (ref 13–17)
IMM GRANULOCYTES NFR BLD AUTO: 0.6 % — SIGNIFICANT CHANGE UP (ref 0–1.5)
LYMPHOCYTES # BLD AUTO: 3.39 K/UL — HIGH (ref 1–3.3)
LYMPHOCYTES # BLD AUTO: 32.3 % — SIGNIFICANT CHANGE UP (ref 13–44)
MACROCYTES BLD QL: SIGNIFICANT CHANGE UP
MANUAL SMEAR VERIFICATION: SIGNIFICANT CHANGE UP
MCHC RBC-ENTMCNC: 30.8 PG — SIGNIFICANT CHANGE UP (ref 27–34)
MCHC RBC-ENTMCNC: 34.7 % — SIGNIFICANT CHANGE UP (ref 32–36)
MCV RBC AUTO: 88.7 FL — SIGNIFICANT CHANGE UP (ref 80–100)
MICROCYTES BLD QL: SLIGHT — SIGNIFICANT CHANGE UP
MONOCYTES # BLD AUTO: 2.14 K/UL — HIGH (ref 0–0.9)
MONOCYTES NFR BLD AUTO: 20.4 % — HIGH (ref 2–14)
NEUTROPHILS # BLD AUTO: 4.66 K/UL — SIGNIFICANT CHANGE UP (ref 1.8–7.4)
NEUTROPHILS NFR BLD AUTO: 44.5 % — SIGNIFICANT CHANGE UP (ref 43–77)
PLATELET # BLD AUTO: 424 K/UL — HIGH (ref 150–400)
PLATELET COUNT - ESTIMATE: NORMAL — SIGNIFICANT CHANGE UP
PMV BLD: 10.1 FL — SIGNIFICANT CHANGE UP (ref 7–13)
POIKILOCYTOSIS BLD QL AUTO: SIGNIFICANT CHANGE UP
POLYCHROMASIA BLD QL SMEAR: SIGNIFICANT CHANGE UP
POTASSIUM SERPL-MCNC: 4.6 MMOL/L — SIGNIFICANT CHANGE UP (ref 3.5–5.3)
POTASSIUM SERPL-SCNC: 4.6 MMOL/L — SIGNIFICANT CHANGE UP (ref 3.5–5.3)
PROT SERPL-MCNC: 8 G/DL — SIGNIFICANT CHANGE UP (ref 6–8.3)
RBC # BLD: 2.21 M/UL — LOW (ref 4.2–5.8)
RBC # FLD: 24.3 % — HIGH (ref 10.3–14.5)
SICKLE CELLS BLD QL SMEAR: SIGNIFICANT CHANGE UP
SODIUM SERPL-SCNC: 138 MMOL/L — SIGNIFICANT CHANGE UP (ref 135–145)
WBC # BLD: 10.48 K/UL — SIGNIFICANT CHANGE UP (ref 3.8–10.5)
WBC # FLD AUTO: 10.48 K/UL — SIGNIFICANT CHANGE UP (ref 3.8–10.5)

## 2017-05-16 DIAGNOSIS — Z92.89 PERSONAL HISTORY OF OTHER MEDICAL TREATMENT: ICD-10-CM

## 2017-05-16 DIAGNOSIS — D57.1 SICKLE-CELL DISEASE WITHOUT CRISIS: ICD-10-CM

## 2017-05-16 DIAGNOSIS — D59.6 HEMOGLOBINURIA DUE TO HEMOLYSIS FROM OTHER EXTERNAL CAUSES: ICD-10-CM

## 2017-05-16 DIAGNOSIS — E83.19 OTHER DISORDERS OF IRON METABOLISM: ICD-10-CM

## 2017-06-15 ENCOUNTER — LABORATORY RESULT (OUTPATIENT)
Age: 22
End: 2017-06-15

## 2017-06-15 ENCOUNTER — OUTPATIENT (OUTPATIENT)
Dept: OUTPATIENT SERVICES | Facility: HOSPITAL | Age: 22
LOS: 1 days | End: 2017-06-15

## 2017-06-15 ENCOUNTER — APPOINTMENT (OUTPATIENT)
Dept: INTERNAL MEDICINE | Facility: HOSPITAL | Age: 22
End: 2017-06-15

## 2017-06-15 VITALS — HEART RATE: 68 BPM | SYSTOLIC BLOOD PRESSURE: 112 MMHG | DIASTOLIC BLOOD PRESSURE: 46 MMHG | OXYGEN SATURATION: 94 %

## 2017-06-15 DIAGNOSIS — D57.1 SICKLE-CELL DISEASE WITHOUT CRISIS: ICD-10-CM

## 2017-06-15 DIAGNOSIS — Z92.89 PERSONAL HISTORY OF OTHER MEDICAL TREATMENT: ICD-10-CM

## 2017-06-15 DIAGNOSIS — E83.19 OTHER DISORDERS OF IRON METABOLISM: ICD-10-CM

## 2017-06-15 DIAGNOSIS — D59.6 HEMOGLOBINURIA DUE TO HEMOLYSIS FROM OTHER EXTERNAL CAUSES: ICD-10-CM

## 2017-06-15 LAB
ALBUMIN SERPL ELPH-MCNC: 4.7 G/DL — SIGNIFICANT CHANGE UP (ref 3.3–5)
ALP SERPL-CCNC: 93 U/L — SIGNIFICANT CHANGE UP (ref 40–120)
ALT FLD-CCNC: 19 U/L — SIGNIFICANT CHANGE UP (ref 4–41)
ANISOCYTOSIS BLD QL: SIGNIFICANT CHANGE UP
AST SERPL-CCNC: 70 U/L — HIGH (ref 4–40)
BASO STIPL BLD QL SMEAR: PRESENT — SIGNIFICANT CHANGE UP
BASOPHILS # BLD AUTO: 0.06 K/UL — SIGNIFICANT CHANGE UP (ref 0–0.2)
BASOPHILS NFR BLD AUTO: 0.6 % — SIGNIFICANT CHANGE UP (ref 0–2)
BASOPHILS NFR SPEC: 1.6 % — SIGNIFICANT CHANGE UP (ref 0–2)
BILIRUB SERPL-MCNC: 4.2 MG/DL — HIGH (ref 0.2–1.2)
BLASTS # FLD: 0 % — SIGNIFICANT CHANGE UP (ref 0–0)
BUN SERPL-MCNC: 8 MG/DL — SIGNIFICANT CHANGE UP (ref 7–23)
CALCIUM SERPL-MCNC: 9.4 MG/DL — SIGNIFICANT CHANGE UP (ref 8.4–10.5)
CHLORIDE SERPL-SCNC: 100 MMOL/L — SIGNIFICANT CHANGE UP (ref 98–107)
CO2 SERPL-SCNC: 25 MMOL/L — SIGNIFICANT CHANGE UP (ref 22–31)
CREAT SERPL-MCNC: 0.65 MG/DL — SIGNIFICANT CHANGE UP (ref 0.5–1.3)
DACRYOCYTES BLD QL SMEAR: SLIGHT — SIGNIFICANT CHANGE UP
ELLIPTOCYTES BLD QL SMEAR: SLIGHT — SIGNIFICANT CHANGE UP
EOSINOPHIL # BLD AUTO: 0.32 K/UL — SIGNIFICANT CHANGE UP (ref 0–0.5)
EOSINOPHIL NFR BLD AUTO: 3 % — SIGNIFICANT CHANGE UP (ref 0–6)
EOSINOPHIL NFR FLD: 4.8 % — SIGNIFICANT CHANGE UP (ref 0–6)
FERRITIN SERPL-MCNC: 2199 NG/ML — HIGH (ref 30–400)
GIANT PLATELETS BLD QL SMEAR: PRESENT — SIGNIFICANT CHANGE UP
GLUCOSE SERPL-MCNC: 62 MG/DL — LOW (ref 70–99)
HCT VFR BLD CALC: 21.3 % — LOW (ref 39–50)
HGB BLD-MCNC: 7.3 G/DL — LOW (ref 13–17)
HOWELL-JOLLY BOD BLD QL SMEAR: PRESENT — SIGNIFICANT CHANGE UP
HYPOCHROMIA BLD QL: SIGNIFICANT CHANGE UP
IMM GRANULOCYTES NFR BLD AUTO: 0.4 % — SIGNIFICANT CHANGE UP (ref 0–1.5)
LYMPHOCYTES # BLD AUTO: 3.29 K/UL — SIGNIFICANT CHANGE UP (ref 1–3.3)
LYMPHOCYTES # BLD AUTO: 30.8 % — SIGNIFICANT CHANGE UP (ref 13–44)
LYMPHOCYTES NFR SPEC AUTO: 28.2 % — SIGNIFICANT CHANGE UP (ref 13–44)
MACROCYTES BLD QL: SIGNIFICANT CHANGE UP
MCHC RBC-ENTMCNC: 29.9 PG — SIGNIFICANT CHANGE UP (ref 27–34)
MCHC RBC-ENTMCNC: 34.3 % — SIGNIFICANT CHANGE UP (ref 32–36)
MCV RBC AUTO: 87.3 FL — SIGNIFICANT CHANGE UP (ref 80–100)
METAMYELOCYTES # FLD: 0 % — SIGNIFICANT CHANGE UP (ref 0–1)
MICROCYTES BLD QL: SLIGHT — SIGNIFICANT CHANGE UP
MONOCYTES # BLD AUTO: 1.95 K/UL — HIGH (ref 0–0.9)
MONOCYTES NFR BLD AUTO: 18.3 % — HIGH (ref 2–14)
MONOCYTES NFR BLD: 7.3 % — SIGNIFICANT CHANGE UP (ref 2–9)
MYELOCYTES NFR BLD: 0 % — SIGNIFICANT CHANGE UP (ref 0–0)
NEUTROPHIL AB SER-ACNC: 54.8 % — SIGNIFICANT CHANGE UP (ref 43–77)
NEUTROPHILS # BLD AUTO: 5.02 K/UL — SIGNIFICANT CHANGE UP (ref 1.8–7.4)
NEUTROPHILS NFR BLD AUTO: 46.9 % — SIGNIFICANT CHANGE UP (ref 43–77)
NEUTS BAND # BLD: 0 % — SIGNIFICANT CHANGE UP (ref 0–6)
OTHER - HEMATOLOGY %: 0 — SIGNIFICANT CHANGE UP
OVALOCYTES BLD QL SMEAR: SIGNIFICANT CHANGE UP
PLATELET # BLD AUTO: 542 K/UL — HIGH (ref 150–400)
PLATELET COUNT - ESTIMATE: SIGNIFICANT CHANGE UP
PMV BLD: 9.5 FL — SIGNIFICANT CHANGE UP (ref 7–13)
POIKILOCYTOSIS BLD QL AUTO: SIGNIFICANT CHANGE UP
POLYCHROMASIA BLD QL SMEAR: SIGNIFICANT CHANGE UP
POTASSIUM SERPL-MCNC: 5.5 MMOL/L — HIGH (ref 3.5–5.3)
POTASSIUM SERPL-SCNC: 5.5 MMOL/L — HIGH (ref 3.5–5.3)
PROMYELOCYTES # FLD: 0 % — SIGNIFICANT CHANGE UP (ref 0–0)
PROT SERPL-MCNC: 7.8 G/DL — SIGNIFICANT CHANGE UP (ref 6–8.3)
RBC # BLD: 2.44 M/UL — LOW (ref 4.2–5.8)
RBC # FLD: 25.2 % — HIGH (ref 10.3–14.5)
RETICS #: 339.8 10X3/UL — HIGH (ref 17–73)
RETICS/RBC NFR: 13.9 % — HIGH (ref 0.5–2.5)
SCHISTOCYTES BLD QL AUTO: SLIGHT — SIGNIFICANT CHANGE UP
SICKLE CELLS BLD QL SMEAR: SIGNIFICANT CHANGE UP
SODIUM SERPL-SCNC: 141 MMOL/L — SIGNIFICANT CHANGE UP (ref 135–145)
TARGETS BLD QL SMEAR: SIGNIFICANT CHANGE UP
VARIANT LYMPHS # BLD: 3.2 % — SIGNIFICANT CHANGE UP
WBC # BLD: 10.68 K/UL — HIGH (ref 3.8–10.5)
WBC # FLD AUTO: 10.68 K/UL — HIGH (ref 3.8–10.5)

## 2017-07-01 ENCOUNTER — INPATIENT (INPATIENT)
Facility: HOSPITAL | Age: 22
LOS: 0 days | Discharge: HOME | End: 2017-07-02
Attending: HOSPITALIST | Admitting: HOSPITALIST
Payer: COMMERCIAL

## 2017-07-01 VITALS
DIASTOLIC BLOOD PRESSURE: 72 MMHG | OXYGEN SATURATION: 98 % | SYSTOLIC BLOOD PRESSURE: 134 MMHG | HEART RATE: 74 BPM | TEMPERATURE: 98 F | RESPIRATION RATE: 16 BRPM

## 2017-07-01 DIAGNOSIS — D57.00 HB-SS DISEASE WITH CRISIS, UNSPECIFIED: ICD-10-CM

## 2017-07-01 DIAGNOSIS — E83.111 HEMOCHROMATOSIS DUE TO REPEATED RED BLOOD CELL TRANSFUSIONS: ICD-10-CM

## 2017-07-01 DIAGNOSIS — R63.8 OTHER SYMPTOMS AND SIGNS CONCERNING FOOD AND FLUID INTAKE: ICD-10-CM

## 2017-07-01 DIAGNOSIS — Z29.9 ENCOUNTER FOR PROPHYLACTIC MEASURES, UNSPECIFIED: ICD-10-CM

## 2017-07-01 LAB
ALBUMIN SERPL ELPH-MCNC: 4.5 G/DL — SIGNIFICANT CHANGE UP (ref 3.3–5)
ALP SERPL-CCNC: 89 U/L — SIGNIFICANT CHANGE UP (ref 40–120)
ALT FLD-CCNC: 20 U/L — SIGNIFICANT CHANGE UP (ref 4–41)
ANISOCYTOSIS BLD QL: SIGNIFICANT CHANGE UP
AST SERPL-CCNC: 75 U/L — HIGH (ref 4–40)
BASOPHILS # BLD AUTO: 0.02 K/UL — SIGNIFICANT CHANGE UP (ref 0–0.2)
BASOPHILS NFR BLD AUTO: 0.2 % — SIGNIFICANT CHANGE UP (ref 0–2)
BILIRUB SERPL-MCNC: 3.9 MG/DL — HIGH (ref 0.2–1.2)
BLD GP AB SCN SERPL QL: NEGATIVE — SIGNIFICANT CHANGE UP
BUN SERPL-MCNC: 8 MG/DL — SIGNIFICANT CHANGE UP (ref 7–23)
CALCIUM SERPL-MCNC: 9.2 MG/DL — SIGNIFICANT CHANGE UP (ref 8.4–10.5)
CHLORIDE SERPL-SCNC: 103 MMOL/L — SIGNIFICANT CHANGE UP (ref 98–107)
CO2 SERPL-SCNC: 23 MMOL/L — SIGNIFICANT CHANGE UP (ref 22–31)
CREAT SERPL-MCNC: 0.62 MG/DL — SIGNIFICANT CHANGE UP (ref 0.5–1.3)
ELLIPTOCYTES BLD QL SMEAR: SLIGHT — SIGNIFICANT CHANGE UP
EOSINOPHIL # BLD AUTO: 0.31 K/UL — SIGNIFICANT CHANGE UP (ref 0–0.5)
EOSINOPHIL NFR BLD AUTO: 3.5 % — SIGNIFICANT CHANGE UP (ref 0–6)
GIANT PLATELETS BLD QL SMEAR: PRESENT — SIGNIFICANT CHANGE UP
GLUCOSE SERPL-MCNC: 91 MG/DL — SIGNIFICANT CHANGE UP (ref 70–99)
HCT VFR BLD CALC: 18.2 % — CRITICAL LOW (ref 39–50)
HGB BLD-MCNC: 6.8 G/DL — CRITICAL LOW (ref 13–17)
HYPOCHROMIA BLD QL: SIGNIFICANT CHANGE UP
IMM GRANULOCYTES # BLD AUTO: 0.21 # — SIGNIFICANT CHANGE UP
IMM GRANULOCYTES NFR BLD AUTO: 2.4 % — HIGH (ref 0–1.5)
LDH SERPL L TO P-CCNC: 1036 U/L — HIGH (ref 135–225)
LYMPHOCYTES # BLD AUTO: 2.23 K/UL — SIGNIFICANT CHANGE UP (ref 1–3.3)
LYMPHOCYTES # BLD AUTO: 25.4 % — SIGNIFICANT CHANGE UP (ref 13–44)
MACROCYTES BLD QL: SIGNIFICANT CHANGE UP
MAGNESIUM SERPL-MCNC: 1.9 MG/DL — SIGNIFICANT CHANGE UP (ref 1.6–2.6)
MANUAL SMEAR VERIFICATION: SIGNIFICANT CHANGE UP
MCHC RBC-ENTMCNC: 32.1 PG — SIGNIFICANT CHANGE UP (ref 27–34)
MCHC RBC-ENTMCNC: 37.4 % — HIGH (ref 32–36)
MCV RBC AUTO: 85.8 FL — SIGNIFICANT CHANGE UP (ref 80–100)
MICROCYTES BLD QL: SLIGHT — SIGNIFICANT CHANGE UP
MONOCYTES # BLD AUTO: 1.44 K/UL — HIGH (ref 0–0.9)
MONOCYTES NFR BLD AUTO: 16.4 % — HIGH (ref 2–14)
NEUTROPHILS # BLD AUTO: 4.56 K/UL — SIGNIFICANT CHANGE UP (ref 1.8–7.4)
NEUTROPHILS NFR BLD AUTO: 52.1 % — SIGNIFICANT CHANGE UP (ref 43–77)
NRBC # FLD: 0.33 — SIGNIFICANT CHANGE UP
NRBC FLD-RTO: 3.8 — SIGNIFICANT CHANGE UP
OVALOCYTES BLD QL SMEAR: SIGNIFICANT CHANGE UP
PHOSPHATE SERPL-MCNC: 3.5 MG/DL — SIGNIFICANT CHANGE UP (ref 2.5–4.5)
PLATELET # BLD AUTO: 480 K/UL — HIGH (ref 150–400)
PLATELET COUNT - ESTIMATE: NORMAL — SIGNIFICANT CHANGE UP
PMV BLD: 9.9 FL — SIGNIFICANT CHANGE UP (ref 7–13)
POIKILOCYTOSIS BLD QL AUTO: SIGNIFICANT CHANGE UP
POLYCHROMASIA BLD QL SMEAR: SLIGHT — SIGNIFICANT CHANGE UP
POTASSIUM SERPL-MCNC: 4.2 MMOL/L — SIGNIFICANT CHANGE UP (ref 3.5–5.3)
POTASSIUM SERPL-SCNC: 4.2 MMOL/L — SIGNIFICANT CHANGE UP (ref 3.5–5.3)
PROT SERPL-MCNC: 7.8 G/DL — SIGNIFICANT CHANGE UP (ref 6–8.3)
RBC # BLD: 2.12 M/UL — LOW (ref 4.2–5.8)
RBC # FLD: 25.2 % — HIGH (ref 10.3–14.5)
RETICS #: 200.6 10X3/UL — HIGH (ref 17–73)
RETICS/RBC NFR: 9.5 % — HIGH (ref 0.5–2.5)
REVIEW TO FOLLOW: YES — SIGNIFICANT CHANGE UP
RH IG SCN BLD-IMP: POSITIVE — SIGNIFICANT CHANGE UP
SCHISTOCYTES BLD QL AUTO: SLIGHT — SIGNIFICANT CHANGE UP
SICKLE CELLS BLD QL SMEAR: SIGNIFICANT CHANGE UP
SODIUM SERPL-SCNC: 141 MMOL/L — SIGNIFICANT CHANGE UP (ref 135–145)
TARGETS BLD QL SMEAR: SLIGHT — SIGNIFICANT CHANGE UP
WBC # BLD: 8.77 K/UL — SIGNIFICANT CHANGE UP (ref 3.8–10.5)
WBC # FLD AUTO: 8.77 K/UL — SIGNIFICANT CHANGE UP (ref 3.8–10.5)

## 2017-07-01 PROCEDURE — 71020: CPT | Mod: 26

## 2017-07-01 PROCEDURE — 99223 1ST HOSP IP/OBS HIGH 75: CPT | Mod: GC

## 2017-07-01 RX ORDER — FOLIC ACID 0.8 MG
1 TABLET ORAL DAILY
Qty: 0 | Refills: 0 | Status: DISCONTINUED | OUTPATIENT
Start: 2017-07-01 | End: 2017-07-02

## 2017-07-01 RX ORDER — ENOXAPARIN SODIUM 100 MG/ML
40 INJECTION SUBCUTANEOUS DAILY
Qty: 0 | Refills: 0 | Status: DISCONTINUED | OUTPATIENT
Start: 2017-07-01 | End: 2017-07-02

## 2017-07-01 RX ORDER — HYDROMORPHONE HYDROCHLORIDE 2 MG/ML
1 INJECTION INTRAMUSCULAR; INTRAVENOUS; SUBCUTANEOUS ONCE
Qty: 0 | Refills: 0 | Status: DISCONTINUED | OUTPATIENT
Start: 2017-07-01 | End: 2017-07-01

## 2017-07-01 RX ORDER — OXYCODONE HYDROCHLORIDE 5 MG/1
10 TABLET ORAL EVERY 4 HOURS
Qty: 0 | Refills: 0 | Status: DISCONTINUED | OUTPATIENT
Start: 2017-07-01 | End: 2017-07-02

## 2017-07-01 RX ORDER — HYDROMORPHONE HYDROCHLORIDE 2 MG/ML
2 INJECTION INTRAMUSCULAR; INTRAVENOUS; SUBCUTANEOUS ONCE
Qty: 0 | Refills: 0 | Status: DISCONTINUED | OUTPATIENT
Start: 2017-07-01 | End: 2017-07-01

## 2017-07-01 RX ORDER — IBUPROFEN 200 MG
600 TABLET ORAL EVERY 6 HOURS
Qty: 0 | Refills: 0 | Status: DISCONTINUED | OUTPATIENT
Start: 2017-07-01 | End: 2017-07-02

## 2017-07-01 RX ORDER — HYDROXYUREA 500 MG/1
2000 CAPSULE ORAL DAILY
Qty: 0 | Refills: 0 | Status: DISCONTINUED | OUTPATIENT
Start: 2017-07-01 | End: 2017-07-02

## 2017-07-01 RX ORDER — SODIUM CHLORIDE 9 MG/ML
1000 INJECTION, SOLUTION INTRAVENOUS
Qty: 0 | Refills: 0 | Status: DISCONTINUED | OUTPATIENT
Start: 2017-07-01 | End: 2017-07-02

## 2017-07-01 RX ORDER — IBUPROFEN 200 MG
1 TABLET ORAL
Qty: 0 | Refills: 0 | COMMUNITY

## 2017-07-01 RX ORDER — SODIUM CHLORIDE 9 MG/ML
1000 INJECTION, SOLUTION INTRAVENOUS
Qty: 0 | Refills: 0 | Status: COMPLETED | OUTPATIENT
Start: 2017-07-01 | End: 2017-07-01

## 2017-07-01 RX ADMIN — HYDROMORPHONE HYDROCHLORIDE 2 MILLIGRAM(S): 2 INJECTION INTRAMUSCULAR; INTRAVENOUS; SUBCUTANEOUS at 12:55

## 2017-07-01 RX ADMIN — HYDROMORPHONE HYDROCHLORIDE 1 MILLIGRAM(S): 2 INJECTION INTRAMUSCULAR; INTRAVENOUS; SUBCUTANEOUS at 10:59

## 2017-07-01 RX ADMIN — Medication 1 MILLIGRAM(S): at 18:39

## 2017-07-01 RX ADMIN — HYDROMORPHONE HYDROCHLORIDE 2 MILLIGRAM(S): 2 INJECTION INTRAMUSCULAR; INTRAVENOUS; SUBCUTANEOUS at 13:10

## 2017-07-01 RX ADMIN — SODIUM CHLORIDE 150 MILLILITER(S): 9 INJECTION, SOLUTION INTRAVENOUS at 18:39

## 2017-07-01 RX ADMIN — HYDROXYUREA 2000 MILLIGRAM(S): 500 CAPSULE ORAL at 18:39

## 2017-07-01 RX ADMIN — SODIUM CHLORIDE 150 MILLILITER(S): 9 INJECTION, SOLUTION INTRAVENOUS at 14:04

## 2017-07-01 RX ADMIN — SODIUM CHLORIDE 1000 MILLILITER(S): 9 INJECTION, SOLUTION INTRAVENOUS at 11:52

## 2017-07-01 RX ADMIN — HYDROMORPHONE HYDROCHLORIDE 1 MILLIGRAM(S): 2 INJECTION INTRAMUSCULAR; INTRAVENOUS; SUBCUTANEOUS at 11:14

## 2017-07-01 NOTE — H&P ADULT - PROBLEM SELECTOR PLAN 1
- pain currently well controlled  - given 1L 1/2 NS in ED, c/w IV fluid  - Pain control with Oxy IR 10mg Q6 for severe pain and motrin 600mg Q6 for mild and moderate - pain currently well controlled  - given 1L 1/2 NS in ED, c/w IV fluid  - Pain control with Oxy IR 10mg Q6 for severe pain and motrin 600mg Q6 for mild and moderate  - Daily hemolysis labs   - incentive spirometry   - folic acid 1mg daily - pain currently well controlled  - given 1L 1/2 NS in ED, c/w IV fluid  - Pain control with Oxy IR 10mg Q6 for moderate and severe pain and motrin 600mg Q6 for mild   - Daily hemolysis labs   - incentive spirometry   - folic acid 1mg daily

## 2017-07-01 NOTE — ED ADULT NURSE NOTE - OBJECTIVE STATEMENT
Pt received to main ED room 24 with reporting of SCC pain starting around 5 am this morning, pt reporting 8/10 pain in the center of chest, waist, and bilateral legs. Pt received awake, A&Ox4, denies lightheadedness, dizziness, headache at this time. Respirations appear even, unlabored, pt denies SOB at this time. Pt denies abdominal pain, N/V/D or dysuria. Pt ambulatory at baseline, denies recent falls. VS documented per flow, EKG done. Visitor at bedside identified as brother. Cardiac monitor in place showing NSR HR 70s-80s. Pt appears to be in pain, uncomfortable. Pt awaiting MD stone. Will continue to monitor.

## 2017-07-01 NOTE — H&P ADULT - HISTORY OF PRESENT ILLNESS
20 y/o M w/ PMHx HbSS (last crisis was Jan 2017), s/p splenectomy and lap elsa, previous monthly exchange transfusions (no longer receiving), presents with chest, lower back and leg pain, which is typical of his sickle cell crisis pain.       In ED,   Vitals- T98.4, HR 74, /72, RR 16, SpO2 98 on RA  Given Dilaudid 1mg x1 and 2mg x1, 1L 1/2NS  Labs Hgb 6.8, LDH 1036, T bili 3.9 22 y/o M w/ PMHx HbSS (last crisis was Jan 2017), s/p splenectomy and lap elsa, previous monthly exchange transfusions (no longer receiving), presents with chest, lower back and leg pain, which is typical of his sickle cell pain crisis.   Patient reports waking up this morning with chest and bilateral leg pain. The chest pain was diffuse and was initially 6/10. Pain did not change with ambulation or breathing. Denies any SOB or diaphoresis. The leg pain was localized to the upper parts of his legs and was also 6/10 in intensity.   The pain increased to a 9/10 and the patient took 600mg of ibuprofen  which only provided mild improvement, so he came to the ED for further pain management.   Denies any F/C, N/V, abdominal pain, constipation, diarrhea, cough, SOB, URI symptoms, recent travel or sick contacts. Patient was in his usual state of alvin yesterday.     In ED,   Vitals- T98.4, HR 74, /72, RR 16, SpO2 98 on RA  Given Dilaudid 1mg x1 and 2mg x1, 1L 1/2NS  Labs Hgb 6.8, LDH 1036, T bili 3.9

## 2017-07-01 NOTE — H&P ADULT - ASSESSMENT
22 y/o M w/ PMHx HbSS (last crisis was Jan 2017), s/p splenectomy and lap elsa,  presents with chest, lower back and leg pain, 2/2 sickle cell pain crisis.

## 2017-07-01 NOTE — H&P ADULT - NSHPLABSRESULTS_GEN_ALL_CORE
6.8    8.77  )-----------( 480      ( 01 Jul 2017 10:44 )             18.2   Reticulocyte Count (07.01.17 @ 10:44)    Reticulocyte Percent: 9.5 %    Absolute Reticulocytes: 200.6 10x3/uL    Complete Blood Count + Automated Diff (12.04.08 @ 18:47)    Neutrophil Count - CBC: 16.08 #    Lymphocyte Count - CBC: 4.73: Delta: 3.21 on 06/17/ #    Monocyte Count - CBC: 3.18: Delta: 1.98 on 06/17/ #    Eosinophil Count - CBC: 0.14 #    Basophil Count - CBC: 0.09: Delta: 0.03 on 06/17/ #      07-01    141  |  103  |  8   ----------------------------<  91  4.2   |  23  |  0.62    Ca    9.2      01 Jul 2017 10:45  Phos  3.5     07-01  Mg     1.9     07-01    TPro  7.8  /  Alb  4.5  /  TBili  3.9<H>  /  DBili  x   /  AST  75<H>  /  ALT  20  /  AlkPhos  89  07-01          Lactate Dehydrogenase, Serum (07.01.17 @ 10:45)    Lactate Dehydrogenase, Serum: 1036 U/L      RAD CHEST PA LAT (07.01.17 @ 12:16)   IMPRESSION:  Clear lungs.

## 2017-07-01 NOTE — H&P ADULT - NSHPREVIEWOFSYSTEMS_GEN_ALL_CORE
Review of Systems:   CONSTITUTIONAL: No fever, weight changes, fatigue, appetite changes  EYES: No eye pain, visual disturbances, or discharge  ENMT:  No difficulty hearing, tinnitus, vertigo; No sinus or throat pain  NECK: No pain or stiffness  RESPIRATORY: No cough, wheezing, chills or hemoptysis; No shortness of breath  CARDIOVASCULAR: No palpitations, dizziness, or leg swelling, + CP now resolved   GASTROINTESTINAL: No abdominal or epigastric pain. No nausea, vomiting, or hematemesis; No diarrhea or constipation. No melena or hematochezia.  GENITOURINARY: No dysuria, frequency, hematuria, or incontinence  NEUROLOGICAL: No headaches, memory loss, loss of strength, numbness, or tremors  SKIN: No itching, burning, rashes, or lesions   ENDOCRINE: No heat or cold intolerance; No hair loss  MUSCULOSKELETAL: No joint pain or swelling;+ LE pain   PSYCHIATRIC: No depression, anxiety, mood swings, or difficulty sleeping  HEME/LYMPH: No easy bruising, or bleeding gums  ALLERY AND IMMUNOLOGIC: No hives or eczema

## 2017-07-01 NOTE — ED ADULT NURSE NOTE - CHIEF COMPLAINT QUOTE
Patient c/o SCC with pain to b/l waist since this morning. Unrelieved with  Motrin 600mg . Denies chest pain, SOB. Appears uncomfortable.

## 2017-07-01 NOTE — ED PROVIDER NOTE - PHYSICAL EXAMINATION
ATTENDING PHYSICAL EXAM DR. Chino ***GEN - NAD; well appearing; A+O x3 ***HEAD - NC/AT ***EYES/NOSE - PERRL, EOMI, anicteric, mucous membranes moist, no discharge ***THROAT: Oral cavity and pharynx normal. No inflammation, swelling, exudate, or lesions.  ***NECK: Neck supple, non-tender without lymphadenopathy, no masses, no JVD.   ***PULMONARY - CTA b/l, symmetric breath sounds. ***CARDIAC -s1s2, RRR, no M,R,G  ***ABDOMEN - +BS, ND, NT, soft, no guarding, no rebound, no masses   ***BACK - no CVA tenderness, Normal  spine ***EXTREMITIES - symmetric pulses, 2+ dp, capillary refill < 2 seconds, no clubbing, no cyanosis, no edema ***SKIN - no rash or bruising   ***NEUROLOGIC - alert, CN 2-12 intact

## 2017-07-01 NOTE — H&P ADULT - ATTENDING COMMENTS
Pt was seen and examined by me.  Case d/w Dr. Rodriguez and I agree with findings and plan as detailed above.    20yo M w/ PMH of sickle cell disease with h/o acute chest syndrome a/w low back and chest pain.  No fevers, cough, SOB, chills.  Pain typical of VOC per patient but unrelieved by motrin taken at home.  Pain better after dilaudid given in ED.  Admit for sickle cell disease with acute crisis.  C/w 1/2 NS at 150ml/hr, supplemental O2 prn, incentive spirometry, folic acid.  Bowel regimen prn constipation.  Since pain improved, trial of oral narcotics.  If no relief, will start PCA pump.  Monitor daily CBC, retic, LDH.  DVT ppx with HSQ.

## 2017-07-01 NOTE — ED PROVIDER NOTE - MEDICAL DECISION MAKING DETAILS
pt p/w SS crisis. Will check CBC, CMP, LDH. IVF and pain control. Will reassess for improvement pt p/w SS crisis. Will check CBC, CMP, LDH. IVF and pain control. Will reassess for improvement.     Will admit for SS crisis. Low suspicion for acute chest syndrome given stable vitals and clear lungs on cxr.

## 2017-07-01 NOTE — H&P ADULT - NSHPSOCIALHISTORY_GEN_ALL_CORE
Social History:    Marital Status:  (   )    ( X  ) Single    (   )    (  )   Occupation: [employer] worker   Lives with: (  ) alone  (  ) children   (  ) spouse   (  ) parents  (  ) other    Substance Use (street drugs): ( X ) never used  (  ) other:  Tobacco Usage:  ( X ) never smoked   (   ) former smoker   (   ) current smoker  (     ) pack year  (        ) last cigarette date  Alcohol Usage: None   Denies any other illicit drug use. Social History:    Marital Status:  (   )    ( X  ) Single    (   )    (  )   Occupation: [employer] worker   Lives with: (  ) alone  (  ) children   (  ) spouse   (X) parents  ( X ) other- siblings     Substance Use (street drugs): ( X ) never used  (  ) other:  Tobacco Usage:  ( X ) never smoked   (   ) former smoker   (   ) current smoker  (     ) pack year  (        ) last cigarette date  Alcohol Usage: None   Denies any other illicit drug use.

## 2017-07-01 NOTE — ED ADULT NURSE REASSESSMENT NOTE - NS ED NURSE REASSESS COMMENT FT1
Pt with difficult IV access, bilateral 20g IV's difficult to flush and kinked, not able to administer maintenance fluids through IV's. JOSEPH Perez made aware of lack of IV access, patient to go to assigned bed and plan for IV ultrasound by MD's on 9S. Pt left main ED at 1500 via stretcher escorted by transport services. Brother accompanied patient, safety maintained in main ED.

## 2017-07-01 NOTE — H&P ADULT - NSHPPHYSICALEXAM_GEN_ALL_CORE
PHYSICAL EXAM:    GENERAL: Comfortable, no acute distress   HEAD:  Normocephalic, atraumatic  EYES: EOMI, PERRLA  HEENT: Moist mucous membranes  NECK: Supple, No JVD  NERVOUS SYSTEM:  Alert & Oriented X3, Motor Strength 5/5 B/L upper and lower extremities  CHEST/LUNG: Clear to auscultation bilaterally  HEART: Regular rate and rhythm, + systolic murmur   ABDOMEN: Soft, Nontender, Nondistended, Bowel sounds present  EXTREMITIES:   No clubbing, cyanosis, or edema  MUSCULOSKELETAL: No muscle tenderness, no joint tenderness  SKIN:  warm and dry, no rash

## 2017-07-01 NOTE — ED ADULT TRIAGE NOTE - CHIEF COMPLAINT QUOTE
Patient c/o SCC with pain to b/l waist since this morning. Unrelieved with  motrin 600. Denies chest pain, SOB. Patient c/o SCC with pain to b/l waist since this morning. Unrelieved with  Motrin 600mg . Denies chest pain, SOB. Appears uncomfortable.

## 2017-07-01 NOTE — ED ADULT NURSE REASSESSMENT NOTE - NS ED NURSE REASSESS COMMENT FT1
Pt awake, alert. Pt reporting pain as 6/10, reporting as tolerable at this time. 20g PIV placed in R AC by MD Finn via ultrasound, patent to flush, bolus infusing, dressing reinforced. VS documented per flow. Pt with no apparent distress observed at this time, appears more comfortable, brother at bedside. Safety maintained, will continue to monitor.

## 2017-07-01 NOTE — ED PROVIDER NOTE - OBJECTIVE STATEMENT
21 yr M w/ hx of SS w/ hx of ACS (2016, requiring ICU stay), last crisis in Jan 2017 presenting with chest, lower back and leg pain which is typical of his crisis pain.     Patient reports 21 yr M w/ hx of SS w/ hx of ACS (2016, requiring ICU stay) and last crisis in Jan 2017 as well as iron overload presenting with chest, lower back and leg pain which is typical of his crisis pain.     Patient reports he started having lower back, b/l leg and chest pain yesterday evening. He took Motrin which did not alleviate his pain and was unable to take oxycodone because his prescription ran out. He denies any fevers/chills, URI symptoms, abdominal pain, nausea/vomiting, cough and dysuria.     PCP: Kenia Nguyễn 21 yr M w/ hx of SS w/ hx of ACS (2016, requiring ICU stay) and last crisis in Jan 2017 as well as iron overload presenting with chest, lower back and leg pain which is typical of his crisis pain.     Patient reports he started having lower back, b/l leg and chest pain yesterday evening. He took Motrin which did not alleviate his pain and was unable to take oxycodone because his prescription ran out. He denies any fevers/chills, URI symptoms, abdominal pain, nausea/vomiting, cough and dysuria.     PCP: Kenia Nguyễn  Attending - Agree with above.  I evaluated patient myself.  20 y/o M with brother at bedside.  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  This morning developed LBP and b/l lower ext pains which is typical of his pain crises.  When I asked, he denies having any chest pain now or yesterday.  No shortness of breathe.  No fever.  No recent trauma or illness.  Last admission in February.  [place] records reviewed.  States he took Motrin.  Ran out of oxycodone. 21 yr M w/ hx of SS w/ hx of ACS (2016, requiring ICU stay) and last crisis in Jan 2017 as well as iron overload presenting with chest, lower back and leg pain which is typical of his crisis pain.     Patient reports he started having lower back, b/l leg and chest pain yesterday evening. He took Motrin which did not alleviate his pain and was unable to take oxycodone because his prescription ran out. He denies any fevers/chills, URI symptoms, abdominal pain, nausea/vomiting, cough and dysuria.     PCP: Kenia Nguyễn  Attending - Agree with above.  I evaluated patient myself.  20 y/o M with brother at bedside.  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  This morning developed LBP and b/l lower ext pains which is typical of his pain crises.  Reports mild SSCP last night, none now.  No shortness of breathe.  No fever.  No recent trauma or illness.  Last admission in February.  [place] records reviewed.  States he took Motrin.  Ran out of oxycodone.

## 2017-07-02 VITALS
RESPIRATION RATE: 18 BRPM | TEMPERATURE: 98 F | SYSTOLIC BLOOD PRESSURE: 107 MMHG | HEART RATE: 75 BPM | DIASTOLIC BLOOD PRESSURE: 49 MMHG | OXYGEN SATURATION: 94 %

## 2017-07-02 DIAGNOSIS — D57.1 SICKLE-CELL DISEASE WITHOUT CRISIS: ICD-10-CM

## 2017-07-02 LAB
ALBUMIN SERPL ELPH-MCNC: 4.2 G/DL — SIGNIFICANT CHANGE UP (ref 3.3–5)
ALP SERPL-CCNC: 85 U/L — SIGNIFICANT CHANGE UP (ref 40–120)
ALT FLD-CCNC: 19 U/L — SIGNIFICANT CHANGE UP (ref 4–41)
AST SERPL-CCNC: 64 U/L — HIGH (ref 4–40)
BASOPHILS # BLD AUTO: 0.03 K/UL — SIGNIFICANT CHANGE UP (ref 0–0.2)
BASOPHILS NFR BLD AUTO: 0.3 % — SIGNIFICANT CHANGE UP (ref 0–2)
BILIRUB SERPL-MCNC: 3.3 MG/DL — HIGH (ref 0.2–1.2)
BUN SERPL-MCNC: 8 MG/DL — SIGNIFICANT CHANGE UP (ref 7–23)
CALCIUM SERPL-MCNC: 8.8 MG/DL — SIGNIFICANT CHANGE UP (ref 8.4–10.5)
CHLORIDE SERPL-SCNC: 104 MMOL/L — SIGNIFICANT CHANGE UP (ref 98–107)
CO2 SERPL-SCNC: 24 MMOL/L — SIGNIFICANT CHANGE UP (ref 22–31)
CREAT SERPL-MCNC: 0.59 MG/DL — SIGNIFICANT CHANGE UP (ref 0.5–1.3)
EOSINOPHIL # BLD AUTO: 0.29 K/UL — SIGNIFICANT CHANGE UP (ref 0–0.5)
EOSINOPHIL NFR BLD AUTO: 2.8 % — SIGNIFICANT CHANGE UP (ref 0–6)
GLUCOSE SERPL-MCNC: 83 MG/DL — SIGNIFICANT CHANGE UP (ref 70–99)
HCT VFR BLD CALC: 17.1 % — CRITICAL LOW (ref 39–50)
HGB BLD-MCNC: 6.2 G/DL — CRITICAL LOW (ref 13–17)
IMM GRANULOCYTES # BLD AUTO: 0.06 # — SIGNIFICANT CHANGE UP
IMM GRANULOCYTES NFR BLD AUTO: 0.6 % — SIGNIFICANT CHANGE UP (ref 0–1.5)
LDH SERPL L TO P-CCNC: 938 U/L — HIGH (ref 135–225)
LYMPHOCYTES # BLD AUTO: 3.98 K/UL — HIGH (ref 1–3.3)
LYMPHOCYTES # BLD AUTO: 38.2 % — SIGNIFICANT CHANGE UP (ref 13–44)
MAGNESIUM SERPL-MCNC: 1.6 MG/DL — SIGNIFICANT CHANGE UP (ref 1.6–2.6)
MCHC RBC-ENTMCNC: 30.5 PG — SIGNIFICANT CHANGE UP (ref 27–34)
MCHC RBC-ENTMCNC: 36.3 % — HIGH (ref 32–36)
MCV RBC AUTO: 84.2 FL — SIGNIFICANT CHANGE UP (ref 80–100)
MONOCYTES # BLD AUTO: 1.63 K/UL — HIGH (ref 0–0.9)
MONOCYTES NFR BLD AUTO: 15.6 % — HIGH (ref 2–14)
NEUTROPHILS # BLD AUTO: 4.43 K/UL — SIGNIFICANT CHANGE UP (ref 1.8–7.4)
NEUTROPHILS NFR BLD AUTO: 42.5 % — LOW (ref 43–77)
NRBC # FLD: 0.33 — SIGNIFICANT CHANGE UP
NRBC FLD-RTO: 3.2 — SIGNIFICANT CHANGE UP
PHOSPHATE SERPL-MCNC: 4.4 MG/DL — SIGNIFICANT CHANGE UP (ref 2.5–4.5)
PLATELET # BLD AUTO: 461 K/UL — HIGH (ref 150–400)
PMV BLD: 10.4 FL — SIGNIFICANT CHANGE UP (ref 7–13)
POTASSIUM SERPL-MCNC: 3.8 MMOL/L — SIGNIFICANT CHANGE UP (ref 3.5–5.3)
POTASSIUM SERPL-SCNC: 3.8 MMOL/L — SIGNIFICANT CHANGE UP (ref 3.5–5.3)
PROT SERPL-MCNC: 7.1 G/DL — SIGNIFICANT CHANGE UP (ref 6–8.3)
RBC # BLD: 2.03 M/UL — LOW (ref 4.2–5.8)
RBC # FLD: 24.8 % — HIGH (ref 10.3–14.5)
RETICS #: 185.3 10X3/UL — HIGH (ref 17–73)
RETICS/RBC NFR: 9.1 % — HIGH (ref 0.5–2.5)
REVIEW TO FOLLOW: YES — SIGNIFICANT CHANGE UP
SODIUM SERPL-SCNC: 141 MMOL/L — SIGNIFICANT CHANGE UP (ref 135–145)
WBC # BLD: 10.42 K/UL — SIGNIFICANT CHANGE UP (ref 3.8–10.5)
WBC # FLD AUTO: 10.42 K/UL — SIGNIFICANT CHANGE UP (ref 3.8–10.5)

## 2017-07-02 PROCEDURE — 99222 1ST HOSP IP/OBS MODERATE 55: CPT | Mod: GC

## 2017-07-02 PROCEDURE — 99239 HOSP IP/OBS DSCHRG MGMT >30: CPT

## 2017-07-02 RX ORDER — OXYCODONE HYDROCHLORIDE 5 MG/1
1 TABLET ORAL
Qty: 42 | Refills: 0 | OUTPATIENT
Start: 2017-07-02 | End: 2017-07-09

## 2017-07-02 RX ORDER — IBUPROFEN 200 MG
1 TABLET ORAL
Qty: 120 | Refills: 0 | OUTPATIENT
Start: 2017-07-02 | End: 2017-08-01

## 2017-07-02 RX ORDER — OXYCODONE HYDROCHLORIDE 5 MG/1
1 TABLET ORAL
Qty: 180 | Refills: 0 | OUTPATIENT
Start: 2017-07-02 | End: 2017-08-01

## 2017-07-02 RX ORDER — IBUPROFEN 200 MG
1 TABLET ORAL
Qty: 0 | Refills: 0 | COMMUNITY

## 2017-07-02 RX ADMIN — Medication 600 MILLIGRAM(S): at 14:45

## 2017-07-02 RX ADMIN — Medication 600 MILLIGRAM(S): at 13:45

## 2017-07-02 RX ADMIN — Medication 1 MILLIGRAM(S): at 13:44

## 2017-07-02 RX ADMIN — SODIUM CHLORIDE 150 MILLILITER(S): 9 INJECTION, SOLUTION INTRAVENOUS at 13:44

## 2017-07-02 RX ADMIN — HYDROXYUREA 2000 MILLIGRAM(S): 500 CAPSULE ORAL at 13:44

## 2017-07-02 RX ADMIN — SODIUM CHLORIDE 150 MILLILITER(S): 9 INJECTION, SOLUTION INTRAVENOUS at 00:53

## 2017-07-02 RX ADMIN — SODIUM CHLORIDE 150 MILLILITER(S): 9 INJECTION, SOLUTION INTRAVENOUS at 07:18

## 2017-07-02 NOTE — PROGRESS NOTE ADULT - PROBLEM SELECTOR PLAN 1
- pain currently well controlled  - given 1L 1/2 NS in ED, c/w IV fluid  - Pain control with Oxy IR 10mg Q6 for moderate and severe pain and motrin 600mg Q6 for mild   - Daily hemolysis labs   - incentive spirometry   - folic acid 1mg daily - pain currently well controlled  - given 1L 1/2 NS in ED, c/w IV fluid  - Pain control with Oxy IR 10mg Q6 for moderate and severe pain and motrin 600mg Q6 for mild   - Daily hemolysis labs   - incentive spirometry   - folic acid 1mg daily  -Will consult with Hematology

## 2017-07-02 NOTE — DISCHARGE NOTE ADULT - MEDICATION SUMMARY - MEDICATIONS TO TAKE
I will START or STAY ON the medications listed below when I get home from the hospital:    oxyCODONE 10 mg oral tablet  -- 1 tab(s) by mouth every 4 hours, As needed, Moderate and severe pain MDD:60 mg  -- Indication: For Sickle cell crisis    hydroxyurea 500 mg oral capsule  -- 4 cap(s) by mouth once a day  -- Indication: For Sickle Cell Disease    Jadenu 360 mg oral tablet  -- 6 tab(s) by mouth once a day  -- Indication: For Iron overload due to repeated red blood cell transfusions    folic acid 1 mg oral tablet  -- 1 tab(s) by mouth once a day  -- Indication: For Sickle Cell Disease I will START or STAY ON the medications listed below when I get home from the hospital:    oxyCODONE 10 mg oral tablet  -- 1 tab(s) by mouth every 4 hours, As needed, Moderate and severe pain MDD:60 mg  -- Indication: For Sickle cell crisis    ibuprofen 600 mg oral tablet  -- 1 tab(s) by mouth every 6 hours, As needed, mild pain  -- Indication: For Sickle cell crisis    hydroxyurea 500 mg oral capsule  -- 4 cap(s) by mouth once a day  -- Indication: For Sickle Cell Disease    Jadenu 360 mg oral tablet  -- 6 tab(s) by mouth once a day  -- Indication: For Iron overload due to repeated red blood cell transfusions    folic acid 1 mg oral tablet  -- 1 tab(s) by mouth once a day  -- Indication: For Sickle Cell Disease

## 2017-07-02 NOTE — PROGRESS NOTE ADULT - ASSESSMENT
20 y/o M w/ PMHx HbSS (last crisis was Jan 2017), s/p splenectomy and lap elsa,  presents with chest, lower back and leg pain, 2/2 sickle cell pain crisis.

## 2017-07-02 NOTE — PROGRESS NOTE ADULT - ATTENDING COMMENTS
Mild symptoms compared to prior pain crises, despite significantly elevated LDH (>1000). Will continue hydration, oral analgesia, and request Hematolgy evaluation. Time planning D/C 35 minutes.

## 2017-07-02 NOTE — DISCHARGE NOTE ADULT - PATIENT PORTAL LINK FT
“You can access the FollowHealth Patient Portal, offered by [facility], by registering with the following website: [email or web address]”

## 2017-07-02 NOTE — DISCHARGE NOTE ADULT - MEDICATION SUMMARY - MEDICATIONS TO STOP TAKING
I will STOP taking the medications listed below when I get home from the hospital:    acetaminophen 325 mg oral tablet  -- 2 tab(s) by mouth every 6 hours, As needed, For Temp greater than 38 C (100.4 F)    senna oral tablet  -- 2 tab(s) by mouth once a day (at bedtime)    docusate sodium 100 mg oral capsule  -- 1 cap(s) by mouth 3 times a day    oseltamivir 75 mg oral capsule  -- 1 cap(s) by mouth once a day    Motrin 400 mg oral tablet  -- 1 tab(s) by mouth every 6 hours I will STOP taking the medications listed below when I get home from the hospital:    acetaminophen 325 mg oral tablet  -- 2 tab(s) by mouth every 6 hours, As needed, For Temp greater than 38 C (100.4 F)    senna oral tablet  -- 2 tab(s) by mouth once a day (at bedtime)    docusate sodium 100 mg oral capsule  -- 1 cap(s) by mouth 3 times a day    oseltamivir 75 mg oral capsule  -- 1 cap(s) by mouth once a day

## 2017-07-02 NOTE — DISCHARGE NOTE ADULT - HOSPITAL COURSE
20 y/o M w/ PMHx HbSS (last crisis was Jan 2017), s/p splenectomy and lap elsa, previous monthly exchange transfusions (no longer receiving), presented with chest, lower back and leg pain, which is typical of his sickle cell pain crisis. The chest pain was diffuse and was initially 6/10. Pain did not change with ambulation or breathing. Denied any SOB or diaphoresis. The leg pain was localized to the upper parts of his legs and was also 6/10 in intensity. The pain increased to a 9/10 and the patient took 600mg of ibuprofen  which only provided mild improvement, so he came to the ED for further pain management.     In ED, patient was given Dilaudid 1 mg X 1 dose, and 2 mg X 1 dose, as well as 1 liter bolus of 1/2 NS IV fluids. Patient was admitted, and placed on IV fluids 1/2NS. Patient did not need pain medications since admission. Patient's LDH was increased at 1036, and HGB was 6.3. Patient remained clinically stable, and denied pain in his chest, legs or back. Patient was seen by hematology, who recommended _________________________,    Patient was discharged with a pain regimen of: ___________________ and told to follow-up with his hematologist, Dr. Kenia Nguyễn. 20 y/o M w/ PMHx HbSS (last crisis was Jan 2017), s/p splenectomy and lap elsa, previous monthly exchange transfusions (no longer receiving), presented with chest, lower back and leg pain, which is typical of his sickle cell pain crisis. The chest pain was diffuse and was initially 6/10. Pain did not change with ambulation or breathing. Denied any SOB or diaphoresis. The leg pain was localized to the upper parts of his legs and was also 6/10 in intensity. The pain increased to a 9/10 and the patient took 600mg of ibuprofen  which only provided mild improvement, so he came to the ED for further pain management.     In ED, patient was given Dilaudid 1 mg X 1 dose, and 2 mg X 1 dose, as well as 1 liter bolus of 1/2 NS IV fluids. Patient was admitted, and placed on IV fluids 1/2NS. Patient did not need pain medications since admission. Patient's LDH was increased at 1036, and HGB was 6.3. Patient remained clinically stable, and denied pain in his chest, legs or back. Patient was seen by hematology, who agreed with pain regimen, and recommend that patient see Dr Kenia Nguyễn within 1 week of discharge.     Patient was discharged with a pain regimen of Oxycodone 10 mg q6 hrs for moderate to severe pain, and Motrin 600 mg q 6 hrs for mild to moderate pain [ISTOP reference ID [id number], patient's last oxycodone prescription was on 06/15/17 for 15 day supply] and told to follow-up with his hematologist, Dr. Kenia Nguyễn within 1 week of discharge.

## 2017-07-02 NOTE — DISCHARGE NOTE ADULT - CARE PLAN
Principal Discharge DX:	Sickle cell crisis  Goal:	control pain  Instructions for follow-up, activity and diet:	Continue pain regimen: Oxycodone IR 10 mg q4-6 hrs for moderate and severe pain, Motrin 600 mg q6hrs for mild pain. Continue folic acid and hydroxyurea  Secondary Diagnosis:	Iron overload due to repeated red blood cell transfusions  Instructions for follow-up, activity and diet:	continue with Jadenu Principal Discharge DX:	Sickle cell crisis  Goal:	control pain  Instructions for follow-up, activity and diet:	Continue pain regimen: Oxycodone IR 10 mg q4-6 hrs for moderate and severe pain, Motrin 600 mg q6hrs for mild pain. Continue folic acid and hydroxyurea. Keep hydrated with fluids. Make followup appointment with Dr. Kenia Nguyễn within 1 week of discharge.  Secondary Diagnosis:	Iron overload due to repeated red blood cell transfusions  Instructions for follow-up, activity and diet:	continue with Jadenu

## 2017-07-02 NOTE — CONSULT NOTE ADULT - PROBLEM SELECTOR RECOMMENDATION 9
reports pain is well controlled, off pain meds  monitor CBC, retic, LDH, tbili  incentive spirometry  pain control as needed  continue IV hydration  continue folic acid, home hydrea  will need to see Dr. Nguyễn on discharge

## 2017-07-02 NOTE — DISCHARGE NOTE ADULT - CONDITIONS AT DISCHARGE
Stable Patient a&ox4, VSS, no acute distress noted. No c/o of pain noted. Skin intact. No s/s of pain noted at this time. Patient safety and comfort maintained, to be d/c home

## 2017-07-02 NOTE — PROGRESS NOTE ADULT - SUBJECTIVE AND OBJECTIVE BOX
Patient is a 21y old  Male who presents with a chief complaint of Chest, lower back and leg pain (01 Jul 2017 14:00)        SUBJECTIVE / OVERNIGHT EVENTS: Patient had no acute events overnight. Patient denies any pain this morning. Denies abdominal pain or SOB. Patient says that current pain regimen is working well for him.       MEDICATIONS  (STANDING):  sodium chloride 0.45%. 1000 milliLiter(s) (150 mL/Hr) IV Continuous <Continuous>  hydroxyurea 2000 milliGRAM(s) Oral daily  folic acid 1 milliGRAM(s) Oral daily  enoxaparin Injectable 40 milliGRAM(s) SubCutaneous daily  Deferasirox (Jadenu)  360mg tablets 6 Tablet(s)   Oral daily    MEDICATIONS  (PRN):  ibuprofen  Tablet 600 milliGRAM(s) Oral every 6 hours PRN mild pain  oxyCODONE IR 10 milliGRAM(s) Oral every 4 hours PRN Moderate and severe pain      Vital Signs Last 24 Hrs  T(C): 36.7 (07-02-17 @ 05:42), Max: 37.1 (07-01-17 @ 20:56)  HR: 64 (07-02-17 @ 05:42) (53 - 86)  BP: 112/60 (07-02-17 @ 05:42) (111/65 - 141/49)  RR: 17 (07-02-17 @ 05:42) (16 - 18)  SpO2: 96% (07-02-17 @ 05:42) (95% - 100%)  CAPILLARY BLOOD GLUCOSE        I&O's Summary    01 Jul 2017 07:01  -  02 Jul 2017 07:00  --------------------------------------------------------  IN: 1650 mL / OUT: 0 mL / NET: 1650 mL        PHYSICAL EXAM:  GENERAL: Comfortable, no acute distress   HEAD:  Normocephalic, atraumatic  EYES: EOMI, PERRLA  HEENT: Moist mucous membranes  NECK: Supple, No JVD  NERVOUS SYSTEM:  Alert & Oriented X3, Motor Strength 5/5 B/L upper and lower extremities  CHEST/LUNG: Clear to auscultation bilaterally  HEART: Regular rate and rhythm, + systolic murmur   ABDOMEN: Soft, Nontender, Nondistended, Bowel sounds present  EXTREMITIES:   No clubbing, cyanosis, or edema  MUSCULOSKELETAL: No muscle tenderness, no joint tenderness  SKIN:  warm and dry, no rash    LABS:                        6.8    8.77  )-----------( 480      ( 01 Jul 2017 10:44 )             18.2     07-01    141  |  103  |  8   ----------------------------<  91  4.2   |  23  |  0.62    Ca    9.2      01 Jul 2017 10:45  Phos  3.5     07-01  Mg     1.9     07-01    TPro  7.8  /  Alb  4.5  /  TBili  3.9<H>  /  DBili  x   /  AST  75<H>  /  ALT  20  /  AlkPhos  89  07-01              RADIOLOGY & ADDITIONAL TESTS:    Imaging Personally Reviewed:    < from: Xray Chest 2 Views PA/Lat (07.01.17 @ 12:16) >    The lungs are clear. Heart is mildly enlarged. No pleural effusions or   pneumothorax.

## 2017-07-02 NOTE — DISCHARGE NOTE ADULT - PLAN OF CARE
control pain Continue pain regimen: Oxycodone IR 10 mg q4-6 hrs for moderate and severe pain, Motrin 600 mg q6hrs for mild pain. Continue folic acid and hydroxyurea continue with Deisy Continue pain regimen: Oxycodone IR 10 mg q4-6 hrs for moderate and severe pain, Motrin 600 mg q6hrs for mild pain. Continue folic acid and hydroxyurea. Keep hydrated with fluids. Make followup appointment with Dr. Kenia Nguyễn within 1 week of discharge.

## 2017-07-02 NOTE — DISCHARGE NOTE ADULT - CARE PROVIDER_API CALL
Kenia Nguyễn), Internal Medicine  [address] [facility] [place] [address]  Phone: [phone]  Fax: [phone]

## 2017-07-02 NOTE — DISCHARGE NOTE ADULT - ADDITIONAL INSTRUCTIONS
Follow up with Hematologist: Dr. Kenia Nguyễn Follow up with Hematologist: Dr. Kenia Nguyễn within 1 week of hospital discharge.

## 2017-07-02 NOTE — CONSULT NOTE ADULT - SUBJECTIVE AND OBJECTIVE BOX
HPI:  20 y/o M w/ PMHx HbSS disease, s/p splenectomy and lap elsa, previous monthly exchange transfusions (no longer receiving), admitted 7/1 for generalized pain. He  reports he believes it was 2/2 weather. Reports generalized pain when he presented with most of his pain in his bl hips, knees, and lower back. Denies fevers, chills, dyspnea, chest pain, n/v/d/c. He reports that now he does not have any pain, has 0/10 pain today.     In ED,   Vitals- T98.4, HR 74, /72, RR 16, SpO2 98 on RA  Given Dilaudid 1mg x1 and 2mg x1, 1L 1/2NS  Labs Hgb 6.8, LDH 1036, T bili 3.9 (01 Jul 2017 14:00)      Allergies    ceftriaxone (Unknown)    Intolerances        MEDICATIONS  (STANDING):  sodium chloride 0.45%. 1000 milliLiter(s) (150 mL/Hr) IV Continuous <Continuous>  hydroxyurea 2000 milliGRAM(s) Oral daily  folic acid 1 milliGRAM(s) Oral daily  enoxaparin Injectable 40 milliGRAM(s) SubCutaneous daily  Deferasirox (Jadenu)  360mg tablets 6 Tablet(s)   Oral daily    MEDICATIONS  (PRN):  ibuprofen  Tablet 600 milliGRAM(s) Oral every 6 hours PRN mild pain  oxyCODONE IR 10 milliGRAM(s) Oral every 4 hours PRN Moderate and severe pain      PAST MEDICAL & SURGICAL HISTORY:  Iron overload due to repeated red blood cell transfusions  Sickle cell anemia  Acute chest syndrome  Sickle Cell Disease: HbSS  S/P Splenectomy  S/P Laparotomy for spleen removal  S/P Laparoscopic Cholecystectomy      FAMILY HISTORY:  Family history of sickle cell anemia (Sibling)      SOCIAL HISTORY: No EtOH, no tobacco    REVIEW OF SYSTEMS:    CONSTITUTIONAL: No weakness, fevers or chills  EYES/ENT: No visual changes;  No vertigo or throat pain   NECK: No pain or stiffness  RESPIRATORY: No cough, wheezing, hemoptysis; No shortness of breath  CARDIOVASCULAR: No chest pain or palpitations  GASTROINTESTINAL: No abdominal or epigastric pain. No nausea, vomiting, or hematemesis; No diarrhea or constipation. No melena or hematochezia.  GENITOURINARY: No dysuria, frequency or hematuria  NEUROLOGICAL: No numbness or weakness  SKIN: No itching, burning, rashes, or lesions   All other review of systems is negative unless indicated above.    Height (cm): 180.34 (07-01 @ 15:36)  Weight (kg): 81 (07-01 @ 15:36)  BMI (kg/m2): 24.9 (07-01 @ 15:36)  BSA (m2): 2.01 (07-01 @ 15:36)    T(F): 98.1 (07-02-17 @ 05:42), Max: 98.7 (07-01-17 @ 20:56)  HR: 64 (07-02-17 @ 05:42)  BP: 112/60 (07-02-17 @ 05:42)  RR: 17 (07-02-17 @ 05:42)  SpO2: 96% (07-02-17 @ 05:42)  Wt(kg): --    GENERAL: NAD, well-developed  HEAD:  Atraumatic, Normocephalic  EYES: EOMI, PERRLA, conjunctiva and sclera clear  NECK: Supple, No JVD  CHEST/LUNG: Clear to auscultation bilaterally; No wheeze  HEART: Regular rate and rhythm; No murmurs, rubs, or gallops  ABDOMEN: Soft, Nontender, Nondistended; Bowel sounds present  EXTREMITIES:  2+ Peripheral Pulses, No clubbing, cyanosis, or edema  NEUROLOGY: non-focal  SKIN: No rashes or lesions                          6.2    10.42 )-----------( 461      ( 02 Jul 2017 06:10 )             17.1       07-02    141  |  104  |  8   ----------------------------<  83  3.8   |  24  |  0.59    Ca    8.8      02 Jul 2017 06:10  Phos  4.4     07-02  Mg     1.6     07-02    TPro  7.1  /  Alb  4.2  /  TBili  3.3<H>  /  DBili  x   /  AST  64<H>  /  ALT  19  /  AlkPhos  85  07-02      Magnesium, Serum: 1.6 mg/dL (07-02 @ 06:10)  Phosphorus Level, Serum: 4.4 mg/dL (07-02 @ 06:10)  Lactate Dehydrogenase, Serum: 938 U/L (07-02 @ 06:10)

## 2017-07-06 ENCOUNTER — INPATIENT (INPATIENT)
Facility: HOSPITAL | Age: 22
LOS: 7 days | Discharge: HOME | End: 2017-07-14
Attending: INTERNAL MEDICINE | Admitting: INTERNAL MEDICINE
Payer: COMMERCIAL

## 2017-07-06 VITALS
TEMPERATURE: 99 F | OXYGEN SATURATION: 94 % | DIASTOLIC BLOOD PRESSURE: 62 MMHG | RESPIRATION RATE: 18 BRPM | HEIGHT: 71 IN | HEART RATE: 68 BPM | SYSTOLIC BLOOD PRESSURE: 122 MMHG | WEIGHT: 179.9 LBS

## 2017-07-06 DIAGNOSIS — D57.00 HB-SS DISEASE WITH CRISIS, UNSPECIFIED: ICD-10-CM

## 2017-07-06 DIAGNOSIS — E83.111 HEMOCHROMATOSIS DUE TO REPEATED RED BLOOD CELL TRANSFUSIONS: ICD-10-CM

## 2017-07-06 PROCEDURE — 71010: CPT | Mod: 26

## 2017-07-06 PROCEDURE — 99223 1ST HOSP IP/OBS HIGH 75: CPT

## 2017-07-06 RX ORDER — FOLIC ACID 0.8 MG
1 TABLET ORAL DAILY
Qty: 0 | Refills: 0 | Status: DISCONTINUED | OUTPATIENT
Start: 2017-07-06 | End: 2017-07-14

## 2017-07-06 RX ORDER — HYDROMORPHONE HYDROCHLORIDE 2 MG/ML
30 INJECTION INTRAMUSCULAR; INTRAVENOUS; SUBCUTANEOUS
Qty: 0 | Refills: 0 | Status: DISCONTINUED | OUTPATIENT
Start: 2017-07-06 | End: 2017-07-12

## 2017-07-06 RX ORDER — HYDROXYUREA 500 MG/1
2000 CAPSULE ORAL DAILY
Qty: 0 | Refills: 0 | Status: DISCONTINUED | OUTPATIENT
Start: 2017-07-06 | End: 2017-07-14

## 2017-07-06 RX ORDER — HYDROMORPHONE HYDROCHLORIDE 2 MG/ML
2 INJECTION INTRAMUSCULAR; INTRAVENOUS; SUBCUTANEOUS ONCE
Qty: 0 | Refills: 0 | Status: DISCONTINUED | OUTPATIENT
Start: 2017-07-06 | End: 2017-07-06

## 2017-07-06 RX ORDER — KETOROLAC TROMETHAMINE 30 MG/ML
15 SYRINGE (ML) INJECTION ONCE
Qty: 0 | Refills: 0 | Status: DISCONTINUED | OUTPATIENT
Start: 2017-07-06 | End: 2017-07-06

## 2017-07-06 RX ORDER — NALOXONE HYDROCHLORIDE 4 MG/.1ML
0.1 SPRAY NASAL
Qty: 0 | Refills: 0 | Status: DISCONTINUED | OUTPATIENT
Start: 2017-07-06 | End: 2017-07-14

## 2017-07-06 RX ORDER — ONDANSETRON 8 MG/1
4 TABLET, FILM COATED ORAL EVERY 6 HOURS
Qty: 0 | Refills: 0 | Status: DISCONTINUED | OUTPATIENT
Start: 2017-07-06 | End: 2017-07-14

## 2017-07-06 RX ORDER — ENOXAPARIN SODIUM 100 MG/ML
40 INJECTION SUBCUTANEOUS DAILY
Qty: 0 | Refills: 0 | Status: DISCONTINUED | OUTPATIENT
Start: 2017-07-06 | End: 2017-07-14

## 2017-07-06 RX ORDER — SODIUM CHLORIDE 9 MG/ML
1000 INJECTION, SOLUTION INTRAVENOUS
Qty: 0 | Refills: 0 | Status: DISCONTINUED | OUTPATIENT
Start: 2017-07-06 | End: 2017-07-13

## 2017-07-06 RX ORDER — ONDANSETRON 8 MG/1
4 TABLET, FILM COATED ORAL ONCE
Qty: 0 | Refills: 0 | Status: COMPLETED | OUTPATIENT
Start: 2017-07-06 | End: 2017-07-06

## 2017-07-06 RX ADMIN — HYDROMORPHONE HYDROCHLORIDE 2 MILLIGRAM(S): 2 INJECTION INTRAMUSCULAR; INTRAVENOUS; SUBCUTANEOUS at 21:35

## 2017-07-06 RX ADMIN — HYDROMORPHONE HYDROCHLORIDE 2 MILLIGRAM(S): 2 INJECTION INTRAMUSCULAR; INTRAVENOUS; SUBCUTANEOUS at 21:20

## 2017-07-06 RX ADMIN — ONDANSETRON 4 MILLIGRAM(S): 8 TABLET, FILM COATED ORAL at 20:10

## 2017-07-06 RX ADMIN — ONDANSETRON 4 MILLIGRAM(S): 8 TABLET, FILM COATED ORAL at 23:37

## 2017-07-06 RX ADMIN — HYDROMORPHONE HYDROCHLORIDE 30 MILLILITER(S): 2 INJECTION INTRAMUSCULAR; INTRAVENOUS; SUBCUTANEOUS at 21:45

## 2017-07-06 RX ADMIN — Medication 15 MILLIGRAM(S): at 20:10

## 2017-07-06 RX ADMIN — SODIUM CHLORIDE 100 MILLILITER(S): 9 INJECTION, SOLUTION INTRAVENOUS at 21:44

## 2017-07-06 NOTE — H&P ADULT - NSHPLABSRESULTS_GEN_ALL_CORE
Portable  upright  chest    INTERPRETATION:    There  is  cardiomegaly.  The  lungs  are  clear.  No  effusions  or  pneumothorax.      COMPARISON:    July 1, 2017  without  change.      IMPRESSION:    Clear  lungs  with  cardiomegaly.

## 2017-07-06 NOTE — H&P ADULT - HISTORY OF PRESENT ILLNESS
22 y/o M h/o acute chest syndrome, iron overload 2/2 h/o transfusions,  HbSS , s/p splenectomy and lap elsa, previous monthly exchange transfusions (no longer receiving), presents with chest, lower back back pain. Discharged for similar crisis episode 4 days ago.   Pt unaware of inciting trigger. Compliant with all meds including hydroxyurea. Denies new  physical/ emotional stressors. Denies cough, fevers, rigors, vomiting. CXR negative for infiltrates. Home po oxy provided some pain relief, but quickly wore off.

## 2017-07-06 NOTE — H&P ADULT - NSHPPHYSICALEXAM_GEN_ALL_CORE
PHYSICAL EXAM:      Constitutional: distress 2/2 pain  HEENT: PERRLA, EOMI, Normal Hearing  Neck: No LAD, No JVD  Back: Normal spine flexure, No CVA tenderness  Respiratory: CTAB  Cardiovascular: S1 and S2, RRR, no M/G/R  Gastrointestinal: BS+, soft, NT/ND  Extremities: No peripheral edema  Vascular: 2+ peripheral pulses  Neurological: A/O x 3, no focal deficits  Psychiatric: Normal mood, normal affect  Musculoskeletal: 5/5 strength b/l upper and lower extremities  Skin: No rashes

## 2017-07-06 NOTE — PROVIDER CONTACT NOTE (MEDICATION) - BACKGROUND
Pt takes home medication 3 tablets of Jadenu 360mg/tab at bedtime. Pharmacy unable to approve medication brought in from home as the expiration date is April 2017.

## 2017-07-06 NOTE — H&P ADULT - NSHPREVIEWOFSYSTEMS_GEN_ALL_CORE
Review of Systems:   CONSTITUTIONAL: No fever, weight loss, or fatigue  EYES: No eye pain, visual disturbances, or discharge  ENMT:  No difficulty hearing, tinnitus, vertigo; No sinus or throat pain  NECK: No pain or stiffness  RESPIRATORY: No cough, wheezing, chills or hemoptysis; No shortness of breath  CARDIOVASCULAR: + chest pain, palpitations, dizziness, or leg swelling  GASTROINTESTINAL: No abdominal or epigastric pain. +nausea, no vomiting, or hematemesis; No diarrhea or constipation. No melena or hematochezia.  GENITOURINARY: No dysuria, frequency, hematuria, or incontinence  NEUROLOGICAL: No headaches, memory loss, loss of strength, numbness, or tremors  SKIN: No itching, burning, rashes, or lesions   LYMPH NODES: No enlarged glands  ENDOCRINE: No heat or cold intolerance; No hair loss  MUSCULOSKELETAL: lower back , BL leg pain  HEME/LYMPH: No easy bruising, or bleeding gums  ALLERY AND IMMUNOLOGIC: No hives or eczema

## 2017-07-07 DIAGNOSIS — D57.00 HB-SS DISEASE WITH CRISIS, UNSPECIFIED: ICD-10-CM

## 2017-07-07 DIAGNOSIS — D64.9 ANEMIA, UNSPECIFIED: ICD-10-CM

## 2017-07-07 DIAGNOSIS — R07.9 CHEST PAIN, UNSPECIFIED: ICD-10-CM

## 2017-07-07 DIAGNOSIS — Z29.9 ENCOUNTER FOR PROPHYLACTIC MEASURES, UNSPECIFIED: ICD-10-CM

## 2017-07-07 LAB
ALBUMIN SERPL ELPH-MCNC: 4.3 G/DL — SIGNIFICANT CHANGE UP (ref 3.3–5)
ALP SERPL-CCNC: 83 U/L — SIGNIFICANT CHANGE UP (ref 40–120)
ALT FLD-CCNC: 25 U/L — SIGNIFICANT CHANGE UP (ref 4–41)
AST SERPL-CCNC: 90 U/L — HIGH (ref 4–40)
BILIRUB SERPL-MCNC: 3.7 MG/DL — HIGH (ref 0.2–1.2)
BUN SERPL-MCNC: 13 MG/DL — SIGNIFICANT CHANGE UP (ref 7–23)
CALCIUM SERPL-MCNC: 8.6 MG/DL — SIGNIFICANT CHANGE UP (ref 8.4–10.5)
CHLORIDE SERPL-SCNC: 99 MMOL/L — SIGNIFICANT CHANGE UP (ref 98–107)
CK MB BLD-MCNC: 1 NG/ML — SIGNIFICANT CHANGE UP (ref 1–6.6)
CK MB BLD-MCNC: SIGNIFICANT CHANGE UP (ref 0–2.5)
CK SERPL-CCNC: 135 U/L — SIGNIFICANT CHANGE UP (ref 30–200)
CO2 SERPL-SCNC: 24 MMOL/L — SIGNIFICANT CHANGE UP (ref 22–31)
CREAT SERPL-MCNC: 0.88 MG/DL — SIGNIFICANT CHANGE UP (ref 0.5–1.3)
GLUCOSE SERPL-MCNC: 118 MG/DL — HIGH (ref 70–99)
HCT VFR BLD CALC: 16 % — CRITICAL LOW (ref 39–50)
HGB BLD-MCNC: 5 G/DL — CRITICAL LOW (ref 13–17)
LDH SERPL L TO P-CCNC: 1284 U/L — HIGH (ref 135–225)
MCHC RBC-ENTMCNC: 27.7 PG — SIGNIFICANT CHANGE UP (ref 27–34)
MCHC RBC-ENTMCNC: 31.2 % — LOW (ref 32–36)
MCV RBC AUTO: 82.8 FL — SIGNIFICANT CHANGE UP (ref 80–100)
NRBC # FLD: 1.85 — SIGNIFICANT CHANGE UP
NRBC FLD-RTO: 11.4 — SIGNIFICANT CHANGE UP
PLATELET # BLD AUTO: 337 K/UL — SIGNIFICANT CHANGE UP (ref 150–400)
PMV BLD: 10.7 FL — SIGNIFICANT CHANGE UP (ref 7–13)
POTASSIUM SERPL-MCNC: 4.5 MMOL/L — SIGNIFICANT CHANGE UP (ref 3.5–5.3)
POTASSIUM SERPL-SCNC: 4.5 MMOL/L — SIGNIFICANT CHANGE UP (ref 3.5–5.3)
PROT SERPL-MCNC: 7.4 G/DL — SIGNIFICANT CHANGE UP (ref 6–8.3)
RBC # BLD: 1.57 M/UL — LOW (ref 4.2–5.8)
RBC # FLD: 23.9 % — HIGH (ref 10.3–14.5)
RETICS #: 85.9 10X3/UL — HIGH (ref 17–73)
RETICS/RBC NFR: 5.5 % — HIGH (ref 0.5–2.5)
SODIUM SERPL-SCNC: 139 MMOL/L — SIGNIFICANT CHANGE UP (ref 135–145)
TROPONIN T SERPL-MCNC: < 0.06 NG/ML — SIGNIFICANT CHANGE UP (ref 0–0.06)
WBC # BLD: 16.19 K/UL — HIGH (ref 3.8–10.5)
WBC # FLD AUTO: 16.19 K/UL — HIGH (ref 3.8–10.5)

## 2017-07-07 PROCEDURE — 71010: CPT | Mod: 26

## 2017-07-07 PROCEDURE — 99222 1ST HOSP IP/OBS MODERATE 55: CPT | Mod: GC

## 2017-07-07 PROCEDURE — 99233 SBSQ HOSP IP/OBS HIGH 50: CPT

## 2017-07-07 RX ORDER — LIDOCAINE 4 G/100G
1 CREAM TOPICAL DAILY
Qty: 0 | Refills: 0 | Status: DISCONTINUED | OUTPATIENT
Start: 2017-07-07 | End: 2017-07-14

## 2017-07-07 RX ORDER — CYCLOBENZAPRINE HYDROCHLORIDE 10 MG/1
10 TABLET, FILM COATED ORAL THREE TIMES A DAY
Qty: 0 | Refills: 0 | Status: COMPLETED | OUTPATIENT
Start: 2017-07-07 | End: 2017-07-10

## 2017-07-07 RX ORDER — ACETAMINOPHEN 500 MG
650 TABLET ORAL EVERY 6 HOURS
Qty: 0 | Refills: 0 | Status: DISCONTINUED | OUTPATIENT
Start: 2017-07-07 | End: 2017-07-14

## 2017-07-07 RX ADMIN — LIDOCAINE 1 PATCH: 4 CREAM TOPICAL at 14:33

## 2017-07-07 RX ADMIN — CYCLOBENZAPRINE HYDROCHLORIDE 10 MILLIGRAM(S): 10 TABLET, FILM COATED ORAL at 14:33

## 2017-07-07 RX ADMIN — ONDANSETRON 4 MILLIGRAM(S): 8 TABLET, FILM COATED ORAL at 15:54

## 2017-07-07 RX ADMIN — Medication 650 MILLIGRAM(S): at 22:00

## 2017-07-07 RX ADMIN — Medication 1 MILLIGRAM(S): at 11:33

## 2017-07-07 RX ADMIN — HYDROMORPHONE HYDROCHLORIDE 30 MILLILITER(S): 2 INJECTION INTRAMUSCULAR; INTRAVENOUS; SUBCUTANEOUS at 20:07

## 2017-07-07 RX ADMIN — CYCLOBENZAPRINE HYDROCHLORIDE 10 MILLIGRAM(S): 10 TABLET, FILM COATED ORAL at 21:23

## 2017-07-07 RX ADMIN — HYDROMORPHONE HYDROCHLORIDE 30 MILLILITER(S): 2 INJECTION INTRAMUSCULAR; INTRAVENOUS; SUBCUTANEOUS at 08:05

## 2017-07-07 RX ADMIN — HYDROXYUREA 2000 MILLIGRAM(S): 500 CAPSULE ORAL at 06:45

## 2017-07-07 NOTE — PROGRESS NOTE ADULT - PROBLEM SELECTOR PLAN 2
marco Olivas; will either order pharm equivalent or ask pt to bring in home med.  Hematology Consult requested

## 2017-07-07 NOTE — ED PROVIDER NOTE - PROGRESS NOTE DETAILS
Wisam: I was not involved in the patient care. due to sunrise downtime, no dispo was put in so I placed it in the chart.

## 2017-07-07 NOTE — PROGRESS NOTE ADULT - PROBLEM SELECTOR PLAN 4
Hgb 5.0.  Hematology will consult. Pt has h/o of Iron overload.  Will only do PRBC trans if Clinically needed . IE ACS.

## 2017-07-07 NOTE — PROGRESS NOTE ADULT - SUBJECTIVE AND OBJECTIVE BOX
Patient is a 21y old  Male who presents with a chief complaint of chest pain, low Back pain (06 Jul 2017 20:11).  Patient notes pain to Chest , Low back and Legs 5/10 and improve to 3/10 after pressing PCA pump.      SUBJECTIVE / OVERNIGHT EVENTS:    MEDICATIONS  (STANDING):  HYDROmorphone PCA (1 mG/mL) 30 milliLiter(s) PCA Continuous PCA Continuous  hydroxyurea (Non - oncologic) 2000 milliGRAM(s) Oral daily  sodium chloride 0.45%. 1000 milliLiter(s) (100 mL/Hr) IV Continuous <Continuous>  folic acid 1 milliGRAM(s) Oral daily  enoxaparin Injectable 40 milliGRAM(s) SubCutaneous daily  Jadenu (Deferasirox) 360 mg Tablet 1080 milliGRAM(s)   Oral daily    MEDICATIONS  (PRN):  naloxone Injectable 0.1 milliGRAM(s) IV Push every 3 minutes PRN For ANY of the following changes in patient status:  A. RR LESS THAN 10 breaths per minute, B. Oxygen saturation LESS THAN 90%, C. Sedation score of 6  ondansetron Injectable 4 milliGRAM(s) IV Push every 6 hours PRN Nausea      Vital Signs Last 24 Hrs  T(C): 36.8 (07-07-17 @ 05:20), Max: 37.3 (07-06-17 @ 21:18)  HR: 85 (07-07-17 @ 05:20) (68 - 85)  BP: 107/58 (07-07-17 @ 05:20) (104/60 - 122/62)  RR: 20 (07-07-17 @ 05:20) (18 - 20)  SpO2: 95% (07-07-17 @ 05:20) (94% - 95%)  CAPILLARY BLOOD GLUCOSE        I&O's Summary      PHYSICAL EXAM:  GENERAL: NAD, well-developed  HEAD:  Atraumatic, Normocephalic  EYES: EOMI, PERRLA, conjunctiva and sclera clear  NECK: Supple, No JVD  CHEST/LUNG: Clear to auscultation bilaterally; No wheeze  HEART: Regular rate and rhythm; No murmurs, rubs, or gallops  ABDOMEN: Soft, Nontender, Nondistended; Bowel sounds present  EXTREMITIES:  2+ Peripheral Pulses, No clubbing, cyanosis, or edema  PSYCH: AAOx3  NEUROLOGY: non-focal  SKIN: No rash/lesions    LABS:                        5.0    16.19 )-----------( 337      ( 07 Jul 2017 05:30 )             16.0     07-07    139  |  99  |  13  ----------------------------<  118<H>  4.5   |  24  |  0.88    Ca    8.6      07 Jul 2017 05:30    TPro  7.4  /  Alb  4.3  /  TBili  3.7<H>  /  DBili  x   /  AST  90<H>  /  ALT  25  /  AlkPhos  83  07-07      CARDIAC MARKERS ( 06 Jul 2017 11:39 )  x     / < 0.06 ng/mL / 98 u/L / 1.00 ng/mL / x              RADIOLOGY & ADDITIONAL TESTS:    Imaging Personally Reviewed:    Consultant(s) Notes Reviewed:      Care Discussed with Consultants/Other Providers: Hematology

## 2017-07-07 NOTE — CONSULT NOTE ADULT - SUBJECTIVE AND OBJECTIVE BOX
HPI:  20 y/o M h/o acute chest syndrome, iron overload 2/2 h/o transfusions,  HbSS , s/p splenectomy and lap elsa, previous monthly exchange transfusions (no longer receiving), presents with chest, lower back back pain. Discharged for similar crisis episode 4 days ago.   Patient denies recent illness, sick contacts.  Unclear trigger.  States was feeling fine until yesterday, tried to control pain with home narcotics but symptoms worsened.        Allergies    ceftriaxone (Unknown)    Intolerances        MEDICATIONS  (STANDING):  HYDROmorphone PCA (1 mG/mL) 30 milliLiter(s) PCA Continuous PCA Continuous  hydroxyurea (Non - oncologic) 2000 milliGRAM(s) Oral daily  sodium chloride 0.45%. 1000 milliLiter(s) (100 mL/Hr) IV Continuous <Continuous>  folic acid 1 milliGRAM(s) Oral daily  enoxaparin Injectable 40 milliGRAM(s) SubCutaneous daily  Jadenu (Deferasirox) 360 mg Tablet 1080 milliGRAM(s)   Oral daily    MEDICATIONS  (PRN):  naloxone Injectable 0.1 milliGRAM(s) IV Push every 3 minutes PRN For ANY of the following changes in patient status:  A. RR LESS THAN 10 breaths per minute, B. Oxygen saturation LESS THAN 90%, C. Sedation score of 6  ondansetron Injectable 4 milliGRAM(s) IV Push every 6 hours PRN Nausea      PAST MEDICAL & SURGICAL HISTORY:  Iron overload due to repeated red blood cell transfusions  Sickle cell anemia  Acute chest syndrome  Sickle Cell Disease: HbSS  S/P Splenectomy  S/P Laparotomy for spleen removal  S/P Laparoscopic Cholecystectomy      FAMILY HISTORY:  Family history of sickle cell anemia (Sibling)      SOCIAL HISTORY: No EtOH, no tobacco    REVIEW OF SYSTEMS:    CONSTITUTIONAL: No weakness, fevers or chills  EYES/ENT: No visual changes;  No vertigo or throat pain   NECK: No pain or stiffness  RESPIRATORY: No cough, wheezing, hemoptysis; No shortness of breath  CARDIOVASCULAR: No chest pain or palpitations  GASTROINTESTINAL: No abdominal or epigastric pain. No nausea, vomiting, or hematemesis; No diarrhea or constipation. No melena or hematochezia.  GENITOURINARY: No dysuria, frequency or hematuria  NEUROLOGICAL: No numbness or weakness  SKIN: No itching, burning, rashes, or lesions   All other review of systems is negative unless indicated above.    Height (cm): 180.34 (07-06 @ 21:18)  Weight (kg): 81.6 (07-06 @ 21:18)  BMI (kg/m2): 25.1 (07-06 @ 21:18)  BSA (m2): 2.02 (07-06 @ 21:18)    T(F): 98.2 (07-07-17 @ 05:20), Max: 99.2 (07-06-17 @ 21:18)  HR: 85 (07-07-17 @ 05:20)  BP: 107/58 (07-07-17 @ 05:20)  RR: 20 (07-07-17 @ 05:20)  SpO2: 95% (07-07-17 @ 05:20)  Wt(kg): --    GENERAL: NAD, well-developed  HEAD:  Atraumatic, Normocephalic  EYES: EOMI, PERRLA, conjunctiva and sclera clear  NECK: Supple, No JVD  CHEST/LUNG: Clear to auscultation bilaterally; No wheeze  HEART: Regular rate and rhythm; No murmurs, rubs, or gallops  ABDOMEN: Soft, Nontender, Nondistended; Bowel sounds present  EXTREMITIES:  2+ Peripheral Pulses, No clubbing, cyanosis, or edema  NEUROLOGY: non-focal  SKIN: No rashes or lesions                          5.0    16.19 )-----------( 337      ( 07 Jul 2017 05:30 )             16.0       07-07    139  |  99  |  13  ----------------------------<  118<H>  4.5   |  24  |  0.88    Ca    8.6      07 Jul 2017 05:30    TPro  7.4  /  Alb  4.3  /  TBili  3.7<H>  /  DBili  x   /  AST  90<H>  /  ALT  25  /  AlkPhos  83  07-07      Lactate Dehydrogenase, Serum: 1284 U/L (07-07 @ 05:30)  Uric Acid, Serum: 5.1 mg/dL (07-06 @ 11:39)  Lactate Dehydrogenase, Serum: 1112 U/L (07-06 @ 11:39)

## 2017-07-07 NOTE — ED PROVIDER NOTE - ATTENDING CONTRIBUTION TO CARE
hx of Sickle cell disease with several hours of increasing joint pain, typical of crisis, home narcs not effective. PE VSS, chest clear, uncomfortable, no joint effusion, abd soft non tender. Imp; Sickle Cell crisis; Plan after hydration 1/2 NS and Dilaudid, pain persists; Hgb 6.3. retic 14 will admit for comntinued pain management

## 2017-07-07 NOTE — CONSULT NOTE ADULT - PROBLEM SELECTOR RECOMMENDATION 9
- History, laboratory findings, and imaging reviewed.   - Clinical presentation consistent with typical VOC  - Hydrate with 1/2 NS, Pain Control, Bowel Regimen, Incentive Spirometry, Folate  - No indication for exchange/simple transfusion at this time  - Trend CBC, reticulocyte, LDH daily

## 2017-07-07 NOTE — PROGRESS NOTE ADULT - ASSESSMENT
22 yo m with recurrent sickle cell crisis.  Patient is a 21y old  Male who presents with a chief complaint of chest pain, low Back pain (06 Jul 2017 20:11).  Patient notes pain to Chest , Low back and Legs 5/10 and improve to 3/10 after pressing PCA pump.  Severe Anemia with Hgb 5.0 today. 20 yo m with recurrent sickle cell crisis.  Patient is a 21y old  Male who presents with a chief complaint of chest pain, low Back pain (06 Jul 2017 20:11).  Patient notes pain to Chest , Low back and Legs 5/10 and improve to 3/10 after pressing PCA pump.  Severe Anemia with Hgb 5.0 today.  Chest pain today- no SOB.

## 2017-07-07 NOTE — PROVIDER CONTACT NOTE (MEDICATION) - ACTION/TREATMENT ORDERED:
YEISON Flowers notified and ordered no further action at this time.
NP Zoe Flowers notified, 650mg Tylenol PO ordered. Will continue to monitor and assess.

## 2017-07-07 NOTE — PROGRESS NOTE ADULT - PROBLEM SELECTOR PLAN 5
Monitor EKG.  Cpk and trop added to labs.  CXR - urgent r/o ACS.  c/w pCA pain Monitor EKG.  Cpk and trop added to labs.  CXR - urgent r/o ACS.  c/w pCA pain  TTE ordeded dfor cp with Cardiomegaly.

## 2017-07-07 NOTE — CONSULT NOTE ADULT - ASSESSMENT
20 y/o M h/o HgSS, acute chest syndrome, iron overload, s/p splenectomy and lap elsa presents with chest, lower back pain

## 2017-07-07 NOTE — PROGRESS NOTE ADULT - PROBLEM SELECTOR PLAN 1
-pca, ivf, bowel regimen  -continue hydroxyurea, folic acid.  Hematology consulted for Severe anemia

## 2017-07-07 NOTE — PROGRESS NOTE ADULT - ATTENDING COMMENTS
Addendum  CPK/Trop - negative Addendum  CPK/Trop - negative  CXR 7/7- Clear with Cardiomegaly- Echo ord.

## 2017-07-08 DIAGNOSIS — R50.9 FEVER, UNSPECIFIED: ICD-10-CM

## 2017-07-08 LAB
ALBUMIN SERPL ELPH-MCNC: 4.1 G/DL — SIGNIFICANT CHANGE UP (ref 3.3–5)
ALP SERPL-CCNC: 72 U/L — SIGNIFICANT CHANGE UP (ref 40–120)
ALT FLD-CCNC: 24 U/L — SIGNIFICANT CHANGE UP (ref 4–41)
AST SERPL-CCNC: 101 U/L — HIGH (ref 4–40)
B PERT DNA SPEC QL NAA+PROBE: SIGNIFICANT CHANGE UP
BASOPHILS # BLD AUTO: 0.01 K/UL — SIGNIFICANT CHANGE UP (ref 0–0.2)
BASOPHILS NFR BLD AUTO: 0.1 % — SIGNIFICANT CHANGE UP (ref 0–2)
BILIRUB SERPL-MCNC: 4.9 MG/DL — HIGH (ref 0.2–1.2)
BUN SERPL-MCNC: 15 MG/DL — SIGNIFICANT CHANGE UP (ref 7–23)
C PNEUM DNA SPEC QL NAA+PROBE: NOT DETECTED — SIGNIFICANT CHANGE UP
CALCIUM SERPL-MCNC: 8.8 MG/DL — SIGNIFICANT CHANGE UP (ref 8.4–10.5)
CHLORIDE SERPL-SCNC: 104 MMOL/L — SIGNIFICANT CHANGE UP (ref 98–107)
CK SERPL-CCNC: 268 U/L — HIGH (ref 30–200)
CO2 SERPL-SCNC: 25 MMOL/L — SIGNIFICANT CHANGE UP (ref 22–31)
CREAT SERPL-MCNC: 0.69 MG/DL — SIGNIFICANT CHANGE UP (ref 0.5–1.3)
EOSINOPHIL # BLD AUTO: 0.03 K/UL — SIGNIFICANT CHANGE UP (ref 0–0.5)
EOSINOPHIL NFR BLD AUTO: 0.2 % — SIGNIFICANT CHANGE UP (ref 0–6)
FLUAV H1 2009 PAND RNA SPEC QL NAA+PROBE: NOT DETECTED — SIGNIFICANT CHANGE UP
FLUAV H1 RNA SPEC QL NAA+PROBE: NOT DETECTED — SIGNIFICANT CHANGE UP
FLUAV H3 RNA SPEC QL NAA+PROBE: NOT DETECTED — SIGNIFICANT CHANGE UP
FLUAV SUBTYP SPEC NAA+PROBE: SIGNIFICANT CHANGE UP
FLUBV RNA SPEC QL NAA+PROBE: NOT DETECTED — SIGNIFICANT CHANGE UP
GLUCOSE SERPL-MCNC: 112 MG/DL — HIGH (ref 70–99)
HADV DNA SPEC QL NAA+PROBE: NOT DETECTED — SIGNIFICANT CHANGE UP
HCOV 229E RNA SPEC QL NAA+PROBE: NOT DETECTED — SIGNIFICANT CHANGE UP
HCOV HKU1 RNA SPEC QL NAA+PROBE: NOT DETECTED — SIGNIFICANT CHANGE UP
HCOV NL63 RNA SPEC QL NAA+PROBE: NOT DETECTED — SIGNIFICANT CHANGE UP
HCOV OC43 RNA SPEC QL NAA+PROBE: NOT DETECTED — SIGNIFICANT CHANGE UP
HCT VFR BLD CALC: 12.4 % — CRITICAL LOW (ref 39–50)
HGB BLD-MCNC: 4.8 G/DL — CRITICAL LOW (ref 13–17)
HMPV RNA SPEC QL NAA+PROBE: NOT DETECTED — SIGNIFICANT CHANGE UP
HPIV1 RNA SPEC QL NAA+PROBE: NOT DETECTED — SIGNIFICANT CHANGE UP
HPIV2 RNA SPEC QL NAA+PROBE: NOT DETECTED — SIGNIFICANT CHANGE UP
HPIV3 RNA SPEC QL NAA+PROBE: NOT DETECTED — SIGNIFICANT CHANGE UP
HPIV4 RNA SPEC QL NAA+PROBE: NOT DETECTED — SIGNIFICANT CHANGE UP
IMM GRANULOCYTES # BLD AUTO: 0.21 # — SIGNIFICANT CHANGE UP
IMM GRANULOCYTES NFR BLD AUTO: 1.3 % — SIGNIFICANT CHANGE UP (ref 0–1.5)
LDH SERPL L TO P-CCNC: 1616 U/L — HIGH (ref 135–225)
LYMPHOCYTES # BLD AUTO: 13.6 % — SIGNIFICANT CHANGE UP (ref 13–44)
LYMPHOCYTES # BLD AUTO: 2.16 K/UL — SIGNIFICANT CHANGE UP (ref 1–3.3)
M PNEUMO DNA SPEC QL NAA+PROBE: NOT DETECTED — SIGNIFICANT CHANGE UP
MAGNESIUM SERPL-MCNC: 1.9 MG/DL — SIGNIFICANT CHANGE UP (ref 1.6–2.6)
MANUAL SMEAR VERIFICATION: SIGNIFICANT CHANGE UP
MCHC RBC-ENTMCNC: 32.4 PG — SIGNIFICANT CHANGE UP (ref 27–34)
MCHC RBC-ENTMCNC: 38.7 % — HIGH (ref 32–36)
MCV RBC AUTO: 83.8 FL — SIGNIFICANT CHANGE UP (ref 80–100)
MONOCYTES # BLD AUTO: 2.13 K/UL — HIGH (ref 0–0.9)
MONOCYTES NFR BLD AUTO: 13.4 % — SIGNIFICANT CHANGE UP (ref 2–14)
NEUTROPHILS # BLD AUTO: 11.36 K/UL — HIGH (ref 1.8–7.4)
NEUTROPHILS NFR BLD AUTO: 71.4 % — SIGNIFICANT CHANGE UP (ref 43–77)
NRBC # FLD: 2.42 — SIGNIFICANT CHANGE UP
NRBC FLD-RTO: 15.2 — SIGNIFICANT CHANGE UP
PHOSPHATE SERPL-MCNC: 3.5 MG/DL — SIGNIFICANT CHANGE UP (ref 2.5–4.5)
PLATELET # BLD AUTO: 292 K/UL — SIGNIFICANT CHANGE UP (ref 150–400)
PMV BLD: 10.6 FL — SIGNIFICANT CHANGE UP (ref 7–13)
POTASSIUM SERPL-MCNC: 4.3 MMOL/L — SIGNIFICANT CHANGE UP (ref 3.5–5.3)
POTASSIUM SERPL-SCNC: 4.3 MMOL/L — SIGNIFICANT CHANGE UP (ref 3.5–5.3)
PROT SERPL-MCNC: 7.1 G/DL — SIGNIFICANT CHANGE UP (ref 6–8.3)
RBC # BLD: 1.48 M/UL — LOW (ref 4.2–5.8)
RBC # FLD: 25.5 % — HIGH (ref 10.3–14.5)
RETICS #: 172.9 10X3/UL — HIGH (ref 17–73)
RETICS/RBC NFR: 11.7 % — HIGH (ref 0.5–2.5)
RSV RNA SPEC QL NAA+PROBE: NOT DETECTED — SIGNIFICANT CHANGE UP
RV+EV RNA SPEC QL NAA+PROBE: NOT DETECTED — SIGNIFICANT CHANGE UP
SODIUM SERPL-SCNC: 142 MMOL/L — SIGNIFICANT CHANGE UP (ref 135–145)
SPECIMEN SOURCE: SIGNIFICANT CHANGE UP
SPECIMEN SOURCE: SIGNIFICANT CHANGE UP
TROPONIN T SERPL-MCNC: < 0.06 NG/ML — SIGNIFICANT CHANGE UP (ref 0–0.06)
WBC # BLD: 15.9 K/UL — HIGH (ref 3.8–10.5)
WBC # FLD AUTO: 15.9 K/UL — HIGH (ref 3.8–10.5)

## 2017-07-08 PROCEDURE — 99233 SBSQ HOSP IP/OBS HIGH 50: CPT

## 2017-07-08 PROCEDURE — 99232 SBSQ HOSP IP/OBS MODERATE 35: CPT | Mod: GC

## 2017-07-08 PROCEDURE — 71250 CT THORAX DX C-: CPT | Mod: 26

## 2017-07-08 RX ORDER — AZTREONAM 2 G
2000 VIAL (EA) INJECTION EVERY 8 HOURS
Qty: 0 | Refills: 0 | Status: DISCONTINUED | OUTPATIENT
Start: 2017-07-08 | End: 2017-07-14

## 2017-07-08 RX ORDER — VANCOMYCIN HCL 1 G
VIAL (EA) INTRAVENOUS
Qty: 0 | Refills: 0 | Status: DISCONTINUED | OUTPATIENT
Start: 2017-07-08 | End: 2017-07-12

## 2017-07-08 RX ORDER — AZTREONAM 2 G
2000 VIAL (EA) INJECTION ONCE
Qty: 0 | Refills: 0 | Status: COMPLETED | OUTPATIENT
Start: 2017-07-08 | End: 2017-07-08

## 2017-07-08 RX ORDER — VANCOMYCIN HCL 1 G
1000 VIAL (EA) INTRAVENOUS ONCE
Qty: 0 | Refills: 0 | Status: COMPLETED | OUTPATIENT
Start: 2017-07-08 | End: 2017-07-08

## 2017-07-08 RX ORDER — VANCOMYCIN HCL 1 G
1000 VIAL (EA) INTRAVENOUS EVERY 12 HOURS
Qty: 0 | Refills: 0 | Status: DISCONTINUED | OUTPATIENT
Start: 2017-07-09 | End: 2017-07-12

## 2017-07-08 RX ORDER — AZTREONAM 2 G
VIAL (EA) INJECTION
Qty: 0 | Refills: 0 | Status: DISCONTINUED | OUTPATIENT
Start: 2017-07-08 | End: 2017-07-14

## 2017-07-08 RX ADMIN — LIDOCAINE 1 PATCH: 4 CREAM TOPICAL at 02:00

## 2017-07-08 RX ADMIN — CYCLOBENZAPRINE HYDROCHLORIDE 10 MILLIGRAM(S): 10 TABLET, FILM COATED ORAL at 06:46

## 2017-07-08 RX ADMIN — HYDROMORPHONE HYDROCHLORIDE 30 MILLILITER(S): 2 INJECTION INTRAMUSCULAR; INTRAVENOUS; SUBCUTANEOUS at 20:21

## 2017-07-08 RX ADMIN — HYDROMORPHONE HYDROCHLORIDE 30 MILLILITER(S): 2 INJECTION INTRAMUSCULAR; INTRAVENOUS; SUBCUTANEOUS at 08:23

## 2017-07-08 RX ADMIN — LIDOCAINE 1 PATCH: 4 CREAM TOPICAL at 13:42

## 2017-07-08 RX ADMIN — Medication 250 MILLIGRAM(S): at 16:53

## 2017-07-08 RX ADMIN — CYCLOBENZAPRINE HYDROCHLORIDE 10 MILLIGRAM(S): 10 TABLET, FILM COATED ORAL at 13:42

## 2017-07-08 RX ADMIN — Medication 100 MILLIGRAM(S): at 18:35

## 2017-07-08 RX ADMIN — SODIUM CHLORIDE 150 MILLILITER(S): 9 INJECTION, SOLUTION INTRAVENOUS at 20:22

## 2017-07-08 RX ADMIN — ONDANSETRON 4 MILLIGRAM(S): 8 TABLET, FILM COATED ORAL at 06:50

## 2017-07-08 RX ADMIN — SODIUM CHLORIDE 150 MILLILITER(S): 9 INJECTION, SOLUTION INTRAVENOUS at 21:36

## 2017-07-08 RX ADMIN — CYCLOBENZAPRINE HYDROCHLORIDE 10 MILLIGRAM(S): 10 TABLET, FILM COATED ORAL at 21:36

## 2017-07-08 RX ADMIN — HYDROXYUREA 2000 MILLIGRAM(S): 500 CAPSULE ORAL at 12:47

## 2017-07-08 RX ADMIN — Medication 100 MILLIGRAM(S): at 21:36

## 2017-07-08 RX ADMIN — HYDROMORPHONE HYDROCHLORIDE 30 MILLILITER(S): 2 INJECTION INTRAMUSCULAR; INTRAVENOUS; SUBCUTANEOUS at 06:59

## 2017-07-08 RX ADMIN — Medication 1 MILLIGRAM(S): at 12:47

## 2017-07-08 NOTE — PROGRESS NOTE ADULT - PROBLEM SELECTOR PLAN 1
-appreciate Heme f/u  -no need for transfusion at this time  -pt states his baseline Hb is 5-6  -pca, ivf, bowel regimen  -continue hydroxyurea, folic acid

## 2017-07-08 NOTE — PROGRESS NOTE ADULT - SUBJECTIVE AND OBJECTIVE BOX
Patient is a 21y old  Male who presents with a chief complaint of chest pain, low nack pain (06 Jul 2017 20:11)      SUBJECTIVE / OVERNIGHT EVENTS:    Febrile to 101.5 overnight, T100 at 6AM. Hb 4.8 today from 5.0.  Pt states he feels about the same as yesterday in terms of pain, mostly in his chest/back/legs.   Denies sob/cough/URI symptoms/abdominal pain/n/v/diarrhea/dysuria.       MEDICATIONS  (STANDING):  HYDROmorphone PCA (1 mG/mL) 30 milliLiter(s) PCA Continuous PCA Continuous  hydroxyurea (Non - oncologic) 2000 milliGRAM(s) Oral daily  sodium chloride 0.45%. 1000 milliLiter(s) (150 mL/Hr) IV Continuous <Continuous>  folic acid 1 milliGRAM(s) Oral daily  enoxaparin Injectable 40 milliGRAM(s) SubCutaneous daily  Jadenu (Deferasirox) 360 mg Tablet 1080 milliGRAM(s)   Oral daily  lidocaine   Patch 1 Patch Transdermal daily  cyclobenzaprine 10 milliGRAM(s) Oral three times a day    MEDICATIONS  (PRN):  naloxone Injectable 0.1 milliGRAM(s) IV Push every 3 minutes PRN For ANY of the following changes in patient status:  A. RR LESS THAN 10 breaths per minute, B. Oxygen saturation LESS THAN 90%, C. Sedation score of 6  ondansetron Injectable 4 milliGRAM(s) IV Push every 6 hours PRN Nausea  acetaminophen   Tablet 650 milliGRAM(s) Oral every 6 hours PRN For Temp greater than 38 C (100.4 F)      Vital Signs Last 24 Hrs  T(C): 36.7 (07-08-17 @ 11:30), Max: 38.6 (07-07-17 @ 21:39)  HR: 84 (07-08-17 @ 11:30) (84 - 116)  BP: 110/61 (07-08-17 @ 11:30) (108/66 - 124/66)  RR: 19 (07-08-17 @ 11:30) (18 - 20)  SpO2: 94% (07-08-17 @ 11:30) (90% - 95%)  CAPILLARY BLOOD GLUCOSE        I&O's Summary    07 Jul 2017 07:01  -  08 Jul 2017 07:00  --------------------------------------------------------  IN: 900 mL / OUT: 1600 mL / NET: -700 mL        PHYSICAL EXAM:  GENERAL: NAD, well-developed  CHEST/LUNG: Clear to auscultation bilaterally; No wheeze  HEART: Regular rate and rhythm; No murmurs, rubs, or gallops  ABDOMEN: Soft, Nontender, Nondistended; Bowel sounds present  EXTREMITIES:  2+ Peripheral Pulses, No clubbing, cyanosis, or edema  s    LABS:                        4.8    15.90 )-----------( 292      ( 08 Jul 2017 06:30 )             12.4     07-08    142  |  104  |  15  ----------------------------<  112<H>  4.3   |  25  |  0.69    Ca    8.8      08 Jul 2017 06:30  Phos  3.5     07-08  Mg     1.9     07-08    TPro  7.1  /  Alb  4.1  /  TBili  4.9<H>  /  DBili  x   /  AST  101<H>  /  ALT  24  /  AlkPhos  72  07-08      CARDIAC MARKERS ( 08 Jul 2017 06:30 )  x     / < 0.06 ng/mL / 268 u/L / x     / x      CARDIAC MARKERS ( 07 Jul 2017 05:30 )  x     / < 0.06 ng/mL / 135 u/L / 1.00 ng/mL / x      CARDIAC MARKERS ( 06 Jul 2017 11:39 )  x     / < 0.06 ng/mL / 98 u/L / 1.00 ng/mL / x              RADIOLOGY & ADDITIONAL TESTS:    Imaging Personally Reviewed:    Consultant(s) Notes Reviewed:  Heme/Onc    Care Discussed with Consultants/Other Providers:

## 2017-07-08 NOTE — PROGRESS NOTE ADULT - PROBLEM SELECTOR PLAN 2
-unclear infectious source at this time  -blood cultures and urine cx testing  -check RVP  -monitor off abx for now

## 2017-07-08 NOTE — CHART NOTE - NSCHARTNOTEFT_GEN_A_CORE
Pt febrile 102.9. Decreased O2 sat 88-90%. Pain persists to arms, legs and chest.    PE  lethargic but arousable on PCA dilaudid  Breath sound clear. O2 sat 98% on 50% VM  S1S2 RRR  Abd soft, nondistended, nontender, +bowel sounds  RVP negative    A/P 21M admitted with sickle cell crisis, CXR neg for acute chest last evening. Now with unrelieved pain on PCA pump, hgb 4.8, fevers and increased supplemental oxygen requirements    d/w Dr. Lopez medical attending and heme  pt with elevated retics, no need to transfuse for now  Increase rate of IVF   Will get CT chest, non contrast  Start Vanco and aztreonam (allergic to cephalosporin)  continue supplemental oxygen  MICU consult if no improvement or condition worsens Pt febrile 102.9. Decreased O2 sat 88-90%. Pain persists to arms, legs and chest.    PE  lethargic but arousable on PCA dilaudid  Breath sound clear. O2 sat 98% on 50% VM  S1S2 RRR  Abd soft, nondistended, nontender, +bowel sounds  RVP negative    A/P 21M admitted with sickle cell crisis, CXR neg for acute chest last evening. Now with unrelieved pain on PCA pump, hgb 4.8, fevers and increased supplemental oxygen requirements    d/w Dr. Lopez medical attending and heme  pt with elevated retics, no need to transfuse for now  Increase rate of IVF   Will get CT chest, non contrast  Start Vanco and aztreonam (allergic to cephalosporin)  continue supplemental oxygen  f/u repeat blood cultures  MICU consult if no improvement or condition worsens

## 2017-07-09 LAB
ALBUMIN SERPL ELPH-MCNC: 3.9 G/DL — SIGNIFICANT CHANGE UP (ref 3.3–5)
ALP SERPL-CCNC: 65 U/L — SIGNIFICANT CHANGE UP (ref 40–120)
ALT FLD-CCNC: 21 U/L — SIGNIFICANT CHANGE UP (ref 4–41)
AST SERPL-CCNC: 75 U/L — HIGH (ref 4–40)
BACTERIA UR CULT: SIGNIFICANT CHANGE UP
BASOPHILS # BLD AUTO: 0.01 K/UL — SIGNIFICANT CHANGE UP (ref 0–0.2)
BASOPHILS NFR BLD AUTO: 0.1 % — SIGNIFICANT CHANGE UP (ref 0–2)
BILIRUB SERPL-MCNC: 4.4 MG/DL — HIGH (ref 0.2–1.2)
BUN SERPL-MCNC: 12 MG/DL — SIGNIFICANT CHANGE UP (ref 7–23)
CALCIUM SERPL-MCNC: 8.9 MG/DL — SIGNIFICANT CHANGE UP (ref 8.4–10.5)
CHLORIDE SERPL-SCNC: 102 MMOL/L — SIGNIFICANT CHANGE UP (ref 98–107)
CO2 SERPL-SCNC: 25 MMOL/L — SIGNIFICANT CHANGE UP (ref 22–31)
CREAT SERPL-MCNC: 0.61 MG/DL — SIGNIFICANT CHANGE UP (ref 0.5–1.3)
EOSINOPHIL # BLD AUTO: 0.04 K/UL — SIGNIFICANT CHANGE UP (ref 0–0.5)
EOSINOPHIL NFR BLD AUTO: 0.3 % — SIGNIFICANT CHANGE UP (ref 0–6)
GLUCOSE SERPL-MCNC: 100 MG/DL — HIGH (ref 70–99)
HCT VFR BLD CALC: 11.3 % — CRITICAL LOW (ref 39–50)
HCT VFR BLD CALC: 11.8 % — CRITICAL LOW (ref 39–50)
HGB BLD-MCNC: 4.2 G/DL — CRITICAL LOW (ref 13–17)
HGB BLD-MCNC: 4.4 G/DL — CRITICAL LOW (ref 13–17)
IMM GRANULOCYTES # BLD AUTO: 0.22 # — SIGNIFICANT CHANGE UP
IMM GRANULOCYTES NFR BLD AUTO: 1.5 % — SIGNIFICANT CHANGE UP (ref 0–1.5)
LDH SERPL L TO P-CCNC: 1442 U/L — HIGH (ref 135–225)
LYMPHOCYTES # BLD AUTO: 24.1 % — SIGNIFICANT CHANGE UP (ref 13–44)
LYMPHOCYTES # BLD AUTO: 3.47 K/UL — HIGH (ref 1–3.3)
MAGNESIUM SERPL-MCNC: 1.9 MG/DL — SIGNIFICANT CHANGE UP (ref 1.6–2.6)
MANUAL SMEAR VERIFICATION: SIGNIFICANT CHANGE UP
MCHC RBC-ENTMCNC: 31.2 PG — SIGNIFICANT CHANGE UP (ref 27–34)
MCHC RBC-ENTMCNC: 31.6 PG — SIGNIFICANT CHANGE UP (ref 27–34)
MCHC RBC-ENTMCNC: 37.2 % — HIGH (ref 32–36)
MCHC RBC-ENTMCNC: 37.3 % — HIGH (ref 32–36)
MCV RBC AUTO: 83.7 FL — SIGNIFICANT CHANGE UP (ref 80–100)
MCV RBC AUTO: 85 FL — SIGNIFICANT CHANGE UP (ref 80–100)
MONOCYTES # BLD AUTO: 2.03 K/UL — HIGH (ref 0–0.9)
MONOCYTES NFR BLD AUTO: 14.1 % — HIGH (ref 2–14)
NEUTROPHILS # BLD AUTO: 8.61 K/UL — HIGH (ref 1.8–7.4)
NEUTROPHILS NFR BLD AUTO: 59.9 % — SIGNIFICANT CHANGE UP (ref 43–77)
NRBC # FLD: 3.46 — SIGNIFICANT CHANGE UP
NRBC # FLD: 4.59 — SIGNIFICANT CHANGE UP
NRBC FLD-RTO: 24.1 — SIGNIFICANT CHANGE UP
NRBC FLD-RTO: 33 — SIGNIFICANT CHANGE UP
PHOSPHATE SERPL-MCNC: 3.7 MG/DL — SIGNIFICANT CHANGE UP (ref 2.5–4.5)
PLATELET # BLD AUTO: 273 K/UL — SIGNIFICANT CHANGE UP (ref 150–400)
PLATELET # BLD AUTO: 372 K/UL — SIGNIFICANT CHANGE UP (ref 150–400)
PMV BLD: 10.4 FL — SIGNIFICANT CHANGE UP (ref 7–13)
PMV BLD: 10.6 FL — SIGNIFICANT CHANGE UP (ref 7–13)
POTASSIUM SERPL-MCNC: 4.5 MMOL/L — SIGNIFICANT CHANGE UP (ref 3.5–5.3)
POTASSIUM SERPL-SCNC: 4.5 MMOL/L — SIGNIFICANT CHANGE UP (ref 3.5–5.3)
PROT SERPL-MCNC: 7.2 G/DL — SIGNIFICANT CHANGE UP (ref 6–8.3)
RBC # BLD: 1.33 M/UL — LOW (ref 4.2–5.8)
RBC # BLD: 1.41 M/UL — LOW (ref 4.2–5.8)
RBC # FLD: 27.2 % — HIGH (ref 10.3–14.5)
RBC # FLD: 27.5 % — HIGH (ref 10.3–14.5)
RETICS #: 166.1 10X3/UL — HIGH (ref 17–73)
RETICS/RBC NFR: 12.5 % — HIGH (ref 0.5–2.5)
SODIUM SERPL-SCNC: 141 MMOL/L — SIGNIFICANT CHANGE UP (ref 135–145)
SPECIMEN SOURCE: SIGNIFICANT CHANGE UP
WBC # BLD: 13.93 K/UL — HIGH (ref 3.8–10.5)
WBC # BLD: 14.38 K/UL — HIGH (ref 3.8–10.5)
WBC # FLD AUTO: 13.93 K/UL — HIGH (ref 3.8–10.5)
WBC # FLD AUTO: 14.38 K/UL — HIGH (ref 3.8–10.5)

## 2017-07-09 PROCEDURE — 99233 SBSQ HOSP IP/OBS HIGH 50: CPT

## 2017-07-09 PROCEDURE — 99232 SBSQ HOSP IP/OBS MODERATE 35: CPT | Mod: GC

## 2017-07-09 PROCEDURE — 93306 TTE W/DOPPLER COMPLETE: CPT | Mod: 26

## 2017-07-09 RX ORDER — PETROLATUM,WHITE
1 JELLY (GRAM) TOPICAL THREE TIMES A DAY
Qty: 0 | Refills: 0 | Status: DISCONTINUED | OUTPATIENT
Start: 2017-07-09 | End: 2017-07-14

## 2017-07-09 RX ADMIN — HYDROMORPHONE HYDROCHLORIDE 30 MILLILITER(S): 2 INJECTION INTRAMUSCULAR; INTRAVENOUS; SUBCUTANEOUS at 22:20

## 2017-07-09 RX ADMIN — HYDROXYUREA 2000 MILLIGRAM(S): 500 CAPSULE ORAL at 12:06

## 2017-07-09 RX ADMIN — Medication 1 MILLIGRAM(S): at 12:05

## 2017-07-09 RX ADMIN — Medication 100 MILLIGRAM(S): at 22:26

## 2017-07-09 RX ADMIN — CYCLOBENZAPRINE HYDROCHLORIDE 10 MILLIGRAM(S): 10 TABLET, FILM COATED ORAL at 05:22

## 2017-07-09 RX ADMIN — Medication 250 MILLIGRAM(S): at 07:36

## 2017-07-09 RX ADMIN — CYCLOBENZAPRINE HYDROCHLORIDE 10 MILLIGRAM(S): 10 TABLET, FILM COATED ORAL at 22:27

## 2017-07-09 RX ADMIN — Medication 100 MILLIGRAM(S): at 05:21

## 2017-07-09 RX ADMIN — HYDROMORPHONE HYDROCHLORIDE 30 MILLILITER(S): 2 INJECTION INTRAMUSCULAR; INTRAVENOUS; SUBCUTANEOUS at 20:23

## 2017-07-09 RX ADMIN — Medication 250 MILLIGRAM(S): at 18:02

## 2017-07-09 RX ADMIN — LIDOCAINE 1 PATCH: 4 CREAM TOPICAL at 12:06

## 2017-07-09 RX ADMIN — CYCLOBENZAPRINE HYDROCHLORIDE 10 MILLIGRAM(S): 10 TABLET, FILM COATED ORAL at 13:47

## 2017-07-09 RX ADMIN — HYDROMORPHONE HYDROCHLORIDE 30 MILLILITER(S): 2 INJECTION INTRAMUSCULAR; INTRAVENOUS; SUBCUTANEOUS at 08:12

## 2017-07-09 RX ADMIN — LIDOCAINE 1 PATCH: 4 CREAM TOPICAL at 01:45

## 2017-07-09 RX ADMIN — LIDOCAINE 1 PATCH: 4 CREAM TOPICAL at 23:26

## 2017-07-09 RX ADMIN — Medication 100 MILLIGRAM(S): at 13:44

## 2017-07-09 NOTE — PROGRESS NOTE ADULT - SUBJECTIVE AND OBJECTIVE BOX
Patient is a 21y old  Male who presents with a chief complaint of chest pain, low nack pain (06 Jul 2017 20:11)      SUBJECTIVE / OVERNIGHT EVENTS:    Pt spiked T 102.9 yesterday at 4pm with relative hypoxia, went for CT chest (prelim negative).  No further fevers.   Pt states his pain level is about the same, in his chest/back/legs.  Denies sob/cough/URI symptoms/nausea/vomiting/abd pain/diarrhea/dysuria.     MEDICATIONS  (STANDING):  HYDROmorphone PCA (1 mG/mL) 30 milliLiter(s) PCA Continuous PCA Continuous  hydroxyurea (Non - oncologic) 2000 milliGRAM(s) Oral daily  sodium chloride 0.45%. 1000 milliLiter(s) (150 mL/Hr) IV Continuous <Continuous>  folic acid 1 milliGRAM(s) Oral daily  enoxaparin Injectable 40 milliGRAM(s) SubCutaneous daily  Jadenu (Deferasirox) 360 mg Tablet 1080 milliGRAM(s)   Oral daily  lidocaine   Patch 1 Patch Transdermal daily  cyclobenzaprine 10 milliGRAM(s) Oral three times a day  vancomycin  IVPB   IV Intermittent   vancomycin  IVPB 1000 milliGRAM(s) IV Intermittent every 12 hours  aztreonam  IVPB   IV Intermittent   aztreonam  IVPB 2000 milliGRAM(s) IV Intermittent every 8 hours    MEDICATIONS  (PRN):  naloxone Injectable 0.1 milliGRAM(s) IV Push every 3 minutes PRN For ANY of the following changes in patient status:  A. RR LESS THAN 10 breaths per minute, B. Oxygen saturation LESS THAN 90%, C. Sedation score of 6  ondansetron Injectable 4 milliGRAM(s) IV Push every 6 hours PRN Nausea  acetaminophen   Tablet 650 milliGRAM(s) Oral every 6 hours PRN For Temp greater than 38 C (100.4 F)      Vital Signs Last 24 Hrs  T(C): 37 (07-09-17 @ 09:35), Max: 39.4 (07-08-17 @ 16:19)  HR: 110 (07-09-17 @ 09:35) (84 - 111)  BP: 108/56 (07-09-17 @ 09:35) (106/47 - 132/70)  RR: 18 (07-09-17 @ 09:35) (18 - 19)  SpO2: 96% (07-09-17 @ 09:35) (92% - 100%)  CAPILLARY BLOOD GLUCOSE        I&O's Summary      PHYSICAL EXAM:  GENERAL: NAD, well-developed  CHEST/LUNG: Clear to auscultation bilaterally; No wheeze  HEART: Regular rate and rhythm; No murmurs, rubs, or gallops  ABDOMEN: Soft, Nontender, Nondistended; Bowel sounds present  EXTREMITIES:  2+ Peripheral Pulses, No clubbing, cyanosis, or edema      LABS:                        4.2    14.38 )-----------( 273      ( 09 Jul 2017 07:20 )             11.3     07-09    141  |  102  |  12  ----------------------------<  100<H>  4.5   |  25  |  0.61    Ca    8.9      09 Jul 2017 07:20  Phos  3.7     07-09  Mg     1.9     07-09    TPro  7.2  /  Alb  3.9  /  TBili  4.4<H>  /  DBili  x   /  AST  75<H>  /  ALT  21  /  AlkPhos  65  07-09      CARDIAC MARKERS ( 08 Jul 2017 06:30 )  x     / < 0.06 ng/mL / 268 u/L / x     / x              RADIOLOGY & ADDITIONAL TESTS:    Imaging Personally Reviewed:    Consultant(s) Notes Reviewed:      Care Discussed with Consultants/Other Providers:

## 2017-07-09 NOTE — PROGRESS NOTE ADULT - ASSESSMENT
20 yo m with sickle cell crisis w/ h/o acute chest syndrome, iron overload 2/2 transfusions, p/w sickle cell crisis

## 2017-07-09 NOTE — PROGRESS NOTE ADULT - PROBLEM SELECTOR PLAN 2
-unclear infectious source at this time  -blood cultures and urine cx NGTD  -check RVP - negative  -CT chest - await final read, prelim negative  -started Vanc/Aztreonam for broad spectrum coverage yesterday

## 2017-07-09 NOTE — CHART NOTE - NSCHARTNOTEFT_GEN_A_CORE
Critical value-- hgb 4.2.   Pt feels better today. Fevers subsided. Pain improved. Using PCA pump  PE  A&OX3.   Scleral jaundice  Breath sounds clear  Card:[id number]  Abd soft, ND, NT, + bowel sounds  Ext: No C/C/E  21M with PMH sickle cell disease, iron overload due to repeated transfusions,  admitted with sickle cell crisis, fevers.  Started on abx yesterday, fevers resolved. Now with Hgb 4.2. Retics 12.5  d/w heme Dr. Aristides Mcgowan, no plan for transfusion at this time as pt is improving and has iron overload.   Still unable to get home dose of Jadenu, home dose was  and father states refills are obtained by mail order. Drug not available to [facility] pharmacy.  Will discuss with heme for possible use of Exjade, available at [facility]  continue abx, IVF, incentive spirometer.

## 2017-07-09 NOTE — PROGRESS NOTE ADULT - PROBLEM SELECTOR PLAN 1
-pt states his baseline Hb is 5-6  -Hb has been downtrending, 4.2 today - f/u Heme if ok to transfuse PRBCS - would be cautious given pt with h/o iron overload   -pca, ivf, bowel regimen  -continue hydroxyurea, folic acid

## 2017-07-10 LAB
ALBUMIN SERPL ELPH-MCNC: 3.8 G/DL — SIGNIFICANT CHANGE UP (ref 3.3–5)
ALP SERPL-CCNC: 74 U/L — SIGNIFICANT CHANGE UP (ref 40–120)
ALT FLD-CCNC: 19 U/L — SIGNIFICANT CHANGE UP (ref 4–41)
AST SERPL-CCNC: 57 U/L — HIGH (ref 4–40)
BASOPHILS # BLD AUTO: 0.02 K/UL — SIGNIFICANT CHANGE UP (ref 0–0.2)
BASOPHILS NFR BLD AUTO: 0.1 % — SIGNIFICANT CHANGE UP (ref 0–2)
BILIRUB SERPL-MCNC: 6.8 MG/DL — HIGH (ref 0.2–1.2)
BLD GP AB SCN SERPL QL: NEGATIVE — SIGNIFICANT CHANGE UP
BUN SERPL-MCNC: 10 MG/DL — SIGNIFICANT CHANGE UP (ref 7–23)
CALCIUM SERPL-MCNC: 8.7 MG/DL — SIGNIFICANT CHANGE UP (ref 8.4–10.5)
CHLORIDE SERPL-SCNC: 100 MMOL/L — SIGNIFICANT CHANGE UP (ref 98–107)
CO2 SERPL-SCNC: 28 MMOL/L — SIGNIFICANT CHANGE UP (ref 22–31)
CREAT SERPL-MCNC: 0.54 MG/DL — SIGNIFICANT CHANGE UP (ref 0.5–1.3)
EOSINOPHIL # BLD AUTO: 0.02 K/UL — SIGNIFICANT CHANGE UP (ref 0–0.5)
EOSINOPHIL NFR BLD AUTO: 0.1 % — SIGNIFICANT CHANGE UP (ref 0–6)
GLUCOSE SERPL-MCNC: 120 MG/DL — HIGH (ref 70–99)
HCT VFR BLD CALC: 10.3 % — CRITICAL LOW (ref 39–50)
HGB BLD-MCNC: 3.9 G/DL — CRITICAL LOW (ref 13–17)
IMM GRANULOCYTES # BLD AUTO: 0.15 # — SIGNIFICANT CHANGE UP
IMM GRANULOCYTES NFR BLD AUTO: 1 % — SIGNIFICANT CHANGE UP (ref 0–1.5)
LDH SERPL L TO P-CCNC: 1305 U/L — HIGH (ref 135–225)
LYMPHOCYTES # BLD AUTO: 16.4 % — SIGNIFICANT CHANGE UP (ref 13–44)
LYMPHOCYTES # BLD AUTO: 2.41 K/UL — SIGNIFICANT CHANGE UP (ref 1–3.3)
MAGNESIUM SERPL-MCNC: 1.7 MG/DL — SIGNIFICANT CHANGE UP (ref 1.6–2.6)
MCHC RBC-ENTMCNC: 32 PG — SIGNIFICANT CHANGE UP (ref 27–34)
MCHC RBC-ENTMCNC: 37.9 % — HIGH (ref 32–36)
MCV RBC AUTO: 84.4 FL — SIGNIFICANT CHANGE UP (ref 80–100)
MONOCYTES # BLD AUTO: 2.21 K/UL — HIGH (ref 0–0.9)
MONOCYTES NFR BLD AUTO: 15.1 % — HIGH (ref 2–14)
NEUTROPHILS # BLD AUTO: 9.85 K/UL — HIGH (ref 1.8–7.4)
NEUTROPHILS NFR BLD AUTO: 67.3 % — SIGNIFICANT CHANGE UP (ref 43–77)
NRBC # FLD: 6.7 — SIGNIFICANT CHANGE UP
NRBC FLD-RTO: 45.7 — SIGNIFICANT CHANGE UP
PHOSPHATE SERPL-MCNC: 3.9 MG/DL — SIGNIFICANT CHANGE UP (ref 2.5–4.5)
PLATELET # BLD AUTO: 339 K/UL — SIGNIFICANT CHANGE UP (ref 150–400)
PMV BLD: 10.5 FL — SIGNIFICANT CHANGE UP (ref 7–13)
POTASSIUM SERPL-MCNC: 3.4 MMOL/L — LOW (ref 3.5–5.3)
POTASSIUM SERPL-SCNC: 3.4 MMOL/L — LOW (ref 3.5–5.3)
PROT SERPL-MCNC: 6.9 G/DL — SIGNIFICANT CHANGE UP (ref 6–8.3)
RBC # BLD: 1.22 M/UL — LOW (ref 4.2–5.8)
RBC # FLD: 29 % — HIGH (ref 10.3–14.5)
RETICS #: 106.6 10X3/UL — HIGH (ref 17–73)
RETICS/RBC NFR: 8.7 % — HIGH (ref 0.5–2.5)
RH IG SCN BLD-IMP: POSITIVE — SIGNIFICANT CHANGE UP
SODIUM SERPL-SCNC: 140 MMOL/L — SIGNIFICANT CHANGE UP (ref 135–145)
VANCOMYCIN TROUGH SERPL-MCNC: 3.1 UG/ML — LOW (ref 10–20)
WBC # BLD: 14.66 K/UL — HIGH (ref 3.8–10.5)
WBC # FLD AUTO: 14.66 K/UL — HIGH (ref 3.8–10.5)

## 2017-07-10 PROCEDURE — 99232 SBSQ HOSP IP/OBS MODERATE 35: CPT | Mod: GC

## 2017-07-10 PROCEDURE — 99233 SBSQ HOSP IP/OBS HIGH 50: CPT

## 2017-07-10 PROCEDURE — 71010: CPT | Mod: 26

## 2017-07-10 PROCEDURE — 99497 ADVNCD CARE PLAN 30 MIN: CPT

## 2017-07-10 PROCEDURE — 99223 1ST HOSP IP/OBS HIGH 75: CPT | Mod: GC

## 2017-07-10 RX ORDER — IPRATROPIUM/ALBUTEROL SULFATE 18-103MCG
3 AEROSOL WITH ADAPTER (GRAM) INHALATION ONCE
Qty: 0 | Refills: 0 | Status: COMPLETED | OUTPATIENT
Start: 2017-07-10 | End: 2017-07-10

## 2017-07-10 RX ORDER — POTASSIUM CHLORIDE 20 MEQ
40 PACKET (EA) ORAL ONCE
Qty: 0 | Refills: 0 | Status: COMPLETED | OUTPATIENT
Start: 2017-07-10 | End: 2017-07-10

## 2017-07-10 RX ORDER — AZITHROMYCIN 500 MG/1
500 TABLET, FILM COATED ORAL ONCE
Qty: 0 | Refills: 0 | Status: COMPLETED | OUTPATIENT
Start: 2017-07-10 | End: 2017-07-10

## 2017-07-10 RX ORDER — ACETAMINOPHEN 500 MG
650 TABLET ORAL EVERY 6 HOURS
Qty: 0 | Refills: 0 | Status: COMPLETED | OUTPATIENT
Start: 2017-07-10 | End: 2017-07-10

## 2017-07-10 RX ORDER — AZITHROMYCIN 500 MG/1
500 TABLET, FILM COATED ORAL EVERY 24 HOURS
Qty: 0 | Refills: 0 | Status: DISCONTINUED | OUTPATIENT
Start: 2017-07-11 | End: 2017-07-14

## 2017-07-10 RX ORDER — DIPHENHYDRAMINE HCL 50 MG
25 CAPSULE ORAL ONCE
Qty: 0 | Refills: 0 | Status: COMPLETED | OUTPATIENT
Start: 2017-07-10 | End: 2017-07-10

## 2017-07-10 RX ORDER — AZITHROMYCIN 500 MG/1
TABLET, FILM COATED ORAL
Qty: 0 | Refills: 0 | Status: DISCONTINUED | OUTPATIENT
Start: 2017-07-10 | End: 2017-07-14

## 2017-07-10 RX ADMIN — Medication 650 MILLIGRAM(S): at 13:51

## 2017-07-10 RX ADMIN — Medication 100 MILLIGRAM(S): at 05:32

## 2017-07-10 RX ADMIN — Medication 40 MILLIEQUIVALENT(S): at 10:16

## 2017-07-10 RX ADMIN — Medication 25 MILLIGRAM(S): at 13:51

## 2017-07-10 RX ADMIN — HYDROXYUREA 2000 MILLIGRAM(S): 500 CAPSULE ORAL at 11:25

## 2017-07-10 RX ADMIN — Medication 3 MILLILITER(S): at 00:43

## 2017-07-10 RX ADMIN — CYCLOBENZAPRINE HYDROCHLORIDE 10 MILLIGRAM(S): 10 TABLET, FILM COATED ORAL at 05:33

## 2017-07-10 RX ADMIN — Medication 250 MILLIGRAM(S): at 17:09

## 2017-07-10 RX ADMIN — SODIUM CHLORIDE 150 MILLILITER(S): 9 INJECTION, SOLUTION INTRAVENOUS at 08:24

## 2017-07-10 RX ADMIN — Medication 1 MILLIGRAM(S): at 11:25

## 2017-07-10 RX ADMIN — Medication 100 MILLIGRAM(S): at 22:05

## 2017-07-10 RX ADMIN — Medication 100 MILLIGRAM(S): at 11:36

## 2017-07-10 RX ADMIN — HYDROMORPHONE HYDROCHLORIDE 30 MILLILITER(S): 2 INJECTION INTRAMUSCULAR; INTRAVENOUS; SUBCUTANEOUS at 07:56

## 2017-07-10 RX ADMIN — LIDOCAINE 1 PATCH: 4 CREAM TOPICAL at 11:25

## 2017-07-10 RX ADMIN — AZITHROMYCIN 250 MILLIGRAM(S): 500 TABLET, FILM COATED ORAL at 13:27

## 2017-07-10 RX ADMIN — SODIUM CHLORIDE 150 MILLILITER(S): 9 INJECTION, SOLUTION INTRAVENOUS at 22:05

## 2017-07-10 RX ADMIN — LIDOCAINE 1 PATCH: 4 CREAM TOPICAL at 22:06

## 2017-07-10 RX ADMIN — HYDROMORPHONE HYDROCHLORIDE 30 MILLILITER(S): 2 INJECTION INTRAMUSCULAR; INTRAVENOUS; SUBCUTANEOUS at 20:01

## 2017-07-10 NOTE — CONSULT NOTE ADULT - SUBJECTIVE AND OBJECTIVE BOX
CHIEF COMPLAINT: Patient is a 21y old  Male who presents with a chief complaint of chest pain, low nack pain (06 Jul 2017 20:11)    Interval Events: Called because pt was placed on nonrebreather for hypoxia this morning. On arrival, nonrebreather was off and pt was in no distress, pulse oximetry with poor wave form in 70s. Placed back on nonrebreather with improvement of O2 saturation to 100%, Gr    REVIEW OF SYSTEMS:  Constitutional:   Eyes:  ENT:  CV:  Resp:  GI:  :  MSK:  Integumentary:  Neurological:  Psychiatric:  Endocrine:  Hematologic/Lymphatic:  Allergic/Immunologic:  [ ] All other systems negative  [ ] Unable to assess ROS because ________    OBJECTIVE:  ICU Vital Signs Last 24 Hrs  T(C): 37.4 (10 Jul 2017 05:27), Max: 37.9 (09 Jul 2017 21:39)  T(F): 99.3 (10 Jul 2017 05:27), Max: 100.2 (09 Jul 2017 21:39)  HR: 85 (10 Jul 2017 05:27) (85 - 118)  BP: 123/70 (10 Jul 2017 05:27) (101/52 - 129/67)  BP(mean): --  ABP: --  ABP(mean): --  RR: 26 (10 Jul 2017 09:37) (18 - 26)  SpO2: 100% (10 Jul 2017 09:37) (93% - 100%)        CAPILLARY BLOOD GLUCOSE          PHYSICAL EXAM:  General:   HEENT:   Lymph Nodes:  Neck:   Respiratory:   Cardiovascular:   Abdomen:   Extremities:   Skin:   Neurological:  Psychiatry:    HOSPITAL MEDICATIONS:  MEDICATIONS  (STANDING):  HYDROmorphone PCA (1 mG/mL) 30 milliLiter(s) PCA Continuous PCA Continuous  hydroxyurea (Non - oncologic) 2000 milliGRAM(s) Oral daily  sodium chloride 0.45%. 1000 milliLiter(s) (150 mL/Hr) IV Continuous <Continuous>  folic acid 1 milliGRAM(s) Oral daily  enoxaparin Injectable 40 milliGRAM(s) SubCutaneous daily  Jadenu (Deferasirox) 360 mg Tablet 1080 milliGRAM(s)   Oral daily  lidocaine   Patch 1 Patch Transdermal daily  vancomycin  IVPB   IV Intermittent   vancomycin  IVPB 1000 milliGRAM(s) IV Intermittent every 12 hours  aztreonam  IVPB   IV Intermittent   aztreonam  IVPB 2000 milliGRAM(s) IV Intermittent every 8 hours  acetaminophen   Tablet 650 milliGRAM(s) Oral every 6 hours  azithromycin  IVPB   IV Intermittent   azithromycin  IVPB 500 milliGRAM(s) IV Intermittent once    MEDICATIONS  (PRN):  naloxone Injectable 0.1 milliGRAM(s) IV Push every 3 minutes PRN For ANY of the following changes in patient status:  A. RR LESS THAN 10 breaths per minute, B. Oxygen saturation LESS THAN 90%, C. Sedation score of 6  ondansetron Injectable 4 milliGRAM(s) IV Push every 6 hours PRN Nausea  acetaminophen   Tablet 650 milliGRAM(s) Oral every 6 hours PRN For Temp greater than 38 C (100.4 F)  petrolatum white Ointment 1 Application(s) Topical three times a day PRN dry lips  diphenhydrAMINE   Capsule 25 milliGRAM(s) Oral once PRN Rash and/or Itching      LABS:  (07-10 @ 06:00)                        3.9  14.66 )-----------( 339                 10.3    Neutrophils = 9.85 (67.3%)  Lymphocytes = 2.41 (16.4%)  Eosinophils = 0.02 (0.1%)  Basophils = 0.02 (0.1%)  Monocytes = 2.21 (15.1%)  Bands = --%    WBC Trend: 14.66<--, 13.93<--, 14.38<--  Hb Trend: 3.9<--, 4.4<--, 4.2<--, 4.8<--, 5.0<--  Plt Trend: 339<--, 372<--, 273<--, 292<--, 337<--  07-10    140  |  100  |  10  ----------------------------<  120<H>  3.4<L>   |  28  |  0.54    Ca    8.7      10 Jul 2017 06:47  Phos  3.9     07-10  Mg     1.7     07-10    TPro  6.9  /  Alb  3.8  /  TBili  6.8<H>  /  DBili  x   /  AST  57<H>  /  ALT  19  /  AlkPhos  74  07-10    Creatinine Trend: 0.54<--, 0.61<--, 0.69<--, 0.88<--, 0.63<--, 0.59<--                MICROBIOLOGY:   Blood Cx:  Urine Cx:  Sputum Cx:  Legionella:  RVP:    RADIOLOGY:  X Ray:  CT:  MRI:  Ultrasound:  [ ] Reviewed and interpreted by me    EKG: CHIEF COMPLAINT: Patient is a 21y old male who presents with a chief complaint of chest pain, low nack pain (06 Jul 2017 20:11)    Interval Events: Called because pt was placed on nonrebreather for hypoxia this morning. Off nonrebreather, pt desaturates to high 70s. Titrated off nonrebreather now saturating 100% on 4L nasal canula with good wave form. Tachypneic to low 20s. Tachycardic to 100s. BP 120s systolic. Febrile to 100.2 overnight. Has cough productive of green sputum that started yesterday. Pt says he does not feel short of breath and that he has chest pain typical of his sickle cell pain crisis, same intensity as when came into hospital. Pt alert and oriented but drousy, receiving opioids.         REVIEW OF SYSTEMS:  Constitutional: currently afebrile   Eyes:   ENT: no nasal congestion  CV: chest pain  Resp: cough with green sputum, no dyspnea  GI: no diarrhea or abdominal pain, no melena or hematochezia  : no dysuria  MSK: leg and back pain  Integumentary: no rash  Neurological: headache, feels tired  Psychiatric: normal affect  Endocrine:  Hematologic/Lymphatic: no bleeding  Allergic/Immunologic:  [x ] All other systems negative  [ ] Unable to assess ROS because ________    OBJECTIVE:  ICU Vital Signs Last 24 Hrs  T(C): 37.4 (10 Jul 2017 05:27), Max: 37.9 (09 Jul 2017 21:39)  T(F): 99.3 (10 Jul 2017 05:27), Max: 100.2 (09 Jul 2017 21:39)  HR: 85 (10 Jul 2017 05:27) (85 - 118)  BP: 123/70 (10 Jul 2017 05:27) (101/52 - 129/67)  BP(mean): --  ABP: --  ABP(mean): --  RR: 26 (10 Jul 2017 09:37) (18 - 26)  SpO2: 100% (10 Jul 2017 09:37) (93% - 100%)        CAPILLARY BLOOD GLUCOSE          PHYSICAL EXAM:  General: appears drousy, not in distress  HEENT: dry mouth  Lymph Nodes: no cervical lymphadenopathy   Neck: no JVD  Respiratory: tachypnea but no accessory muscle use, rales at bases b/l, coughed once at bedside with green sputum observed  Cardiovascular: tachycardia, no murmurs  Abdomen: soft, NTND, +BS  Extremities: no pedal edema  Skin: no rashes  Neurological: drousby arousable, AAOx3, nonfocal neurologic exam  Psychiatry: normal affect    HOSPITAL MEDICATIONS:  MEDICATIONS  (STANDING):  HYDROmorphone PCA (1 mG/mL) 30 milliLiter(s) PCA Continuous PCA Continuous  hydroxyurea (Non - oncologic) 2000 milliGRAM(s) Oral daily  sodium chloride 0.45%. 1000 milliLiter(s) (150 mL/Hr) IV Continuous <Continuous>  folic acid 1 milliGRAM(s) Oral daily  enoxaparin Injectable 40 milliGRAM(s) SubCutaneous daily  Jadenu (Deferasirox) 360 mg Tablet 1080 milliGRAM(s)   Oral daily  lidocaine   Patch 1 Patch Transdermal daily  vancomycin  IVPB   IV Intermittent   vancomycin  IVPB 1000 milliGRAM(s) IV Intermittent every 12 hours  aztreonam  IVPB   IV Intermittent   aztreonam  IVPB 2000 milliGRAM(s) IV Intermittent every 8 hours  acetaminophen   Tablet 650 milliGRAM(s) Oral every 6 hours  azithromycin  IVPB   IV Intermittent   azithromycin  IVPB 500 milliGRAM(s) IV Intermittent once    MEDICATIONS  (PRN):  naloxone Injectable 0.1 milliGRAM(s) IV Push every 3 minutes PRN For ANY of the following changes in patient status:  A. RR LESS THAN 10 breaths per minute, B. Oxygen saturation LESS THAN 90%, C. Sedation score of 6  ondansetron Injectable 4 milliGRAM(s) IV Push every 6 hours PRN Nausea  acetaminophen   Tablet 650 milliGRAM(s) Oral every 6 hours PRN For Temp greater than 38 C (100.4 F)  petrolatum white Ointment 1 Application(s) Topical three times a day PRN dry lips  diphenhydrAMINE   Capsule 25 milliGRAM(s) Oral once PRN Rash and/or Itching      LABS:  (07-10 @ 06:00)                        3.9  14.66 )-----------( 339                 10.3    Neutrophils = 9.85 (67.3%)  Lymphocytes = 2.41 (16.4%)  Eosinophils = 0.02 (0.1%)  Basophils = 0.02 (0.1%)  Monocytes = 2.21 (15.1%)  Bands = --%    WBC Trend: 14.66<--, 13.93<--, 14.38<--  Hb Trend: 3.9<--, 4.4<--, 4.2<--, 4.8<--, 5.0<--  Plt Trend: 339<--, 372<--, 273<--, 292<--, 337<--  07-10    140  |  100  |  10  ----------------------------<  120<H>  3.4<L>   |  28  |  0.54    Ca    8.7      10 Jul 2017 06:47  Phos  3.9     07-10  Mg     1.7     07-10    TPro  6.9  /  Alb  3.8  /  TBili  6.8<H>  /  DBili  x   /  AST  57<H>  /  ALT  19  /  AlkPhos  74  07-10    Creatinine Trend: 0.54<--, 0.61<--, 0.69<--, 0.88<--, 0.63<--, 0.59<--                MICROBIOLOGY:   Blood Cx:  Urine Cx:  Sputum Cx:  Legionella:  RVP:    RADIOLOGY:  X Ray:  CT:  MRI:  Ultrasound:  [ ] Reviewed and interpreted by me    EKG: CHIEF COMPLAINT: Patient is a 21y old male who presents with a chief complaint of chest pain, low nack pain (06 Jul 2017 20:11)    Interval Events: 21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 Has anemia baseline 5-6, currently 3.9. Called because pt was placed on nonrebreather for hypoxia this morning. Off nonrebreather, pt desaturates to high 70s. Titrated off nonrebreather now saturating 100% on 4L nasal canula with good wave form. Tachypneic to low 20s. Tachycardic to 100s. BP 120s systolic. Febrile to 100.2 overnight. Has cough productive of green sputum that started yesterday. Pt says he does not feel short of breath and that he has chest pain typical of his sickle cell pain crisis, same intensity as when came into hospital. Pt alert and oriented but drousy, on dilaudid PCA.          REVIEW OF SYSTEMS:  Constitutional: currently afebrile   Eyes:   ENT: no nasal congestion  CV: chest pain  Resp: cough with green sputum, no dyspnea  GI: no diarrhea or abdominal pain, no melena or hematochezia  : no dysuria  MSK: leg and back pain  Integumentary: no rash  Neurological: headache, feels tired  Psychiatric: normal affect  Endocrine:  Hematologic/Lymphatic: no bleeding  Allergic/Immunologic:  [x ] All other systems negative  [ ] Unable to assess ROS because ________    OBJECTIVE:  ICU Vital Signs Last 24 Hrs  T(C): 37.4 (10 Jul 2017 05:27), Max: 37.9 (09 Jul 2017 21:39)  T(F): 99.3 (10 Jul 2017 05:27), Max: 100.2 (09 Jul 2017 21:39)  HR: 85 (10 Jul 2017 05:27) (85 - 118)  BP: 123/70 (10 Jul 2017 05:27) (101/52 - 129/67)  BP(mean): --  ABP: --  ABP(mean): --  RR: 26 (10 Jul 2017 09:37) (18 - 26)  SpO2: 100% (10 Jul 2017 09:37) (93% - 100%)        CAPILLARY BLOOD GLUCOSE          PHYSICAL EXAM:  General: appears drousy, not in distress  HEENT: dry mouth  Lymph Nodes: no cervical lymphadenopathy   Neck: no JVD  Respiratory: tachypnea but no accessory muscle use, rales at bases b/l, coughed once at bedside with green sputum observed  Cardiovascular: tachycardia, no murmurs  Abdomen: soft, NTND, +BS  Extremities: no pedal edema  Skin: no rashes  Neurological: drousby arousable, AAOx3, nonfocal neurologic exam  Psychiatry: normal affect    HOSPITAL MEDICATIONS:  MEDICATIONS  (STANDING):  HYDROmorphone PCA (1 mG/mL) 30 milliLiter(s) PCA Continuous PCA Continuous  hydroxyurea (Non - oncologic) 2000 milliGRAM(s) Oral daily  sodium chloride 0.45%. 1000 milliLiter(s) (150 mL/Hr) IV Continuous <Continuous>  folic acid 1 milliGRAM(s) Oral daily  enoxaparin Injectable 40 milliGRAM(s) SubCutaneous daily  Jadenu (Deferasirox) 360 mg Tablet 1080 milliGRAM(s)   Oral daily  lidocaine   Patch 1 Patch Transdermal daily  vancomycin  IVPB   IV Intermittent   vancomycin  IVPB 1000 milliGRAM(s) IV Intermittent every 12 hours  aztreonam  IVPB   IV Intermittent   aztreonam  IVPB 2000 milliGRAM(s) IV Intermittent every 8 hours  acetaminophen   Tablet 650 milliGRAM(s) Oral every 6 hours  azithromycin  IVPB   IV Intermittent   azithromycin  IVPB 500 milliGRAM(s) IV Intermittent once    MEDICATIONS  (PRN):  naloxone Injectable 0.1 milliGRAM(s) IV Push every 3 minutes PRN For ANY of the following changes in patient status:  A. RR LESS THAN 10 breaths per minute, B. Oxygen saturation LESS THAN 90%, C. Sedation score of 6  ondansetron Injectable 4 milliGRAM(s) IV Push every 6 hours PRN Nausea  acetaminophen   Tablet 650 milliGRAM(s) Oral every 6 hours PRN For Temp greater than 38 C (100.4 F)  petrolatum white Ointment 1 Application(s) Topical three times a day PRN dry lips  diphenhydrAMINE   Capsule 25 milliGRAM(s) Oral once PRN Rash and/or Itching      LABS:  (07-10 @ 06:00)                        3.9  14.66 )-----------( 339                 10.3    Neutrophils = 9.85 (67.3%)  Lymphocytes = 2.41 (16.4%)  Eosinophils = 0.02 (0.1%)  Basophils = 0.02 (0.1%)  Monocytes = 2.21 (15.1%)  Bands = --%    WBC Trend: 14.66<--, 13.93<--, 14.38<--  Hb Trend: 3.9<--, 4.4<--, 4.2<--, 4.8<--, 5.0<--  Plt Trend: 339<--, 372<--, 273<--, 292<--, 337<--  07-10    140  |  100  |  10  ----------------------------<  120<H>  3.4<L>   |  28  |  0.54    Ca    8.7      10 Jul 2017 06:47  Phos  3.9     07-10  Mg     1.7     07-10    TPro  6.9  /  Alb  3.8  /  TBili  6.8<H>  /  DBili  x   /  AST  57<H>  /  ALT  19  /  AlkPhos  74  07-10    Creatinine Trend: 0.54<--, 0.61<--, 0.69<--, 0.88<--, 0.63<--, 0.59<--                MICROBIOLOGY:   Blood Cx: BCx negative 7/8 x 2, UCx negative 7/8, RVP- 7/8  Urine Cx:  Sputum Cx:  Legionella:  RVP:    RADIOLOGY:  X Ray:  CT: CT chest 7/8 unremarkable  MRI:  Ultrasound:  [ ] Reviewed and interpreted by me    EKG:

## 2017-07-10 NOTE — CONSULT NOTE ADULT - ASSESSMENT
21M with HbSS disease and h/o acute chest presented with pain crisis consulted for hypoxia with increased O2 requirement this AM, also febrile overnight with new cough productive of green sputum, likely pneumonia.  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 Also with worsened anemia to 3.9, being tranfused.   - Not an ICU candidate at present time, but re-consult if decompensates  - Continue O2 support and escalate as needed, currently 100% on 4L  - Add azithromycin to vancomycin and cefepime  - Obtain CXR, see if new infiltrate  - Check sputum culture, repeat blood culture, urine legionella Ab  - Transfusions per primary team  - Pulmonology will follow 21M with HbSS disease (baseline Hgb 5-6) and h/o acute chest presented with pain crisis consulted for hypoxia with increased O2 requirement this AM, also febrile overnight with new cough productive of yellow sputum, likely pnuemonia doubt acute chest. Also with worsened anemia to 3.9, being transfused.   - Not an ICU candidate at present time, but re-consult if decompensates  - Continue O2 support and escalate as needed, currently 100% on 4L  - Add azithromycin to vancomycin and cefepime  - Obtain CXR, see if new infiltrate  - Check sputum culture, repeat blood culture, urine legionella Ab  - Transfusions per primary team  - Pulmonology will follow

## 2017-07-10 NOTE — PROGRESS NOTE ADULT - ASSESSMENT
20 yo m with sickle cell crisis w/ h/o acute chest syndrome, iron overload 2/2 transfusions, p/w sickle cell crisis .  Patient now Hypoxic- Respiratory distress with Sat 72 % on RA

## 2017-07-10 NOTE — PROGRESS NOTE ADULT - PROBLEM SELECTOR PLAN 1
- Would give 2 units PRBC simple transfusion.  Repeat CXR  - Hydrate with 1/2 NS, Pain Control, Bowel Regimen, Incentive Spirometry, Folate  - No indication for exchange/simple transfusion at this time  - Trend CBC, reticulocyte, LDH daily

## 2017-07-10 NOTE — PROGRESS NOTE ADULT - ATTENDING COMMENTS
ICU EVAL REQUESTED.  TRANSFUSE 2 U PRBC with pre meds ADVANCED CARE NOTE  ICU EVAL REQUESTED.  TRANSFUSE 2 U PRBC with pre medsand Type and Screen requete.  ICU rec to Start Azythro.  Pt already on Aztreonam  hematology agree with PRBC in setting of Hypoxia.  CXR Stat

## 2017-07-10 NOTE — PROGRESS NOTE ADULT - SUBJECTIVE AND OBJECTIVE BOX
Patient is a 21y old  Male who presents with a chief complaint of chest pain, low nack pain (06 Jul 2017 20:11). Patient seen with non-rebreather MASK on his CHEST. Oxygen on ROOM AIR &2 % , On NR Mask 100 %      SUBJECTIVE / OVERNIGHT EVENTS:    MEDICATIONS  (STANDING):  HYDROmorphone PCA (1 mG/mL) 30 milliLiter(s) PCA Continuous PCA Continuous  hydroxyurea (Non - oncologic) 2000 milliGRAM(s) Oral daily  sodium chloride 0.45%. 1000 milliLiter(s) (150 mL/Hr) IV Continuous <Continuous>  folic acid 1 milliGRAM(s) Oral daily  enoxaparin Injectable 40 milliGRAM(s) SubCutaneous daily  Jadenu (Deferasirox) 360 mg Tablet 1080 milliGRAM(s)   Oral daily  lidocaine   Patch 1 Patch Transdermal daily  vancomycin  IVPB   IV Intermittent   vancomycin  IVPB 1000 milliGRAM(s) IV Intermittent every 12 hours  aztreonam  IVPB   IV Intermittent   aztreonam  IVPB 2000 milliGRAM(s) IV Intermittent every 8 hours  potassium chloride    Tablet ER 40 milliEquivalent(s) Oral once    MEDICATIONS  (PRN):  naloxone Injectable 0.1 milliGRAM(s) IV Push every 3 minutes PRN For ANY of the following changes in patient status:  A. RR LESS THAN 10 breaths per minute, B. Oxygen saturation LESS THAN 90%, C. Sedation score of 6  ondansetron Injectable 4 milliGRAM(s) IV Push every 6 hours PRN Nausea  acetaminophen   Tablet 650 milliGRAM(s) Oral every 6 hours PRN For Temp greater than 38 C (100.4 F)  petrolatum white Ointment 1 Application(s) Topical three times a day PRN dry lips      Vital Signs Last 24 Hrs  T(C): 37.4 (07-10-17 @ 05:27), Max: 37.9 (07-09-17 @ 21:39)  HR: 85 (07-10-17 @ 05:27) (85 - 118)  BP: 123/70 (07-10-17 @ 05:27) (101/52 - 129/67)  RR: 26 (07-10-17 @ 09:37) (18 - 26)  SpO2: 100% (07-10-17 @ 09:37) (93% - 100%)  CAPILLARY BLOOD GLUCOSE        I&O's Summary      PHYSICAL EXAM:  GENERAL: NAD, well-developed, Mouth Breathing.  HEAD:  Atraumatic, Normocephalic  EYES: EOMI, PERRLA, conjunctiva and sclera clear  NECK: Supple, No JVD  CHEST/LUNG: Clear to auscultation bilaterally; No wheeze  HEART: Regular rate and rhythm; No murmurs, rubs, or gallops  ABDOMEN: Soft, Nontender, Nondistended; Bowel sounds present  EXTREMITIES:  2+ Peripheral Pulses, No clubbing, cyanosis, or edema  PSYCH: AAOx3  NEUROLOGY: non-focal  SKIN: No rashes or lesions    LABS:                        3.9    14.66 )-----------( 339      ( 10 Jul 2017 06:00 )             10.3     07-10    140  |  100  |  10  ----------------------------<  120<H>  3.4<L>   |  28  |  0.54    Ca    8.7      10 Jul 2017 06:47  Phos  3.9     07-10  Mg     1.7     07-10    TPro  6.9  /  Alb  3.8  /  TBili  6.8<H>  /  DBili  x   /  AST  57<H>  /  ALT  19  /  AlkPhos  74  07-10      RADIOLOGY & ADDITIONAL TESTS:    Imaging Personally Reviewed:    Consultant(s) Notes Reviewed:      Care Discussed with Consultants/Other Providers: Hematology - called again. ICU called

## 2017-07-10 NOTE — PROGRESS NOTE ADULT - SUBJECTIVE AND OBJECTIVE BOX
INTERVAL HPI/OVERNIGHT EVENTS:  Patient S&E at bedside. Increased SOB this AM.  Hg decreased.    VITAL SIGNS:  T(F): 99.3 (07-10-17 @ 05:27)  HR: 85 (07-10-17 @ 05:27)  BP: 123/70 (07-10-17 @ 05:27)  RR: 26 (07-10-17 @ 09:37)  SpO2: 100% (07-10-17 @ 09:37)  Wt(kg): --    PHYSICAL EXAM:    Constitutional: NAD  Eyes: EOMI, icteric  Neck: supple, no masses, no JVD  Respiratory: CTA b/l, good air entry b/l  Cardiovascular: RRR, no M/R/G  Gastrointestinal: soft, NTND, no masses palpable, + BS, no hepatosplenomegaly  Extremities: no c/c/e  Neurological: AAOx3      MEDICATIONS  (STANDING):  HYDROmorphone PCA (1 mG/mL) 30 milliLiter(s) PCA Continuous PCA Continuous  hydroxyurea (Non - oncologic) 2000 milliGRAM(s) Oral daily  sodium chloride 0.45%. 1000 milliLiter(s) (150 mL/Hr) IV Continuous <Continuous>  folic acid 1 milliGRAM(s) Oral daily  enoxaparin Injectable 40 milliGRAM(s) SubCutaneous daily  Jadenu (Deferasirox) 360 mg Tablet 1080 milliGRAM(s)   Oral daily  lidocaine   Patch 1 Patch Transdermal daily  vancomycin  IVPB   IV Intermittent   vancomycin  IVPB 1000 milliGRAM(s) IV Intermittent every 12 hours  aztreonam  IVPB   IV Intermittent   aztreonam  IVPB 2000 milliGRAM(s) IV Intermittent every 8 hours  acetaminophen   Tablet 650 milliGRAM(s) Oral every 6 hours  azithromycin  IVPB   IV Intermittent   azithromycin  IVPB 500 milliGRAM(s) IV Intermittent once    MEDICATIONS  (PRN):  naloxone Injectable 0.1 milliGRAM(s) IV Push every 3 minutes PRN For ANY of the following changes in patient status:  A. RR LESS THAN 10 breaths per minute, B. Oxygen saturation LESS THAN 90%, C. Sedation score of 6  ondansetron Injectable 4 milliGRAM(s) IV Push every 6 hours PRN Nausea  acetaminophen   Tablet 650 milliGRAM(s) Oral every 6 hours PRN For Temp greater than 38 C (100.4 F)  petrolatum white Ointment 1 Application(s) Topical three times a day PRN dry lips  diphenhydrAMINE   Capsule 25 milliGRAM(s) Oral once PRN Rash and/or Itching      Allergies    ceftriaxone (Unknown)    Intolerances        LABS:                        3.9    14.66 )-----------( 339      ( 10 Jul 2017 06:00 )             10.3     07-10    140  |  100  |  10  ----------------------------<  120<H>  3.4<L>   |  28  |  0.54    Ca    8.7      10 Jul 2017 06:47  Phos  3.9     07-10  Mg     1.7     07-10    TPro  6.9  /  Alb  3.8  /  TBili  6.8<H>  /  DBili  x   /  AST  57<H>  /  ALT  19  /  AlkPhos  74  07-10          RADIOLOGY & ADDITIONAL TESTS:  Studies reviewed.

## 2017-07-10 NOTE — PROGRESS NOTE ADULT - PROBLEM SELECTOR PLAN 1
-pt states his baseline Hb is 5-6  -Hb has been downtrending, 3.8today - f/u Heme if ok to transfuse PRBCS - would be cautious given pt with h/o iron ovoad.  WILL NEED BLOOD   -pca, ivf, bowel regimen  -continue hydroxyurea, folic acid

## 2017-07-11 LAB
ANISOCYTOSIS BLD QL: SIGNIFICANT CHANGE UP
BASOPHILS # BLD AUTO: 0.04 K/UL — SIGNIFICANT CHANGE UP (ref 0–0.2)
BASOPHILS NFR BLD AUTO: 0.3 % — SIGNIFICANT CHANGE UP (ref 0–2)
BASOPHILS NFR SPEC: 0 % — SIGNIFICANT CHANGE UP (ref 0–2)
BUN SERPL-MCNC: 7 MG/DL — SIGNIFICANT CHANGE UP (ref 7–23)
CALCIUM SERPL-MCNC: 8.4 MG/DL — SIGNIFICANT CHANGE UP (ref 8.4–10.5)
CHLORIDE SERPL-SCNC: 99 MMOL/L — SIGNIFICANT CHANGE UP (ref 98–107)
CO2 SERPL-SCNC: 27 MMOL/L — SIGNIFICANT CHANGE UP (ref 22–31)
CREAT SERPL-MCNC: 0.48 MG/DL — LOW (ref 0.5–1.3)
EOSINOPHIL # BLD AUTO: 0.12 K/UL — SIGNIFICANT CHANGE UP (ref 0–0.5)
EOSINOPHIL NFR BLD AUTO: 0.8 % — SIGNIFICANT CHANGE UP (ref 0–6)
EOSINOPHIL NFR FLD: 1 % — SIGNIFICANT CHANGE UP (ref 0–6)
GLUCOSE SERPL-MCNC: 109 MG/DL — HIGH (ref 70–99)
HCT VFR BLD CALC: 13.9 % — CRITICAL LOW (ref 39–50)
HGB A MFR BLD: 0 % — LOW
HGB A MFR BLD: 0 % — LOW
HGB A MFR BLD: 30.5 % — LOW
HGB A2 MFR BLD: 3.8 % — HIGH (ref 2.4–3.5)
HGB A2 MFR BLD: 4.4 % — HIGH (ref 2.4–3.5)
HGB A2 MFR BLD: 4.4 % — HIGH (ref 2.4–3.5)
HGB BLD-MCNC: 5 G/DL — CRITICAL LOW (ref 13–17)
HGB ELECT COMMENT: SIGNIFICANT CHANGE UP
HGB F MFR BLD: 2.2 % — HIGH (ref 0–1.5)
HGB F MFR BLD: 2.5 % — HIGH (ref 0–1.5)
HGB F MFR BLD: 2.8 % — HIGH (ref 0–1.5)
HGB S MFR BLD: 63.5 % — HIGH (ref 0–0)
HGB S MFR BLD: 92.8 % — HIGH (ref 0–0)
HGB S MFR BLD: 93.1 % — HIGH (ref 0–0)
IMM GRANULOCYTES # BLD AUTO: 0.3 # — SIGNIFICANT CHANGE UP
IMM GRANULOCYTES NFR BLD AUTO: 1.9 % — HIGH (ref 0–1.5)
LDH SERPL L TO P-CCNC: 1108 U/L — HIGH (ref 135–225)
LYMPHOCYTES # BLD AUTO: 1.82 K/UL — SIGNIFICANT CHANGE UP (ref 1–3.3)
LYMPHOCYTES # BLD AUTO: 11.5 % — LOW (ref 13–44)
LYMPHOCYTES NFR SPEC AUTO: 17 % — SIGNIFICANT CHANGE UP (ref 13–44)
MACROCYTES BLD QL: SIGNIFICANT CHANGE UP
MANUAL SMEAR VERIFICATION: SIGNIFICANT CHANGE UP
MCHC RBC-ENTMCNC: 33.1 PG — SIGNIFICANT CHANGE UP (ref 27–34)
MCHC RBC-ENTMCNC: 36 % — SIGNIFICANT CHANGE UP (ref 32–36)
MCV RBC AUTO: 92.1 FL — SIGNIFICANT CHANGE UP (ref 80–100)
MONOCYTES # BLD AUTO: 1.78 K/UL — HIGH (ref 0–0.9)
MONOCYTES NFR BLD AUTO: 11.2 % — SIGNIFICANT CHANGE UP (ref 2–14)
MONOCYTES NFR BLD: 11 % — HIGH (ref 2–9)
NEUTROPHIL AB SER-ACNC: 71 % — SIGNIFICANT CHANGE UP (ref 43–77)
NEUTROPHILS # BLD AUTO: 11.8 K/UL — HIGH (ref 1.8–7.4)
NEUTROPHILS NFR BLD AUTO: 74.3 % — SIGNIFICANT CHANGE UP (ref 43–77)
NRBC # BLD: 86 /100WBC — SIGNIFICANT CHANGE UP
NRBC # FLD: 10.6 — SIGNIFICANT CHANGE UP
NRBC FLD-RTO: 66.8 — SIGNIFICANT CHANGE UP
PLATELET # BLD AUTO: 408 K/UL — HIGH (ref 150–400)
PLATELET COUNT - ESTIMATE: NORMAL — SIGNIFICANT CHANGE UP
PMV BLD: 11 FL — SIGNIFICANT CHANGE UP (ref 7–13)
POLYCHROMASIA BLD QL SMEAR: SLIGHT — SIGNIFICANT CHANGE UP
POTASSIUM SERPL-MCNC: 3.4 MMOL/L — LOW (ref 3.5–5.3)
POTASSIUM SERPL-SCNC: 3.4 MMOL/L — LOW (ref 3.5–5.3)
RBC # BLD: 1.51 M/UL — LOW (ref 4.2–5.8)
RBC # FLD: 25.3 % — HIGH (ref 10.3–14.5)
RETICS #: 353.6 10X3/UL — HIGH (ref 17–73)
RETICS/RBC NFR: > 22.2 % — HIGH (ref 0.5–2.5)
SCHISTOCYTES BLD QL AUTO: SLIGHT — SIGNIFICANT CHANGE UP
SICKLE CELLS BLD QL SMEAR: SIGNIFICANT CHANGE UP
SODIUM SERPL-SCNC: 141 MMOL/L — SIGNIFICANT CHANGE UP (ref 135–145)
SPHEROCYTES BLD QL SMEAR: SLIGHT — SIGNIFICANT CHANGE UP
TARGETS BLD QL SMEAR: SIGNIFICANT CHANGE UP
WBC # BLD: 15.86 K/UL — HIGH (ref 3.8–10.5)
WBC # FLD AUTO: 15.86 K/UL — HIGH (ref 3.8–10.5)

## 2017-07-11 PROCEDURE — 99232 SBSQ HOSP IP/OBS MODERATE 35: CPT | Mod: GC

## 2017-07-11 PROCEDURE — 99223 1ST HOSP IP/OBS HIGH 75: CPT

## 2017-07-11 PROCEDURE — 99356: CPT

## 2017-07-11 PROCEDURE — 99233 SBSQ HOSP IP/OBS HIGH 50: CPT

## 2017-07-11 RX ORDER — POTASSIUM CHLORIDE 20 MEQ
40 PACKET (EA) ORAL ONCE
Qty: 0 | Refills: 0 | Status: COMPLETED | OUTPATIENT
Start: 2017-07-11 | End: 2017-07-11

## 2017-07-11 RX ORDER — ACETAMINOPHEN 500 MG
650 TABLET ORAL ONCE
Qty: 0 | Refills: 0 | Status: COMPLETED | OUTPATIENT
Start: 2017-07-11 | End: 2017-07-11

## 2017-07-11 RX ORDER — DIPHENHYDRAMINE HCL 50 MG
25 CAPSULE ORAL ONCE
Qty: 0 | Refills: 0 | Status: COMPLETED | OUTPATIENT
Start: 2017-07-11 | End: 2017-07-11

## 2017-07-11 RX ADMIN — Medication 100 MILLIGRAM(S): at 06:18

## 2017-07-11 RX ADMIN — Medication 650 MILLIGRAM(S): at 15:30

## 2017-07-11 RX ADMIN — SODIUM CHLORIDE 150 MILLILITER(S): 9 INJECTION, SOLUTION INTRAVENOUS at 21:40

## 2017-07-11 RX ADMIN — Medication 650 MILLIGRAM(S): at 15:41

## 2017-07-11 RX ADMIN — HYDROXYUREA 2000 MILLIGRAM(S): 500 CAPSULE ORAL at 11:27

## 2017-07-11 RX ADMIN — Medication 25 MILLIGRAM(S): at 15:29

## 2017-07-11 RX ADMIN — AZITHROMYCIN 250 MILLIGRAM(S): 500 TABLET, FILM COATED ORAL at 12:00

## 2017-07-11 RX ADMIN — LIDOCAINE 1 PATCH: 4 CREAM TOPICAL at 14:00

## 2017-07-11 RX ADMIN — Medication 250 MILLIGRAM(S): at 17:34

## 2017-07-11 RX ADMIN — Medication 40 MILLIEQUIVALENT(S): at 11:26

## 2017-07-11 RX ADMIN — HYDROMORPHONE HYDROCHLORIDE 30 MILLILITER(S): 2 INJECTION INTRAMUSCULAR; INTRAVENOUS; SUBCUTANEOUS at 03:10

## 2017-07-11 RX ADMIN — HYDROMORPHONE HYDROCHLORIDE 30 MILLILITER(S): 2 INJECTION INTRAMUSCULAR; INTRAVENOUS; SUBCUTANEOUS at 08:06

## 2017-07-11 RX ADMIN — ONDANSETRON 4 MILLIGRAM(S): 8 TABLET, FILM COATED ORAL at 06:53

## 2017-07-11 RX ADMIN — Medication 100 MILLIGRAM(S): at 21:40

## 2017-07-11 RX ADMIN — SODIUM CHLORIDE 150 MILLILITER(S): 9 INJECTION, SOLUTION INTRAVENOUS at 06:19

## 2017-07-11 RX ADMIN — HYDROMORPHONE HYDROCHLORIDE 30 MILLILITER(S): 2 INJECTION INTRAMUSCULAR; INTRAVENOUS; SUBCUTANEOUS at 20:11

## 2017-07-11 RX ADMIN — Medication 1 MILLIGRAM(S): at 11:27

## 2017-07-11 RX ADMIN — Medication 100 MILLIGRAM(S): at 13:41

## 2017-07-11 RX ADMIN — Medication 250 MILLIGRAM(S): at 06:18

## 2017-07-11 NOTE — CONSULT NOTE ADULT - SUBJECTIVE AND OBJECTIVE BOX
HPI:    PAST MEDICAL & SURGICAL HISTORY:  Iron overload due to repeated red blood cell transfusions  Sickle cell anemia  Acute chest syndrome  Sickle Cell Disease: HbSS  S/P Splenectomy  S/P Laparotomy for spleen removal  S/P Laparoscopic Cholecystectomy      FAMILY HISTORY:  Family history of sickle cell anemia (Sibling)      SOCIAL HISTORY:  Smoking: [ ] Never Smoked [ ] Former Smoker (__ packs x ___ years) [ ] Current Smoker  (__ packs x ___ years)  Substance Use: [ ] Never Used [ ] Used ____  EtOH Use:  Marital Status: [ ] Single [ ]  [ ]  [ ]   Sexual History:   Occupation:  Recent Travel:  Country of Birth:  Advance Directives:    Allergies    ceftriaxone (Unknown)    Intolerances        HOME MEDICATIONS:    REVIEW OF SYSTEMS:  Constitutional: [ ] fevers [ ] chills [ ] weight loss [ ] weight gain  HEENT: [ ] dry eyes [ ] eye irritation [ ] postnasal drip [ ] nasal congestion  CV: [ ] chest pain [ ] orthopnea [ ] palpitations [ ] murmur  Resp: [ ] cough [ ] shortness of breath [ ] dyspnea [ ] wheezing [ ] sputum [ ] hemoptysis  GI: [ ] nausea [ ] vomiting [ ] diarrhea [ ] constipation [ ] abd pain [ ] dysphagia   : [ ] dysuria [ ] nocturia [ ] hematuria [ ] increased urinary frequency  Musculoskeletal: [ ] back pain [ ] myalgias [ ] arthralgias [ ] fracture  Skin: [ ] rash [ ] itch  Neurological: [ ] headache [ ] dizziness [ ] syncope [ ] weakness [ ] numbness  Psychiatric: [ ] anxiety [ ] depression  Endocrine: [ ] diabetes [ ] thyroid problem  Hematologic/Lymphatic: [ ] anemia [ ] bleeding problem  Allergic/Immunologic: [ ] itchy eyes [ ] nasal discharge [ ] hives [ ] angioedema  [ ] All other systems negative  [ ] Unable to assess ROS because ________    OBJECTIVE:  ICU Vital Signs Last 24 Hrs  T(C): 37.4 (11 Jul 2017 08:20), Max: 37.8 (11 Jul 2017 06:12)  T(F): 99.3 (11 Jul 2017 08:20), Max: 100.1 (11 Jul 2017 06:12)  HR: 94 (11 Jul 2017 06:12) (88 - 98)  BP: 128/66 (11 Jul 2017 06:12) (117/65 - 128/66)  BP(mean): --  ABP: --  ABP(mean): --  RR: 22 (11 Jul 2017 06:12) (18 - 26)  SpO2: 99% (11 Jul 2017 06:12) (99% - 100%)        CAPILLARY BLOOD GLUCOSE          PHYSICAL EXAM:  General:   HEENT:   Lymph Nodes:  Neck:   Respiratory:   Cardiovascular:   Abdomen:   Extremities:   Skin:   Neurological:  Psychiatry:    HOSPITAL MEDICATIONS:  MEDICATIONS  (STANDING):  HYDROmorphone PCA (1 mG/mL) 30 milliLiter(s) PCA Continuous PCA Continuous  hydroxyurea (Non - oncologic) 2000 milliGRAM(s) Oral daily  sodium chloride 0.45%. 1000 milliLiter(s) (150 mL/Hr) IV Continuous <Continuous>  folic acid 1 milliGRAM(s) Oral daily  enoxaparin Injectable 40 milliGRAM(s) SubCutaneous daily  Jadenu (Deferasirox) 360 mg Tablet 1080 milliGRAM(s)   Oral daily  lidocaine   Patch 1 Patch Transdermal daily  vancomycin  IVPB   IV Intermittent   vancomycin  IVPB 1000 milliGRAM(s) IV Intermittent every 12 hours  aztreonam  IVPB   IV Intermittent   aztreonam  IVPB 2000 milliGRAM(s) IV Intermittent every 8 hours  azithromycin  IVPB   IV Intermittent   azithromycin  IVPB 500 milliGRAM(s) IV Intermittent every 24 hours    MEDICATIONS  (PRN):  naloxone Injectable 0.1 milliGRAM(s) IV Push every 3 minutes PRN For ANY of the following changes in patient status:  A. RR LESS THAN 10 breaths per minute, B. Oxygen saturation LESS THAN 90%, C. Sedation score of 6  ondansetron Injectable 4 milliGRAM(s) IV Push every 6 hours PRN Nausea  acetaminophen   Tablet 650 milliGRAM(s) Oral every 6 hours PRN For Temp greater than 38 C (100.4 F)  petrolatum white Ointment 1 Application(s) Topical three times a day PRN dry lips      LABS:                        3.9    14.66 )-----------( 339      ( 10 Jul 2017 06:00 )             10.3     Hgb Trend: 3.9<--, 4.4<--, 4.2<--, 4.8<--, 5.0<--  07-11    141  |  99  |  7   ----------------------------<  109<H>  3.4<L>   |  27  |  0.48<L>    Ca    8.4      11 Jul 2017 07:25  Phos  3.9     07-10  Mg     1.7     07-10    TPro  6.9  /  Alb  3.8  /  TBili  6.8<H>  /  DBili  x   /  AST  57<H>  /  ALT  19  /  AlkPhos  74  07-10    Creatinine Trend: 0.48<--, 0.54<--, 0.61<--, 0.69<--, 0.88<--, 0.63<--            MICROBIOLOGY:     RADIOLOGY:  [x ] Reviewed and interpreted by me  CXR: Opacification in the left lower lobe. Heart boarder not well seen. Rotated film.    PULMONARY FUNCTION TESTS: None in the system. HPI: Mr. Grider is a 21 year old male with a history of sickle cell anemia who comes in with worsening shortness of breath and dyspnea. States he was in his usual state of health until until a couple of days ago when he started to have sickle pain. He was found at the time to be severely anemic to 3.9 hgb and by the time he got some transfusions he was having difficulty breathing. A MICU consult was done which suggested high amount of oxygen, broadened antibiotics, and reconsult heme for potential exchange transfusion. He also continues to have low grade fevers throuthout this hospitalization but was recently started on broad spectrum abx. His CXR has significantly worsened over the last couple of days and his ultrasound exam also shows hepatization of the left lung with b-lines anteriorly throughout the left lung, and then right posterior lung showing shred sign with a small effusion and consolidation. The MICU was again consulted for worsening hypoxemia but was saturating 100% on a NRB. I attempted to de-escelate his oxygen to 4L NC but he desaturated to 85%. He is currently saturating 95% on a 50% FiO2 venti-mask. I spoke with heme about the potential need for exchange transfusion.     PAST MEDICAL & SURGICAL HISTORY:  Iron overload due to repeated red blood cell transfusions  Sickle cell anemia  Acute chest syndrome  Sickle Cell Disease: HbSS  S/P Splenectomy  S/P Laparotomy for spleen removal  S/P Laparoscopic Cholecystectomy      FAMILY HISTORY:  Family history of sickle cell anemia (Sibling)      SOCIAL HISTORY:  Smoking: [ x] Never Smoked [ ] Former Smoker (__ packs x ___ years) [ ] Current Smoker  (__ packs x ___ years)  Substance Use: [x ] Never Used [ ] Used ____  EtOH Use: socially  Marital Status: [x ] Single [ ]  [ ]  [ ]   Sexual History: Noncontributory   Occupation: noncontributory  Recent Travel: Denies  Country of Birth: [place]  Advance Directives: Full Code    Allergies    ceftriaxone (Unknown)    Intolerances        HOME MEDICATIONS: As per Med rec    REVIEW OF SYSTEMS:  Constitutional: [ x] fevers [ ] chills [ ] weight loss [ ] weight gain  HEENT: [ ] dry eyes [ ] eye irritation [ ] postnasal drip [ ] nasal congestion  CV: [ ] chest pain [ ] orthopnea [ ] palpitations [ ] murmur  Resp: [ ] cough [ ] shortness of breath [x ] dyspnea [ ] wheezing [ ] sputum [ ] hemoptysis  GI: [ ] nausea [ ] vomiting [ ] diarrhea [ ] constipation [ ] abd pain [ ] dysphagia   : [ ] dysuria [ ] nocturia [ ] hematuria [ ] increased urinary frequency  Musculoskeletal: [x ] back pain [ ] myalgias [ ] arthralgias [ ] fracture  Skin: [ ] rash [ ] itch  Neurological: [x ] headache [ ] dizziness [ ] syncope [ ] weakness [ ] numbness  Psychiatric: [ ] anxiety [ ] depression  Endocrine: [ ] diabetes [ ] thyroid problem  Hematologic/Lymphatic: [ ] anemia [ ] bleeding problem  Allergic/Immunologic: [ ] itchy eyes [ ] nasal discharge [ ] hives [ ] angioedema  x ] All other systems negative  [ ] Unable to assess ROS because ________    OBJECTIVE:  ICU Vital Signs Last 24 Hrs  T(C): 37.4 (11 Jul 2017 08:20), Max: 37.8 (11 Jul 2017 06:12)  T(F): 99.3 (11 Jul 2017 08:20), Max: 100.1 (11 Jul 2017 06:12)  HR: 94 (11 Jul 2017 06:12) (88 - 98)  BP: 128/66 (11 Jul 2017 06:12) (117/65 - 128/66)  BP(mean): --  ABP: --  ABP(mean): --  RR: 22 (11 Jul 2017 06:12) (18 - 26)  SpO2: 99% (11 Jul 2017 06:12) (99% - 100%)        CAPILLARY BLOOD GLUCOSE          PHYSICAL EXAM:  General: Under mild respiratory distreess  HEENT: PERRLA  Lymph Nodes: Non appreciated  Neck: no thyroidmegaly, normal size  Respiratory: Decrease BS in the LLL with +rhonchi  Cardiovascular: Tachycardia  Abdomen: SNT, +BS, No R/G/R  Extremities: No C/C/E  Skin: No rashes noted  Neurological: Nonfocal except for headache  Psychiatry: flat affect    HOSPITAL MEDICATIONS:  MEDICATIONS  (STANDING):  HYDROmorphone PCA (1 mG/mL) 30 milliLiter(s) PCA Continuous PCA Continuous  hydroxyurea (Non - oncologic) 2000 milliGRAM(s) Oral daily  sodium chloride 0.45%. 1000 milliLiter(s) (150 mL/Hr) IV Continuous <Continuous>  folic acid 1 milliGRAM(s) Oral daily  enoxaparin Injectable 40 milliGRAM(s) SubCutaneous daily  Jadenu (Deferasirox) 360 mg Tablet 1080 milliGRAM(s)   Oral daily  lidocaine   Patch 1 Patch Transdermal daily  vancomycin  IVPB   IV Intermittent   vancomycin  IVPB 1000 milliGRAM(s) IV Intermittent every 12 hours  aztreonam  IVPB   IV Intermittent   aztreonam  IVPB 2000 milliGRAM(s) IV Intermittent every 8 hours  azithromycin  IVPB   IV Intermittent   azithromycin  IVPB 500 milliGRAM(s) IV Intermittent every 24 hours    MEDICATIONS  (PRN):  naloxone Injectable 0.1 milliGRAM(s) IV Push every 3 minutes PRN For ANY of the following changes in patient status:  A. RR LESS THAN 10 breaths per minute, B. Oxygen saturation LESS THAN 90%, C. Sedation score of 6  ondansetron Injectable 4 milliGRAM(s) IV Push every 6 hours PRN Nausea  acetaminophen   Tablet 650 milliGRAM(s) Oral every 6 hours PRN For Temp greater than 38 C (100.4 F)  petrolatum white Ointment 1 Application(s) Topical three times a day PRN dry lips      LABS:                        3.9    14.66 )-----------( 339      ( 10 Jul 2017 06:00 )             10.3     Hgb Trend: 3.9<--, 4.4<--, 4.2<--, 4.8<--, 5.0<--  07-11    141  |  99  |  7   ----------------------------<  109<H>  3.4<L>   |  27  |  0.48<L>    Ca    8.4      11 Jul 2017 07:25  Phos  3.9     07-10  Mg     1.7     07-10    TPro  6.9  /  Alb  3.8  /  TBili  6.8<H>  /  DBili  x   /  AST  57<H>  /  ALT  19  /  AlkPhos  74  07-10    Creatinine Trend: 0.48<--, 0.54<--, 0.61<--, 0.69<--, 0.88<--, 0.63<--            MICROBIOLOGY:     RADIOLOGY:  [x ] Reviewed and interpreted by me  CXR: Opacification in the left lower lobe. Heart boarder not well seen. Rotated film.    Point of Care Ultrasound:   Lung:  ultrasound exam also shows hepatization of the left lung with b-lines anteriorly throughout the left lung, and then right posterior lung showing shred sign with a small effusion and consolidation  Cardiac: mild reduction in LV function with septal wall abnormalities. Chronic dilated RV with dilated RA. Dilated IVC. No pericardial effusion    PULMONARY FUNCTION TESTS: None in the system.

## 2017-07-11 NOTE — PROGRESS NOTE ADULT - ATTENDING COMMENTS
f/u ICU/ Pulmonary eval.  Decreasing NR mask to 50 % Face Mask.  Patient seen with Brother at bedside.

## 2017-07-11 NOTE — PROGRESS NOTE ADULT - SUBJECTIVE AND OBJECTIVE BOX
Patient is a 21y old  Male who presents with a chief complaint of chest pain, low nack pain (06 Jul 2017 20:11).  Patient seen with Brother Ramiro at bedside.  Patient had fever over the weekend and was started on Aztreonam.  Desat to 72 % on ROOM AIR 7/10 and was on nonrebreather mask.  S/p 2 u PRBC 7/10, hgb 5.0 today.but ?/ 52 % sat on ROOM AIR.  Pulmonary at bedside evaluating. ICU reconsult called.      SUBJECTIVE / OVERNIGHT EVENTS:    MEDICATIONS  (STANDING):  HYDROmorphone PCA (1 mG/mL) 30 milliLiter(s) PCA Continuous PCA Continuous  hydroxyurea (Non - oncologic) 2000 milliGRAM(s) Oral daily  sodium chloride 0.45%. 1000 milliLiter(s) (150 mL/Hr) IV Continuous <Continuous>  folic acid 1 milliGRAM(s) Oral daily  enoxaparin Injectable 40 milliGRAM(s) SubCutaneous daily  Jadenu (Deferasirox) 360 mg Tablet 1080 milliGRAM(s)   Oral daily  lidocaine   Patch 1 Patch Transdermal daily  vancomycin  IVPB   IV Intermittent   vancomycin  IVPB 1000 milliGRAM(s) IV Intermittent every 12 hours  aztreonam  IVPB   IV Intermittent   aztreonam  IVPB 2000 milliGRAM(s) IV Intermittent every 8 hours  azithromycin  IVPB   IV Intermittent   azithromycin  IVPB 500 milliGRAM(s) IV Intermittent every 24 hours  potassium chloride    Tablet ER 40 milliEquivalent(s) Oral once    MEDICATIONS  (PRN):  naloxone Injectable 0.1 milliGRAM(s) IV Push every 3 minutes PRN For ANY of the following changes in patient status:  A. RR LESS THAN 10 breaths per minute, B. Oxygen saturation LESS THAN 90%, C. Sedation score of 6  ondansetron Injectable 4 milliGRAM(s) IV Push every 6 hours PRN Nausea  acetaminophen   Tablet 650 milliGRAM(s) Oral every 6 hours PRN For Temp greater than 38 C (100.4 F)  petrolatum white Ointment 1 Application(s) Topical three times a day PRN dry lips      Vital Signs Last 24 Hrs  T(C): 37.4 (07-11-17 @ 08:20), Max: 37.8 (07-11-17 @ 06:12)  HR: 94 (07-11-17 @ 06:12) (88 - 98)  BP: 128/66 (07-11-17 @ 06:12) (117/65 - 128/66)  RR: 22 (07-11-17 @ 06:12) (18 - 26)  SpO2: 99% (07-11-17 @ 06:12) (99% - 100%)  CAPILLARY BLOOD GLUCOS    PHYSICAL EXAM:  GENERAL: NAD, well-developed. NOTES BREATHING is EASIER since BLOOD 7/10/17  HEAD:  Atraumatic, Normocephalic  EYES: EOMI, PERRLA, conjunctiva and sclera clear  NECK: Supple, No JVD  CHEST/LUNG: Clear to auscultation bilaterally; No wheeze  HEART: Regular rate and rhythm; No murmurs, rubs, or gallops  ABDOMEN: Soft, Nontender, Nondistended; Bowel sounds present  EXTREMITIES:  2+ Peripheral Pulses, No clubbing, cyanosis, or edema  PSYCH: AAOx3  NEUROLOGY: non-focal  SKIN: No rashes or lesions    LABS:                        5.0    15.86 )-----------( 408      ( 11 Jul 2017 06:55 )             13.9     07-11    141  |  99  |  7   ----------------------------<  109<H>  3.4<L>   |  27  |  0.48<L>    Ca    8.4      11 Jul 2017 07:25  Phos  3.9     07-10  Mg     1.7     07-10    TPro  6.9  /  Alb  3.8  /  TBili  6.8<H>  /  DBili  x   /  AST  57<H>  /  ALT  19  /  AlkPhos  74  07-10      RADIOLOGY & ADDITIONAL TESTS:    < from: Xray Chest 1 View AP- PORTABLE-Urgent (07.10.17 @ 16:59) >  IMPRESSION:  Limited image due to rotation and low lung volumes.    Enlarged cardiac silhouette again noted. This along with the degree of   rotation limits evaluation of the retrocardiac region. Consider repeat   film, preferably PA and lateral, for more definitive evaluation, if felt   to be clinically indicated.    Small, possibly partially loculated, right pleural effusion with minimal   atelectasis at the right base.      < end of copied text >      Imaging Personally Reviewed:    Consultant(s) Notes Reviewed:      Care Discussed with Consultants/Other Providers:

## 2017-07-11 NOTE — PROGRESS NOTE ADULT - PROBLEM SELECTOR PLAN 1
-pt states his baseline Hb is 5-6  -Hb has been downtrending, 3.9 yesterday.. NEEDED BLOOD   -Today Hgb5.0 with improved dyspnea.  -continue hydroxyurea, folic acid

## 2017-07-11 NOTE — CHART NOTE - NSCHARTNOTEFT_GEN_A_CORE
CHIEF COMPLAINT: hypoxia    Interval Events: Called to re-evaluate patient secondary to hypoxia.      OBJECTIVE:  ICU Vital Signs Last 24 Hrs  T(F): 99.3 (11 Jul 2017 08:20), Max: 100.1 (11 Jul 2017 06:12)  HR: 94 (11 Jul 2017 06:12) (88 - 98)  BP: 128/66 (11 Jul 2017 06:12) (117/65 - 128/66)  RR: 22 (11 Jul 2017 06:12) (18 - 26)  SpO2: 99% (11 Jul 2017 06:12) (99% - 100%)      PHYSICAL EXAM:  General:   HEENT:   Lymph Nodes:  Neck:   Respiratory:   Cardiovascular:   Abdomen:   Extremities:   Skin:   Neurological:  Psychiatry:    HOSPITAL MEDICATIONS:  enoxaparin Injectable 40 milliGRAM(s) SubCutaneous daily  vancomycin  IVPB   IV Intermittent   vancomycin  IVPB 1000 milliGRAM(s) IV Intermittent every 12 hours  aztreonam  IVPB   IV Intermittent   aztreonam  IVPB 2000 milliGRAM(s) IV Intermittent every 8 hours  azithromycin  IVPB   IV Intermittent   azithromycin  IVPB 500 milliGRAM(s) IV Intermittent every 24 hours  HYDROmorphone PCA (1 mG/mL) 30 milliLiter(s) PCA Continuous PCA Continuous  ondansetron Injectable 4 milliGRAM(s) IV Push every 6 hours PRN  acetaminophen   Tablet 650 milliGRAM(s) Oral every 6 hours PRN  hydroxyurea (Non - oncologic) 2000 milliGRAM(s) Oral daily  sodium chloride 0.45%. 1000 milliLiter(s) IV Continuous <Continuous>  folic acid 1 milliGRAM(s) Oral daily  potassium chloride    Tablet ER 40 milliEquivalent(s) Oral once  lidocaine   Patch 1 Patch Transdermal daily  petrolatum white Ointment 1 Application(s) Topical three times a day PRN  naloxone Injectable 0.1 milliGRAM(s) IV Push every 3 minutes PRN  Jadenu (Deferasirox) 360 mg Tablet 1080 milliGRAM(s)   Oral daily      LABS:                        5.0    15.86 )-----------( 408      ( 11 Jul 2017 06:55 )             13.9     Hgb Trend: 5.0<--, 3.9<--, 4.4<--, 4.2<--, 4.8<--  07-11    141  |  99  |  7   ----------------------------<  109<H>  3.4<L>   |  27  |  0.48<L>    Ca    8.4      11 Jul 2017 07:25  Phos  3.9     07-10  Mg     1.7     07-10    TPro  6.9  /  Alb  3.8  /  TBili  6.8<H>  /  DBili  x   /  AST  57<H>  /  ALT  19  /  AlkPhos  74  07-10    Creatinine Trend: 0.48<--, 0.54<--, 0.61<--, 0.69<--, 0.88<--, 0.63<-- CHIEF COMPLAINT: hypoxia    Interval Events: Called to re-evaluate patient secondary to hypoxia.      OBJECTIVE:  ICU Vital Signs Last 24 Hrs  T(F): 99.3 (11 Jul 2017 08:20), Max: 100.1 (11 Jul 2017 06:12)  HR: 94 (11 Jul 2017 06:12) (88 - 98)  BP: 128/66 (11 Jul 2017 06:12) (117/65 - 128/66)  RR: 22 (11 Jul 2017 06:12) (18 - 26)  SpO2: 99% (11 Jul 2017 06:12) (99% - 100%)      PHYSICAL EXAM:  General:   HEENT:   Lymph Nodes:  Neck:   Respiratory:   Cardiovascular:   Abdomen:   Extremities:   Skin:   Neurological:  Psychiatry:    HOSPITAL MEDICATIONS:  enoxaparin Injectable 40 milliGRAM(s) SubCutaneous daily  vancomycin  IVPB   IV Intermittent   vancomycin  IVPB 1000 milliGRAM(s) IV Intermittent every 12 hours  aztreonam  IVPB   IV Intermittent   aztreonam  IVPB 2000 milliGRAM(s) IV Intermittent every 8 hours  azithromycin  IVPB   IV Intermittent   azithromycin  IVPB 500 milliGRAM(s) IV Intermittent every 24 hours  HYDROmorphone PCA (1 mG/mL) 30 milliLiter(s) PCA Continuous PCA Continuous  ondansetron Injectable 4 milliGRAM(s) IV Push every 6 hours PRN  acetaminophen   Tablet 650 milliGRAM(s) Oral every 6 hours PRN  hydroxyurea (Non - oncologic) 2000 milliGRAM(s) Oral daily  sodium chloride 0.45%. 1000 milliLiter(s) IV Continuous <Continuous>  folic acid 1 milliGRAM(s) Oral daily  potassium chloride    Tablet ER 40 milliEquivalent(s) Oral once  lidocaine   Patch 1 Patch Transdermal daily  petrolatum white Ointment 1 Application(s) Topical three times a day PRN  naloxone Injectable 0.1 milliGRAM(s) IV Push every 3 minutes PRN  Jadenu (Deferasirox) 360 mg Tablet 1080 milliGRAM(s)   Oral daily      LABS:                        5.0    15.86 )-----------( 408      ( 11 Jul 2017 06:55 )             13.9     Hgb Trend: 5.0<--, 3.9<--, 4.4<--, 4.2<--, 4.8<--  07-11    141  |  99  |  7   ----------------------------<  109<H>  3.4<L>   |  27  |  0.48<L>    Ca    8.4      11 Jul 2017 07:25  Phos  3.9     07-10  Mg     1.7     07-10    TPro  6.9  /  Alb  3.8  /  TBili  6.8<H>  /  DBili  x   /  AST  57<H>  /  ALT  19  /  AlkPhos  74  07-10    Creatinine Trend: 0.48<--, 0.54<--, 0.61<--, 0.69<--, 0.88<--, 0.63<--    AP:  21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 Patient is a 22 yo M w/PMH of HbSS disease with recent admission for pain crisis and history of acute chest syndrome in the past who presented on 7/6 with pain crisis typical of prior pain crisis (chest, back and b/l leg pain). Patient with baseline anemia around 5-6, Hb 3.9 yesterday, given 2U pRBCs yesterday with improvement in anemia to Hb of 5.0 this morning. Patient reportedly hypoxic this morning to 50s, so MICU re-consulted for evaluation. Patient AAOx3 at bedside, not in respiratory distress. O2 sat noted to be 85% on 4L NC, switched to 50% non-rebreather with O2 sat improvement to 99%. Followed by pulmonary team. No fevers or chills, nausea, vomiting.      OBJECTIVE:  ICU Vital Signs Last 24 Hrs  T(F): 99.3 (11 Jul 2017 08:20), Max: 100.1 (11 Jul 2017 06:12)  HR: 94 (11 Jul 2017 06:12) (88 - 98)  BP: 128/66 (11 Jul 2017 06:12) (117/65 - 128/66)  RR: 22 (11 Jul 2017 06:12) (18 - 26)  SpO2: 99% (11 Jul 2017 06:12) (99% - 100%)      PHYSICAL EXAM:  General: NAD  HEENT: EOMI b/l  Neck: supple  Respiratory: respirations non-labored  Cardiovascular: RRR, +S1, S2  Abdomen: soft, NTTP, +BS  Extremities: no edema  Skin: no rashes  Neurological: AAOx3  Psychiatry: reactive affect    HOSPITAL MEDICATIONS:  enoxaparin Injectable 40 milliGRAM(s) SubCutaneous daily  vancomycin  IVPB   IV Intermittent   vancomycin  IVPB 1000 milliGRAM(s) IV Intermittent every 12 hours  aztreonam  IVPB   IV Intermittent   aztreonam  IVPB 2000 milliGRAM(s) IV Intermittent every 8 hours  azithromycin  IVPB   IV Intermittent   azithromycin  IVPB 500 milliGRAM(s) IV Intermittent every 24 hours  HYDROmorphone PCA (1 mG/mL) 30 milliLiter(s) PCA Continuous PCA Continuous  ondansetron Injectable 4 milliGRAM(s) IV Push every 6 hours PRN  acetaminophen   Tablet 650 milliGRAM(s) Oral every 6 hours PRN  hydroxyurea (Non - oncologic) 2000 milliGRAM(s) Oral daily  sodium chloride 0.45%. 1000 milliLiter(s) IV Continuous <Continuous>  folic acid 1 milliGRAM(s) Oral daily  potassium chloride    Tablet ER 40 milliEquivalent(s) Oral once  lidocaine   Patch 1 Patch Transdermal daily  petrolatum white Ointment 1 Application(s) Topical three times a day PRN  naloxone Injectable 0.1 milliGRAM(s) IV Push every 3 minutes PRN  Jadenu (Deferasirox) 360 mg Tablet 1080 milliGRAM(s)   Oral daily      LABS:                        5.0    15.86 )-----------( 408      ( 11 Jul 2017 06:55 )             13.9     Hgb Trend: 5.0<--, 3.9<--, 4.4<--, 4.2<--, 4.8<--  07-11    141  |  99  |  7   ----------------------------<  109<H>  3.4<L>   |  27  |  0.48<L>    Ca    8.4      11 Jul 2017 07:25  Phos  3.9     07-10  Mg     1.7     07-10    TPro  6.9  /  Alb  3.8  /  TBili  6.8<H>  /  DBili  x   /  AST  57<H>  /  ALT  19  /  AlkPhos  74  07-10    Creatinine Trend: 0.48<--, 0.54<--, 0.61<--, 0.69<--, 0.88<--, 0.63<--    AP:  21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 Patient is a 20 yo M w/PMH of HbSS disease with recent admission for pain crisis and history of acute chest syndrome in the past who presented on 7/6 with pain crisis typical of prior pain crisis (chest, back and b/l leg pain). Patient with baseline anemia around 5-6, Hb 3.9 yesterday, given 2U pRBCs yesterday with improvement in anemia to Hb of 5.0 this morning. Patient reportedly hypoxic this morning to 50s, so MICU re-consulted for evaluation. Patient AAOx3 at bedside, not in respiratory distress. O2 sat noted to be 85% on 4L NC, switched to 50% non-rebreather with O2 sat improvement to 99%. Followed by pulmonary team. No fevers or chills, nausea, vomiting.      OBJECTIVE:  ICU Vital Signs Last 24 Hrs  T(F): 99.3 (11 Jul 2017 08:20), Max: 100.1 (11 Jul 2017 06:12)  HR: 94 (11 Jul 2017 06:12) (88 - 98)  BP: 128/66 (11 Jul 2017 06:12) (117/65 - 128/66)  RR: 22 (11 Jul 2017 06:12) (18 - 26)  SpO2: 99% (11 Jul 2017 06:12) (99% - 100%)      PHYSICAL EXAM:  General: NAD  HEENT: EOMI b/l  Neck: supple  Respiratory: respirations non-labored  Cardiovascular: RRR, +S1, S2  Abdomen: soft, NTTP, +BS  Extremities: no edema  Skin: no rashes  Neurological: AAOx3  Psychiatry: reactive affect    HOSPITAL MEDICATIONS:  enoxaparin Injectable 40 milliGRAM(s) SubCutaneous daily  vancomycin  IVPB   IV Intermittent   vancomycin  IVPB 1000 milliGRAM(s) IV Intermittent every 12 hours  aztreonam  IVPB   IV Intermittent   aztreonam  IVPB 2000 milliGRAM(s) IV Intermittent every 8 hours  azithromycin  IVPB   IV Intermittent   azithromycin  IVPB 500 milliGRAM(s) IV Intermittent every 24 hours  HYDROmorphone PCA (1 mG/mL) 30 milliLiter(s) PCA Continuous PCA Continuous  ondansetron Injectable 4 milliGRAM(s) IV Push every 6 hours PRN  acetaminophen   Tablet 650 milliGRAM(s) Oral every 6 hours PRN  hydroxyurea (Non - oncologic) 2000 milliGRAM(s) Oral daily  sodium chloride 0.45%. 1000 milliLiter(s) IV Continuous <Continuous>  folic acid 1 milliGRAM(s) Oral daily  potassium chloride    Tablet ER 40 milliEquivalent(s) Oral once  lidocaine   Patch 1 Patch Transdermal daily  petrolatum white Ointment 1 Application(s) Topical three times a day PRN  naloxone Injectable 0.1 milliGRAM(s) IV Push every 3 minutes PRN  Jadenu (Deferasirox) 360 mg Tablet 1080 milliGRAM(s)   Oral daily      LABS:                        5.0    15.86 )-----------( 408      ( 11 Jul 2017 06:55 )             13.9     Hgb Trend: 5.0<--, 3.9<--, 4.4<--, 4.2<--, 4.8<--  07-11    141  |  99  |  7   ----------------------------<  109<H>  3.4<L>   |  27  |  0.48<L>    Ca    8.4      11 Jul 2017 07:25  Phos  3.9     07-10  Mg     1.7     07-10    TPro  6.9  /  Alb  3.8  /  TBili  6.8<H>  /  DBili  x   /  AST  57<H>  /  ALT  19  /  AlkPhos  74  07-10    Creatinine Trend: 0.48<--, 0.54<--, 0.61<--, 0.69<--, 0.88<--, 0.63<--    AP:  21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

## 2017-07-11 NOTE — CONSULT NOTE ADULT - ATTENDING COMMENTS
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 He is clinically stable at this point but has worsened over the course of his admission. Though he was given broad spectrum antibiotics for a possible pneumonia, his oxygen requirements and worsening imaging (including US) may be suggestive of acute chest. New small alveolar consolidation on right base; large left consolidation.   - Cont. Venti-mask  - Cont. Abx  - consider exchange transfusion and possibly MICU reevaluation.
21 year old man with sickle cell disease presented with pain crisis - patient states that he usually has back and chest pain and this is his usual presentation had a fever and was put on non-rebreather overnight - currently on 3-4 LPM nasal cannula doing well    getting a transfusion  follow up heme recommendations  antibiotics  chest x-ray  supplemental oxygen as needed to keep saturation over 92%  pain control  incentive spirometry  not MICU candidate at this time will have pulmonary follow patient  please call with questions.
History of SSDz p/w chest and lower back pain. Will continue to optimize pain control, IVF, incentive spirometer. He has been on hydroxyurea 200mg daily, follows up with Dr. Nguyễn.

## 2017-07-11 NOTE — CONSULT NOTE ADULT - ASSESSMENT
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 He is clinically stable at this point but has worsened over the course of his admission. Though he was given broad spectrum antibiotics for a possible pneumonia, his oxygen requirements and worsening imaging (including US) may be suggestive of acute chest. I have spoken with hematology about the need for an rbc exchange transfusion given the worsening clinical picture. I would continue to give FiO2 of 50% and continue broad spectrum antibiotics at this time. If hematology is willing to exhange transfuse, I would help expedite the patient to the MICU if that needed to be done.   - Cont. Venti-mask  - Cont. Abx  - Seriously consider exchange transfusion  - Please call during 7am-5pm [phone]) if patient is deteriorating. If after hours please call MICU.    Pulmonary Consult Service  Aniceto Reece,  MPH  [phone]

## 2017-07-11 NOTE — PROGRESS NOTE ADULT - SUBJECTIVE AND OBJECTIVE BOX
INTERVAL HPI/OVERNIGHT EVENTS:  Patient S&E at bedside. Episode of hypoxia this morning.  Currently resting in bread on venti mask.       VITAL SIGNS:  T(F): 98.8 (07-11-17 @ 10:01)  HR: 79 (07-11-17 @ 10:01)  BP: 118/65 (07-11-17 @ 10:01)  RR: 24 (07-11-17 @ 10:01)  SpO2: 96% (07-11-17 @ 10:01)  Wt(kg): --    PHYSICAL EXAM:    Constitutional: NAD  Eyes: EOMI, mild scleral icterus  Neck: supple, no masses, no JVD  Respiratory: basilar rales  Cardiovascular: RRR, no M/R/G  Gastrointestinal: soft, NTND, no masses palpable, + BS, no hepatosplenomegaly  Extremities: no c/c/e  Neurological: AAOx3      MEDICATIONS  (STANDING):  HYDROmorphone PCA (1 mG/mL) 30 milliLiter(s) PCA Continuous PCA Continuous  hydroxyurea (Non - oncologic) 2000 milliGRAM(s) Oral daily  sodium chloride 0.45%. 1000 milliLiter(s) (150 mL/Hr) IV Continuous <Continuous>  folic acid 1 milliGRAM(s) Oral daily  enoxaparin Injectable 40 milliGRAM(s) SubCutaneous daily  Jadenu (Deferasirox) 360 mg Tablet 1080 milliGRAM(s)   Oral daily  lidocaine   Patch 1 Patch Transdermal daily  vancomycin  IVPB   IV Intermittent   vancomycin  IVPB 1000 milliGRAM(s) IV Intermittent every 12 hours  aztreonam  IVPB   IV Intermittent   aztreonam  IVPB 2000 milliGRAM(s) IV Intermittent every 8 hours  azithromycin  IVPB   IV Intermittent   azithromycin  IVPB 500 milliGRAM(s) IV Intermittent every 24 hours    MEDICATIONS  (PRN):  naloxone Injectable 0.1 milliGRAM(s) IV Push every 3 minutes PRN For ANY of the following changes in patient status:  A. RR LESS THAN 10 breaths per minute, B. Oxygen saturation LESS THAN 90%, C. Sedation score of 6  ondansetron Injectable 4 milliGRAM(s) IV Push every 6 hours PRN Nausea  acetaminophen   Tablet 650 milliGRAM(s) Oral every 6 hours PRN For Temp greater than 38 C (100.4 F)  petrolatum white Ointment 1 Application(s) Topical three times a day PRN dry lips      Allergies    ceftriaxone (Unknown)    Intolerances        LABS:                        5.0    15.86 )-----------( 408      ( 11 Jul 2017 06:55 )             13.9     07-11    141  |  99  |  7   ----------------------------<  109<H>  3.4<L>   |  27  |  0.48<L>    Ca    8.4      11 Jul 2017 07:25  Phos  3.9     07-10  Mg     1.7     07-10    TPro  6.9  /  Alb  3.8  /  TBili  6.8<H>  /  DBili  x   /  AST  57<H>  /  ALT  19  /  AlkPhos  74  07-10          RADIOLOGY & ADDITIONAL TESTS:  Studies reviewed.

## 2017-07-11 NOTE — PROGRESS NOTE ADULT - PROBLEM SELECTOR PLAN 1
- S/p 2 units PRBC yesterday.  Hg up to 5.0  - Reported episode of hypoxia this morning requiring non rebreather, currently on 50% VM.    - Discussed with Pulm fellow.  Reports worsening pulmonary findings on bedside US.    - Called blood bank to discuss possible exchange vs. additional simple PRBC transfusion given low hg.   - Hydrate with 1/2 NS, Pain Control, Bowel Regimen, Incentive Spirometry, Folate  - Trend CBC, reticulocyte, LDH daily

## 2017-07-11 NOTE — PROGRESS NOTE ADULT - ATTENDING COMMENTS
shortness of breath, hypoxia and on nonrebreether, simple pRBC transfusion, no exchange given low level of hgb.

## 2017-07-12 LAB
BACTERIA BLD CULT: SIGNIFICANT CHANGE UP
BACTERIA BLD CULT: SIGNIFICANT CHANGE UP
BASE EXCESS BLDA CALC-SCNC: 9.2 MMOL/L — SIGNIFICANT CHANGE UP
BUN SERPL-MCNC: 5 MG/DL — LOW (ref 7–23)
CALCIUM SERPL-MCNC: 8.9 MG/DL — SIGNIFICANT CHANGE UP (ref 8.4–10.5)
CHLORIDE SERPL-SCNC: 98 MMOL/L — SIGNIFICANT CHANGE UP (ref 98–107)
CO2 SERPL-SCNC: 28 MMOL/L — SIGNIFICANT CHANGE UP (ref 22–31)
CREAT SERPL-MCNC: 0.47 MG/DL — LOW (ref 0.5–1.3)
GLUCOSE SERPL-MCNC: 89 MG/DL — SIGNIFICANT CHANGE UP (ref 70–99)
HCO3 BLDA-SCNC: 33 MMOL/L — HIGH (ref 22–26)
HCT VFR BLD CALC: 16.9 % — CRITICAL LOW (ref 39–50)
HGB BLD-MCNC: 6.1 G/DL — CRITICAL LOW (ref 13–17)
LDH SERPL L TO P-CCNC: 1078 U/L — HIGH (ref 135–225)
MCHC RBC-ENTMCNC: 33.9 PG — SIGNIFICANT CHANGE UP (ref 27–34)
MCHC RBC-ENTMCNC: 36.1 % — HIGH (ref 32–36)
MCV RBC AUTO: 93.9 FL — SIGNIFICANT CHANGE UP (ref 80–100)
NRBC # FLD: 11.99 — SIGNIFICANT CHANGE UP
NRBC FLD-RTO: 95.8 — SIGNIFICANT CHANGE UP
PCO2 BLDA: 62 MMHG — HIGH (ref 35–48)
PH BLDA: 7.37 PH — SIGNIFICANT CHANGE UP (ref 7.35–7.45)
PLATELET # BLD AUTO: 397 K/UL — SIGNIFICANT CHANGE UP (ref 150–400)
PMV BLD: 11 FL — SIGNIFICANT CHANGE UP (ref 7–13)
PO2 BLDA: 41 MMHG — CRITICAL LOW (ref 83–108)
POTASSIUM SERPL-MCNC: 3.8 MMOL/L — SIGNIFICANT CHANGE UP (ref 3.5–5.3)
POTASSIUM SERPL-SCNC: 3.8 MMOL/L — SIGNIFICANT CHANGE UP (ref 3.5–5.3)
RBC # BLD: 1.8 M/UL — LOW (ref 4.2–5.8)
RBC # FLD: 24.4 % — HIGH (ref 10.3–14.5)
RETICS #: 314.8 10X3/UL — HIGH (ref 17–73)
RETICS/RBC NFR: 17.5 % — HIGH (ref 0.5–2.5)
SAO2 % BLDA: 76.3 % — LOW (ref 95–99)
SODIUM SERPL-SCNC: 142 MMOL/L — SIGNIFICANT CHANGE UP (ref 135–145)
VANCOMYCIN TROUGH SERPL-MCNC: 3.2 UG/ML — LOW (ref 10–20)
WBC # BLD: 12.52 K/UL — HIGH (ref 3.8–10.5)
WBC # FLD AUTO: 12.52 K/UL — HIGH (ref 3.8–10.5)

## 2017-07-12 PROCEDURE — 99356: CPT

## 2017-07-12 PROCEDURE — 99233 SBSQ HOSP IP/OBS HIGH 50: CPT

## 2017-07-12 PROCEDURE — 99233 SBSQ HOSP IP/OBS HIGH 50: CPT | Mod: GC

## 2017-07-12 PROCEDURE — 99232 SBSQ HOSP IP/OBS MODERATE 35: CPT | Mod: GC

## 2017-07-12 PROCEDURE — 71010: CPT | Mod: 26

## 2017-07-12 RX ORDER — HYDROMORPHONE HYDROCHLORIDE 2 MG/ML
30 INJECTION INTRAMUSCULAR; INTRAVENOUS; SUBCUTANEOUS
Qty: 0 | Refills: 0 | Status: DISCONTINUED | OUTPATIENT
Start: 2017-07-12 | End: 2017-07-14

## 2017-07-12 RX ORDER — VANCOMYCIN HCL 1 G
1250 VIAL (EA) INTRAVENOUS EVERY 12 HOURS
Qty: 0 | Refills: 0 | Status: DISCONTINUED | OUTPATIENT
Start: 2017-07-12 | End: 2017-07-13

## 2017-07-12 RX ORDER — ONDANSETRON 8 MG/1
8 TABLET, FILM COATED ORAL ONCE
Qty: 0 | Refills: 0 | Status: DISCONTINUED | OUTPATIENT
Start: 2017-07-12 | End: 2017-07-12

## 2017-07-12 RX ORDER — ONDANSETRON 8 MG/1
8 TABLET, FILM COATED ORAL ONCE
Qty: 0 | Refills: 0 | Status: COMPLETED | OUTPATIENT
Start: 2017-07-12 | End: 2017-07-12

## 2017-07-12 RX ADMIN — LIDOCAINE 1 PATCH: 4 CREAM TOPICAL at 12:03

## 2017-07-12 RX ADMIN — HYDROMORPHONE HYDROCHLORIDE 30 MILLILITER(S): 2 INJECTION INTRAMUSCULAR; INTRAVENOUS; SUBCUTANEOUS at 18:39

## 2017-07-12 RX ADMIN — Medication 1 MILLIGRAM(S): at 12:03

## 2017-07-12 RX ADMIN — ONDANSETRON 4 MILLIGRAM(S): 8 TABLET, FILM COATED ORAL at 07:23

## 2017-07-12 RX ADMIN — Medication 100 MILLIGRAM(S): at 15:25

## 2017-07-12 RX ADMIN — HYDROMORPHONE HYDROCHLORIDE 30 MILLILITER(S): 2 INJECTION INTRAMUSCULAR; INTRAVENOUS; SUBCUTANEOUS at 11:07

## 2017-07-12 RX ADMIN — ONDANSETRON 4 MILLIGRAM(S): 8 TABLET, FILM COATED ORAL at 16:25

## 2017-07-12 RX ADMIN — LIDOCAINE 1 PATCH: 4 CREAM TOPICAL at 01:41

## 2017-07-12 RX ADMIN — SODIUM CHLORIDE 150 MILLILITER(S): 9 INJECTION, SOLUTION INTRAVENOUS at 05:51

## 2017-07-12 RX ADMIN — Medication 100 MILLIGRAM(S): at 05:50

## 2017-07-12 RX ADMIN — Medication 166.67 MILLIGRAM(S): at 21:58

## 2017-07-12 RX ADMIN — HYDROMORPHONE HYDROCHLORIDE 30 MILLILITER(S): 2 INJECTION INTRAMUSCULAR; INTRAVENOUS; SUBCUTANEOUS at 08:19

## 2017-07-12 RX ADMIN — Medication 166.67 MILLIGRAM(S): at 09:51

## 2017-07-12 RX ADMIN — HYDROXYUREA 2000 MILLIGRAM(S): 500 CAPSULE ORAL at 12:03

## 2017-07-12 RX ADMIN — HYDROMORPHONE HYDROCHLORIDE 30 MILLILITER(S): 2 INJECTION INTRAMUSCULAR; INTRAVENOUS; SUBCUTANEOUS at 05:47

## 2017-07-12 RX ADMIN — Medication 100 MILLIGRAM(S): at 21:47

## 2017-07-12 RX ADMIN — ONDANSETRON 8 MILLIGRAM(S): 8 TABLET, FILM COATED ORAL at 12:00

## 2017-07-12 RX ADMIN — AZITHROMYCIN 250 MILLIGRAM(S): 500 TABLET, FILM COATED ORAL at 17:07

## 2017-07-12 RX ADMIN — HYDROMORPHONE HYDROCHLORIDE 30 MILLILITER(S): 2 INJECTION INTRAMUSCULAR; INTRAVENOUS; SUBCUTANEOUS at 19:01

## 2017-07-12 NOTE — PROVIDER CONTACT NOTE (CRITICAL VALUE NOTIFICATION) - RECOMMENDATIONS
As per protocol by blood bank call Rapid Response, but as per attending pt stable.Do not call RR
NP Truman notified; maintain falls risk precautions. Assist pt with ADL. continue monitoring for change in status. no blood transfusions at this time.
Continue mask
MENDEZ Davis notified; Continue monitoring pt. awaiting primary team decision regarding blood transfusion

## 2017-07-12 NOTE — PROVIDER CONTACT NOTE (CRITICAL VALUE NOTIFICATION) - ACTION/TREATMENT ORDERED:
Awaiting further instructions
Will continue to monitor.
As per NP will consult with Hematology re; transfusion
Continue mask

## 2017-07-12 NOTE — PROGRESS NOTE ADULT - PROBLEM SELECTOR PLAN 1
- S/p 1 units PRBC yesterday.  Hg up to 6.1  - Hg improved, LDH trending down.  Would repeat Hg electropheresis today to assess Hg S percentage and rule out hemolysis of transfused prbc.  - Hydrate with 1/2 NS, Pain Control, Bowel Regimen, Incentive Spirometry, Folate  - Trend CBC, reticulocyte, LDH daily  - No transfusion indicated at this time, will follow closely.

## 2017-07-12 NOTE — PROGRESS NOTE ADULT - SUBJECTIVE AND OBJECTIVE BOX
Patient is a 21y old  Male who presents with a chief complaint of chest pain, low nack pain (06 Jul 2017 20:11).  Patient on Iv Abx ?? PNA.  Patient now on Venti- mask again and followed by Pulmonary.  Nurse notes he desats to 52 % on RA.- will get ABG.  Nauseous today      SUBJECTIVE / OVERNIGHT EVENTS:    MEDICATIONS  (STANDING):  hydroxyurea (Non - oncologic) 2000 milliGRAM(s) Oral daily  sodium chloride 0.45%. 1000 milliLiter(s) (150 mL/Hr) IV Continuous <Continuous>  folic acid 1 milliGRAM(s) Oral daily  enoxaparin Injectable 40 milliGRAM(s) SubCutaneous daily  Jadenu (Deferasirox) 360 mg Tablet 1080 milliGRAM(s)   Oral daily  lidocaine   Patch 1 Patch Transdermal daily  aztreonam  IVPB   IV Intermittent   aztreonam  IVPB 2000 milliGRAM(s) IV Intermittent every 8 hours  azithromycin  IVPB   IV Intermittent   azithromycin  IVPB 500 milliGRAM(s) IV Intermittent every 24 hours  vancomycin  IVPB 1250 milliGRAM(s) IV Intermittent every 12 hours  HYDROmorphone PCA (1 mG/mL) 30 milliLiter(s) PCA Continuous PCA Continuous  ondansetron  IVPB 8 milliGRAM(s) IV Intermittent once    MEDICATIONS  (PRN):  naloxone Injectable 0.1 milliGRAM(s) IV Push every 3 minutes PRN For ANY of the following changes in patient status:  A. RR LESS THAN 10 breaths per minute, B. Oxygen saturation LESS THAN 90%, C. Sedation score of 6  ondansetron Injectable 4 milliGRAM(s) IV Push every 6 hours PRN Nausea  acetaminophen   Tablet 650 milliGRAM(s) Oral every 6 hours PRN For Temp greater than 38 C (100.4 F)  petrolatum white Ointment 1 Application(s) Topical three times a day PRN dry lips      Vital Signs Last 24 Hrs  T(C): 36.9 (07-12-17 @ 05:17), Max: 36.9 (07-11-17 @ 20:42)  HR: 74 (07-12-17 @ 05:17) (72 - 80)  BP: 118/60 (07-12-17 @ 05:17) (109/58 - 120/64)  RR: 20 (07-12-17 @ 05:17) (20 - 24)  SpO2: 100% (07-12-17 @ 05:17) (99% - 100%)  CAPILLARY BLOOD GLUCOSE    PHYSICAL EXAM:  GENERAL: NAD, well-developed, Mildly Lethargic, On VENTI-MASK, Nauseous  HEAD:  Atraumatic, Normocephalic  EYES: EOMI, PERRLA, conjunctiva and sclera clear  NECK: Supple, No JVD  CHEST/LUNG: Clear to auscultation bilaterally; No wheeze, POOR INS EFFORT.  HEART: Regular rate and rhythm; No murmurs, rubs, or gallops  ABDOMEN: Soft, Nontender, Nondistended; Bowel sounds present  EXTREMITIES:  2+ Peripheral Pulses, No clubbing, cyanosis, or edema  PSYCH: AAOx3  NEUROLOGY: non-focal  SKIN: No rashes or lesions    LABS:                        6.1    12.52 )-----------( 397      ( 12 Jul 2017 05:20 )             16.9     07-12    142  |  98  |  5<L>  ----------------------------<  89  3.8   |  28  |  0.47<L>    Ca    8.9      12 Jul 2017 05:20          RADIOLOGY & ADDITIONAL TESTS:  < from: Xray Chest 1 View AP -PORTABLE-Routine (07.12.17 @ 08:50) >  MPRESSION:   Medial left lung base opacity may represent atelectasis and/or pneumonia.      Imaging Personally Reviewed:    Consultant(s) Notes Reviewed:      Care Discussed with Consultants/Other Providers: Heme and Pulm Patient is a 21y old  Male who presents with a chief complaint of chest pain, low nack pain (06 Jul 2017 20:11).  Patient on Iv Abx ?? PNA.  Patient now on Venti- mask again and followed by Pulmonary.  Nurse notes he desats to 52 % on RA.- will get ABG.  Nauseous today      SUBJECTIVE / OVERNIGHT EVENTS:    MEDICATIONS  (STANDING):  hydroxyurea (Non - oncologic) 2000 milliGRAM(s) Oral daily  sodium chloride 0.45%. 1000 milliLiter(s) (150 mL/Hr) IV Continuous <Continuous>  folic acid 1 milliGRAM(s) Oral daily  enoxaparin Injectable 40 milliGRAM(s) SubCutaneous daily  Jadenu (Deferasirox) 360 mg Tablet 1080 milliGRAM(s)   Oral daily  lidocaine   Patch 1 Patch Transdermal daily  aztreonam  IVPB   IV Intermittent   aztreonam  IVPB 2000 milliGRAM(s) IV Intermittent every 8 hours  azithromycin  IVPB   IV Intermittent   azithromycin  IVPB 500 milliGRAM(s) IV Intermittent every 24 hours  vancomycin  IVPB 1250 milliGRAM(s) IV Intermittent every 12 hours  HYDROmorphone PCA (1 mG/mL) 30 milliLiter(s) PCA Continuous PCA Continuous  ondansetron  IVPB 8 milliGRAM(s) IV Intermittent once    MEDICATIONS  (PRN):  naloxone Injectable 0.1 milliGRAM(s) IV Push every 3 minutes PRN For ANY of the following changes in patient status:  A. RR LESS THAN 10 breaths per minute, B. Oxygen saturation LESS THAN 90%, C. Sedation score of 6  ondansetron Injectable 4 milliGRAM(s) IV Push every 6 hours PRN Nausea  acetaminophen   Tablet 650 milliGRAM(s) Oral every 6 hours PRN For Temp greater than 38 C (100.4 F)  petrolatum white Ointment 1 Application(s) Topical three times a day PRN dry lips      Vital Signs Last 24 Hrs  T(C): 36.9 (07-12-17 @ 05:17), Max: 36.9 (07-11-17 @ 20:42)  HR: 74 (07-12-17 @ 05:17) (72 - 80)  BP: 118/60 (07-12-17 @ 05:17) (109/58 - 120/64)  RR: 20 (07-12-17 @ 05:17) (20 - 24)  SpO2: 100% (07-12-17 @ 05:17) (99% - 100%)  CAPILLARY BLOOD GLUCOSE    PHYSICAL EXAM:  GENERAL: NAD, well-developed, Mildly Lethargic, On VENTI-MASK, Nauseous  HEAD:  Atraumatic, Normocephalic  EYES: EOMI, PERRLA, conjunctiva and sclera clear  NECK: Supple, No JVD  CHEST/LUNG: Clear to auscultation bilaterally; No wheeze, POOR INS EFFORT.  HEART: Regular rate and rhythm; No murmurs, rubs, or gallops  ABDOMEN: Soft, Nontender, Nondistended; Bowel sounds present  EXTREMITIES:  2+ Peripheral Pulses, No clubbing, cyanosis, or edema  PSYCH: AAOx3  NEUROLOGY: non-focal  SKIN: No rashes or lesions    LABS:                        6.1    12.52 )-----------( 397      ( 12 Jul 2017 05:20 )             16.9     07-12    142  |  98  |  5<L>  ----------------------------<  89  3.8   |  28  |  0.47<L>    Ca    8.9      12 Jul 2017 05:20          RADIOLOGY & ADDITIONAL TESTS:  < from: Xray Chest 1 View AP -PORTABLE-Routine (07.12.17 @ 08:50) >  MPRESSION:   Medial left lung base opacity may represent atelectasis and/or pneumonia.    Blood Gas Profile - Arterial (07.12.17 @ 14:15)    pH, Arterial: 7.37 pH    pCO2, Arterial: 62 mmHg    pO2, Arterial: 41 mmHg    HCO3, Arterial: 33 mmol/L    Base Excess, Arterial: 9.2: BASE EXCESS: REFERENCE RANGE = 0 (+/-) 2 mmol/l mmol/L    Oxygen Saturation, Arterial: 76.3 %        Imaging Personally Reviewed:    Consultant(s) Notes Reviewed:      Care Discussed with Consultants/Other Providers: Heme and Pulm

## 2017-07-12 NOTE — PROVIDER CONTACT NOTE (CRITICAL VALUE NOTIFICATION) - ASSESSMENT
Pt asymptomatic
Pt assesed for sob, chest pain
Remains Ao3.Afebrile. tachypnic 26-28 bpm. Vital signs otherwise stable.
On ventimask, po 95%.
PT tachypnic 26-28. No c/o pain at this time. 126/72 HR: 98. 02sat@99

## 2017-07-12 NOTE — PROGRESS NOTE ADULT - ATTENDING COMMENTS
Heme- wants Hgb Electropheresis.  Dec Sats on RA- ABG on RA ord.  N/V- Zofran prn Heme- wants Hgb Electropheresis.  Dec Sats on RA- ABG on RA ord.  N/V- Zofran prn    Addendum   ABG 7/12/17 Heme- wants Hgb Electropheresis.  Dec Sats on RA- ABG on RA ord.  N/V- Zofran prn    Addendum   ABG 7/12/17  PH 7.3, PCo2 62, PO2 41, Sat 76.3  ICU EVAL AGAIN REQUESTED.  Sat 100 5 on Non - rebreather.        SUBJECTIVE / OVERNIGHT EVENTS:    MEDICATIONS  (STANDING):  diltiazem    milliGRAM(s) Oral daily  pantoprazole    Tablet 40 milliGRAM(s) Oral before breakfast  insulin lispro (HumaLOG) corrective regimen sliding scale   SubCutaneous three times a day before meals  insulin lispro (HumaLOG) corrective regimen sliding scale   SubCutaneous at bedtime  dextrose 5%. 1000 milliLiter(s) (50 mL/Hr) IV Continuous <Continuous>  dextrose 50% Injectable 12.5 Gram(s) IV Push once  dextrose 50% Injectable 25 Gram(s) IV Push once  dextrose 50% Injectable 25 Gram(s) IV Push once    MEDICATIONS  (PRN):  dextrose Gel 1 Dose(s) Oral once PRN Blood Glucose LESS THAN 70 milliGRAM(s)/deciliter  glucagon  Injectable 1 milliGRAM(s) IntraMuscular once PRN Glucose LESS THAN 70 milligrams/deciliter  melatonin 3 milliGRAM(s) Oral at bedtime PRN Insomnia      Vital Signs Last 24 Hrs  T(C): 36.8 (07-12-17 @ 06:39), Max: 37.2 (07-11-17 @ 21:09)  HR: 100 (07-12-17 @ 14:10) (73 - 100)  BP: 138/75 (07-12-17 @ 14:10) (138/75 - 159/76)  RR: 18 (07-12-17 @ 06:39) (18 - 18)  SpO2: 97% (07-12-17 @ 06:39) (97% - 99%)  CAPILLARY BLOOD GLUCOSE  100 (12 Jul 2017 13:10)  185 (12 Jul 2017 08:26)  218 (11 Jul 2017 21:51)  154 (11 Jul 2017 16:40)        I&O's Summary      PHYSICAL EXAM:  GENERAL: NAD, well-developed  HEAD:  Atraumatic, Normocephalic  EYES: EOMI, PERRLA, conjunctiva and sclera clear  NECK: Supple, No JVD  CHEST/LUNG: Clear to auscultation bilaterally; No wheeze  HEART: Regular rate and rhythm; No murmurs, rubs, or gallops  ABDOMEN: Soft, Nontender, Nondistended; Bowel sounds present  EXTREMITIES:  2+ Peripheral Pulses, No clubbing, cyanosis, or edema  PSYCH: AAOx3  NEUROLOGY: non-focal  SKIN: No rashes or lesions    LABS:                        10.6   5.57  )-----------( 218      ( 12 Jul 2017 06:20 )             32.4     07-12    140  |  98  |  9   ----------------------------<  153<H>  3.6   |  24  |  0.78    Ca    9.2      12 Jul 2017 06:20      PT/INR - ( 12 Jul 2017 06:20 )   PT: 12.2 SEC;   INR: 1.09                    RADIOLOGY & ADDITIONAL TESTS:    Imaging Personally Reviewed:    Consultant(s) Notes Reviewed:      Care Discussed with Consultants/Other Providers: Heme- wants Hgb Electropheresis.  Dec Sats on RA- ABG on RA ord.  N/V- Zofran prn    Addendum   ABG 7/12/17  PH 7.3, PCo2 62, PO2 41, Sat 76.3  ICU EVAL AGAIN REQUESTED.  Sat 100 5 on Non - rebreather.THIS IS THE THIRD DAY CALLING ICU EVAL        SUBJECTIVE / OVERNIGHT EVENTS:    MEDICATIONS  (STANDING):  diltiazem    milliGRAM(s) Oral daily  pantoprazole    Tablet 40 milliGRAM(s) Oral before breakfast  insulin lispro (HumaLOG) corrective regimen sliding scale   SubCutaneous three times a day before meals  insulin lispro (HumaLOG) corrective regimen sliding scale   SubCutaneous at bedtime  dextrose 5%. 1000 milliLiter(s) (50 mL/Hr) IV Continuous <Continuous>  dextrose 50% Injectable 12.5 Gram(s) IV Push once  dextrose 50% Injectable 25 Gram(s) IV Push once  dextrose 50% Injectable 25 Gram(s) IV Push once    MEDICATIONS  (PRN):  dextrose Gel 1 Dose(s) Oral once PRN Blood Glucose LESS THAN 70 milliGRAM(s)/deciliter  glucagon  Injectable 1 milliGRAM(s) IntraMuscular once PRN Glucose LESS THAN 70 milligrams/deciliter  melatonin 3 milliGRAM(s) Oral at bedtime PRN Insomnia      Vital Signs Last 24 Hrs  T(C): 36.8 (07-12-17 @ 06:39), Max: 37.2 (07-11-17 @ 21:09)  HR: 100 (07-12-17 @ 14:10) (73 - 100)  BP: 138/75 (07-12-17 @ 14:10) (138/75 - 159/76)  RR: 18 (07-12-17 @ 06:39) (18 - 18)  SpO2: 97% (07-12-17 @ 06:39) (97% - 99%)  CAPILLARY BLOOD GLUCOSE  100 (12 Jul 2017 13:10)  185 (12 Jul 2017 08:26)  218 (11 Jul 2017 21:51)  154 (11 Jul 2017 16:40)        I&O's Summary      PHYSICAL EXAM:  GENERAL: NAD, well-developed  HEAD:  Atraumatic, Normocephalic  EYES: EOMI, PERRLA, conjunctiva and sclera clear  NECK: Supple, No JVD  CHEST/LUNG: Clear to auscultation bilaterally; No wheeze  HEART: Regular rate and rhythm; No murmurs, rubs, or gallops  ABDOMEN: Soft, Nontender, Nondistended; Bowel sounds present  EXTREMITIES:  2+ Peripheral Pulses, No clubbing, cyanosis, or edema  PSYCH: AAOx3  NEUROLOGY: non-focal  SKIN: No rashes or lesions    LABS:                        10.6   5.57  )-----------( 218      ( 12 Jul 2017 06:20 )             32.4     07-12    140  |  98  |  9   ----------------------------<  153<H>  3.6   |  24  |  0.78    Ca    9.2      12 Jul 2017 06:20      PT/INR - ( 12 Jul 2017 06:20 )   PT: 12.2 SEC;   INR: 1.09                    RADIOLOGY & ADDITIONAL TESTS:    Imaging Personally Reviewed:    Consultant(s) Notes Reviewed:      Care Discussed with Consultants/Other Providers: Heme- wants Hgb Electropheresis.  Dec Sats on RA- ABG on RA ord.  N/V- Zofran prn     Prolong Visit./Addendum 7/12/17 at 4 pm  ABG 7/12/17  PH 7.3, PCo2 62, PO2 41, Sat 76.3  ICU EVAL AGAIN REQUESTED.  Sat 100 5 on Non - rebreather.THIS IS THE THIRD DAY CALLING ICU .  Patient will be accepted to [facility].

## 2017-07-12 NOTE — PROGRESS NOTE ADULT - PROBLEM SELECTOR PLAN 2
-unclear infectious source at this time  -blood cultures and urine cx NGTD  -check RVP - negative  -CT chest - negative  -on Aztreonam and Azythro and Vanco

## 2017-07-12 NOTE — PROGRESS NOTE ADULT - ASSESSMENT
22 yo m with sickle cell crisis w/ h/o acute chest syndrome, iron overload 2/2 transfusions, p/w sickle cell crisis .  Patient now Hypoxic- Respiratory distress with Sat 52 % on RA.  nauseous and Vomiting- biliary

## 2017-07-12 NOTE — PROGRESS NOTE ADULT - ATTENDING COMMENTS
Hgb SS Dz p/w shortness of breath, CXR with infiltrate, currently on non-rebreather, pain control, pRBC transfusion. Given his low Hgb after transfusionx3 electrophoresis will be followed. He is not a candidate for exchange transfusion given low hgb. Will follow up.

## 2017-07-12 NOTE — PROGRESS NOTE ADULT - ATTENDING COMMENTS
Patient is stable, feels slightly better. S/p blood transfusion. Continue vanco, aztreonam, and azithromycin. 100% O2 sat on 50% VM.

## 2017-07-12 NOTE — PROGRESS NOTE ADULT - ASSESSMENT
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 He is clinically stable at this point but has worsened over the course of his admission. Though he was given broad spectrum antibiotics for a possible pneumonia, his oxygen requirements and worsening imaging (including US) may be suggestive of acute chest. I have spoken with hematology about the need for an rbc exchange transfusion given the worsening clinical picture. I would continue to give FiO2 of 50% and continue broad spectrum antibiotics at this time. If hematology is willing to exhange transfuse, I would help expedite the patient to the MICU if that needed to be done.   - Cont. Venti-mask  - Cont. Abx  - Seriously consider exchange transfusion  - Please call during 7am-5pm ([phone]) if patient is deteriorating. If after hours please call MICU.    Pulmonary Consult Service  Aniceto Reece,  MP[facility]  [phone]

## 2017-07-12 NOTE — PROGRESS NOTE ADULT - SUBJECTIVE AND OBJECTIVE BOX
Interval Events: Patient was seen and examined at bedside. No overnight events. Found the patient off oxygen today and saturating 65%. Placed back on Ventimask of 50% FiO2 and resaturated to 93%. States he is doing a little better.     REVIEW OF SYSTEMS:  Constitutional: [ ] fevers [ ] chills [ ] weight loss [ ] weight gain  CV: [ ] chest pain [ ] orthopnea [ ] palpitations [ ] murmur  Resp: [ ] cough [x ] shortness of breath [ ] dyspnea [ ] wheezing [ ] sputum [ ] hemoptysis  [ x] All other systems negative  [ ] Unable to assess ROS because ________    OBJECTIVE:  ICU Vital Signs Last 24 Hrs  T(C): 36.9 (12 Jul 2017 05:17), Max: 37.1 (11 Jul 2017 10:01)  T(F): 98.5 (12 Jul 2017 05:17), Max: 98.8 (11 Jul 2017 10:01)  HR: 74 (12 Jul 2017 05:17) (72 - 80)  BP: 118/60 (12 Jul 2017 05:17) (109/58 - 120/64)  BP(mean): --  ABP: --  ABP(mean): --  RR: 20 (12 Jul 2017 05:17) (20 - 24)  SpO2: 100% (12 Jul 2017 05:17) (96% - 100%)        CAPILLARY BLOOD GLUCOSE          PHYSICAL EXAM:  General: In mild respiratory distress  HEENT:   Lymph Nodes: No palpable lymphadenopathy  Neck:   Respiratory:   Cardiovascular:   Abdomen: SNT, +BS, No R/G/R  Extremities:   Skin:   Neurological:      HOSPITAL MEDICATIONS:  MEDICATIONS  (STANDING):  HYDROmorphone PCA (1 mG/mL) 30 milliLiter(s) PCA Continuous PCA Continuous  hydroxyurea (Non - oncologic) 2000 milliGRAM(s) Oral daily  sodium chloride 0.45%. 1000 milliLiter(s) (150 mL/Hr) IV Continuous <Continuous>  folic acid 1 milliGRAM(s) Oral daily  enoxaparin Injectable 40 milliGRAM(s) SubCutaneous daily  Jadenu (Deferasirox) 360 mg Tablet 1080 milliGRAM(s)   Oral daily  lidocaine   Patch 1 Patch Transdermal daily  aztreonam  IVPB   IV Intermittent   aztreonam  IVPB 2000 milliGRAM(s) IV Intermittent every 8 hours  azithromycin  IVPB   IV Intermittent   azithromycin  IVPB 500 milliGRAM(s) IV Intermittent every 24 hours  vancomycin  IVPB 1250 milliGRAM(s) IV Intermittent every 12 hours    MEDICATIONS  (PRN):  naloxone Injectable 0.1 milliGRAM(s) IV Push every 3 minutes PRN For ANY of the following changes in patient status:  A. RR LESS THAN 10 breaths per minute, B. Oxygen saturation LESS THAN 90%, C. Sedation score of 6  ondansetron Injectable 4 milliGRAM(s) IV Push every 6 hours PRN Nausea  acetaminophen   Tablet 650 milliGRAM(s) Oral every 6 hours PRN For Temp greater than 38 C (100.4 F)  petrolatum white Ointment 1 Application(s) Topical three times a day PRN dry lips      LABS:                        6.1    12.52 )-----------( 397      ( 12 Jul 2017 05:20 )             16.9     Hgb Trend: 6.1<--, 5.0<--, 3.9<--, 4.4<--, 4.2<--  07-12    142  |  98  |  5<L>  ----------------------------<  89  3.8   |  28  |  0.47<L>    Ca    8.9      12 Jul 2017 05:20      Creatinine Trend: 0.47<--, 0.48<--, 0.54<--, 0.61<--, 0.69<--, 0.88<--          MICROBIOLOGY:     RADIOLOGY:  [ ] Reviewed and interpreted by me

## 2017-07-12 NOTE — PROGRESS NOTE ADULT - SUBJECTIVE AND OBJECTIVE BOX
INTERVAL HPI/OVERNIGHT EVENTS:  Patient S&E at bedside. No o/n events    VITAL SIGNS:  T(F): 98.5 (07-12-17 @ 05:17)  HR: 74 (07-12-17 @ 05:17)  BP: 118/60 (07-12-17 @ 05:17)  RR: 20 (07-12-17 @ 05:17)  SpO2: 100% (07-12-17 @ 05:17)  Wt(kg): --    PHYSICAL EXAM:    Constitutional: NAD  Eyes: EOMI, sclera non-icteric  Neck: supple, no masses, no JVD  Respiratory: decreased bs at bases  Cardiovascular: RRR, no M/R/G  Gastrointestinal: soft, NTND, no masses palpable, + BS, no hepatosplenomegaly  Extremities: no c/c/e  Neurological: AAOx3      MEDICATIONS  (STANDING):  hydroxyurea (Non - oncologic) 2000 milliGRAM(s) Oral daily  sodium chloride 0.45%. 1000 milliLiter(s) (150 mL/Hr) IV Continuous <Continuous>  folic acid 1 milliGRAM(s) Oral daily  enoxaparin Injectable 40 milliGRAM(s) SubCutaneous daily  Jadenu (Deferasirox) 360 mg Tablet 1080 milliGRAM(s)   Oral daily  lidocaine   Patch 1 Patch Transdermal daily  aztreonam  IVPB   IV Intermittent   aztreonam  IVPB 2000 milliGRAM(s) IV Intermittent every 8 hours  azithromycin  IVPB   IV Intermittent   azithromycin  IVPB 500 milliGRAM(s) IV Intermittent every 24 hours  vancomycin  IVPB 1250 milliGRAM(s) IV Intermittent every 12 hours  HYDROmorphone PCA (1 mG/mL) 30 milliLiter(s) PCA Continuous PCA Continuous  ondansetron Injectable 8 milliGRAM(s) IV Push once    MEDICATIONS  (PRN):  naloxone Injectable 0.1 milliGRAM(s) IV Push every 3 minutes PRN For ANY of the following changes in patient status:  A. RR LESS THAN 10 breaths per minute, B. Oxygen saturation LESS THAN 90%, C. Sedation score of 6  ondansetron Injectable 4 milliGRAM(s) IV Push every 6 hours PRN Nausea  acetaminophen   Tablet 650 milliGRAM(s) Oral every 6 hours PRN For Temp greater than 38 C (100.4 F)  petrolatum white Ointment 1 Application(s) Topical three times a day PRN dry lips      Allergies    ceftriaxone (Unknown)    Intolerances        LABS:                        6.1    12.52 )-----------( 397      ( 12 Jul 2017 05:20 )             16.9     07-12    142  |  98  |  5<L>  ----------------------------<  89  3.8   |  28  |  0.47<L>    Ca    8.9      12 Jul 2017 05:20            RADIOLOGY & ADDITIONAL TESTS:  Studies reviewed.

## 2017-07-12 NOTE — PROGRESS NOTE ADULT - PROBLEM SELECTOR PLAN 1
-pt states his baseline Hb is 5-6  -. NEEDED BLOOD   -Today Hgb 6.1 today BUT STILL DYSPNEIC.  -continue hydroxyurea, folic acid

## 2017-07-13 LAB
BASOPHILS # BLD AUTO: 0.02 K/UL — SIGNIFICANT CHANGE UP (ref 0–0.2)
BASOPHILS NFR BLD AUTO: 0.1 % — SIGNIFICANT CHANGE UP (ref 0–2)
BUN SERPL-MCNC: 5 MG/DL — LOW (ref 7–23)
BUN SERPL-MCNC: 5 MG/DL — LOW (ref 7–23)
CALCIUM SERPL-MCNC: 8.8 MG/DL — SIGNIFICANT CHANGE UP (ref 8.4–10.5)
CALCIUM SERPL-MCNC: 8.8 MG/DL — SIGNIFICANT CHANGE UP (ref 8.4–10.5)
CHLORIDE SERPL-SCNC: 98 MMOL/L — SIGNIFICANT CHANGE UP (ref 98–107)
CHLORIDE SERPL-SCNC: 98 MMOL/L — SIGNIFICANT CHANGE UP (ref 98–107)
CO2 SERPL-SCNC: 32 MMOL/L — HIGH (ref 22–31)
CO2 SERPL-SCNC: 32 MMOL/L — HIGH (ref 22–31)
CREAT SERPL-MCNC: 0.47 MG/DL — LOW (ref 0.5–1.3)
CREAT SERPL-MCNC: 0.47 MG/DL — LOW (ref 0.5–1.3)
EOSINOPHIL # BLD AUTO: 0.28 K/UL — SIGNIFICANT CHANGE UP (ref 0–0.5)
EOSINOPHIL NFR BLD AUTO: 2 % — SIGNIFICANT CHANGE UP (ref 0–6)
GLUCOSE SERPL-MCNC: 107 MG/DL — HIGH (ref 70–99)
GLUCOSE SERPL-MCNC: 107 MG/DL — HIGH (ref 70–99)
HAPTOGLOB SERPL-MCNC: < 20 MG/DL — LOW (ref 34–200)
HCT VFR BLD CALC: 16.8 % — CRITICAL LOW (ref 39–50)
HCT VFR BLD CALC: 16.8 % — CRITICAL LOW (ref 39–50)
HGB A MFR BLD: 39.3 % — LOW
HGB A2 MFR BLD: 3.9 % — HIGH (ref 2.4–3.5)
HGB BLD-MCNC: 5.9 G/DL — CRITICAL LOW (ref 13–17)
HGB BLD-MCNC: 5.9 G/DL — CRITICAL LOW (ref 13–17)
HGB F MFR BLD: 2.1 % — HIGH (ref 0–1.5)
HGB S MFR BLD: 54.7 % — HIGH (ref 0–0)
IMM GRANULOCYTES # BLD AUTO: 0.21 # — SIGNIFICANT CHANGE UP
IMM GRANULOCYTES NFR BLD AUTO: 1.5 % — SIGNIFICANT CHANGE UP (ref 0–1.5)
LDH SERPL L TO P-CCNC: 826 U/L — HIGH (ref 135–225)
LYMPHOCYTES # BLD AUTO: 18.3 % — SIGNIFICANT CHANGE UP (ref 13–44)
LYMPHOCYTES # BLD AUTO: 2.61 K/UL — SIGNIFICANT CHANGE UP (ref 1–3.3)
MAGNESIUM SERPL-MCNC: 1.9 MG/DL — SIGNIFICANT CHANGE UP (ref 1.6–2.6)
MANUAL SMEAR VERIFICATION: SIGNIFICANT CHANGE UP
MANUAL SMEAR VERIFICATION: SIGNIFICANT CHANGE UP
MCHC RBC-ENTMCNC: 33.3 PG — SIGNIFICANT CHANGE UP (ref 27–34)
MCHC RBC-ENTMCNC: 33.3 PG — SIGNIFICANT CHANGE UP (ref 27–34)
MCHC RBC-ENTMCNC: 35.1 % — SIGNIFICANT CHANGE UP (ref 32–36)
MCHC RBC-ENTMCNC: 35.1 % — SIGNIFICANT CHANGE UP (ref 32–36)
MCV RBC AUTO: 94.9 FL — SIGNIFICANT CHANGE UP (ref 80–100)
MCV RBC AUTO: 94.9 FL — SIGNIFICANT CHANGE UP (ref 80–100)
MONOCYTES # BLD AUTO: 1.86 K/UL — HIGH (ref 0–0.9)
MONOCYTES NFR BLD AUTO: 13.1 % — SIGNIFICANT CHANGE UP (ref 2–14)
NEUTROPHILS # BLD AUTO: 9.25 K/UL — HIGH (ref 1.8–7.4)
NEUTROPHILS NFR BLD AUTO: 65 % — SIGNIFICANT CHANGE UP (ref 43–77)
NRBC # FLD: 14.63 — SIGNIFICANT CHANGE UP
NRBC # FLD: 14.63 — SIGNIFICANT CHANGE UP
NRBC FLD-RTO: 102.8 — SIGNIFICANT CHANGE UP
NRBC FLD-RTO: 102.8 — SIGNIFICANT CHANGE UP
PHOSPHATE SERPL-MCNC: 4 MG/DL — SIGNIFICANT CHANGE UP (ref 2.5–4.5)
PLATELET # BLD AUTO: 426 K/UL — HIGH (ref 150–400)
PLATELET # BLD AUTO: 426 K/UL — HIGH (ref 150–400)
PMV BLD: 10.7 FL — SIGNIFICANT CHANGE UP (ref 7–13)
PMV BLD: 10.7 FL — SIGNIFICANT CHANGE UP (ref 7–13)
POTASSIUM SERPL-MCNC: 3.1 MMOL/L — LOW (ref 3.5–5.3)
POTASSIUM SERPL-MCNC: 3.1 MMOL/L — LOW (ref 3.5–5.3)
POTASSIUM SERPL-SCNC: 3.1 MMOL/L — LOW (ref 3.5–5.3)
POTASSIUM SERPL-SCNC: 3.1 MMOL/L — LOW (ref 3.5–5.3)
RBC # BLD: 1.77 M/UL — LOW (ref 4.2–5.8)
RBC # BLD: 1.77 M/UL — LOW (ref 4.2–5.8)
RBC # FLD: 26.5 % — HIGH (ref 10.3–14.5)
RBC # FLD: 26.5 % — HIGH (ref 10.3–14.5)
RETICS #: 362.5 10X3/UL — HIGH (ref 17–73)
RETICS/RBC NFR: 20.5 % — HIGH (ref 0.5–2.5)
SODIUM SERPL-SCNC: 141 MMOL/L — SIGNIFICANT CHANGE UP (ref 135–145)
SODIUM SERPL-SCNC: 141 MMOL/L — SIGNIFICANT CHANGE UP (ref 135–145)
VANCOMYCIN TROUGH SERPL-MCNC: 5.4 UG/ML — LOW (ref 10–20)
WBC # BLD: 14.23 K/UL — HIGH (ref 3.8–10.5)
WBC # BLD: 14.23 K/UL — HIGH (ref 3.8–10.5)
WBC # FLD AUTO: 14.23 K/UL — HIGH (ref 3.8–10.5)
WBC # FLD AUTO: 14.23 K/UL — HIGH (ref 3.8–10.5)

## 2017-07-13 PROCEDURE — 99232 SBSQ HOSP IP/OBS MODERATE 35: CPT | Mod: GC

## 2017-07-13 PROCEDURE — 99233 SBSQ HOSP IP/OBS HIGH 50: CPT | Mod: GC

## 2017-07-13 RX ORDER — POTASSIUM CHLORIDE 20 MEQ
40 PACKET (EA) ORAL EVERY 4 HOURS
Qty: 0 | Refills: 0 | Status: COMPLETED | OUTPATIENT
Start: 2017-07-13 | End: 2017-07-13

## 2017-07-13 RX ORDER — VANCOMYCIN HCL 1 G
1500 VIAL (EA) INTRAVENOUS EVERY 12 HOURS
Qty: 0 | Refills: 0 | Status: DISCONTINUED | OUTPATIENT
Start: 2017-07-13 | End: 2017-07-14

## 2017-07-13 RX ORDER — SODIUM CHLORIDE 9 MG/ML
1000 INJECTION INTRAMUSCULAR; INTRAVENOUS; SUBCUTANEOUS
Qty: 0 | Refills: 0 | Status: DISCONTINUED | OUTPATIENT
Start: 2017-07-13 | End: 2017-07-13

## 2017-07-13 RX ORDER — SODIUM CHLORIDE 9 MG/ML
1000 INJECTION, SOLUTION INTRAVENOUS
Qty: 0 | Refills: 0 | Status: DISCONTINUED | OUTPATIENT
Start: 2017-07-13 | End: 2017-07-14

## 2017-07-13 RX ADMIN — Medication 40 MILLIEQUIVALENT(S): at 05:50

## 2017-07-13 RX ADMIN — AZITHROMYCIN 250 MILLIGRAM(S): 500 TABLET, FILM COATED ORAL at 13:00

## 2017-07-13 RX ADMIN — SODIUM CHLORIDE 150 MILLILITER(S): 9 INJECTION, SOLUTION INTRAVENOUS at 05:53

## 2017-07-13 RX ADMIN — ONDANSETRON 4 MILLIGRAM(S): 8 TABLET, FILM COATED ORAL at 13:08

## 2017-07-13 RX ADMIN — LIDOCAINE 1 PATCH: 4 CREAM TOPICAL at 00:40

## 2017-07-13 RX ADMIN — Medication 1 MILLIGRAM(S): at 12:59

## 2017-07-13 RX ADMIN — Medication 40 MILLIEQUIVALENT(S): at 11:11

## 2017-07-13 RX ADMIN — Medication 166.67 MILLIGRAM(S): at 11:11

## 2017-07-13 RX ADMIN — HYDROMORPHONE HYDROCHLORIDE 30 MILLILITER(S): 2 INJECTION INTRAMUSCULAR; INTRAVENOUS; SUBCUTANEOUS at 07:00

## 2017-07-13 RX ADMIN — LIDOCAINE 1 PATCH: 4 CREAM TOPICAL at 12:57

## 2017-07-13 RX ADMIN — Medication 100 MILLIGRAM(S): at 15:13

## 2017-07-13 RX ADMIN — HYDROMORPHONE HYDROCHLORIDE 30 MILLILITER(S): 2 INJECTION INTRAMUSCULAR; INTRAVENOUS; SUBCUTANEOUS at 19:39

## 2017-07-13 RX ADMIN — HYDROMORPHONE HYDROCHLORIDE 30 MILLILITER(S): 2 INJECTION INTRAMUSCULAR; INTRAVENOUS; SUBCUTANEOUS at 09:18

## 2017-07-13 RX ADMIN — Medication 300 MILLIGRAM(S): at 23:07

## 2017-07-13 RX ADMIN — Medication 100 MILLIGRAM(S): at 22:19

## 2017-07-13 RX ADMIN — Medication 100 MILLIGRAM(S): at 05:50

## 2017-07-13 RX ADMIN — HYDROXYUREA 2000 MILLIGRAM(S): 500 CAPSULE ORAL at 12:59

## 2017-07-13 RX ADMIN — SODIUM CHLORIDE 75 MILLILITER(S): 9 INJECTION, SOLUTION INTRAVENOUS at 12:59

## 2017-07-13 NOTE — PROGRESS NOTE ADULT - PROBLEM SELECTOR PLAN 2
-unclear infectious source at this time  -blood cultures and urine cx NGTD  -check RVP - negative  -CT chest - negative  -on Aztreonam and Azythro and Vanco -unclear infectious source at this time  -blood cultures and urine cx NGTD  -check RVP - negative  -CT chest - negative  -on Aztreonam and Azythro and Vanco  -f/u vanco trough

## 2017-07-13 NOTE — PROVIDER CONTACT NOTE (OTHER) - ASSESSMENT
Patient in bed, tachypneic, 02 saturation drops to 50 % off of non rebreather - % on non rebreather
Patient is alert and oriented. Vs are within normal limits. No respiratory distress noted.

## 2017-07-13 NOTE — PROGRESS NOTE ADULT - PROBLEM SELECTOR PLAN 1
-Pt with clinical constellation which is concerning for acute chest syndrome right now.   - S/p 3 units PRBC on 7/11.  Hb up to 6.1 but now 5.9.  -LDH trending down.  Hb S was 54.7 yesterday.   - Hydrate with 1/2 NS, Pain Control, Bowel Regimen, Incentive Spirometry, Folate  - Trend CBC, reticulocyte, LDH daily  - Would give 1 more unit PRBC today with goal HbS <30-40% given current clinical scenario. -Pt with clinical constellation which is concerning for acute chest syndrome right now.   - S/p 3 units PRBC on 7/11.  Hb up to 6.1 but now 5.9.  -LDH trending down.  Hb S was 54.7 yesterday.   - Hydrate with 1/2 NS, Pain Control, Bowel Regimen, Incentive Spirometry, Folate  - Trend CBC, reticulocyte, LDH daily  - Hold off on PRBC for now.

## 2017-07-13 NOTE — PROGRESS NOTE ADULT - ATTENDING COMMENTS
Sickle cell disease, pulmonary HTN, history of iron overload, acute crisis on PCA pump, anemia s/p transfusion, PNA on broad spectrum antibiotics (aztreonam, vanco, azithromycin), acute hypoxemia on hi flow via NC

## 2017-07-13 NOTE — PROGRESS NOTE ADULT - PROBLEM SELECTOR PLAN 1
-pt states his baseline Hb is 5-6  -. NEEDED BLOOD   -Today Hgb 6.1 today BUT STILL DYSPNEIC.  -continue hydroxyurea, folic acid - continues on PCA pump  - pain improving  - hgb 5.9 today. Heme f/u appreciated. No transfusion planned  - no hypoxia on high flow NC. will attempt to wean back to NC if pt tolerates  -continue hydroxyurea, folic acid

## 2017-07-13 NOTE — PROGRESS NOTE ADULT - PROBLEM SELECTOR PROBLEM 5
Chest pain, unspecified, other
Prophylactic measure

## 2017-07-13 NOTE — PROGRESS NOTE ADULT - ATTENDING COMMENTS
feel much better for pain and shortness of breath, continue pain control, oxygen and incentive spirometer and IVF, no pRBC transfusion.

## 2017-07-13 NOTE — PROGRESS NOTE ADULT - ASSESSMENT
22 yo m with sickle cell crisis w/ h/o acute chest syndrome, iron overload 2/2 transfusions, p/w sickle cell crisis .  Patient now Hypoxic- Respiratory distress with Sat 52 % on RA.  nauseous and Vomiting- biliary 20 yo m with sickle cell crisis w/ h/o acute chest syndrome, iron overload 2/2 transfusions, p/w sickle cell crisis .Transferred to [facility] for high flow oxygen due to severe hypoxia. Improved today, will try to wean to NC

## 2017-07-13 NOTE — PROGRESS NOTE ADULT - SUBJECTIVE AND OBJECTIVE BOX
INTERVAL HPI/OVERNIGHT EVENTS:  Patient S&E at bedside. No o/n events, pt says his breathing feels much better on the high flow oxygen. Pain is at about a 3-4/10. He denies fevers, says he gets chest pain occasionally.     VITAL SIGNS:  T(F): 98.3 (07-13-17 @ 10:45)  HR: 60 (07-13-17 @ 12:08)  BP: 125/59 (07-13-17 @ 10:45)  RR: 19 (07-13-17 @ 12:08)  SpO2: 98% (07-13-17 @ 12:08)  Wt(kg): --    PHYSICAL EXAM:    Constitutional: NAD, drowsy from medication.  Eyes: EOMI, sclera non-icteric  Neck: supple, no masses, no JVD  Respiratory: CTA b/l but without great inspiratory effort.   Cardiovascular: RRR, no M/R/G  Gastrointestinal: soft, NTND, no masses palpable, + BS, no hepatosplenomegaly  Extremities: no c/c/e  Neurological: AAOx3      MEDICATIONS  (STANDING):  hydroxyurea (Non - oncologic) 2000 milliGRAM(s) Oral daily  folic acid 1 milliGRAM(s) Oral daily  enoxaparin Injectable 40 milliGRAM(s) SubCutaneous daily  Jadenu (Deferasirox) 360 mg Tablet 1080 milliGRAM(s)   Oral daily  lidocaine   Patch 1 Patch Transdermal daily  aztreonam  IVPB   IV Intermittent   aztreonam  IVPB 2000 milliGRAM(s) IV Intermittent every 8 hours  azithromycin  IVPB   IV Intermittent   azithromycin  IVPB 500 milliGRAM(s) IV Intermittent every 24 hours  vancomycin  IVPB 1250 milliGRAM(s) IV Intermittent every 12 hours  HYDROmorphone PCA (1 mG/mL) 30 milliLiter(s) PCA Continuous PCA Continuous  sodium chloride 0.45%. 1000 milliLiter(s) (75 mL/Hr) IV Continuous <Continuous>    MEDICATIONS  (PRN):  naloxone Injectable 0.1 milliGRAM(s) IV Push every 3 minutes PRN For ANY of the following changes in patient status:  A. RR LESS THAN 10 breaths per minute, B. Oxygen saturation LESS THAN 90%, C. Sedation score of 6  ondansetron Injectable 4 milliGRAM(s) IV Push every 6 hours PRN Nausea  acetaminophen   Tablet 650 milliGRAM(s) Oral every 6 hours PRN For Temp greater than 38 C (100.4 F)  petrolatum white Ointment 1 Application(s) Topical three times a day PRN dry lips      Allergies    ceftriaxone (Unknown)    Intolerances        LABS:                        5.9    14.23 )-----------( 426      ( 13 Jul 2017 02:40 )             16.8     07-13    141  |  98  |  5<L>  ----------------------------<  107<H>  3.1<L>   |  32<H>  |  0.47<L>    Ca    8.8      13 Jul 2017 02:40  Phos  4.0     07-13  Mg     1.9     07-13            RADIOLOGY & ADDITIONAL TESTS:  Studies reviewed.      EXAM:  RAD CHEST PORTABLE ROUTINE    PROCEDURE DATE:  Jul 12 2017     IMPRESSION:   Medial left lung base opacity may represent atelectasis and/or pneumonia.

## 2017-07-14 ENCOUNTER — TRANSCRIPTION ENCOUNTER (OUTPATIENT)
Age: 22
End: 2017-07-14

## 2017-07-14 VITALS
SYSTOLIC BLOOD PRESSURE: 127 MMHG | HEART RATE: 71 BPM | RESPIRATION RATE: 18 BRPM | TEMPERATURE: 98 F | DIASTOLIC BLOOD PRESSURE: 65 MMHG | OXYGEN SATURATION: 97 %

## 2017-07-14 LAB
BUN SERPL-MCNC: 5 MG/DL — LOW (ref 7–23)
CALCIUM SERPL-MCNC: 9.1 MG/DL — SIGNIFICANT CHANGE UP (ref 8.4–10.5)
CHLORIDE SERPL-SCNC: 98 MMOL/L — SIGNIFICANT CHANGE UP (ref 98–107)
CO2 SERPL-SCNC: 28 MMOL/L — SIGNIFICANT CHANGE UP (ref 22–31)
CREAT SERPL-MCNC: 0.47 MG/DL — LOW (ref 0.5–1.3)
GLUCOSE SERPL-MCNC: 98 MG/DL — SIGNIFICANT CHANGE UP (ref 70–99)
HCT VFR BLD CALC: 19.9 % — CRITICAL LOW (ref 39–50)
HGB BLD-MCNC: 6.7 G/DL — CRITICAL LOW (ref 13–17)
HGB ELECT COMMENT: SIGNIFICANT CHANGE UP
LDH SERPL L TO P-CCNC: 808 U/L — HIGH (ref 135–225)
MCHC RBC-ENTMCNC: 33.3 PG — SIGNIFICANT CHANGE UP (ref 27–34)
MCHC RBC-ENTMCNC: 33.7 % — SIGNIFICANT CHANGE UP (ref 32–36)
MCV RBC AUTO: 99 FL — SIGNIFICANT CHANGE UP (ref 80–100)
NRBC # FLD: 14.36 — SIGNIFICANT CHANGE UP
NRBC FLD-RTO: 122.7 — SIGNIFICANT CHANGE UP
PLATELET # BLD AUTO: 499 K/UL — HIGH (ref 150–400)
PMV BLD: 11.6 FL — SIGNIFICANT CHANGE UP (ref 7–13)
POTASSIUM SERPL-MCNC: 3.2 MMOL/L — LOW (ref 3.5–5.3)
POTASSIUM SERPL-SCNC: 3.2 MMOL/L — LOW (ref 3.5–5.3)
RBC # BLD: 2.01 M/UL — LOW (ref 4.2–5.8)
RBC # FLD: 27.7 % — HIGH (ref 10.3–14.5)
RETICS #: 431.4 10X3/UL — HIGH (ref 17–73)
RETICS/RBC NFR: 21.7 % — HIGH (ref 0.5–2.5)
SODIUM SERPL-SCNC: 143 MMOL/L — SIGNIFICANT CHANGE UP (ref 135–145)
WBC # BLD: 11.7 K/UL — HIGH (ref 3.8–10.5)
WBC # FLD AUTO: 11.7 K/UL — HIGH (ref 3.8–10.5)

## 2017-07-14 PROCEDURE — 99232 SBSQ HOSP IP/OBS MODERATE 35: CPT | Mod: GC

## 2017-07-14 PROCEDURE — 99233 SBSQ HOSP IP/OBS HIGH 50: CPT | Mod: GC

## 2017-07-14 RX ORDER — POTASSIUM CHLORIDE 20 MEQ
40 PACKET (EA) ORAL EVERY 4 HOURS
Qty: 0 | Refills: 0 | Status: DISCONTINUED | OUTPATIENT
Start: 2017-07-14 | End: 2017-07-14

## 2017-07-14 RX ORDER — IBUPROFEN 200 MG
400 TABLET ORAL EVERY 6 HOURS
Qty: 0 | Refills: 0 | Status: DISCONTINUED | OUTPATIENT
Start: 2017-07-14 | End: 2017-07-14

## 2017-07-14 RX ORDER — FOLIC ACID 0.8 MG
1 TABLET ORAL
Qty: 0 | Refills: 0 | DISCHARGE
Start: 2017-07-14

## 2017-07-14 RX ORDER — OXYCODONE HYDROCHLORIDE 5 MG/1
10 TABLET ORAL EVERY 4 HOURS
Qty: 0 | Refills: 0 | Status: DISCONTINUED | OUTPATIENT
Start: 2017-07-14 | End: 2017-07-14

## 2017-07-14 RX ORDER — HYDROXYUREA 500 MG/1
3 CAPSULE ORAL
Qty: 0 | Refills: 0 | DISCHARGE
Start: 2017-07-14

## 2017-07-14 RX ORDER — HYDROXYUREA 500 MG/1
4 CAPSULE ORAL
Qty: 0 | Refills: 0 | DISCHARGE
Start: 2017-07-14

## 2017-07-14 RX ORDER — OXYCODONE HYDROCHLORIDE 5 MG/1
1 TABLET ORAL
Qty: 42 | Refills: 0 | OUTPATIENT
Start: 2017-07-14 | End: 2017-07-21

## 2017-07-14 RX ADMIN — Medication 1 MILLIGRAM(S): at 11:44

## 2017-07-14 RX ADMIN — SODIUM CHLORIDE 75 MILLILITER(S): 9 INJECTION, SOLUTION INTRAVENOUS at 01:34

## 2017-07-14 RX ADMIN — Medication 40 MILLIEQUIVALENT(S): at 10:34

## 2017-07-14 RX ADMIN — LIDOCAINE 1 PATCH: 4 CREAM TOPICAL at 11:44

## 2017-07-14 RX ADMIN — Medication 100 MILLIGRAM(S): at 05:31

## 2017-07-14 RX ADMIN — HYDROXYUREA 2000 MILLIGRAM(S): 500 CAPSULE ORAL at 11:44

## 2017-07-14 RX ADMIN — HYDROMORPHONE HYDROCHLORIDE 30 MILLILITER(S): 2 INJECTION INTRAMUSCULAR; INTRAVENOUS; SUBCUTANEOUS at 07:14

## 2017-07-14 RX ADMIN — LIDOCAINE 1 PATCH: 4 CREAM TOPICAL at 00:11

## 2017-07-14 NOTE — DISCHARGE NOTE ADULT - REASON FOR ADMISSION
chest pain, low nack pain chest pain, low back pain chest pain, low back pain, acute hypoxemic respiratory failure

## 2017-07-14 NOTE — DISCHARGE NOTE ADULT - PATIENT PORTAL LINK FT
“You can access the [employer] Patient Portal, offered by [facility], by registering with the following website: [email or web address]”

## 2017-07-14 NOTE — DISCHARGE NOTE ADULT - CARE PLAN
Principal Discharge DX:	Vasoocclusive sickle cell crisis  Goal:	Control of chronic state  Instructions for follow-up, activity and diet:	You were admitted for sickle cell crisis. During admission you developed fever and needed antibiotics. Your oxygen level was low and you were on supplemental oxygen. Your pain was controlled with IV pain pump. Please follow up with Dr. Nguyễn within one week. Notify Dr. Nguyễn if you have recurrence of pain, shortness of breath or fever. Use oxycodone or ibuprofen as needed for pain. Stay well hydrated.  Secondary Diagnosis:	Iron overload due to repeated red blood cell transfusions  Goal:	Continued treatment  Instructions for follow-up, activity and diet:	Please follow up with your mail order company to have refill Jadenu delivered  to your home as soon as possible. Follow up with Dr. Nguyễn within one week. Notify Dr. Nguyễn if you have worsening jaundice (yellow skin or eyes)

## 2017-07-14 NOTE — PROGRESS NOTE ADULT - PROBLEM SELECTOR PLAN 3
heme to advise on possible use of Xjade as Jadenu unavailable at this time Pt to resume Jadenu upon discharge

## 2017-07-14 NOTE — PROGRESS NOTE ADULT - PROBLEM SELECTOR PLAN 2
-unclear infectious source at this time  -blood cultures and urine cx NGTD  -check RVP - negative  -CT chest - negative  -on Aztreonam and Azythro and Vanco  -f/u vanco trough - Completed course of antibiotics, remains afebrile

## 2017-07-14 NOTE — PROGRESS NOTE ADULT - SUBJECTIVE AND OBJECTIVE BOX
CHIEF COMPLAINT:    Interval Events:    REVIEW OF SYSTEMS:  Constitutional:   Eyes:  ENT:  CV:  Resp:  GI:  :  MSK:  Integumentary:  Neurological:  Psychiatric:  Endocrine:  Hematologic/Lymphatic:  Allergic/Immunologic:  [ ] All other systems negative  [ ] Unable to assess ROS because ________    OBJECTIVE:  ICU Vital Signs Last 24 Hrs  T(C): 37.1 (14 Jul 2017 06:21), Max: 37.1 (14 Jul 2017 02:31)  T(F): 98.7 (14 Jul 2017 06:21), Max: 98.7 (14 Jul 2017 02:31)  HR: 69 (14 Jul 2017 06:38) (60 - 94)  BP: 117/63 (14 Jul 2017 06:21) (115/68 - 125/59)  BP(mean): 93 (13 Jul 2017 18:47) (73 - 93)  ABP: --  ABP(mean): --  RR: 18 (14 Jul 2017 06:38) (18 - 20)  SpO2: 100% (14 Jul 2017 06:38) (97% - 100%)        07-13 @ 07:01  -  07-14 @ 07:00  --------------------------------------------------------  IN: 0 mL / OUT: 625 mL / NET: -625 mL      CAPILLARY BLOOD GLUCOSE          PHYSICAL EXAM:  General:   HEENT:   Lymph Nodes:  Neck:   Respiratory:   Cardiovascular:   Abdomen:   Extremities:   Skin:   Neurological:  Psychiatry:    HOSPITAL MEDICATIONS:  MEDICATIONS  (STANDING):  hydroxyurea (Non - oncologic) 2000 milliGRAM(s) Oral daily  folic acid 1 milliGRAM(s) Oral daily  enoxaparin Injectable 40 milliGRAM(s) SubCutaneous daily  Jadenu (Deferasirox) 360 mg Tablet 1080 milliGRAM(s)   Oral daily  lidocaine   Patch 1 Patch Transdermal daily  aztreonam  IVPB   IV Intermittent   aztreonam  IVPB 2000 milliGRAM(s) IV Intermittent every 8 hours  azithromycin  IVPB   IV Intermittent   azithromycin  IVPB 500 milliGRAM(s) IV Intermittent every 24 hours  HYDROmorphone PCA (1 mG/mL) 30 milliLiter(s) PCA Continuous PCA Continuous  sodium chloride 0.45%. 1000 milliLiter(s) (75 mL/Hr) IV Continuous <Continuous>  vancomycin  IVPB 1500 milliGRAM(s) IV Intermittent every 12 hours    MEDICATIONS  (PRN):  naloxone Injectable 0.1 milliGRAM(s) IV Push every 3 minutes PRN For ANY of the following changes in patient status:  A. RR LESS THAN 10 breaths per minute, B. Oxygen saturation LESS THAN 90%, C. Sedation score of 6  ondansetron Injectable 4 milliGRAM(s) IV Push every 6 hours PRN Nausea  acetaminophen   Tablet 650 milliGRAM(s) Oral every 6 hours PRN For Temp greater than 38 C (100.4 F)  petrolatum white Ointment 1 Application(s) Topical three times a day PRN dry lips      LABS:                        6.7    11.70 )-----------( 499      ( 14 Jul 2017 05:30 )             19.9     07-14    143  |  98  |  5<L>  ----------------------------<  98  3.2<L>   |  28  |  0.47<L>    Ca    9.1      14 Jul 2017 05:30  Phos  4.0     07-13  Mg     1.9     07-13                  MICROBIOLOGY:     RADIOLOGY:  [ ] Reviewed and interpreted by me    PULMONARY FUNCTION TESTS:    EKG: CHIEF COMPLAINT: feels better, wants to go home    Interval Events: stable oxygenation overnight    REVIEW OF SYSTEMS:  CV: Denies chest pain  Resp: Denies SOB  MSK:  pain improved  [x] All other systems negative      OBJECTIVE:  ICU Vital Signs Last 24 Hrs  T(C): 37.1 (14 Jul 2017 06:21), Max: 37.1 (14 Jul 2017 02:31)  T(F): 98.7 (14 Jul 2017 06:21), Max: 98.7 (14 Jul 2017 02:31)  HR: 69 (14 Jul 2017 06:38) (60 - 94)  BP: 117/63 (14 Jul 2017 06:21) (115/68 - 125/59)  BP(mean): 93 (13 Jul 2017 18:47) (73 - 93)  RR: 18 (14 Jul 2017 06:38) (18 - 20)  SpO2: 100% (14 Jul 2017 06:38) (97% - 100%)        HOSPITAL MEDICATIONS:  MEDICATIONS  (STANDING):  hydroxyurea (Non - oncologic) 2000 milliGRAM(s) Oral daily  folic acid 1 milliGRAM(s) Oral daily  enoxaparin Injectable 40 milliGRAM(s) SubCutaneous daily  Jadenu (Deferasirox) 360 mg Tablet 1080 milliGRAM(s)   Oral daily  lidocaine   Patch 1 Patch Transdermal daily  aztreonam  IVPB   IV Intermittent   aztreonam  IVPB 2000 milliGRAM(s) IV Intermittent every 8 hours  azithromycin  IVPB   IV Intermittent   azithromycin  IVPB 500 milliGRAM(s) IV Intermittent every 24 hours  HYDROmorphone PCA (1 mG/mL) 30 milliLiter(s) PCA Continuous PCA Continuous  sodium chloride 0.45%. 1000 milliLiter(s) (75 mL/Hr) IV Continuous <Continuous>  vancomycin  IVPB 1500 milliGRAM(s) IV Intermittent every 12 hours    MEDICATIONS  (PRN):  naloxone Injectable 0.1 milliGRAM(s) IV Push every 3 minutes PRN For ANY of the following changes in patient status:  A. RR LESS THAN 10 breaths per minute, B. Oxygen saturation LESS THAN 90%, C. Sedation score of 6  ondansetron Injectable 4 milliGRAM(s) IV Push every 6 hours PRN Nausea  acetaminophen   Tablet 650 milliGRAM(s) Oral every 6 hours PRN For Temp greater than 38 C (100.4 F)  petrolatum white Ointment 1 Application(s) Topical three times a day PRN dry lips      LABS:                        6.7    11.70 )-----------( 499      ( 14 Jul 2017 05:30 )             19.9     07-14    143  |  98  |  5<L>  ----------------------------<  98  3.2<L>   |  28  |  0.47<L>    Ca    9.1      14 Jul 2017 05:30  Phos  4.0     07-13  Mg     1.9     07-13

## 2017-07-14 NOTE — DISCHARGE NOTE ADULT - MEDICATION SUMMARY - MEDICATIONS TO TAKE
I will START or STAY ON the medications listed below when I get home from the hospital:    oxyCODONE 10 mg oral tablet  -- 1 tab(s) by mouth every 4 hours, As needed, Moderate and severe pain MDD:60 mg  -- Indication: For Vasoocclusive sickle cell crisis pain    hydroxyurea 500 mg oral capsule  -- 4 cap(s) by mouth once a day  -- Indication: For Sickle cell disease    Jadenu 360 mg oral tablet  -- 6 tab(s) by mouth once a day  -- Indication: For Iron overload due to repeated red blood cell transfusions    folic acid 1 mg oral tablet  -- 1 tab(s) by mouth once a day  -- Indication: For Sickle cell disease

## 2017-07-14 NOTE — PROGRESS NOTE ADULT - PROBLEM SELECTOR PROBLEM 1
Sickle Cell Disease
Sickle cell crisis

## 2017-07-14 NOTE — PROGRESS NOTE ADULT - RS GEN PE MLT RESP DETAILS PC
clear to auscultation bilaterally/respirations non-labored
diminished breath sounds, L/breath sounds equal/respirations non-labored

## 2017-07-14 NOTE — PROGRESS NOTE ADULT - PROVIDER SPECIALTY LIST ADULT
Heme/Onc
Heme/Onc
Hospitalist
Pulmonology
Heme/Onc
Heme/Onc

## 2017-07-14 NOTE — DISCHARGE NOTE ADULT - CARE PROVIDER_API CALL
Kenia Nguyễn), Internal Medicine  [address] [facility] [place] [address]  Phone: [phone]  Fax: [phone]

## 2017-07-14 NOTE — DISCHARGE NOTE ADULT - HOSPITAL COURSE
21M with PMH sickle cell disease, iron overload due to frequent blood transfusions, presented to ED with pain 2/2 vaso-occlusive crisis. Admitted to medicine. Started on PCA pump, IV fluids, bowel regimen, continued on folate and hydrea.  Hematology consulted. Transfused X3 units PRBCs for hgb<5 with adequate recovery. Treated with Vanco and aztreonam for fevers, CXR demonstrated LLL opacity-- atelectasis vs pna. CT chest showed bilateral atelectasis. Incentive spirometry encouraged. Required transfer to RCU for continuous pulse oximetry due to severe hypoxia. Started on high flow nasal O2. Pain and hypoxia resolved. O2 sat 97% on room air on ambulation. Discharged home in stable condition with oxycodone and ibuprofen for pain. To f/u with Dr. Nguyễn within one week. To resume use of Jadenu for iron overload. 21M with PMH sickle cell disease, iron overload due to frequent blood transfusions, presented to ED with pain 2/2 vaso-occlusive crisis. Admitted to medicine. Started on PCA pump, IV fluids, bowel regimen, continued on folate and hydrea.  Hematology consulted. Transfused X3 units PRBCs for hgb<5 with adequate recovery. Treated with Vanco and aztreonam for fevers, CXR demonstrated LLL opacity-- atelectasis vs pna. CT chest showed bilateral atelectasis. Incentive spirometry encouraged. Required transfer to RCU for continuous pulse oximetry due to severe hypoxia. Started on high flow nasal O2. Pain and hypoxia resolved. O2 sat 97% on room air on ambulation. Discharged home in stable condition with oxycodone and ibuprofen for pain. To f/u with Dr. Nguyễn within one week. To resume use of Jadenu for iron overload.   [employer]-Stop reference number [id number]

## 2017-07-14 NOTE — PROGRESS NOTE ADULT - ATTENDING COMMENTS
SCD with crisis resolved, anemia improved, Acute hypoxemic respiratory failure has resolved, patient is now on RA and ready for dc

## 2017-07-14 NOTE — PROGRESS NOTE ADULT - PROBLEM SELECTOR PLAN 1
- continues on PCA pump  - pain improving  - hgb 5.9 today. Heme f/u appreciated. No transfusion planned  - no hypoxia on high flow NC. will attempt to wean back to NC if pt tolerates  -continue hydroxyurea, folic acid - d/c PCA pump, pain resolved  - hgb 6.7 today.   - pulse ox 97% on room air on ambulation  -continue hydroxyurea, folic acid  -Discharge home today

## 2017-07-14 NOTE — PROGRESS NOTE ADULT - ASSESSMENT
22 yo m with sickle cell crisis w/ h/o acute chest syndrome, iron overload 2/2 transfusions, p/w sickle cell crisis .  Patient now Hypoxic- Respiratory distress with Sat 52 % on RA.  nauseous and Vomiting- biliary 22 yo m with sickle cell crisis w/ h/o acute chest syndrome, iron overload 2/2 transfusions, p/w sickle cell crisis . Pain and hypoxia resolved. Stable for discharge.

## 2017-07-21 ENCOUNTER — APPOINTMENT (OUTPATIENT)
Dept: INTERNAL MEDICINE | Facility: HOSPITAL | Age: 22
End: 2017-07-21

## 2017-07-21 ENCOUNTER — OUTPATIENT (OUTPATIENT)
Dept: OUTPATIENT SERVICES | Facility: HOSPITAL | Age: 22
LOS: 1 days | End: 2017-07-21

## 2017-07-21 VITALS — DIASTOLIC BLOOD PRESSURE: 50 MMHG | SYSTOLIC BLOOD PRESSURE: 130 MMHG | OXYGEN SATURATION: 100 %

## 2017-07-21 DIAGNOSIS — D59.6 HEMOGLOBINURIA DUE TO HEMOLYSIS FROM OTHER EXTERNAL CAUSES: ICD-10-CM

## 2017-07-24 DIAGNOSIS — Z92.89 PERSONAL HISTORY OF OTHER MEDICAL TREATMENT: ICD-10-CM

## 2017-07-24 DIAGNOSIS — E83.19 OTHER DISORDERS OF IRON METABOLISM: ICD-10-CM

## 2017-07-24 DIAGNOSIS — D57.1 SICKLE-CELL DISEASE WITHOUT CRISIS: ICD-10-CM

## 2017-07-24 DIAGNOSIS — D59.6 HEMOGLOBINURIA DUE TO HEMOLYSIS FROM OTHER EXTERNAL CAUSES: ICD-10-CM

## 2017-08-17 ENCOUNTER — APPOINTMENT (OUTPATIENT)
Dept: INTERNAL MEDICINE | Facility: HOSPITAL | Age: 22
End: 2017-08-17

## 2017-09-27 ENCOUNTER — INPATIENT (INPATIENT)
Facility: HOSPITAL | Age: 22
LOS: 5 days | Discharge: HOME | End: 2017-10-03
Attending: HOSPITALIST | Admitting: HOSPITALIST
Payer: COMMERCIAL

## 2017-09-27 VITALS
RESPIRATION RATE: 22 BRPM | HEART RATE: 108 BPM | TEMPERATURE: 100 F | SYSTOLIC BLOOD PRESSURE: 127 MMHG | DIASTOLIC BLOOD PRESSURE: 51 MMHG | OXYGEN SATURATION: 96 %

## 2017-09-27 RX ORDER — HYDROMORPHONE HYDROCHLORIDE 2 MG/ML
2 INJECTION INTRAMUSCULAR; INTRAVENOUS; SUBCUTANEOUS ONCE
Qty: 0 | Refills: 0 | Status: DISCONTINUED | OUTPATIENT
Start: 2017-09-27 | End: 2017-09-28

## 2017-09-27 RX ORDER — SODIUM CHLORIDE 9 MG/ML
2000 INJECTION INTRAMUSCULAR; INTRAVENOUS; SUBCUTANEOUS ONCE
Qty: 0 | Refills: 0 | Status: COMPLETED | OUTPATIENT
Start: 2017-09-27 | End: 2017-09-27

## 2017-09-27 NOTE — ED PROVIDER NOTE - OBJECTIVE STATEMENT
21 yo M w/ sickle cell ss w/ h/o acute chest (last episode 6/2017, had acute chest then), cholecystectomy, splenectomy p/w pain in waist, lower back, bilat legs consistent w/ usual pain crisis. Pt denies chest pain, shortness of breath. Pt last pain crisis was 7/2017, admitted to [facility] x 2 wks.     PCP: Kenia Nguyễn (hematologist) 23 yo M w/ sickle cell ss w/ h/o acute chest (last episode 6/2017, had acute chest then), cholecystectomy, splenectomy p/w pain in waist, lower back, bilat legs consistent w/ usual pain crisis. Pt denies chest pain, shortness of breath. Pt last pain crisis was 7/2017, admitted to [facility] x 2 wks.   PCP: Kenia Nguyễn (hematologist)    Klep[place]: 22M PMH sickle cell ss (acute chest a few mos ago), cholecystectomy, splenectomy p/w pain in b/l LE, also minimal b/l UE, x1d, gradual onset, similar to multiple prior episodes. Denies SOB/CP, cough/rhinorrhea, black/bloody stool, abd pain, urinary complaints, specific joint pain. 21 yo M w/ sickle cell ss w/ h/o acute chest (last episode 6/2017, had acute chest then), cholecystectomy, splenectomy p/w pain in waist, lower back, bilat legs consistent w/ usual pain crisis. Pt denies chest pain, shortness of breath. Pt last pain crisis was 7/2017, admitted to [facility] x 2 wks.   PCP: Kenia Nguyễn (hematologist)  [place]pfish: 22M PMH sickle cell ss (acute chest a few mos ago), cholecystectomy, splenectomy p/w pain in b/l LE, also minimal b/l UE, x1d, gradual onset, similar to multiple prior episodes. Denies SOB/CP, cough/rhinorrhea, black/bloody stool, abd pain, urinary complaints, specific joint pain.

## 2017-09-27 NOTE — ED PROVIDER NOTE - PHYSICAL EXAMINATION
*GEN:   extreme discomfort, AOx3    ///    *EYES:   PERRL, extra-occular movements intact    ///    *HEENT:   airway patent, moist mucosal membranes    ///    *CV:   regular rate and rhythm, normal S1/S2    ///    *RESP:   mild wheezing L lower lobe, otherwise clear to auscultation    ///    *ABD:   soft, non tender, no guarding    ///    *:   no cva tenderness    ///    *MSK:   no musculoskeletal tenderness    ///    *SKIN:   dry, intact    ///    *NEURO:   AOx3 no LE edema, normal equal distal pulses.  FROM all extremities, no bony ttp

## 2017-09-27 NOTE — ED PROVIDER NOTE - ATTENDING CONTRIBUTION TO CARE
22M PMH sickle cell ss (acute chest a few mos ago), cholecystectomy, splenectomy p/w pain in b/l LE, also minimal b/l UE, x1d, gradual onset, similar to multiple prior episodes. No other systemic complaints. Slight tachycardia, febrile, other vitals wnl. Exam wnl.  Pain: Likely 2/2 sickle cell crisis.   Fever: Per pt, sometimes gets fever w/ crisis. Meets sepsis criteria. Also s/p splenectomy. at risk for bacteremia.  CBC, cmp, retic, vbg comp, blood cx. CXR. IVF. Broad spectrum abx. Symptom control, reassess. Likely admission.

## 2017-09-27 NOTE — ED PROVIDER NOTE - MEDICAL DECISION MAKING DETAILS
23 yo m w/ usual sickle cell crisis, last in July 2017, was admitted w/ acute chest. Fluids, pain control, labs, low threshold admission and blood

## 2017-09-27 NOTE — ED PROVIDER NOTE - PROGRESS NOTE DETAILS
Dr. George Bullard PGY1: pt reports minimal improvement s/p dilaudid, just received zofran. Will order another 2 mg dilaudid Dr. George Bullard PGY1: pt w/ fever 101, reports pain mild improvement, nausea improved, paged hospitalist for admit Dr. George Bullard PGY1: hospitalist requested notify MICU of pt given hx of acute chest on prior admit in July; notified MICU who recommended consult as needed [place]: Pt w/ no resp complaints. Clinically stable for admission to floor. Dr. George Bullard PGY1: pt w/ fever 103, reports pain mild improvement, nausea improved, paged hospitalist for admit

## 2017-09-28 ENCOUNTER — APPOINTMENT (OUTPATIENT)
Dept: INTERNAL MEDICINE | Facility: HOSPITAL | Age: 22
End: 2017-09-28

## 2017-09-28 DIAGNOSIS — D57.00 HB-SS DISEASE WITH CRISIS, UNSPECIFIED: ICD-10-CM

## 2017-09-28 DIAGNOSIS — Z29.9 ENCOUNTER FOR PROPHYLACTIC MEASURES, UNSPECIFIED: ICD-10-CM

## 2017-09-28 DIAGNOSIS — R65.10 SYSTEMIC INFLAMMATORY RESPONSE SYNDROME (SIRS) OF NON-INFECTIOUS ORIGIN WITHOUT ACUTE ORGAN DYSFUNCTION: ICD-10-CM

## 2017-09-28 DIAGNOSIS — E83.111 HEMOCHROMATOSIS DUE TO REPEATED RED BLOOD CELL TRANSFUSIONS: ICD-10-CM

## 2017-09-28 LAB
ALBUMIN SERPL ELPH-MCNC: 4.7 G/DL — SIGNIFICANT CHANGE UP (ref 3.3–5)
ALP SERPL-CCNC: 96 U/L — SIGNIFICANT CHANGE UP (ref 40–120)
ALT FLD-CCNC: 38 U/L — SIGNIFICANT CHANGE UP (ref 4–41)
ANISOCYTOSIS BLD QL: SIGNIFICANT CHANGE UP
APPEARANCE UR: SIGNIFICANT CHANGE UP
AST SERPL-CCNC: 115 U/L — HIGH (ref 4–40)
BACTERIA # UR AUTO: SIGNIFICANT CHANGE UP
BASE EXCESS BLDV CALC-SCNC: 1 MMOL/L — SIGNIFICANT CHANGE UP
BASOPHILS # BLD AUTO: 0.03 K/UL — SIGNIFICANT CHANGE UP (ref 0–0.2)
BASOPHILS NFR BLD AUTO: 0.2 % — SIGNIFICANT CHANGE UP (ref 0–2)
BASOPHILS NFR SPEC: 0 % — SIGNIFICANT CHANGE UP (ref 0–2)
BILIRUB SERPL-MCNC: 3.6 MG/DL — HIGH (ref 0.2–1.2)
BILIRUB UR-MCNC: NEGATIVE — SIGNIFICANT CHANGE UP
BLASTS # FLD: 0 % — SIGNIFICANT CHANGE UP (ref 0–0)
BLD GP AB SCN SERPL QL: NEGATIVE — SIGNIFICANT CHANGE UP
BLOOD GAS VENOUS - CREATININE: 0.76 MG/DL — SIGNIFICANT CHANGE UP (ref 0.5–1.3)
BLOOD UR QL VISUAL: HIGH
BUN SERPL-MCNC: 7 MG/DL — SIGNIFICANT CHANGE UP (ref 7–23)
BUN SERPL-MCNC: 8 MG/DL — SIGNIFICANT CHANGE UP (ref 7–23)
CALCIUM SERPL-MCNC: 7.8 MG/DL — LOW (ref 8.4–10.5)
CALCIUM SERPL-MCNC: 8.5 MG/DL — SIGNIFICANT CHANGE UP (ref 8.4–10.5)
CHLORIDE BLDV-SCNC: 104 MMOL/L — SIGNIFICANT CHANGE UP (ref 96–108)
CHLORIDE SERPL-SCNC: 101 MMOL/L — SIGNIFICANT CHANGE UP (ref 98–107)
CHLORIDE SERPL-SCNC: 101 MMOL/L — SIGNIFICANT CHANGE UP (ref 98–107)
CO2 SERPL-SCNC: 24 MMOL/L — SIGNIFICANT CHANGE UP (ref 22–31)
CO2 SERPL-SCNC: 25 MMOL/L — SIGNIFICANT CHANGE UP (ref 22–31)
COLOR SPEC: YELLOW — SIGNIFICANT CHANGE UP
CREAT SERPL-MCNC: 0.67 MG/DL — SIGNIFICANT CHANGE UP (ref 0.5–1.3)
CREAT SERPL-MCNC: 0.82 MG/DL — SIGNIFICANT CHANGE UP (ref 0.5–1.3)
ELLIPTOCYTES BLD QL SMEAR: SLIGHT — SIGNIFICANT CHANGE UP
EOSINOPHIL # BLD AUTO: 0.01 K/UL — SIGNIFICANT CHANGE UP (ref 0–0.5)
EOSINOPHIL NFR BLD AUTO: 0.1 % — SIGNIFICANT CHANGE UP (ref 0–6)
EOSINOPHIL NFR FLD: 0 % — SIGNIFICANT CHANGE UP (ref 0–6)
GAS PNL BLDV: 139 MMOL/L — SIGNIFICANT CHANGE UP (ref 136–146)
GIANT PLATELETS BLD QL SMEAR: PRESENT — SIGNIFICANT CHANGE UP
GLUCOSE BLDV-MCNC: 116 — HIGH (ref 70–99)
GLUCOSE SERPL-MCNC: 101 MG/DL — HIGH (ref 70–99)
GLUCOSE SERPL-MCNC: 112 MG/DL — HIGH (ref 70–99)
GLUCOSE UR-MCNC: NEGATIVE — SIGNIFICANT CHANGE UP
HCO3 BLDV-SCNC: 25 MMOL/L — SIGNIFICANT CHANGE UP (ref 20–27)
HCT VFR BLD CALC: 20.5 % — CRITICAL LOW (ref 39–50)
HCT VFR BLD CALC: 21.6 % — LOW (ref 39–50)
HCT VFR BLDV CALC: 22.6 % — LOW (ref 39–51)
HGB BLD-MCNC: 7.3 G/DL — LOW (ref 13–17)
HGB BLD-MCNC: 7.9 G/DL — LOW (ref 13–17)
HGB BLDV-MCNC: 7.2 G/DL — LOW (ref 13–17)
IMM GRANULOCYTES # BLD AUTO: 0.16 # — SIGNIFICANT CHANGE UP
IMM GRANULOCYTES NFR BLD AUTO: 1 % — SIGNIFICANT CHANGE UP (ref 0–1.5)
KETONES UR-MCNC: NEGATIVE — SIGNIFICANT CHANGE UP
LACTATE BLDV-MCNC: 1.3 MMOL/L — SIGNIFICANT CHANGE UP (ref 0.5–2)
LDH SERPL L TO P-CCNC: 1151 U/L — HIGH (ref 135–225)
LEUKOCYTE ESTERASE UR-ACNC: SIGNIFICANT CHANGE UP
LYMPHOCYTES # BLD AUTO: 18.1 % — SIGNIFICANT CHANGE UP (ref 13–44)
LYMPHOCYTES # BLD AUTO: 2.99 K/UL — SIGNIFICANT CHANGE UP (ref 1–3.3)
LYMPHOCYTES NFR SPEC AUTO: 16.7 % — SIGNIFICANT CHANGE UP (ref 13–44)
MACROCYTES BLD QL: SIGNIFICANT CHANGE UP
MAGNESIUM SERPL-MCNC: 1.5 MG/DL — LOW (ref 1.6–2.6)
MCHC RBC-ENTMCNC: 32.7 PG — SIGNIFICANT CHANGE UP (ref 27–34)
MCHC RBC-ENTMCNC: 32.9 PG — SIGNIFICANT CHANGE UP (ref 27–34)
MCHC RBC-ENTMCNC: 35.6 % — SIGNIFICANT CHANGE UP (ref 32–36)
MCHC RBC-ENTMCNC: 36.6 % — HIGH (ref 32–36)
MCV RBC AUTO: 90 FL — SIGNIFICANT CHANGE UP (ref 80–100)
MCV RBC AUTO: 91.9 FL — SIGNIFICANT CHANGE UP (ref 80–100)
METAMYELOCYTES # FLD: 0 % — SIGNIFICANT CHANGE UP (ref 0–1)
MICROCYTES BLD QL: SLIGHT — SIGNIFICANT CHANGE UP
MONOCYTES # BLD AUTO: 3.22 K/UL — HIGH (ref 0–0.9)
MONOCYTES NFR BLD AUTO: 19.4 % — HIGH (ref 2–14)
MONOCYTES NFR BLD: 13.1 % — HIGH (ref 2–9)
MUCOUS THREADS # UR AUTO: SIGNIFICANT CHANGE UP
MYELOCYTES NFR BLD: 0 % — SIGNIFICANT CHANGE UP (ref 0–0)
NEUTROPHIL AB SER-ACNC: 70.2 % — SIGNIFICANT CHANGE UP (ref 43–77)
NEUTROPHILS # BLD AUTO: 10.15 K/UL — HIGH (ref 1.8–7.4)
NEUTROPHILS NFR BLD AUTO: 61.2 % — SIGNIFICANT CHANGE UP (ref 43–77)
NEUTS BAND # BLD: 0 % — SIGNIFICANT CHANGE UP (ref 0–6)
NITRITE UR-MCNC: NEGATIVE — SIGNIFICANT CHANGE UP
NRBC # FLD: 0.75 — SIGNIFICANT CHANGE UP
NRBC FLD-RTO: 4.5 — SIGNIFICANT CHANGE UP
OTHER - HEMATOLOGY %: 0 — SIGNIFICANT CHANGE UP
OVALOCYTES BLD QL SMEAR: SLIGHT — SIGNIFICANT CHANGE UP
PCO2 BLDV: 50 MMHG — SIGNIFICANT CHANGE UP (ref 41–51)
PH BLDV: 7.34 PH — SIGNIFICANT CHANGE UP (ref 7.32–7.43)
PH UR: 6 — SIGNIFICANT CHANGE UP (ref 4.6–8)
PHOSPHATE SERPL-MCNC: 5.4 MG/DL — HIGH (ref 2.5–4.5)
PLATELET # BLD AUTO: 471 K/UL — HIGH (ref 150–400)
PLATELET # BLD AUTO: 475 K/UL — HIGH (ref 150–400)
PLATELET COUNT - ESTIMATE: NORMAL — SIGNIFICANT CHANGE UP
PMV BLD: 10 FL — SIGNIFICANT CHANGE UP (ref 7–13)
PMV BLD: 10.4 FL — SIGNIFICANT CHANGE UP (ref 7–13)
PO2 BLDV: 137 MMHG — HIGH (ref 35–40)
POIKILOCYTOSIS BLD QL AUTO: SIGNIFICANT CHANGE UP
POLYCHROMASIA BLD QL SMEAR: SLIGHT — SIGNIFICANT CHANGE UP
POTASSIUM BLDV-SCNC: 3.5 MMOL/L — SIGNIFICANT CHANGE UP (ref 3.4–4.5)
POTASSIUM SERPL-MCNC: 3.9 MMOL/L — SIGNIFICANT CHANGE UP (ref 3.5–5.3)
POTASSIUM SERPL-MCNC: 4.1 MMOL/L — SIGNIFICANT CHANGE UP (ref 3.5–5.3)
POTASSIUM SERPL-SCNC: 3.9 MMOL/L — SIGNIFICANT CHANGE UP (ref 3.5–5.3)
POTASSIUM SERPL-SCNC: 4.1 MMOL/L — SIGNIFICANT CHANGE UP (ref 3.5–5.3)
PROMYELOCYTES # FLD: 0 % — SIGNIFICANT CHANGE UP (ref 0–0)
PROT SERPL-MCNC: 7.8 G/DL — SIGNIFICANT CHANGE UP (ref 6–8.3)
PROT UR-MCNC: 20 — SIGNIFICANT CHANGE UP
RBC # BLD: 2.23 M/UL — LOW (ref 4.2–5.8)
RBC # BLD: 2.4 M/UL — LOW (ref 4.2–5.8)
RBC # FLD: 20.8 % — HIGH (ref 10.3–14.5)
RBC # FLD: 21.4 % — HIGH (ref 10.3–14.5)
RBC CASTS # UR COMP ASSIST: HIGH (ref 0–?)
RETICS #: 0.2 10X6/UL — HIGH (ref 0.02–0.07)
RETICS #: 0.2 10X6/UL — HIGH (ref 0.02–0.07)
RETICS/RBC NFR: 11 % — HIGH (ref 0.5–2.5)
RETICS/RBC NFR: 7 % — HIGH (ref 0.5–2.5)
REVIEW TO FOLLOW: YES — SIGNIFICANT CHANGE UP
RH IG SCN BLD-IMP: POSITIVE — SIGNIFICANT CHANGE UP
SAO2 % BLDV: 100 % — HIGH (ref 60–85)
SICKLE CELLS BLD QL SMEAR: SIGNIFICANT CHANGE UP
SODIUM SERPL-SCNC: 139 MMOL/L — SIGNIFICANT CHANGE UP (ref 135–145)
SODIUM SERPL-SCNC: 140 MMOL/L — SIGNIFICANT CHANGE UP (ref 135–145)
SP GR SPEC: 1.01 — SIGNIFICANT CHANGE UP (ref 1–1.03)
SQUAMOUS # UR AUTO: SIGNIFICANT CHANGE UP
TARGETS BLD QL SMEAR: SLIGHT — SIGNIFICANT CHANGE UP
UROBILINOGEN FLD QL: NORMAL E.U. — SIGNIFICANT CHANGE UP (ref 0.1–0.2)
VARIANT LYMPHS # BLD: 0 % — SIGNIFICANT CHANGE UP
WBC # BLD: 16.56 K/UL — HIGH (ref 3.8–10.5)
WBC # BLD: 19.03 K/UL — HIGH (ref 3.8–10.5)
WBC # FLD AUTO: 16.56 K/UL — HIGH (ref 3.8–10.5)
WBC # FLD AUTO: 19.03 K/UL — HIGH (ref 3.8–10.5)
WBC UR QL: HIGH (ref 0–?)

## 2017-09-28 PROCEDURE — 12345: CPT | Mod: NC

## 2017-09-28 PROCEDURE — 99223 1ST HOSP IP/OBS HIGH 75: CPT

## 2017-09-28 PROCEDURE — 71010: CPT | Mod: 26

## 2017-09-28 RX ORDER — SODIUM CHLORIDE 9 MG/ML
1000 INJECTION, SOLUTION INTRAVENOUS
Qty: 0 | Refills: 0 | Status: DISCONTINUED | OUTPATIENT
Start: 2017-09-28 | End: 2017-10-03

## 2017-09-28 RX ORDER — HYDROMORPHONE HYDROCHLORIDE 2 MG/ML
2 INJECTION INTRAMUSCULAR; INTRAVENOUS; SUBCUTANEOUS EVERY 4 HOURS
Qty: 0 | Refills: 0 | Status: DISCONTINUED | OUTPATIENT
Start: 2017-09-28 | End: 2017-09-28

## 2017-09-28 RX ORDER — NALOXONE HYDROCHLORIDE 4 MG/.1ML
0.1 SPRAY NASAL
Qty: 0 | Refills: 0 | Status: DISCONTINUED | OUTPATIENT
Start: 2017-09-28 | End: 2017-10-03

## 2017-09-28 RX ORDER — VANCOMYCIN HCL 1 G
1000 VIAL (EA) INTRAVENOUS ONCE
Qty: 0 | Refills: 0 | Status: COMPLETED | OUTPATIENT
Start: 2017-09-28 | End: 2017-09-28

## 2017-09-28 RX ORDER — DIPHENHYDRAMINE HCL 50 MG
25 CAPSULE ORAL EVERY 4 HOURS
Qty: 0 | Refills: 0 | Status: DISCONTINUED | OUTPATIENT
Start: 2017-09-28 | End: 2017-10-03

## 2017-09-28 RX ORDER — AZTREONAM 2 G
1000 VIAL (EA) INJECTION EVERY 8 HOURS
Qty: 0 | Refills: 0 | Status: DISCONTINUED | OUTPATIENT
Start: 2017-09-29 | End: 2017-09-29

## 2017-09-28 RX ORDER — OXYCODONE HYDROCHLORIDE 5 MG/1
10 TABLET ORAL EVERY 4 HOURS
Qty: 0 | Refills: 0 | Status: DISCONTINUED | OUTPATIENT
Start: 2017-09-28 | End: 2017-10-03

## 2017-09-28 RX ORDER — SENNA PLUS 8.6 MG/1
2 TABLET ORAL AT BEDTIME
Qty: 0 | Refills: 0 | Status: DISCONTINUED | OUTPATIENT
Start: 2017-09-28 | End: 2017-10-03

## 2017-09-28 RX ORDER — OXYCODONE HYDROCHLORIDE 5 MG/1
10 TABLET ORAL EVERY 4 HOURS
Qty: 0 | Refills: 0 | Status: DISCONTINUED | OUTPATIENT
Start: 2017-09-28 | End: 2017-09-28

## 2017-09-28 RX ORDER — HYDROMORPHONE HYDROCHLORIDE 2 MG/ML
1 INJECTION INTRAMUSCULAR; INTRAVENOUS; SUBCUTANEOUS ONCE
Qty: 0 | Refills: 0 | Status: DISCONTINUED | OUTPATIENT
Start: 2017-09-28 | End: 2017-10-02

## 2017-09-28 RX ORDER — AZTREONAM 2 G
1000 VIAL (EA) INJECTION ONCE
Qty: 0 | Refills: 0 | Status: COMPLETED | OUTPATIENT
Start: 2017-09-28 | End: 2017-09-28

## 2017-09-28 RX ORDER — HYDROMORPHONE HYDROCHLORIDE 2 MG/ML
2 INJECTION INTRAMUSCULAR; INTRAVENOUS; SUBCUTANEOUS ONCE
Qty: 0 | Refills: 0 | Status: DISCONTINUED | OUTPATIENT
Start: 2017-09-28 | End: 2017-09-28

## 2017-09-28 RX ORDER — AZTREONAM 2 G
1000 VIAL (EA) INJECTION ONCE
Qty: 0 | Refills: 0 | Status: COMPLETED | OUTPATIENT
Start: 2017-09-28 | End: 2017-09-29

## 2017-09-28 RX ORDER — VANCOMYCIN HCL 1 G
1250 VIAL (EA) INTRAVENOUS ONCE
Qty: 0 | Refills: 0 | Status: COMPLETED | OUTPATIENT
Start: 2017-09-28 | End: 2017-09-29

## 2017-09-28 RX ORDER — HYDROMORPHONE HYDROCHLORIDE 2 MG/ML
1 INJECTION INTRAMUSCULAR; INTRAVENOUS; SUBCUTANEOUS ONCE
Qty: 0 | Refills: 0 | Status: DISCONTINUED | OUTPATIENT
Start: 2017-09-28 | End: 2017-09-28

## 2017-09-28 RX ORDER — VANCOMYCIN HCL 1 G
1250 VIAL (EA) INTRAVENOUS EVERY 12 HOURS
Qty: 0 | Refills: 0 | Status: DISCONTINUED | OUTPATIENT
Start: 2017-09-29 | End: 2017-09-29

## 2017-09-28 RX ORDER — MAGNESIUM OXIDE 400 MG ORAL TABLET 241.3 MG
400 TABLET ORAL
Qty: 0 | Refills: 0 | Status: COMPLETED | OUTPATIENT
Start: 2017-09-28 | End: 2017-09-28

## 2017-09-28 RX ORDER — ENOXAPARIN SODIUM 100 MG/ML
40 INJECTION SUBCUTANEOUS EVERY 24 HOURS
Qty: 0 | Refills: 0 | Status: DISCONTINUED | OUTPATIENT
Start: 2017-09-28 | End: 2017-10-03

## 2017-09-28 RX ORDER — HYDROMORPHONE HYDROCHLORIDE 2 MG/ML
1 INJECTION INTRAMUSCULAR; INTRAVENOUS; SUBCUTANEOUS EVERY 4 HOURS
Qty: 0 | Refills: 0 | Status: DISCONTINUED | OUTPATIENT
Start: 2017-09-28 | End: 2017-09-28

## 2017-09-28 RX ORDER — FOLIC ACID 0.8 MG
1 TABLET ORAL DAILY
Qty: 0 | Refills: 0 | Status: DISCONTINUED | OUTPATIENT
Start: 2017-09-28 | End: 2017-10-03

## 2017-09-28 RX ORDER — DOCUSATE SODIUM 100 MG
100 CAPSULE ORAL DAILY
Qty: 0 | Refills: 0 | Status: DISCONTINUED | OUTPATIENT
Start: 2017-09-28 | End: 2017-09-30

## 2017-09-28 RX ORDER — ONDANSETRON 8 MG/1
4 TABLET, FILM COATED ORAL EVERY 6 HOURS
Qty: 0 | Refills: 0 | Status: DISCONTINUED | OUTPATIENT
Start: 2017-09-28 | End: 2017-09-29

## 2017-09-28 RX ORDER — HYDROMORPHONE HYDROCHLORIDE 2 MG/ML
30 INJECTION INTRAMUSCULAR; INTRAVENOUS; SUBCUTANEOUS
Qty: 0 | Refills: 0 | Status: DISCONTINUED | OUTPATIENT
Start: 2017-09-28 | End: 2017-10-02

## 2017-09-28 RX ORDER — VANCOMYCIN HCL 1 G
VIAL (EA) INTRAVENOUS
Qty: 0 | Refills: 0 | Status: DISCONTINUED | OUTPATIENT
Start: 2017-09-29 | End: 2017-09-29

## 2017-09-28 RX ORDER — AZTREONAM 2 G
VIAL (EA) INJECTION
Qty: 0 | Refills: 0 | Status: DISCONTINUED | OUTPATIENT
Start: 2017-09-29 | End: 2017-09-29

## 2017-09-28 RX ORDER — ACETAMINOPHEN 500 MG
650 TABLET ORAL ONCE
Qty: 0 | Refills: 0 | Status: COMPLETED | OUTPATIENT
Start: 2017-09-28 | End: 2017-09-28

## 2017-09-28 RX ORDER — KETOROLAC TROMETHAMINE 30 MG/ML
30 SYRINGE (ML) INJECTION ONCE
Qty: 0 | Refills: 0 | Status: DISCONTINUED | OUTPATIENT
Start: 2017-09-28 | End: 2017-09-28

## 2017-09-28 RX ORDER — ONDANSETRON 8 MG/1
4 TABLET, FILM COATED ORAL ONCE
Qty: 0 | Refills: 0 | Status: COMPLETED | OUTPATIENT
Start: 2017-09-28 | End: 2017-09-28

## 2017-09-28 RX ORDER — ACETAMINOPHEN 500 MG
1000 TABLET ORAL ONCE
Qty: 0 | Refills: 0 | Status: COMPLETED | OUTPATIENT
Start: 2017-09-28 | End: 2017-09-28

## 2017-09-28 RX ADMIN — HYDROMORPHONE HYDROCHLORIDE 2 MILLIGRAM(S): 2 INJECTION INTRAMUSCULAR; INTRAVENOUS; SUBCUTANEOUS at 14:31

## 2017-09-28 RX ADMIN — ONDANSETRON 4 MILLIGRAM(S): 8 TABLET, FILM COATED ORAL at 00:31

## 2017-09-28 RX ADMIN — HYDROMORPHONE HYDROCHLORIDE 2 MILLIGRAM(S): 2 INJECTION INTRAMUSCULAR; INTRAVENOUS; SUBCUTANEOUS at 02:49

## 2017-09-28 RX ADMIN — Medication 1 TABLET(S): at 13:13

## 2017-09-28 RX ADMIN — SODIUM CHLORIDE 75 MILLILITER(S): 9 INJECTION, SOLUTION INTRAVENOUS at 04:20

## 2017-09-28 RX ADMIN — SODIUM CHLORIDE 75 MILLILITER(S): 9 INJECTION, SOLUTION INTRAVENOUS at 08:58

## 2017-09-28 RX ADMIN — Medication 650 MILLIGRAM(S): at 21:25

## 2017-09-28 RX ADMIN — SENNA PLUS 2 TABLET(S): 8.6 TABLET ORAL at 21:26

## 2017-09-28 RX ADMIN — Medication 30 MILLIGRAM(S): at 01:31

## 2017-09-28 RX ADMIN — HYDROMORPHONE HYDROCHLORIDE 2 MILLIGRAM(S): 2 INJECTION INTRAMUSCULAR; INTRAVENOUS; SUBCUTANEOUS at 00:28

## 2017-09-28 RX ADMIN — HYDROMORPHONE HYDROCHLORIDE 2 MILLIGRAM(S): 2 INJECTION INTRAMUSCULAR; INTRAVENOUS; SUBCUTANEOUS at 08:58

## 2017-09-28 RX ADMIN — HYDROMORPHONE HYDROCHLORIDE 2 MILLIGRAM(S): 2 INJECTION INTRAMUSCULAR; INTRAVENOUS; SUBCUTANEOUS at 09:05

## 2017-09-28 RX ADMIN — Medication 400 MILLIGRAM(S): at 01:27

## 2017-09-28 RX ADMIN — ONDANSETRON 4 MILLIGRAM(S): 8 TABLET, FILM COATED ORAL at 23:36

## 2017-09-28 RX ADMIN — HYDROMORPHONE HYDROCHLORIDE 2 MILLIGRAM(S): 2 INJECTION INTRAMUSCULAR; INTRAVENOUS; SUBCUTANEOUS at 00:31

## 2017-09-28 RX ADMIN — Medication 50 MILLIGRAM(S): at 03:01

## 2017-09-28 RX ADMIN — Medication 1 MILLIGRAM(S): at 13:12

## 2017-09-28 RX ADMIN — HYDROMORPHONE HYDROCHLORIDE 2 MILLIGRAM(S): 2 INJECTION INTRAMUSCULAR; INTRAVENOUS; SUBCUTANEOUS at 01:00

## 2017-09-28 RX ADMIN — HYDROMORPHONE HYDROCHLORIDE 30 MILLILITER(S): 2 INJECTION INTRAMUSCULAR; INTRAVENOUS; SUBCUTANEOUS at 20:33

## 2017-09-28 RX ADMIN — HYDROMORPHONE HYDROCHLORIDE 2 MILLIGRAM(S): 2 INJECTION INTRAMUSCULAR; INTRAVENOUS; SUBCUTANEOUS at 01:30

## 2017-09-28 RX ADMIN — HYDROMORPHONE HYDROCHLORIDE 2 MILLIGRAM(S): 2 INJECTION INTRAMUSCULAR; INTRAVENOUS; SUBCUTANEOUS at 15:01

## 2017-09-28 RX ADMIN — HYDROMORPHONE HYDROCHLORIDE 1 MILLIGRAM(S): 2 INJECTION INTRAMUSCULAR; INTRAVENOUS; SUBCUTANEOUS at 11:00

## 2017-09-28 RX ADMIN — SODIUM CHLORIDE 2000 MILLILITER(S): 9 INJECTION INTRAMUSCULAR; INTRAVENOUS; SUBCUTANEOUS at 00:22

## 2017-09-28 RX ADMIN — HYDROMORPHONE HYDROCHLORIDE 2 MILLIGRAM(S): 2 INJECTION INTRAMUSCULAR; INTRAVENOUS; SUBCUTANEOUS at 02:19

## 2017-09-28 RX ADMIN — Medication 30 MILLIGRAM(S): at 10:54

## 2017-09-28 RX ADMIN — MAGNESIUM OXIDE 400 MG ORAL TABLET 400 MILLIGRAM(S): 241.3 TABLET ORAL at 13:12

## 2017-09-28 RX ADMIN — Medication 250 MILLIGRAM(S): at 01:27

## 2017-09-28 RX ADMIN — HYDROMORPHONE HYDROCHLORIDE 30 MILLILITER(S): 2 INJECTION INTRAMUSCULAR; INTRAVENOUS; SUBCUTANEOUS at 16:49

## 2017-09-28 RX ADMIN — Medication 100 MILLIGRAM(S): at 13:12

## 2017-09-28 RX ADMIN — MAGNESIUM OXIDE 400 MG ORAL TABLET 400 MILLIGRAM(S): 241.3 TABLET ORAL at 19:39

## 2017-09-28 RX ADMIN — MAGNESIUM OXIDE 400 MG ORAL TABLET 400 MILLIGRAM(S): 241.3 TABLET ORAL at 10:16

## 2017-09-28 RX ADMIN — Medication 30 MILLIGRAM(S): at 01:01

## 2017-09-28 NOTE — CHART NOTE - NSCHARTNOTEFT_GEN_A_CORE
Pt arrived on floor with 7/10 pain, awaiting dilaudid pump.  Pt had dilaudid 2mg ivp approx 2 hours ago with incomplete relief .  Will order 1mg IVP x 1 now while awaiting pump to arrive to floor for pts pain.  BP stable, pt awake alert RR stable

## 2017-09-28 NOTE — H&P ADULT - NSHPPHYSICALEXAM_GEN_ALL_CORE
Vital Signs Last 24 Hrs  T(C): 37.4 (28 Sep 2017 02:19), Max: 39.4 (28 Sep 2017 01:01)  T(F): 99.4 (28 Sep 2017 02:19), Max: 102.9 (28 Sep 2017 01:01)  HR: 72 (28 Sep 2017 04:23) (72 - 108)  BP: 116/47 (28 Sep 2017 04:23) (116/47 - 150/56)  BP(mean): --  RR: 18 (28 Sep 2017 04:23) (18 - 22)  SpO2: 100% (28 Sep 2017 04:23) (94% - 100%)    GENERAL: No acute distress, well-developed  HEAD:  Atraumatic, Normocephalic  ENT: EOMI, PERRLA, conjunctiva and sclera clear, Neck supple, No JVD, moist mucosa  CHEST/LUNG: Clear to auscultation bilaterally; No wheeze, equal breath sounds bilaterally   BACK: No spinal tenderness  HEART: Regular rate and rhythm; No murmurs, rubs, or gallops  ABDOMEN: Soft, Nontender, Nondistended; Bowel sounds present  EXTREMITIES:  No clubbing, cyanosis, or edema  PSYCH: Nl behavior, nl affect  NEUROLOGY: AAOx3, non-focal, cranial nerves intact  SKIN: Normal color, No rashes or lesions

## 2017-09-28 NOTE — H&P ADULT - ASSESSMENT
This is a 22M with history of SCD, hx of ACS in past and Iron Overload Syndrome who presents to the hospital with a sickle cell crisis.

## 2017-09-28 NOTE — CHART NOTE - NSCHARTNOTEFT_GEN_A_CORE
Patient was evaluated and admitted by my colleague this morning.  I have personally seen and examined this patient myself and have reviewed admission H&P, labs, orders, and consultant notes.    This is a 22M with history of SCD, hx of ACS in past and Iron Overload Syndrome who presents to the hospital with bilateral lower extremity pain typical of his sickle cell pain crisis.  No fever, chills, SOB, or chest pain.  No signs/symptoms suggestive of infection at this point.  Last BM yesterday.  -patient being moved to PCA capable location now  -start dilaudid PCA and d/c IVP dilaudid when PCA is started  -c/w IVF hydration  -incentive spirometry  -supplemental O2 prn  -monitor CBC, LDH, retic daily  -senna/colace for bowel regimen  -DVT ppx

## 2017-09-28 NOTE — H&P ADULT - PROBLEM SELECTOR PLAN 2
- Patient with fevers, tachycardia and leukocytosis but no discernable signs of infection at present, CXR clear, abd soft/NT; likely has SIRS in setting of his acute VOC  - Given vanc/aztreonam in ED, would hold off on further abx for now  - f/u BCx and UCx  - If spikes fever then would reconsider abx

## 2017-09-28 NOTE — H&P ADULT - NSHPREVIEWOFSYSTEMS_GEN_ALL_CORE
REVIEW OF SYSTEMS:    CONSTITUTIONAL: No weakness, fevers or chills  EYES/ENT: No visual changes;  No dysphagia  NECK: No pain or stiffness  RESPIRATORY: No cough, wheezing, hemoptysis; No shortness of breath  CARDIOVASCULAR: No chest pain or palpitations; No lower extremity edema  GASTROINTESTINAL: No abdominal or epigastric pain. No nausea, vomiting, or hematemesis; No diarrhea or constipation. No melena or hematochezia.  BACK: No back pain  EXT: b/l LE pain  GENITOURINARY: No dysuria, frequency or hematuria  NEUROLOGICAL: No numbness or weakness  SKIN: No itching, burning, rashes, or lesions   All other review of systems is negative unless indicated above.

## 2017-09-28 NOTE — H&P ADULT - NSHPLABSRESULTS_GEN_ALL_CORE
LABS and ADDITIONAL STUDIES:                        7.9    19.03 )-----------( 471      ( 27 Sep 2017 23:39 )             21.6   Retic % - 7.0%        140  |  101  |  7   ----------------------------<  112<H>  4.1   |  25  |  0.82    Ca    8.5      28 Sep 2017 00:07    TPro  7.8  /  Alb  4.7  /  TBili  3.6<H>  /  DBili  x   /  AST  115<H>  /  ALT  38  /  AlkPhos  96      LIVER FUNCTIONS - ( 28 Sep 2017 00:07 )  Alb: 4.7 g/dL / Pro: 7.8 g/dL / ALK PHOS: 96 u/L / ALT: 38 u/L / AST: 115 u/L / GGT: x           Urinalysis Basic - ( 28 Sep 2017 02:00 )  Color: YELLOW / Appearance: HAZY / S.012 / pH: 6.0  Gluc: NEGATIVE / Ketone: NEGATIVE  / Bili: NEGATIVE / Urobili: NORMAL E.U.   Blood: SMALL / Protein: 20 / Nitrite: NEGATIVE   Leuk Esterase: TRACE / RBC: 5-10 / WBC 5-10   Sq Epi: OCC / Non Sq Epi: x / Bacteria: FEW    CXR 0 prelim - clear lungs

## 2017-09-28 NOTE — ED ADULT NURSE NOTE - OBJECTIVE STATEMENT
Patient received in room #22 c/o SCC. Patient reports pain to B/L LE, waist, and lower back. Patient A&Ox3, ambulatory. Patient reports pain is usually in these areas when in crisis. Patient reports O2 low when in pain. Patient placed on 2L NC. Patient denies any chest pain or difficulty breathing. 20G IV placed by TREY Graves. Labs drawn and sent. Will monitor.

## 2017-09-28 NOTE — H&P ADULT - HISTORY OF PRESENT ILLNESS
This is a 22M with history of Sickle Cell disease with multiple hospitalizations for VOC, hx of ACS in past (last episode in July 2017 s/p 3U pRBC transfusion, and Iron Overload syndrome presents to the hospital with complaints of b/l LE pain that started on the morning of 9/27/17. Said that he is usually able to control his pain at home with ibuprofen and oxycodone prn but was unable to obtain full pain relief at home and he therefore came to the hospital. Currently said that the pain in his legs is still there, said that it is a 5/10 currently (acceptable pain level for him is 2-3/10). Denies any pain in his lower back, chest or UE. Denies any SOB, cough, URI symptoms, abdominal pain, n/v/d, or  complaints. No other complaints.    In the ED, his vitals were T 99.8, Tm102.9, P 108, /51, R 20, O2 sat 96% RA. His lab work showed leukocytosis, anemia, reticulocytosis, mild ARTURO, and elevated TBili. His UA was negative and his CXR showed clear lungs. He was given acetaminophen 1000mg IVPB x1, vancomycin 1g IVPB x1, aztreonam 1000mg IVPB x1, hydromorphone 2mg IVP x3, ketorolac 30mg IVP x1, zofran 4mg IVP x1 and NS 2L. He was admitted to medicine.

## 2017-09-28 NOTE — H&P ADULT - PROBLEM SELECTOR PLAN 1
- Patient with sickle cell crisis, would c/w pain relief with dilaudid prn/dilaudid PCA when on floors  - Will c/w folic acid  - Incentive spirometry  - trend LDH and retic count in AM  - Clarify hydroxyurea dosing in AM (pt states he takes 4 caps of 500mg daily but Dr. Nguyễn's notes state that patient is supposed to be on 3 caps of 500mg daily)  - Bowel regimen

## 2017-09-29 LAB
ALBUMIN SERPL ELPH-MCNC: 4 G/DL — SIGNIFICANT CHANGE UP (ref 3.3–5)
ALP SERPL-CCNC: 85 U/L — SIGNIFICANT CHANGE UP (ref 40–120)
ALT FLD-CCNC: 45 U/L — HIGH (ref 4–41)
APPEARANCE UR: SIGNIFICANT CHANGE UP
AST SERPL-CCNC: 84 U/L — HIGH (ref 4–40)
BASOPHILS # BLD AUTO: 0.03 K/UL — SIGNIFICANT CHANGE UP (ref 0–0.2)
BASOPHILS NFR BLD AUTO: 0.2 % — SIGNIFICANT CHANGE UP (ref 0–2)
BILIRUB SERPL-MCNC: 4.9 MG/DL — HIGH (ref 0.2–1.2)
BILIRUB UR-MCNC: NEGATIVE — SIGNIFICANT CHANGE UP
BLOOD UR QL VISUAL: HIGH
BUN SERPL-MCNC: 11 MG/DL — SIGNIFICANT CHANGE UP (ref 7–23)
CALCIUM SERPL-MCNC: 8.7 MG/DL — SIGNIFICANT CHANGE UP (ref 8.4–10.5)
CHLORIDE SERPL-SCNC: 102 MMOL/L — SIGNIFICANT CHANGE UP (ref 98–107)
CO2 SERPL-SCNC: 26 MMOL/L — SIGNIFICANT CHANGE UP (ref 22–31)
COLOR SPEC: YELLOW — SIGNIFICANT CHANGE UP
CREAT SERPL-MCNC: 0.64 MG/DL — SIGNIFICANT CHANGE UP (ref 0.5–1.3)
EOSINOPHIL # BLD AUTO: 0.01 K/UL — SIGNIFICANT CHANGE UP (ref 0–0.5)
EOSINOPHIL NFR BLD AUTO: 0.1 % — SIGNIFICANT CHANGE UP (ref 0–6)
GLUCOSE SERPL-MCNC: 96 MG/DL — SIGNIFICANT CHANGE UP (ref 70–99)
GLUCOSE UR-MCNC: NEGATIVE — SIGNIFICANT CHANGE UP
HCT VFR BLD CALC: 17.8 % — CRITICAL LOW (ref 39–50)
HGB BLD-MCNC: 5.9 G/DL — CRITICAL LOW (ref 13–17)
IMM GRANULOCYTES # BLD AUTO: 0.09 # — SIGNIFICANT CHANGE UP
IMM GRANULOCYTES NFR BLD AUTO: 0.5 % — SIGNIFICANT CHANGE UP (ref 0–1.5)
KETONES UR-MCNC: NEGATIVE — SIGNIFICANT CHANGE UP
LDH SERPL L TO P-CCNC: 1217 U/L — HIGH (ref 135–225)
LEUKOCYTE ESTERASE UR-ACNC: NEGATIVE — SIGNIFICANT CHANGE UP
LYMPHOCYTES # BLD AUTO: 18.4 % — SIGNIFICANT CHANGE UP (ref 13–44)
LYMPHOCYTES # BLD AUTO: 3.52 K/UL — HIGH (ref 1–3.3)
MAGNESIUM SERPL-MCNC: 1.8 MG/DL — SIGNIFICANT CHANGE UP (ref 1.6–2.6)
MANUAL SMEAR VERIFICATION: SIGNIFICANT CHANGE UP
MCHC RBC-ENTMCNC: 29.2 PG — SIGNIFICANT CHANGE UP (ref 27–34)
MCHC RBC-ENTMCNC: 33.1 % — SIGNIFICANT CHANGE UP (ref 32–36)
MCV RBC AUTO: 87.3 FL — SIGNIFICANT CHANGE UP (ref 80–100)
MONOCYTES # BLD AUTO: 3.44 K/UL — HIGH (ref 0–0.9)
MONOCYTES NFR BLD AUTO: 17.9 % — HIGH (ref 2–14)
NEUTROPHILS # BLD AUTO: 12.09 K/UL — HIGH (ref 1.8–7.4)
NEUTROPHILS NFR BLD AUTO: 62.9 % — SIGNIFICANT CHANGE UP (ref 43–77)
NITRITE UR-MCNC: NEGATIVE — SIGNIFICANT CHANGE UP
NRBC # FLD: 0.64 — SIGNIFICANT CHANGE UP
NRBC FLD-RTO: 3.3 — SIGNIFICANT CHANGE UP
PH UR: 6 — SIGNIFICANT CHANGE UP (ref 4.6–8)
PHOSPHATE SERPL-MCNC: 4.5 MG/DL — SIGNIFICANT CHANGE UP (ref 2.5–4.5)
PLATELET # BLD AUTO: 368 K/UL — SIGNIFICANT CHANGE UP (ref 150–400)
PMV BLD: 10.5 FL — SIGNIFICANT CHANGE UP (ref 7–13)
POTASSIUM SERPL-MCNC: 3.9 MMOL/L — SIGNIFICANT CHANGE UP (ref 3.5–5.3)
POTASSIUM SERPL-SCNC: 3.9 MMOL/L — SIGNIFICANT CHANGE UP (ref 3.5–5.3)
PROT SERPL-MCNC: 7.1 G/DL — SIGNIFICANT CHANGE UP (ref 6–8.3)
PROT UR-MCNC: 100 — SIGNIFICANT CHANGE UP
RBC # BLD: 2.04 M/UL — LOW (ref 4.2–5.8)
RBC # FLD: 20.4 % — HIGH (ref 10.3–14.5)
RBC CASTS # UR COMP ASSIST: SIGNIFICANT CHANGE UP (ref 0–?)
RETICS #: 0.1 10X6/UL — HIGH (ref 0.02–0.07)
RETICS/RBC NFR: 7.1 % — HIGH (ref 0.5–2.5)
SODIUM SERPL-SCNC: 141 MMOL/L — SIGNIFICANT CHANGE UP (ref 135–145)
SP GR SPEC: 1.01 — SIGNIFICANT CHANGE UP (ref 1–1.03)
SPECIMEN SOURCE: SIGNIFICANT CHANGE UP
SPECIMEN SOURCE: SIGNIFICANT CHANGE UP
UROBILINOGEN FLD QL: NORMAL E.U. — SIGNIFICANT CHANGE UP (ref 0.1–0.2)
WBC # BLD: 19.18 K/UL — HIGH (ref 3.8–10.5)
WBC # FLD AUTO: 19.18 K/UL — HIGH (ref 3.8–10.5)
WBC UR QL: SIGNIFICANT CHANGE UP (ref 0–?)

## 2017-09-29 PROCEDURE — 99233 SBSQ HOSP IP/OBS HIGH 50: CPT

## 2017-09-29 RX ORDER — ONDANSETRON 8 MG/1
4 TABLET, FILM COATED ORAL EVERY 6 HOURS
Qty: 0 | Refills: 0 | Status: DISCONTINUED | OUTPATIENT
Start: 2017-09-29 | End: 2017-10-03

## 2017-09-29 RX ORDER — ACETAMINOPHEN 500 MG
650 TABLET ORAL EVERY 6 HOURS
Qty: 0 | Refills: 0 | Status: DISCONTINUED | OUTPATIENT
Start: 2017-09-29 | End: 2017-10-03

## 2017-09-29 RX ORDER — ACETAMINOPHEN 500 MG
650 TABLET ORAL ONCE
Qty: 0 | Refills: 0 | Status: COMPLETED | OUTPATIENT
Start: 2017-09-29 | End: 2017-09-29

## 2017-09-29 RX ADMIN — Medication 100 MILLIGRAM(S): at 13:16

## 2017-09-29 RX ADMIN — Medication 650 MILLIGRAM(S): at 17:15

## 2017-09-29 RX ADMIN — Medication 1 MILLIGRAM(S): at 13:15

## 2017-09-29 RX ADMIN — HYDROMORPHONE HYDROCHLORIDE 30 MILLILITER(S): 2 INJECTION INTRAMUSCULAR; INTRAVENOUS; SUBCUTANEOUS at 13:15

## 2017-09-29 RX ADMIN — HYDROMORPHONE HYDROCHLORIDE 30 MILLILITER(S): 2 INJECTION INTRAMUSCULAR; INTRAVENOUS; SUBCUTANEOUS at 20:29

## 2017-09-29 RX ADMIN — SENNA PLUS 2 TABLET(S): 8.6 TABLET ORAL at 22:39

## 2017-09-29 RX ADMIN — Medication 50 MILLIGRAM(S): at 08:01

## 2017-09-29 RX ADMIN — Medication 166.67 MILLIGRAM(S): at 01:07

## 2017-09-29 RX ADMIN — Medication 50 MILLIGRAM(S): at 00:02

## 2017-09-29 RX ADMIN — HYDROMORPHONE HYDROCHLORIDE 30 MILLILITER(S): 2 INJECTION INTRAMUSCULAR; INTRAVENOUS; SUBCUTANEOUS at 08:40

## 2017-09-29 RX ADMIN — Medication 1 TABLET(S): at 13:16

## 2017-09-29 RX ADMIN — ONDANSETRON 4 MILLIGRAM(S): 8 TABLET, FILM COATED ORAL at 08:45

## 2017-09-29 RX ADMIN — ONDANSETRON 4 MILLIGRAM(S): 8 TABLET, FILM COATED ORAL at 17:15

## 2017-09-29 RX ADMIN — Medication 650 MILLIGRAM(S): at 08:01

## 2017-09-29 NOTE — PROGRESS NOTE ADULT - PROBLEM SELECTOR PLAN 2
-having fevers, but no obvious source of infection at this point  -fevers probably related to sickle cell crisis  -blood cultures negative to date  -hold off abx and monitor

## 2017-09-29 NOTE — PROGRESS NOTE ADULT - SUBJECTIVE AND OBJECTIVE BOX
CHIEF COMPLAINT:  Patient is a 22y old  Male who presents with a chief complaint of B/L LE pain (28 Sep 2017 04:54)    SUBJECTIVE / OVERNIGHT EVENTS:  Reported several episode of vomiting overnight, but none this morning.  No abdominal pain, chest pain, SOB.  Denies any diarrhea.  Last BM 2 days ago but is passing gas.  Also had a fever, but denies any chills.  No dysuria, rash, sore throat, cough, HA, neck stiffness, photophobia.  Says that his b/l leg pain is improving.    MEDICATIONS  (STANDING):  enoxaparin Injectable 40 milliGRAM(s) SubCutaneous every 24 hours  sodium chloride 0.45%. 1000 milliLiter(s) (75 mL/Hr) IV Continuous <Continuous>  folic acid 1 milliGRAM(s) Oral daily  multivitamin 1 Tablet(s) Oral daily  HYDROmorphone PCA (1 mG/mL) 30 milliLiter(s) PCA Continuous PCA Continuous  docusate sodium 100 milliGRAM(s) Oral daily  senna 2 Tablet(s) Oral at bedtime  HYDROmorphone  Injectable 1 milliGRAM(s) IV Push once    MEDICATIONS  (PRN):  diphenhydrAMINE   Capsule 25 milliGRAM(s) Oral every 4 hours PRN Rash and/or Itching  naloxone Injectable 0.1 milliGRAM(s) IV Push every 3 minutes PRN For ANY of the following changes in patient status:  A. RR LESS THAN 10 breaths per minute, B. Oxygen saturation LESS THAN 90%, C. Sedation score of 6  ondansetron Injectable 4 milliGRAM(s) IV Push every 6 hours PRN Nausea  oxyCODONE    IR 10 milliGRAM(s) Oral every 4 hours PRN Moderate Pain (4 - 6)      Vital Signs Last 24 Hrs  T(C): 37.6 (17 @ 12:00)  T(F): 99.6 (17 @ 12:00), Max: 102.8 (17 @ 08:00)  HR: 81 (17 @ 12:00) (70 - 99)  BP: 123/35 (17 @ 12:00)  BP(mean): --  RR: 18 (17 @ 12:00) (17 - 18)  SpO2: 99% (17 @ 08:00) (99% - 100%)  Wt(kg): --     @ 07:  -   @ 07:00  --------------------------------------------------------  IN: 0 mL / OUT: 1400 mL / NET: -1400 mL      CAPILLARY BLOOD GLUCOSE        I&O's Summary    28 Sep 2017 07:01  -  29 Sep 2017 07:00  --------------------------------------------------------  IN: 0 mL / OUT: 1400 mL / NET: -1400 mL        PHYSICAL EXAM:  GENERAL: NAD, well-developed  HEAD:  Atraumatic, Normocephalic  NECK: Supple, No JVD  CHEST/LUNG: Clear to auscultation bilaterally; No wheeze  HEART: Regular rate and rhythm   ABDOMEN: Soft, Nontender, Nondistended; Bowel sounds present  EXTREMITIES:  2+ Peripheral Pulses, No clubbing, cyanosis, or edema  PSYCH: AAOx3      LABS:                        5.9    19.18 )-----------( 368      ( 29 Sep 2017 06:00 )             17.8         141  |  102  |  11  ----------------------------<  96  3.9   |  26  |  0.64    Ca    8.7      29 Sep 2017 06:00  Phos  4.5       Mg     1.8         TPro  7.1  /  Alb  4.0  /  TBili  4.9<H>  /  DBili  x   /  AST  84<H>  /  ALT  45<H>  /  AlkPhos  85            Urinalysis Basic - ( 28 Sep 2017 02:00 )    Color: YELLOW / Appearance: HAZY / S.012 / pH: 6.0  Gluc: NEGATIVE / Ketone: NEGATIVE  / Bili: NEGATIVE / Urobili: NORMAL E.U.   Blood: SMALL / Protein: 20 / Nitrite: NEGATIVE   Leuk Esterase: TRACE / RBC: 5-10 / WBC 5-10   Sq Epi: OCC / Non Sq Epi: x / Bacteria: FEW        RADIOLOGY & ADDITIONAL TESTS:    Imaging Personally Reviewed:    Consultant(s) Notes Reviewed:      Care Discussed with Consultants/Other Providers:    Assessment and Plan: CHIEF COMPLAINT:  Patient is a 22y old  Male who presents with a chief complaint of B/L LE pain (28 Sep 2017 04:54)    SUBJECTIVE / OVERNIGHT EVENTS:  Reported several episode of vomiting overnight, but none this morning.  No abdominal pain, chest pain, SOB.  Denies any diarrhea.  Last BM 2 days ago but is passing gas.  Also had a fever, but denies any chills.  No dysuria, rash, sore throat, cough, HA, neck stiffness, photophobia.  Says that his b/l leg pain is improving.    MEDICATIONS  (STANDING):  enoxaparin Injectable 40 milliGRAM(s) SubCutaneous every 24 hours  sodium chloride 0.45%. 1000 milliLiter(s) (75 mL/Hr) IV Continuous <Continuous>  folic acid 1 milliGRAM(s) Oral daily  multivitamin 1 Tablet(s) Oral daily  HYDROmorphone PCA (1 mG/mL) 30 milliLiter(s) PCA Continuous PCA Continuous  docusate sodium 100 milliGRAM(s) Oral daily  senna 2 Tablet(s) Oral at bedtime  HYDROmorphone  Injectable 1 milliGRAM(s) IV Push once    MEDICATIONS  (PRN):  diphenhydrAMINE   Capsule 25 milliGRAM(s) Oral every 4 hours PRN Rash and/or Itching  naloxone Injectable 0.1 milliGRAM(s) IV Push every 3 minutes PRN For ANY of the following changes in patient status:  A. RR LESS THAN 10 breaths per minute, B. Oxygen saturation LESS THAN 90%, C. Sedation score of 6  ondansetron Injectable 4 milliGRAM(s) IV Push every 6 hours PRN Nausea  oxyCODONE    IR 10 milliGRAM(s) Oral every 4 hours PRN Moderate Pain (4 - 6)    Vital Signs Last 24 Hrs  T(C): 37.6 (17 @ 12:00)  T(F): 99.6 (17 @ 12:00), Max: 102.8 (17 @ 08:00)  HR: 81 (17 @ 12:00) (70 - 99)  BP: 123/35 (17 @ 12:00)  BP(mean): --  RR: 18 (17 @ 12:00) (17 - 18)  SpO2: 99% (17 @ 08:00) (99% - 100%)  Wt(kg): --     @ 07:01  -   @ 07:00  --------------------------------------------------------  IN: 0 mL / OUT: 1400 mL / NET: -1400 mL      PHYSICAL EXAM:  GENERAL: NAD, well-developed, AAOx3  HEAD:  Atraumatic, Normocephalic  NECK: Supple, No JVD  CHEST/LUNG: Clear to auscultation bilaterally; No wheeze  HEART: Regular rate and rhythm   ABDOMEN: Soft, Nontender, Nondistended; Bowel sounds present, +old surgical scar left  EXTREMITIES:  2+ Peripheral Pulses, No clubbing, cyanosis, or edema  PSYCH: AAOx3      LABS:                        5.9    19.18 )-----------( 368      ( 29 Sep 2017 06:00 )             17.8         141  |  102  |  11  ----------------------------<  96  3.9   |  26  |  0.64    Ca    8.7      29 Sep 2017 06:00  Phos  4.5       Mg     1.8         TPro  7.1  /  Alb  4.0  /  TBili  4.9<H>  /  DBili  x   /  AST  84<H>  /  ALT  45<H>  /  AlkPhos  85        Urinalysis Basic - ( 28 Sep 2017 02:00 )    Color: YELLOW / Appearance: HAZY / S.012 / pH: 6.0  Gluc: NEGATIVE / Ketone: NEGATIVE  / Bili: NEGATIVE / Urobili: NORMAL E.U.   Blood: SMALL / Protein: 20 / Nitrite: NEGATIVE   Leuk Esterase: TRACE / RBC: 5-10 / WBC 5-10   Sq Epi: OCC / Non Sq Epi: x / Bacteria: FEW      RADIOLOGY & ADDITIONAL TESTS:    Imaging Personally Reviewed:    Consultant(s) Notes Reviewed:      Care Discussed with Consultants/Other Providers:

## 2017-09-30 LAB
ALBUMIN SERPL ELPH-MCNC: 3.9 G/DL — SIGNIFICANT CHANGE UP (ref 3.3–5)
ALP SERPL-CCNC: 71 U/L — SIGNIFICANT CHANGE UP (ref 40–120)
ALT FLD-CCNC: 35 U/L — SIGNIFICANT CHANGE UP (ref 4–41)
AST SERPL-CCNC: 72 U/L — HIGH (ref 4–40)
BASOPHILS # BLD AUTO: 0.04 K/UL — SIGNIFICANT CHANGE UP (ref 0–0.2)
BASOPHILS NFR BLD AUTO: 0.2 % — SIGNIFICANT CHANGE UP (ref 0–2)
BILIRUB SERPL-MCNC: 5.5 MG/DL — HIGH (ref 0.2–1.2)
BUN SERPL-MCNC: 11 MG/DL — SIGNIFICANT CHANGE UP (ref 7–23)
CALCIUM SERPL-MCNC: 8.7 MG/DL — SIGNIFICANT CHANGE UP (ref 8.4–10.5)
CHLORIDE SERPL-SCNC: 101 MMOL/L — SIGNIFICANT CHANGE UP (ref 98–107)
CO2 SERPL-SCNC: 25 MMOL/L — SIGNIFICANT CHANGE UP (ref 22–31)
CREAT SERPL-MCNC: 0.62 MG/DL — SIGNIFICANT CHANGE UP (ref 0.5–1.3)
EOSINOPHIL # BLD AUTO: 0.14 K/UL — SIGNIFICANT CHANGE UP (ref 0–0.5)
EOSINOPHIL NFR BLD AUTO: 0.7 % — SIGNIFICANT CHANGE UP (ref 0–6)
GLUCOSE SERPL-MCNC: 90 MG/DL — SIGNIFICANT CHANGE UP (ref 70–99)
HCT VFR BLD CALC: 15.8 % — CRITICAL LOW (ref 39–50)
HGB BLD-MCNC: 5.9 G/DL — CRITICAL LOW (ref 13–17)
IMM GRANULOCYTES # BLD AUTO: 0.13 # — SIGNIFICANT CHANGE UP
IMM GRANULOCYTES NFR BLD AUTO: 0.7 % — SIGNIFICANT CHANGE UP (ref 0–1.5)
LDH SERPL L TO P-CCNC: 1308 U/L — HIGH (ref 135–225)
LYMPHOCYTES # BLD AUTO: 18.8 % — SIGNIFICANT CHANGE UP (ref 13–44)
LYMPHOCYTES # BLD AUTO: 3.71 K/UL — HIGH (ref 1–3.3)
MAGNESIUM SERPL-MCNC: 1.8 MG/DL — SIGNIFICANT CHANGE UP (ref 1.6–2.6)
MCHC RBC-ENTMCNC: 32.2 PG — SIGNIFICANT CHANGE UP (ref 27–34)
MCHC RBC-ENTMCNC: 37.3 % — HIGH (ref 32–36)
MCV RBC AUTO: 86.3 FL — SIGNIFICANT CHANGE UP (ref 80–100)
MONOCYTES # BLD AUTO: 2.85 K/UL — HIGH (ref 0–0.9)
MONOCYTES NFR BLD AUTO: 14.4 % — HIGH (ref 2–14)
NEUTROPHILS # BLD AUTO: 12.86 K/UL — HIGH (ref 1.8–7.4)
NEUTROPHILS NFR BLD AUTO: 65.2 % — SIGNIFICANT CHANGE UP (ref 43–77)
NRBC # FLD: 0.29 — SIGNIFICANT CHANGE UP
NRBC FLD-RTO: 1.5 — SIGNIFICANT CHANGE UP
PHOSPHATE SERPL-MCNC: 3.8 MG/DL — SIGNIFICANT CHANGE UP (ref 2.5–4.5)
PLATELET # BLD AUTO: 393 K/UL — SIGNIFICANT CHANGE UP (ref 150–400)
PMV BLD: 11 FL — SIGNIFICANT CHANGE UP (ref 7–13)
POTASSIUM SERPL-MCNC: 4.8 MMOL/L — SIGNIFICANT CHANGE UP (ref 3.5–5.3)
POTASSIUM SERPL-SCNC: 4.8 MMOL/L — SIGNIFICANT CHANGE UP (ref 3.5–5.3)
PROT SERPL-MCNC: 6.8 G/DL — SIGNIFICANT CHANGE UP (ref 6–8.3)
RBC # BLD: 1.83 M/UL — LOW (ref 4.2–5.8)
RBC # FLD: 21.9 % — HIGH (ref 10.3–14.5)
RETICS #: 0.3 10X6/UL — HIGH (ref 0.02–0.07)
RETICS/RBC NFR: 13.7 % — HIGH (ref 0.5–2.5)
SODIUM SERPL-SCNC: 141 MMOL/L — SIGNIFICANT CHANGE UP (ref 135–145)
SPECIMEN SOURCE: SIGNIFICANT CHANGE UP
SPECIMEN SOURCE: SIGNIFICANT CHANGE UP
WBC # BLD: 19.73 K/UL — HIGH (ref 3.8–10.5)
WBC # FLD AUTO: 19.73 K/UL — HIGH (ref 3.8–10.5)

## 2017-09-30 PROCEDURE — 74022 RADEX COMPL AQT ABD SERIES: CPT | Mod: 26

## 2017-09-30 PROCEDURE — 71010: CPT | Mod: 26

## 2017-09-30 PROCEDURE — 99233 SBSQ HOSP IP/OBS HIGH 50: CPT

## 2017-09-30 RX ORDER — HYDROXYUREA 500 MG/1
2000 CAPSULE ORAL DAILY
Qty: 0 | Refills: 0 | Status: DISCONTINUED | OUTPATIENT
Start: 2017-09-30 | End: 2017-10-03

## 2017-09-30 RX ORDER — DOCUSATE SODIUM 100 MG
100 CAPSULE ORAL THREE TIMES A DAY
Qty: 0 | Refills: 0 | Status: DISCONTINUED | OUTPATIENT
Start: 2017-09-30 | End: 2017-10-03

## 2017-09-30 RX ORDER — KETOROLAC TROMETHAMINE 30 MG/ML
15 SYRINGE (ML) INJECTION ONCE
Qty: 0 | Refills: 0 | Status: DISCONTINUED | OUTPATIENT
Start: 2017-09-30 | End: 2017-09-30

## 2017-09-30 RX ORDER — POLYETHYLENE GLYCOL 3350 17 G/17G
17 POWDER, FOR SOLUTION ORAL DAILY
Qty: 0 | Refills: 0 | Status: DISCONTINUED | OUTPATIENT
Start: 2017-09-30 | End: 2017-10-03

## 2017-09-30 RX ADMIN — Medication 15 MILLIGRAM(S): at 21:45

## 2017-09-30 RX ADMIN — Medication 650 MILLIGRAM(S): at 09:31

## 2017-09-30 RX ADMIN — ONDANSETRON 4 MILLIGRAM(S): 8 TABLET, FILM COATED ORAL at 08:41

## 2017-09-30 RX ADMIN — HYDROMORPHONE HYDROCHLORIDE 30 MILLILITER(S): 2 INJECTION INTRAMUSCULAR; INTRAVENOUS; SUBCUTANEOUS at 20:10

## 2017-09-30 RX ADMIN — Medication 1 TABLET(S): at 12:11

## 2017-09-30 RX ADMIN — POLYETHYLENE GLYCOL 3350 17 GRAM(S): 17 POWDER, FOR SOLUTION ORAL at 12:11

## 2017-09-30 RX ADMIN — SODIUM CHLORIDE 75 MILLILITER(S): 9 INJECTION, SOLUTION INTRAVENOUS at 07:20

## 2017-09-30 RX ADMIN — Medication 15 MILLIGRAM(S): at 21:19

## 2017-09-30 RX ADMIN — Medication 100 MILLIGRAM(S): at 21:19

## 2017-09-30 RX ADMIN — Medication 100 MILLIGRAM(S): at 14:37

## 2017-09-30 RX ADMIN — HYDROMORPHONE HYDROCHLORIDE 30 MILLILITER(S): 2 INJECTION INTRAMUSCULAR; INTRAVENOUS; SUBCUTANEOUS at 20:59

## 2017-09-30 RX ADMIN — Medication 1 ENEMA: at 14:37

## 2017-09-30 RX ADMIN — HYDROMORPHONE HYDROCHLORIDE 30 MILLILITER(S): 2 INJECTION INTRAMUSCULAR; INTRAVENOUS; SUBCUTANEOUS at 08:23

## 2017-09-30 RX ADMIN — SENNA PLUS 2 TABLET(S): 8.6 TABLET ORAL at 21:18

## 2017-09-30 RX ADMIN — ONDANSETRON 4 MILLIGRAM(S): 8 TABLET, FILM COATED ORAL at 14:37

## 2017-09-30 RX ADMIN — SODIUM CHLORIDE 75 MILLILITER(S): 9 INJECTION, SOLUTION INTRAVENOUS at 21:19

## 2017-09-30 RX ADMIN — Medication 1 MILLIGRAM(S): at 12:11

## 2017-09-30 NOTE — PROGRESS NOTE ADULT - PROBLEM SELECTOR PLAN 2
- febrile tmax 38.8 yesterday, temp to 38.2 this AM  - with leukocytosis  - no obvious source of infection at this time  - Bcx NG  - monitoring off abx, fever probably related to sickle cell crisis  - f/u Ab xray

## 2017-09-30 NOTE — PROGRESS NOTE ADULT - SUBJECTIVE AND OBJECTIVE BOX
Patient is a 22y old  Male who presents with a chief complaint of B/L LE pain (28 Sep 2017 04:54)    INTERVAL HPI/OVERNIGHT EVENTS:  Patient seen and examined earlier this AM. Having some nausea/vomiting (bilious), some green napkins in bin. Currently reports pain is good, feels like the PCA settings are adequate (pain usually in legs/chest/stomach). Biggest complaint right now is constipation, requesting an enema. States that at home, he was taking 4 caps of hydroxyurea (2000 mg). Denies any CP/SOB currently.     MEDICATIONS  (STANDING):  enoxaparin Injectable 40 milliGRAM(s) SubCutaneous every 24 hours  sodium chloride 0.45%. 1000 milliLiter(s) (75 mL/Hr) IV Continuous <Continuous>  folic acid 1 milliGRAM(s) Oral daily  multivitamin 1 Tablet(s) Oral daily  HYDROmorphone PCA (1 mG/mL) 30 milliLiter(s) PCA Continuous PCA Continuous  senna 2 Tablet(s) Oral at bedtime  HYDROmorphone  Injectable 1 milliGRAM(s) IV Push once  docusate sodium 100 milliGRAM(s) Oral three times a day  polyethylene glycol 3350 17 Gram(s) Oral daily  hydroxyurea (Non - oncologic) 2000 milliGRAM(s) Oral daily    MEDICATIONS  (PRN):  diphenhydrAMINE   Capsule 25 milliGRAM(s) Oral every 4 hours PRN Rash and/or Itching  naloxone Injectable 0.1 milliGRAM(s) IV Push every 3 minutes PRN For ANY of the following changes in patient status:  A. RR LESS THAN 10 breaths per minute, B. Oxygen saturation LESS THAN 90%, C. Sedation score of 6  oxyCODONE    IR 10 milliGRAM(s) Oral every 4 hours PRN Moderate Pain (4 - 6)  ondansetron    Tablet 4 milliGRAM(s) Oral every 6 hours PRN Nausea and/or Vomiting  acetaminophen   Tablet 650 milliGRAM(s) Oral every 6 hours PRN For Temp greater than 38 C (100.4 F)      Allergies  ceftriaxone (Unknown)    Intolerances    REVIEW OF SYSTEMS:  Please see interval HPI:    Vital Signs Last 24 Hrs  T(C): 37.6 (30 Sep 2017 16:00), Max: 38.8 (29 Sep 2017 18:47)  T(F): 99.7 (30 Sep 2017 16:00), Max: 101.8 (29 Sep 2017 18:47)  HR: 78 (30 Sep 2017 16:00) (74 - 98)  BP: 100/47 (30 Sep 2017 16:00) (100/47 - 119/41)  BP(mean): --  RR: 17 (30 Sep 2017 16:00) (17 - 18)  SpO2: 100% (30 Sep 2017 16:00) (94% - 100%)  I&O's Detail    30 Sep 2017 07:01  -  30 Sep 2017 17:12  --------------------------------------------------------  IN:    Oral Fluid: 480 mL    sodium chloride 0.45%.: 600 mL  Total IN: 1080 mL    OUT:  Total OUT: 0 mL    Total NET: 1080 mL    PHYSICAL EXAM:  GENERAL: AA young adult male, mildly uncomfortable appearing, lying in bed  HEAD:  Normocephalic atraumatic  EYES: EOMI  NECK: supple  NERVOUS SYSTEM: AOx3, moving all extremities   CHEST/LUNG: CTAB, no wheeze, NC in place, no respiratory distress  HEART: S1S2 RRR  ABDOMEN: soft, non-tender to light palpation, +BS, old surgical scar present  EXTREMITIES:  no c/c/e    LABS:                        5.9    19.73 )-----------( 393      ( 30 Sep 2017 09:00 )             15.8     30 Sep 2017 07:00    141    |  101    |  11     ----------------------------<  90     4.8     |  25     |  0.62     Ca    8.7        30 Sep 2017 07:00  Phos  3.8       30 Sep 2017 07:00  Mg     1.8       30 Sep 2017 07:00    TPro  6.8    /  Alb  3.9    /  TBili  5.5    /  DBili  x      /  AST  72     /  ALT  35     /  AlkPhos  71     30 Sep 2017 07:00      CAPILLARY BLOOD GLUCOSE        BLOOD CULTURE    RADIOLOGY & ADDITIONAL TESTS:    Imaging Personally Reviewed:  [ ] YES     Consultant(s) Notes Reviewed:      Care Discussed with Consultants/Other Providers: Heme fellow

## 2017-10-01 LAB
ALBUMIN SERPL ELPH-MCNC: 3.8 G/DL — SIGNIFICANT CHANGE UP (ref 3.3–5)
ALP SERPL-CCNC: 70 U/L — SIGNIFICANT CHANGE UP (ref 40–120)
ALT FLD-CCNC: 27 U/L — SIGNIFICANT CHANGE UP (ref 4–41)
AST SERPL-CCNC: 55 U/L — HIGH (ref 4–40)
BACTERIA UR CULT: SIGNIFICANT CHANGE UP
BASOPHILS # BLD AUTO: 0.01 K/UL — SIGNIFICANT CHANGE UP (ref 0–0.2)
BASOPHILS NFR BLD AUTO: 0.1 % — SIGNIFICANT CHANGE UP (ref 0–2)
BILIRUB SERPL-MCNC: 4.5 MG/DL — HIGH (ref 0.2–1.2)
BUN SERPL-MCNC: 8 MG/DL — SIGNIFICANT CHANGE UP (ref 7–23)
CALCIUM SERPL-MCNC: 8.6 MG/DL — SIGNIFICANT CHANGE UP (ref 8.4–10.5)
CHLORIDE SERPL-SCNC: 101 MMOL/L — SIGNIFICANT CHANGE UP (ref 98–107)
CO2 SERPL-SCNC: 30 MMOL/L — SIGNIFICANT CHANGE UP (ref 22–31)
CREAT SERPL-MCNC: 0.55 MG/DL — SIGNIFICANT CHANGE UP (ref 0.5–1.3)
EOSINOPHIL # BLD AUTO: 0.34 K/UL — SIGNIFICANT CHANGE UP (ref 0–0.5)
EOSINOPHIL NFR BLD AUTO: 2.4 % — SIGNIFICANT CHANGE UP (ref 0–6)
GLUCOSE SERPL-MCNC: 121 MG/DL — HIGH (ref 70–99)
HCT VFR BLD CALC: 13.9 % — CRITICAL LOW (ref 39–50)
HGB BLD-MCNC: 5.2 G/DL — CRITICAL LOW (ref 13–17)
IMM GRANULOCYTES # BLD AUTO: 0.06 # — SIGNIFICANT CHANGE UP
IMM GRANULOCYTES NFR BLD AUTO: 0.4 % — SIGNIFICANT CHANGE UP (ref 0–1.5)
LDH SERPL L TO P-CCNC: 1077 U/L — HIGH (ref 135–225)
LYMPHOCYTES # BLD AUTO: 27.2 % — SIGNIFICANT CHANGE UP (ref 13–44)
LYMPHOCYTES # BLD AUTO: 3.87 K/UL — HIGH (ref 1–3.3)
MAGNESIUM SERPL-MCNC: 1.9 MG/DL — SIGNIFICANT CHANGE UP (ref 1.6–2.6)
MANUAL SMEAR VERIFICATION: SIGNIFICANT CHANGE UP
MCHC RBC-ENTMCNC: 32.7 PG — SIGNIFICANT CHANGE UP (ref 27–34)
MCHC RBC-ENTMCNC: 37.4 % — HIGH (ref 32–36)
MCV RBC AUTO: 87.4 FL — SIGNIFICANT CHANGE UP (ref 80–100)
METHOD TYPE: SIGNIFICANT CHANGE UP
MONOCYTES # BLD AUTO: 1.99 K/UL — HIGH (ref 0–0.9)
MONOCYTES NFR BLD AUTO: 14 % — SIGNIFICANT CHANGE UP (ref 2–14)
NEUTROPHILS # BLD AUTO: 7.98 K/UL — HIGH (ref 1.8–7.4)
NEUTROPHILS NFR BLD AUTO: 55.9 % — SIGNIFICANT CHANGE UP (ref 43–77)
NRBC # FLD: 0.25 — SIGNIFICANT CHANGE UP
NRBC FLD-RTO: 1.8 — SIGNIFICANT CHANGE UP
ORGANISM # SPEC MICROSCOPIC CNT: SIGNIFICANT CHANGE UP
PHOSPHATE SERPL-MCNC: 4.2 MG/DL — SIGNIFICANT CHANGE UP (ref 2.5–4.5)
PLATELET # BLD AUTO: 374 K/UL — SIGNIFICANT CHANGE UP (ref 150–400)
PMV BLD: 11.4 FL — SIGNIFICANT CHANGE UP (ref 7–13)
POTASSIUM SERPL-MCNC: 4 MMOL/L — SIGNIFICANT CHANGE UP (ref 3.5–5.3)
POTASSIUM SERPL-SCNC: 4 MMOL/L — SIGNIFICANT CHANGE UP (ref 3.5–5.3)
PROT SERPL-MCNC: 6.9 G/DL — SIGNIFICANT CHANGE UP (ref 6–8.3)
RBC # BLD: 1.59 M/UL — LOW (ref 4.2–5.8)
RBC # FLD: 21.8 % — HIGH (ref 10.3–14.5)
RETICS #: 0.3 10X6/UL — HIGH (ref 0.02–0.07)
RETICS/RBC NFR: 19.4 % — HIGH (ref 0.5–2.5)
SODIUM SERPL-SCNC: 143 MMOL/L — SIGNIFICANT CHANGE UP (ref 135–145)
SPECIMEN SOURCE: SIGNIFICANT CHANGE UP
WBC # BLD: 14.25 K/UL — HIGH (ref 3.8–10.5)
WBC # FLD AUTO: 14.25 K/UL — HIGH (ref 3.8–10.5)

## 2017-10-01 PROCEDURE — 99222 1ST HOSP IP/OBS MODERATE 55: CPT | Mod: GC

## 2017-10-01 PROCEDURE — 99233 SBSQ HOSP IP/OBS HIGH 50: CPT

## 2017-10-01 RX ORDER — ACETAMINOPHEN 500 MG
650 TABLET ORAL ONCE
Qty: 0 | Refills: 0 | Status: COMPLETED | OUTPATIENT
Start: 2017-10-01 | End: 2017-10-01

## 2017-10-01 RX ADMIN — Medication 1 TABLET(S): at 13:27

## 2017-10-01 RX ADMIN — Medication 100 MILLIGRAM(S): at 05:11

## 2017-10-01 RX ADMIN — POLYETHYLENE GLYCOL 3350 17 GRAM(S): 17 POWDER, FOR SOLUTION ORAL at 13:27

## 2017-10-01 RX ADMIN — Medication 1 MILLIGRAM(S): at 13:27

## 2017-10-01 RX ADMIN — SODIUM CHLORIDE 75 MILLILITER(S): 9 INJECTION, SOLUTION INTRAVENOUS at 08:05

## 2017-10-01 RX ADMIN — SENNA PLUS 2 TABLET(S): 8.6 TABLET ORAL at 22:40

## 2017-10-01 RX ADMIN — OXYCODONE HYDROCHLORIDE 10 MILLIGRAM(S): 5 TABLET ORAL at 23:23

## 2017-10-01 RX ADMIN — OXYCODONE HYDROCHLORIDE 10 MILLIGRAM(S): 5 TABLET ORAL at 22:40

## 2017-10-01 RX ADMIN — HYDROMORPHONE HYDROCHLORIDE 30 MILLILITER(S): 2 INJECTION INTRAMUSCULAR; INTRAVENOUS; SUBCUTANEOUS at 20:40

## 2017-10-01 RX ADMIN — Medication 100 MILLIGRAM(S): at 13:27

## 2017-10-01 RX ADMIN — Medication 100 MILLIGRAM(S): at 22:40

## 2017-10-01 RX ADMIN — ONDANSETRON 4 MILLIGRAM(S): 8 TABLET, FILM COATED ORAL at 17:32

## 2017-10-01 RX ADMIN — Medication 650 MILLIGRAM(S): at 18:39

## 2017-10-01 RX ADMIN — HYDROXYUREA 2000 MILLIGRAM(S): 500 CAPSULE ORAL at 22:40

## 2017-10-01 RX ADMIN — SODIUM CHLORIDE 75 MILLILITER(S): 9 INJECTION, SOLUTION INTRAVENOUS at 20:40

## 2017-10-01 RX ADMIN — HYDROMORPHONE HYDROCHLORIDE 30 MILLILITER(S): 2 INJECTION INTRAMUSCULAR; INTRAVENOUS; SUBCUTANEOUS at 08:04

## 2017-10-01 NOTE — PROVIDER CONTACT NOTE (CRITICAL VALUE NOTIFICATION) - RECOMMENDATIONS
As per Microbiology Department- Gabrielle Schuster, bottle could possibly be contaminated. Continue to follow provider orders. Will continue to monitor.

## 2017-10-01 NOTE — CONSULT NOTE ADULT - PROBLEM SELECTOR RECOMMENDATION 9
h/o Hb SS disease.  Currently feels better.   Continue with IV hydration, pain medication. Continue on folic acid and home dose of hydrea.   Patient reinforced to use the Incentive Spirometer.  Hb stable, with appropriate retic counts. Will hold off on transfusion.  DVT ppx.

## 2017-10-01 NOTE — CHART NOTE - NSCHARTNOTEFT_GEN_A_CORE
Notified by RN that blood culture done on 9/29/17 positive for gram positive cocci in clusters after 69 hours of incubation. As per microbiology specimen could be possibly contaminated , awaiting for PCR result. Reviewed PCR result no organism detected.

## 2017-10-01 NOTE — PROVIDER CONTACT NOTE (CRITICAL VALUE NOTIFICATION) - BACKGROUND
Patient admitted for Sickle Cell Crisis. Complaining of nausea vomiting and fever past couple of days.

## 2017-10-01 NOTE — PROVIDER CONTACT NOTE (OTHER) - SITUATION
Pt currently does not have an IV access. RADHA Bryant attempted and was not able to successfully place an IV
Pt. BP outside ordered paramters, 100/47
Pt. temperature 100.8 F and /41
Pt. BP outside ordered parameters, 111/49.

## 2017-10-01 NOTE — PROGRESS NOTE ADULT - SUBJECTIVE AND OBJECTIVE BOX
Patient is a 22y old  Male who presents with a chief complaint of B/L LE pain (28 Sep 2017 04:54)      INTERVAL HPI/OVERNIGHT EVENTS:  No acute events overnight. Bcx 9/28 with GPC in clusters at 69h, however PCR negative, ?possible contaminant, other Bcx no growth.     MEDICATIONS  (STANDING):  enoxaparin Injectable 40 milliGRAM(s) SubCutaneous every 24 hours  sodium chloride 0.45%. 1000 milliLiter(s) (75 mL/Hr) IV Continuous <Continuous>  folic acid 1 milliGRAM(s) Oral daily  multivitamin 1 Tablet(s) Oral daily  HYDROmorphone PCA (1 mG/mL) 30 milliLiter(s) PCA Continuous PCA Continuous  senna 2 Tablet(s) Oral at bedtime  HYDROmorphone  Injectable 1 milliGRAM(s) IV Push once  docusate sodium 100 milliGRAM(s) Oral three times a day  polyethylene glycol 3350 17 Gram(s) Oral daily  hydroxyurea (Non - oncologic) 2000 milliGRAM(s) Oral daily    MEDICATIONS  (PRN):  diphenhydrAMINE   Capsule 25 milliGRAM(s) Oral every 4 hours PRN Rash and/or Itching  naloxone Injectable 0.1 milliGRAM(s) IV Push every 3 minutes PRN For ANY of the following changes in patient status:  A. RR LESS THAN 10 breaths per minute, B. Oxygen saturation LESS THAN 90%, C. Sedation score of 6  oxyCODONE    IR 10 milliGRAM(s) Oral every 4 hours PRN Moderate Pain (4 - 6)  ondansetron    Tablet 4 milliGRAM(s) Oral every 6 hours PRN Nausea and/or Vomiting  acetaminophen   Tablet 650 milliGRAM(s) Oral every 6 hours PRN For Temp greater than 38 C (100.4 F)      Allergies    ceftriaxone (Unknown)    Intolerances        REVIEW OF SYSTEMS:  Please see interval HPI:    Vital Signs Last 24 Hrs  T(C): 37.2 (01 Oct 2017 10:25), Max: 37.8 (30 Sep 2017 17:44)  T(F): 98.9 (01 Oct 2017 10:25), Max: 100.1 (30 Sep 2017 17:44)  HR: 60 (01 Oct 2017 10:25) (60 - 83)  BP: 115/55 (01 Oct 2017 10:25) (100/47 - 115/55)  BP(mean): --  RR: 18 (01 Oct 2017 10:25) (17 - 18)  SpO2: 100% (01 Oct 2017 10:25) (98% - 100%)  I&O's Detail    30 Sep 2017 07:01  -  01 Oct 2017 07:00  --------------------------------------------------------  IN:    Oral Fluid: 1560 mL    sodium chloride 0.45%.: 825 mL  Total IN: 2385 mL    OUT:  Total OUT: 0 mL    Total NET: 2385 mL            PHYSICAL EXAM:  GENERAL:   HEAD:    EYES:   ENMT:   NECK:   NERVOUS SYSTEM:    CHEST/LUNG:   HEART:   ABDOMEN:   EXTREMITIES:    LYMPH:   SKIN:     LABS:                        5.2    14.25 )-----------( 374      ( 01 Oct 2017 07:15 )             13.9     01 Oct 2017 07:15    143    |  101    |  8      ----------------------------<  121    4.0     |  30     |  0.55     Ca    8.6        01 Oct 2017 07:15  Phos  4.2       01 Oct 2017 07:15  Mg     1.9       01 Oct 2017 07:15    TPro  6.9    /  Alb  3.8    /  TBili  4.5    /  DBili  x      /  AST  55     /  ALT  27     /  AlkPhos  70     01 Oct 2017 07:15      CAPILLARY BLOOD GLUCOSE        BLOOD CULTURE    RADIOLOGY & ADDITIONAL TESTS:    Imaging Personally Reviewed:  [ ] YES     Consultant(s) Notes Reviewed:      Care Discussed with Consultants/Other Providers: Patient is a 22y old  Male who presents with a chief complaint of B/L LE pain (28 Sep 2017 04:54)      INTERVAL HPI/OVERNIGHT EVENTS:  No acute events overnight. Bcx 9/28 with GPC in clusters at 69h, however PCR negative, ?possible contaminant, other Bcx no growth. Denies fever/chills/CP/SOB. No nausea/vomiting. Had little bit of a BM. Pain doing Ok, feels current PCA settings adequate.     MEDICATIONS  (STANDING):  enoxaparin Injectable 40 milliGRAM(s) SubCutaneous every 24 hours  sodium chloride 0.45%. 1000 milliLiter(s) (75 mL/Hr) IV Continuous <Continuous>  folic acid 1 milliGRAM(s) Oral daily  multivitamin 1 Tablet(s) Oral daily  HYDROmorphone PCA (1 mG/mL) 30 milliLiter(s) PCA Continuous PCA Continuous  senna 2 Tablet(s) Oral at bedtime  HYDROmorphone  Injectable 1 milliGRAM(s) IV Push once  docusate sodium 100 milliGRAM(s) Oral three times a day  polyethylene glycol 3350 17 Gram(s) Oral daily  hydroxyurea (Non - oncologic) 2000 milliGRAM(s) Oral daily    MEDICATIONS  (PRN):  diphenhydrAMINE   Capsule 25 milliGRAM(s) Oral every 4 hours PRN Rash and/or Itching  naloxone Injectable 0.1 milliGRAM(s) IV Push every 3 minutes PRN For ANY of the following changes in patient status:  A. RR LESS THAN 10 breaths per minute, B. Oxygen saturation LESS THAN 90%, C. Sedation score of 6  oxyCODONE    IR 10 milliGRAM(s) Oral every 4 hours PRN Moderate Pain (4 - 6)  ondansetron    Tablet 4 milliGRAM(s) Oral every 6 hours PRN Nausea and/or Vomiting  acetaminophen   Tablet 650 milliGRAM(s) Oral every 6 hours PRN For Temp greater than 38 C (100.4 F)    Allergies  ceftriaxone (Unknown)    Intolerances    REVIEW OF SYSTEMS:  Please see interval HPI:    Vital Signs Last 24 Hrs  T(C): 37.2 (01 Oct 2017 10:25), Max: 37.8 (30 Sep 2017 17:44)  T(F): 98.9 (01 Oct 2017 10:25), Max: 100.1 (30 Sep 2017 17:44)  HR: 60 (01 Oct 2017 10:25) (60 - 83)  BP: 115/55 (01 Oct 2017 10:25) (100/47 - 115/55)  BP(mean): --  RR: 18 (01 Oct 2017 10:25) (17 - 18)  SpO2: 100% (01 Oct 2017 10:25) (98% - 100%)  I&O's Detail    30 Sep 2017 07:01  -  01 Oct 2017 07:00  --------------------------------------------------------  IN:    Oral Fluid: 1560 mL    sodium chloride 0.45%.: 825 mL  Total IN: 2385 mL    OUT:  Total OUT: 0 mL    Total NET: 2385 mL      PHYSICAL EXAM:  GENERAL: AA young adult male, sleeping comfortably in bed, arousable to voice  HEAD:  Normocephalic/atraumatic  NECK: supple  NERVOUS SYSTEM: non-focal   CHEST/LUNG: CTAB, no respiratory distress, NC in place  HEART: S1S2, RRR  ABDOMEN: soft non-tender +BS, old surgical scar present  EXTREMITIES: no c/c/e    LABS:                        5.2    14.25 )-----------( 374      ( 01 Oct 2017 07:15 )             13.9     01 Oct 2017 07:15    143    |  101    |  8      ----------------------------<  121    4.0     |  30     |  0.55     Ca    8.6        01 Oct 2017 07:15  Phos  4.2       01 Oct 2017 07:15  Mg     1.9       01 Oct 2017 07:15    TPro  6.9    /  Alb  3.8    /  TBili  4.5    /  DBili  x      /  AST  55     /  ALT  27     /  AlkPhos  70     01 Oct 2017 07:15      CAPILLARY BLOOD GLUCOSE        BLOOD CULTURE    RADIOLOGY & ADDITIONAL TESTS:    Imaging Personally Reviewed:  [ ] YES     Consultant(s) Notes Reviewed:      Care Discussed with Consultants/Other Providers: Heme/Onc

## 2017-10-01 NOTE — PROGRESS NOTE ADULT - PROBLEM SELECTOR PLAN 2
- had been having intermittent fevers, currently afebrile  - with leukocytosis  - no obvious source of infection at this time  - Bcx from 9/28 with possible contaminant, other cultures NG  - monitoring off abx, fever probably related to sickle cell crisis  - f/u Ab xray

## 2017-10-01 NOTE — CONSULT NOTE ADULT - PROBLEM SELECTOR RECOMMENDATION 2
Can continue with home dose of Jadenu, if patient is able to bring his home supply.    Please page at [phone] with questions or concerns.

## 2017-10-01 NOTE — PROVIDER CONTACT NOTE (OTHER) - REASON
No IV access
Pt. BP outside ordered parameters
Pt. vital signs outside ordered parameters
BP outside parameters

## 2017-10-01 NOTE — PROVIDER CONTACT NOTE (CRITICAL VALUE NOTIFICATION) - ASSESSMENT
Patient afebrile, vital signs stable, no complaints of nausea, vomiting, sweating. Pain being controlled with PCA pump. No acute signs of distress noted.

## 2017-10-01 NOTE — PROVIDER CONTACT NOTE (OTHER) - ACTION/TREATMENT ORDERED:
Continue to monitor patient
HAJA Hallman made aware, was told she would contact [facility] to come for IV placement, was ordered to managed pt's pain with PO pain meds for now. Will continue to monitor.

## 2017-10-01 NOTE — PROVIDER CONTACT NOTE (CRITICAL VALUE NOTIFICATION) - SITUATION
Blood Cultures resulted gram positive cocci in clusters in aerobic bottle from 9/28 ( after 69 hours).

## 2017-10-01 NOTE — CONSULT NOTE ADULT - SUBJECTIVE AND OBJECTIVE BOX
HPI:  This is a 22M with history of Sickle Cell disease with multiple hospitalizations for VOC, hx of ACS in past (last episode in July 2017 s/p 3U pRBC transfusion, and Iron Overload syndrome presents to the hospital with complaints of b/l LE pain that started on the morning of 9/27/17. Said that he is usually able to control his pain at home with ibuprofen and oxycodone prn but was unable to obtain full pain relief at home and he therefore came to the hospital. Currently said that the pain in his legs is still there, said that it is a 5/10 currently (acceptable pain level for him is 2-3/10). Denies any pain in his lower back, chest or UE. Denies any SOB, cough, URI symptoms, abdominal pain, n/v/d, or  complaints. No other complaints.    In the ED, his vitals were T 99.8, Tm102.9, P 108, /51, R 20, O2 sat 96% RA. His lab work showed leukocytosis, anemia, reticulocytosis, mild ARTURO, and elevated TBili. His UA was negative and his CXR showed clear lungs. He was given acetaminophen 1000mg IVPB x1, vancomycin 1g IVPB x1, aztreonam 1000mg IVPB x1, hydromorphone 2mg IVP x3, ketorolac 30mg IVP x1, zofran 4mg IVP x1 and NS 2L. He was admitted to medicine. (28 Sep 2017 04:54)    PAST MEDICAL & SURGICAL HISTORY:  Iron overload due to repeated red blood cell transfusions  Sickle cell anemia  Acute chest syndrome  Sickle Cell Disease: HbSS  S/P Splenectomy  S/P Laparotomy for spleen removal  S/P Laparoscopic Cholecystectomy    FAMILY HISTORY:  Family history of sickle cell anemia (Sibling)    Social History  MEDICATIONS  (STANDING):  enoxaparin Injectable 40 milliGRAM(s) SubCutaneous every 24 hours  sodium chloride 0.45%. 1000 milliLiter(s) (75 mL/Hr) IV Continuous <Continuous>  folic acid 1 milliGRAM(s) Oral daily  multivitamin 1 Tablet(s) Oral daily  HYDROmorphone PCA (1 mG/mL) 30 milliLiter(s) PCA Continuous PCA Continuous  senna 2 Tablet(s) Oral at bedtime  HYDROmorphone  Injectable 1 milliGRAM(s) IV Push once  docusate sodium 100 milliGRAM(s) Oral three times a day  polyethylene glycol 3350 17 Gram(s) Oral daily  hydroxyurea (Non - oncologic) 2000 milliGRAM(s) Oral daily    MEDICATIONS  (PRN):  diphenhydrAMINE   Capsule 25 milliGRAM(s) Oral every 4 hours PRN Rash and/or Itching  naloxone Injectable 0.1 milliGRAM(s) IV Push every 3 minutes PRN For ANY of the following changes in patient status:  A. RR LESS THAN 10 breaths per minute, B. Oxygen saturation LESS THAN 90%, C. Sedation score of 6  oxyCODONE    IR 10 milliGRAM(s) Oral every 4 hours PRN Moderate Pain (4 - 6)  ondansetron    Tablet 4 milliGRAM(s) Oral every 6 hours PRN Nausea and/or Vomiting  acetaminophen   Tablet 650 milliGRAM(s) Oral every 6 hours PRN For Temp greater than 38 C (100.4 F)    ceftriaxone (Unknown)    REVIEW OF SYSTEMS    10 points ROS is negative except for the ones in HPI.  	  Vital Signs Last 24 Hrs  T(C): 37.1 (01 Oct 2017 04:00), Max: 38.2 (30 Sep 2017 09:30)  T(F): 98.8 (01 Oct 2017 04:00), Max: 100.8 (30 Sep 2017 09:30)  HR: 74 (01 Oct 2017 04:00) (72 - 98)  BP: 106/55 (01 Oct 2017 04:00) (100/47 - 111/49)  BP(mean): --  RR: 18 (01 Oct 2017 04:00) (17 - 18)  SpO2: 99% (01 Oct 2017 04:00) (98% - 100%)    Physical Examination  Constitutional: Alert, oriented X3  Eyes: Normal  ENMT: normal  Neck: No lymphadneopathy  Respiratory: b/l clear to auscultation  Cardiovascular: S1,S2,M0  Abdomen: Soft, non tender, BS present  Gastrointestinal: soft, non tender, BS present  Extremities: soft, no edema  Neurological: intact  Skin: no petechiae  Musculoskeletal: normal  Psychiatric: normal                            5.2    14.25 )-----------( 374      ( 01 Oct 2017 07:15 )             13.9     10-01    143  |  101  |  8   ----------------------------<  121<H>  4.0   |  30  |  0.55    Ca    8.6      01 Oct 2017 07:15  Phos  4.2     10-01  Mg     1.9     10-01    TPro  6.9  /  Alb  3.8  /  TBili  4.5<H>  /  DBili  x   /  AST  55<H>  /  ALT  27  /  AlkPhos  70  10-01      Radiology  Assessment and Plan HPI:  This is a 22M with history of Hb SS disease, with with multiple hospitalizations for VOC, hx of ACS in past, Iron Overload syndrome presents to the hospital with complaints of b/l LE pain that started on the morning of 9/27/17. Said that he is usually able to control his pain at home with ibuprofen and oxycodone prn but was unable to obtain full pain relief at home and he therefore came to the hospital. Currently said that the pain in his legs is still there, said that it is a 5/10 currently (acceptable pain level for him is 2-3/10). Denies any pain in his lower back, chest or UE. Denies any SOB, cough, URI symptoms, abdominal pain, n/v/d, or  complaints. No other complaints.    Patient feels better since admission. Pain is well controlled. No chest pain, sob, headache, dizziness, blurring of vision. No abdominal pain. He feels constipated, able to pass gas. No urinary complaints.    PAST MEDICAL & SURGICAL HISTORY:  Iron overload due to repeated red blood cell transfusions  Sickle cell anemia  Acute chest syndrome  Sickle Cell Disease: HbSS  S/P Splenectomy  S/P Laparotomy for spleen removal  S/P Laparoscopic Cholecystectomy    FAMILY HISTORY:  Family history of sickle cell anemia (Sibling)    Social History  MEDICATIONS  (STANDING):  enoxaparin Injectable 40 milliGRAM(s) SubCutaneous every 24 hours  sodium chloride 0.45%. 1000 milliLiter(s) (75 mL/Hr) IV Continuous <Continuous>  folic acid 1 milliGRAM(s) Oral daily  multivitamin 1 Tablet(s) Oral daily  HYDROmorphone PCA (1 mG/mL) 30 milliLiter(s) PCA Continuous PCA Continuous  senna 2 Tablet(s) Oral at bedtime  HYDROmorphone  Injectable 1 milliGRAM(s) IV Push once  docusate sodium 100 milliGRAM(s) Oral three times a day  polyethylene glycol 3350 17 Gram(s) Oral daily  hydroxyurea (Non - oncologic) 2000 milliGRAM(s) Oral daily    MEDICATIONS  (PRN):  diphenhydrAMINE   Capsule 25 milliGRAM(s) Oral every 4 hours PRN Rash and/or Itching  naloxone Injectable 0.1 milliGRAM(s) IV Push every 3 minutes PRN For ANY of the following changes in patient status:  A. RR LESS THAN 10 breaths per minute, B. Oxygen saturation LESS THAN 90%, C. Sedation score of 6  oxyCODONE    IR 10 milliGRAM(s) Oral every 4 hours PRN Moderate Pain (4 - 6)  ondansetron    Tablet 4 milliGRAM(s) Oral every 6 hours PRN Nausea and/or Vomiting  acetaminophen   Tablet 650 milliGRAM(s) Oral every 6 hours PRN For Temp greater than 38 C (100.4 F)    ceftriaxone (Unknown)    REVIEW OF SYSTEMS    10 points ROS is negative except for the ones in HPI.  	  Vital Signs Last 24 Hrs  T(C): 37.1 (01 Oct 2017 04:00), Max: 38.2 (30 Sep 2017 09:30)  T(F): 98.8 (01 Oct 2017 04:00), Max: 100.8 (30 Sep 2017 09:30)  HR: 74 (01 Oct 2017 04:00) (72 - 98)  BP: 106/55 (01 Oct 2017 04:00) (100/47 - 111/49)  BP(mean): --  RR: 18 (01 Oct 2017 04:00) (17 - 18)  SpO2: 99% (01 Oct 2017 04:00) (98% - 100%)    Physical Examination  Constitutional: Alert, oriented X3  Eyes: Normal  ENMT: normal  Neck: No lymphadneopathy  Respiratory: b/l clear to auscultation  Cardiovascular: S1,S2,M0  Abdomen: Soft, non tender, BS present  Gastrointestinal: soft, non tender, BS present  Extremities: soft, no edema  Neurological: intact  Skin: no petechiae  Musculoskeletal: normal  Psychiatric: normal                            5.2    14.25 )-----------( 374      ( 01 Oct 2017 07:15 )             13.9     10-01    143  |  101  |  8   ----------------------------<  121<H>  4.0   |  30  |  0.55    Ca    8.6      01 Oct 2017 07:15  Phos  4.2     10-01  Mg     1.9     10-01    TPro  6.9  /  Alb  3.8  /  TBili  4.5<H>  /  DBili  x   /  AST  55<H>  /  ALT  27  /  AlkPhos  70  10-01      Radiology  Assessment and Plan

## 2017-10-01 NOTE — CONSULT NOTE ADULT - ATTENDING COMMENTS
history of sickle cell disease with multiple admissions though on hydroxyurea prophylaxis. This admission for leg and back pain, now improving, no chest pain and shortness of breath. Will follow up.

## 2017-10-01 NOTE — PROVIDER CONTACT NOTE (OTHER) - BACKGROUND
Pt. admitted for sickle cell crisis.
21y/o male admitted for Sickle cell crisis
Pt. admitted for sickle cell crisis
Pt. admitted for sickle cell crisis, on PCA pump.

## 2017-10-01 NOTE — PROVIDER CONTACT NOTE (OTHER) - ASSESSMENT
Pt. in NAD, asymptomatic of low DBP.
Pt states pain is currently tolerable and that he is fine with the administration of PO pain meds at the moment
Pt. in NAD, asymptomatic of low DBP, shivering noted.
Pt. in NAD, asymptomatic of low DBP.

## 2017-10-02 LAB
-  CANDIDA ALBICANS: SIGNIFICANT CHANGE UP
-  CANDIDA GLABRATA: SIGNIFICANT CHANGE UP
-  CANDIDA KRUSEI: SIGNIFICANT CHANGE UP
-  CANDIDA PARAPSILOSIS: SIGNIFICANT CHANGE UP
-  CANDIDA TROPICALIS: SIGNIFICANT CHANGE UP
-  COAGULASE NEGATIVE STAPHYLOCOCCUS: SIGNIFICANT CHANGE UP
-  K. PNEUMONIAE GROUP: SIGNIFICANT CHANGE UP
-  KPC RESISTANCE GENE: SIGNIFICANT CHANGE UP
-  STREPTOCOCCUS SP. (NOT GRP A, B OR S PNEUMONIAE): SIGNIFICANT CHANGE UP
A BAUMANNII DNA SPEC QL NAA+PROBE: SIGNIFICANT CHANGE UP
ALBUMIN SERPL ELPH-MCNC: 4 G/DL — SIGNIFICANT CHANGE UP (ref 3.3–5)
ALP SERPL-CCNC: 77 U/L — SIGNIFICANT CHANGE UP (ref 40–120)
ALT FLD-CCNC: 23 U/L — SIGNIFICANT CHANGE UP (ref 4–41)
AST SERPL-CCNC: 44 U/L — HIGH (ref 4–40)
BACTERIA BLD CULT: SIGNIFICANT CHANGE UP
BASOPHILS # BLD AUTO: 0.01 K/UL — SIGNIFICANT CHANGE UP (ref 0–0.2)
BASOPHILS NFR BLD AUTO: 0.1 % — SIGNIFICANT CHANGE UP (ref 0–2)
BILIRUB SERPL-MCNC: 4.1 MG/DL — HIGH (ref 0.2–1.2)
BUN SERPL-MCNC: 9 MG/DL — SIGNIFICANT CHANGE UP (ref 7–23)
CALCIUM SERPL-MCNC: 8.8 MG/DL — SIGNIFICANT CHANGE UP (ref 8.4–10.5)
CHLORIDE SERPL-SCNC: 101 MMOL/L — SIGNIFICANT CHANGE UP (ref 98–107)
CO2 SERPL-SCNC: 30 MMOL/L — SIGNIFICANT CHANGE UP (ref 22–31)
CREAT SERPL-MCNC: 0.55 MG/DL — SIGNIFICANT CHANGE UP (ref 0.5–1.3)
E CLOAC COMP DNA BLD POS QL NAA+PROBE: SIGNIFICANT CHANGE UP
E COLI DNA BLD POS QL NAA+NON-PROBE: SIGNIFICANT CHANGE UP
ENTEROCOC DNA BLD POS QL NAA+NON-PROBE: SIGNIFICANT CHANGE UP
ENTEROCOC DNA BLD POS QL NAA+NON-PROBE: SIGNIFICANT CHANGE UP
EOSINOPHIL # BLD AUTO: 0.41 K/UL — SIGNIFICANT CHANGE UP (ref 0–0.5)
EOSINOPHIL NFR BLD AUTO: 3.2 % — SIGNIFICANT CHANGE UP (ref 0–6)
GLUCOSE SERPL-MCNC: 93 MG/DL — SIGNIFICANT CHANGE UP (ref 70–99)
GP B STREP DNA BLD POS QL NAA+NON-PROBE: SIGNIFICANT CHANGE UP
HAEM INFLU DNA BLD POS QL NAA+NON-PROBE: SIGNIFICANT CHANGE UP
HCT VFR BLD CALC: 13.8 % — CRITICAL LOW (ref 39–50)
HGB BLD-MCNC: 5 G/DL — CRITICAL LOW (ref 13–17)
IMM GRANULOCYTES # BLD AUTO: 0.09 # — SIGNIFICANT CHANGE UP
IMM GRANULOCYTES NFR BLD AUTO: 0.7 % — SIGNIFICANT CHANGE UP (ref 0–1.5)
K OXYTOCA DNA BLD POS QL NAA+NON-PROBE: SIGNIFICANT CHANGE UP
L MONOCYTOG DNA BLD POS QL NAA+NON-PROBE: SIGNIFICANT CHANGE UP
LDH SERPL L TO P-CCNC: 1068 U/L — HIGH (ref 135–225)
LYMPHOCYTES # BLD AUTO: 29.1 % — SIGNIFICANT CHANGE UP (ref 13–44)
LYMPHOCYTES # BLD AUTO: 3.76 K/UL — HIGH (ref 1–3.3)
MAGNESIUM SERPL-MCNC: 1.7 MG/DL — SIGNIFICANT CHANGE UP (ref 1.6–2.6)
MCHC RBC-ENTMCNC: 32.9 PG — SIGNIFICANT CHANGE UP (ref 27–34)
MCHC RBC-ENTMCNC: 36.2 % — HIGH (ref 32–36)
MCV RBC AUTO: 90.8 FL — SIGNIFICANT CHANGE UP (ref 80–100)
MONOCYTES # BLD AUTO: 2.25 K/UL — HIGH (ref 0–0.9)
MONOCYTES NFR BLD AUTO: 17.4 % — HIGH (ref 2–14)
MRSA SPEC QL CULT: SIGNIFICANT CHANGE UP
MSSA DNA SPEC QL NAA+PROBE: SIGNIFICANT CHANGE UP
N MEN ISLT CULT: SIGNIFICANT CHANGE UP
NEUTROPHILS # BLD AUTO: 6.42 K/UL — SIGNIFICANT CHANGE UP (ref 1.8–7.4)
NEUTROPHILS NFR BLD AUTO: 49.5 % — SIGNIFICANT CHANGE UP (ref 43–77)
NRBC # FLD: 0.42 — SIGNIFICANT CHANGE UP
NRBC FLD-RTO: 3.2 — SIGNIFICANT CHANGE UP
ORGANISM # SPEC MICROSCOPIC CNT: SIGNIFICANT CHANGE UP
ORGANISM # SPEC MICROSCOPIC CNT: SIGNIFICANT CHANGE UP
P AERUGINOSA DNA BLD POS NAA+NON-PROBE: SIGNIFICANT CHANGE UP
PHOSPHATE SERPL-MCNC: 5.2 MG/DL — HIGH (ref 2.5–4.5)
PLATELET # BLD AUTO: 448 K/UL — HIGH (ref 150–400)
PMV BLD: 11.1 FL — SIGNIFICANT CHANGE UP (ref 7–13)
POTASSIUM SERPL-MCNC: 4.1 MMOL/L — SIGNIFICANT CHANGE UP (ref 3.5–5.3)
POTASSIUM SERPL-SCNC: 4.1 MMOL/L — SIGNIFICANT CHANGE UP (ref 3.5–5.3)
PROT SERPL-MCNC: 7.3 G/DL — SIGNIFICANT CHANGE UP (ref 6–8.3)
PROTEUS SP DNA BLD POS QL NAA+NON-PROBE: SIGNIFICANT CHANGE UP
RBC # BLD: 1.52 M/UL — LOW (ref 4.2–5.8)
RBC # FLD: 22.5 % — HIGH (ref 10.3–14.5)
RETICS #: 0.2 10X6/UL — HIGH (ref 0.02–0.07)
RETICS/RBC NFR: 16 % — HIGH (ref 0.5–2.5)
S MARCESCENS DNA BLD POS NAA+NON-PROBE: SIGNIFICANT CHANGE UP
S PNEUM DNA BLD POS QL NAA+NON-PROBE: SIGNIFICANT CHANGE UP
S PYO DNA BLD POS QL NAA+NON-PROBE: SIGNIFICANT CHANGE UP
SODIUM SERPL-SCNC: 143 MMOL/L — SIGNIFICANT CHANGE UP (ref 135–145)
WBC # BLD: 12.94 K/UL — HIGH (ref 3.8–10.5)
WBC # FLD AUTO: 12.94 K/UL — HIGH (ref 3.8–10.5)

## 2017-10-02 PROCEDURE — 76937 US GUIDE VASCULAR ACCESS: CPT | Mod: 26

## 2017-10-02 PROCEDURE — 99233 SBSQ HOSP IP/OBS HIGH 50: CPT

## 2017-10-02 RX ADMIN — POLYETHYLENE GLYCOL 3350 17 GRAM(S): 17 POWDER, FOR SOLUTION ORAL at 13:38

## 2017-10-02 RX ADMIN — SENNA PLUS 2 TABLET(S): 8.6 TABLET ORAL at 21:08

## 2017-10-02 RX ADMIN — HYDROXYUREA 2000 MILLIGRAM(S): 500 CAPSULE ORAL at 21:08

## 2017-10-02 RX ADMIN — Medication 1 MILLIGRAM(S): at 13:38

## 2017-10-02 RX ADMIN — Medication 1 TABLET(S): at 13:38

## 2017-10-02 RX ADMIN — HYDROMORPHONE HYDROCHLORIDE 30 MILLILITER(S): 2 INJECTION INTRAMUSCULAR; INTRAVENOUS; SUBCUTANEOUS at 08:22

## 2017-10-02 RX ADMIN — Medication 100 MILLIGRAM(S): at 05:26

## 2017-10-02 RX ADMIN — SODIUM CHLORIDE 75 MILLILITER(S): 9 INJECTION, SOLUTION INTRAVENOUS at 08:23

## 2017-10-02 RX ADMIN — Medication 100 MILLIGRAM(S): at 13:38

## 2017-10-02 RX ADMIN — Medication 100 MILLIGRAM(S): at 21:08

## 2017-10-02 NOTE — PROGRESS NOTE ADULT - ATTENDING COMMENTS
Dispo: awaiting BCx speciation  Possible DC home tmw Dispo: If repeat Bcx still negative tmw and patient without fevers, will DC home tmw

## 2017-10-02 NOTE — PROGRESS NOTE ADULT - PROBLEM SELECTOR PLAN 2
- had been having intermittent fevers, last febrile episode on 9/30  - Bcx from 9/28 with GPC in clusters and has been monitored off Abx with suspicion that fevers likely from SCC and that the + BCx is possible contaminant, 9/29 Bcx negative 48 hours x2 bottles  Will await speciation  WBC downtrending without Abx - had been having intermittent fevers, last febrile episode on 9/30  - Bcx from 9/28 with GPC in clusters and has been monitored off Abx with suspicion that fevers likely from SCC and that the + BCx is possible contaminant however Coag negative Staph not detected, 9/29 Bcx negative 48 hours x2 bottles  WBC downtrending without Abx

## 2017-10-02 NOTE — PROVIDER CONTACT NOTE (CRITICAL VALUE NOTIFICATION) - ACTION/TREATMENT ORDERED:
HAJA Hallman made aware, no further interventions at this time. Stated morning team would make decision regarding critical result. Will continue to monitor.
Provider notified, awaiting further instruction
No further treatment ordered at this time.

## 2017-10-02 NOTE — PROCEDURE NOTE - NSPROCDETAILS_GEN_ALL_CORE
dressing applied/sterile technique, catheter placed/secured in place/ultrasound utilization/location identified, draped/prepped, sterile technique used/blood seen on insertion/flushes easily

## 2017-10-02 NOTE — PROGRESS NOTE ADULT - SUBJECTIVE AND OBJECTIVE BOX
Patient is a 22y old  Male who presents with a chief complaint of B/L LE pain (28 Sep 2017 04:54)      SUBJECTIVE / OVERNIGHT EVENTS: No acute events overnight. Pain much improved today. No chest pain, SOB, N/V/D. Agreeable to stopping PCA pump and continuing oral meds    MEDICATIONS  (STANDING):  enoxaparin Injectable 40 milliGRAM(s) SubCutaneous every 24 hours  sodium chloride 0.45%. 1000 milliLiter(s) (75 mL/Hr) IV Continuous <Continuous>  folic acid 1 milliGRAM(s) Oral daily  multivitamin 1 Tablet(s) Oral daily  senna 2 Tablet(s) Oral at bedtime  docusate sodium 100 milliGRAM(s) Oral three times a day  polyethylene glycol 3350 17 Gram(s) Oral daily  hydroxyurea (Non - oncologic) 2000 milliGRAM(s) Oral daily    MEDICATIONS  (PRN):  diphenhydrAMINE   Capsule 25 milliGRAM(s) Oral every 4 hours PRN Rash and/or Itching  naloxone Injectable 0.1 milliGRAM(s) IV Push every 3 minutes PRN For ANY of the following changes in patient status:  A. RR LESS THAN 10 breaths per minute, B. Oxygen saturation LESS THAN 90%, C. Sedation score of 6  oxyCODONE    IR 10 milliGRAM(s) Oral every 4 hours PRN Moderate Pain (4 - 6)  ondansetron    Tablet 4 milliGRAM(s) Oral every 6 hours PRN Nausea and/or Vomiting  acetaminophen   Tablet 650 milliGRAM(s) Oral every 6 hours PRN For Temp greater than 38 C (100.4 F)      T(C): 37.1 (10-02-17 @ 10:20), Max: 37.1 (10-01-17 @ 18:09)  HR: 79 (10-02-17 @ 10:20) (60 - 79)  BP: 107/56 (10-02-17 @ 10:20) (107/56 - 124/61)  RR: 18 (10-02-17 @ 10:20) (18 - 18)  SpO2: 100% (10-02-17 @ 10:20) (98% - 100%)  CAPILLARY BLOOD GLUCOSE        I&O's Summary      PHYSICAL EXAM:  GENERAL: young male, no apparent distress, on nasal cannula, found resting comfortably  EYES: sclera clear b/l  CHEST/LUNG: Clear to auscultation bilaterally; No wheezing or crackles  HEART: Regular rate and rhythm; No murmurs, rubs, or gallops  ABDOMEN: Soft, Nontender, Nondistended; Bowel sounds present  EXTREMITIES:  2+ Peripheral Pulses, No clubbing, cyanosis, or edema, no TTP of b/l legs  MSK: no joint effusions  NEUROLOGY: awake, alert, responds to Qs appropriately, no gross deficits  SKIN: No rashes or lesions    LABS:                        5.0    12.94 )-----------( 448      ( 02 Oct 2017 03:20 )             13.8     10-02    143  |  101  |  9   ----------------------------<  93  4.1   |  30  |  0.55    Ca    8.8      02 Oct 2017 03:20  Phos  5.2     10-02  Mg     1.7     10-02    TPro  7.3  /  Alb  4.0  /  TBili  4.1<H>  /  DBili  x   /  AST  44<H>  /  ALT  23  /  AlkPhos  77  10-02              RADIOLOGY & ADDITIONAL TESTS:

## 2017-10-03 ENCOUNTER — TRANSCRIPTION ENCOUNTER (OUTPATIENT)
Age: 22
End: 2017-10-03

## 2017-10-03 VITALS
OXYGEN SATURATION: 95 % | HEART RATE: 88 BPM | DIASTOLIC BLOOD PRESSURE: 55 MMHG | SYSTOLIC BLOOD PRESSURE: 118 MMHG | TEMPERATURE: 99 F | RESPIRATION RATE: 20 BRPM

## 2017-10-03 LAB
ALBUMIN SERPL ELPH-MCNC: 4.2 G/DL — SIGNIFICANT CHANGE UP (ref 3.3–5)
ALP SERPL-CCNC: 87 U/L — SIGNIFICANT CHANGE UP (ref 40–120)
ALT FLD-CCNC: 21 U/L — SIGNIFICANT CHANGE UP (ref 4–41)
AST SERPL-CCNC: 39 U/L — SIGNIFICANT CHANGE UP (ref 4–40)
BACTERIA BLD CULT: SIGNIFICANT CHANGE UP
BASOPHILS # BLD AUTO: 0.06 K/UL — SIGNIFICANT CHANGE UP (ref 0–0.2)
BASOPHILS NFR BLD AUTO: 0.5 % — SIGNIFICANT CHANGE UP (ref 0–2)
BILIRUB SERPL-MCNC: 3.1 MG/DL — HIGH (ref 0.2–1.2)
BUN SERPL-MCNC: 6 MG/DL — LOW (ref 7–23)
CALCIUM SERPL-MCNC: 9.5 MG/DL — SIGNIFICANT CHANGE UP (ref 8.4–10.5)
CHLORIDE SERPL-SCNC: 101 MMOL/L — SIGNIFICANT CHANGE UP (ref 98–107)
CO2 SERPL-SCNC: 26 MMOL/L — SIGNIFICANT CHANGE UP (ref 22–31)
CREAT SERPL-MCNC: 0.58 MG/DL — SIGNIFICANT CHANGE UP (ref 0.5–1.3)
EOSINOPHIL # BLD AUTO: 0.44 K/UL — SIGNIFICANT CHANGE UP (ref 0–0.5)
EOSINOPHIL NFR BLD AUTO: 3.6 % — SIGNIFICANT CHANGE UP (ref 0–6)
GLUCOSE SERPL-MCNC: 102 MG/DL — HIGH (ref 70–99)
HCT VFR BLD CALC: 17.4 % — CRITICAL LOW (ref 39–50)
HGB BLD-MCNC: 6.1 G/DL — CRITICAL LOW (ref 13–17)
IMM GRANULOCYTES # BLD AUTO: 0.1 # — SIGNIFICANT CHANGE UP
IMM GRANULOCYTES NFR BLD AUTO: 0.8 % — SIGNIFICANT CHANGE UP (ref 0–1.5)
LDH SERPL L TO P-CCNC: 927 U/L — HIGH (ref 135–225)
LYMPHOCYTES # BLD AUTO: 2.66 K/UL — SIGNIFICANT CHANGE UP (ref 1–3.3)
LYMPHOCYTES # BLD AUTO: 21.5 % — SIGNIFICANT CHANGE UP (ref 13–44)
MAGNESIUM SERPL-MCNC: 1.9 MG/DL — SIGNIFICANT CHANGE UP (ref 1.6–2.6)
MCHC RBC-ENTMCNC: 33.2 PG — SIGNIFICANT CHANGE UP (ref 27–34)
MCHC RBC-ENTMCNC: 35.1 % — SIGNIFICANT CHANGE UP (ref 32–36)
MCV RBC AUTO: 94.6 FL — SIGNIFICANT CHANGE UP (ref 80–100)
MONOCYTES # BLD AUTO: 2 K/UL — HIGH (ref 0–0.9)
MONOCYTES NFR BLD AUTO: 16.2 % — HIGH (ref 2–14)
NEUTROPHILS # BLD AUTO: 7.11 K/UL — SIGNIFICANT CHANGE UP (ref 1.8–7.4)
NEUTROPHILS NFR BLD AUTO: 57.4 % — SIGNIFICANT CHANGE UP (ref 43–77)
NRBC # FLD: 1.22 — SIGNIFICANT CHANGE UP
NRBC FLD-RTO: 9.9 — SIGNIFICANT CHANGE UP
PHOSPHATE SERPL-MCNC: 4.2 MG/DL — SIGNIFICANT CHANGE UP (ref 2.5–4.5)
PLATELET # BLD AUTO: 530 K/UL — HIGH (ref 150–400)
PMV BLD: 11.1 FL — SIGNIFICANT CHANGE UP (ref 7–13)
POTASSIUM SERPL-MCNC: 3.9 MMOL/L — SIGNIFICANT CHANGE UP (ref 3.5–5.3)
POTASSIUM SERPL-SCNC: 3.9 MMOL/L — SIGNIFICANT CHANGE UP (ref 3.5–5.3)
PROT SERPL-MCNC: 7.8 G/DL — SIGNIFICANT CHANGE UP (ref 6–8.3)
RBC # BLD: 1.84 M/UL — LOW (ref 4.2–5.8)
RBC # FLD: 26.8 % — HIGH (ref 10.3–14.5)
RETICS #: 0.4 10X6/UL — HIGH (ref 0.02–0.07)
RETICS/RBC NFR: 20 % — HIGH (ref 0.5–2.5)
SODIUM SERPL-SCNC: 141 MMOL/L — SIGNIFICANT CHANGE UP (ref 135–145)
WBC # BLD: 12.37 K/UL — HIGH (ref 3.8–10.5)
WBC # FLD AUTO: 12.37 K/UL — HIGH (ref 3.8–10.5)

## 2017-10-03 PROCEDURE — 99239 HOSP IP/OBS DSCHRG MGMT >30: CPT

## 2017-10-03 RX ORDER — SENNA PLUS 8.6 MG/1
2 TABLET ORAL
Qty: 0 | Refills: 0 | COMMUNITY
Start: 2017-10-03

## 2017-10-03 RX ORDER — DOCUSATE SODIUM 100 MG
1 CAPSULE ORAL
Qty: 0 | Refills: 0 | COMMUNITY
Start: 2017-10-03

## 2017-10-03 RX ORDER — POLYETHYLENE GLYCOL 3350 17 G/17G
17 POWDER, FOR SOLUTION ORAL
Qty: 0 | Refills: 0 | COMMUNITY
Start: 2017-10-03

## 2017-10-03 RX ORDER — IBUPROFEN 200 MG
1 TABLET ORAL
Qty: 60 | Refills: 0
Start: 2017-10-03 | End: 2017-10-18

## 2017-10-03 RX ORDER — OXYCODONE HYDROCHLORIDE 5 MG/1
1 TABLET ORAL
Qty: 42 | Refills: 0 | OUTPATIENT
Start: 2017-10-03 | End: 2017-10-10

## 2017-10-03 RX ADMIN — Medication 1 MILLIGRAM(S): at 11:12

## 2017-10-03 RX ADMIN — OXYCODONE HYDROCHLORIDE 10 MILLIGRAM(S): 5 TABLET ORAL at 05:17

## 2017-10-03 RX ADMIN — SODIUM CHLORIDE 75 MILLILITER(S): 9 INJECTION, SOLUTION INTRAVENOUS at 05:18

## 2017-10-03 RX ADMIN — Medication 100 MILLIGRAM(S): at 05:17

## 2017-10-03 RX ADMIN — Medication 100 MILLIGRAM(S): at 11:12

## 2017-10-03 RX ADMIN — OXYCODONE HYDROCHLORIDE 10 MILLIGRAM(S): 5 TABLET ORAL at 11:42

## 2017-10-03 RX ADMIN — OXYCODONE HYDROCHLORIDE 10 MILLIGRAM(S): 5 TABLET ORAL at 11:12

## 2017-10-03 RX ADMIN — Medication 1 TABLET(S): at 11:12

## 2017-10-03 NOTE — PROGRESS NOTE ADULT - SUBJECTIVE AND OBJECTIVE BOX
Patient is a 22y old  Male who presents with a chief complaint of B/L LE pain (28 Sep 2017 04:54)      SUBJECTIVE / OVERNIGHT EVENTS: No acute events overnight. Used oxycodone 10mg PRN once this AM. Feeling better and well enough to go home today. Having BMs, no chest pain, SOB, N/V/D.    MEDICATIONS  (STANDING):  docusate sodium 100 milliGRAM(s) Oral three times a day  enoxaparin Injectable 40 milliGRAM(s) SubCutaneous every 24 hours  folic acid 1 milliGRAM(s) Oral daily  hydroxyurea (Non - oncologic) 2000 milliGRAM(s) Oral daily  Jadenu (Deferasirox) 360mg Tablet 6 Tablet(s)   Oral daily  multivitamin 1 Tablet(s) Oral daily  polyethylene glycol 3350 17 Gram(s) Oral daily  senna 2 Tablet(s) Oral at bedtime    MEDICATIONS  (PRN):  acetaminophen   Tablet 650 milliGRAM(s) Oral every 6 hours PRN For Temp greater than 38 C (100.4 F)  diphenhydrAMINE   Capsule 25 milliGRAM(s) Oral every 4 hours PRN Rash and/or Itching  naloxone Injectable 0.1 milliGRAM(s) IV Push every 3 minutes PRN For ANY of the following changes in patient status:  A. RR LESS THAN 10 breaths per minute, B. Oxygen saturation LESS THAN 90%, C. Sedation score of 6  ondansetron    Tablet 4 milliGRAM(s) Oral every 6 hours PRN Nausea and/or Vomiting  oxyCODONE    IR 10 milliGRAM(s) Oral every 4 hours PRN Moderate Pain (4 - 6)      T(C): 37.1 (10-03-17 @ 10:08), Max: 37.2 (10-02-17 @ 17:51)  HR: 80 (10-03-17 @ 10:08) (72 - 88)  BP: 110/54 (10-03-17 @ 10:08) (110/54 - 131/59)  RR: 18 (10-03-17 @ 10:08) (18 - 18)  SpO2: 96% (10-03-17 @ 10:08) (90% - 100%)  CAPILLARY BLOOD GLUCOSE        I&O's Summary      PHYSICAL EXAM:  GENERAL: no apparent distress, on room air, found resting comfortably  CHEST/LUNG: Clear to auscultation bilaterally; No wheezing or crackles  HEART: Regular rate and rhythm; No murmurs, rubs, or gallops  ABDOMEN: Soft, Nontender, Nondistended; Bowel sounds present  PSYCH: AAOX3  MSK: no pain or TTP of legs and joints    LABS:                        6.1    12.37 )-----------( 530      ( 03 Oct 2017 05:39 )             17.4     10-03    141  |  101  |  6<L>  ----------------------------<  102<H>  3.9   |  26  |  0.58    Ca    9.5      03 Oct 2017 05:39  Phos  4.2     10-03  Mg     1.9     10-03    TPro  7.8  /  Alb  4.2  /  TBili  3.1<H>  /  DBili  x   /  AST  39  /  ALT  21  /  AlkPhos  87  10-03              RADIOLOGY & ADDITIONAL TESTS:

## 2017-10-03 NOTE — PROGRESS NOTE ADULT - PROBLEM SELECTOR PROBLEM 2
SIRS (systemic inflammatory response syndrome)
R/O Sepsis
SIRS (systemic inflammatory response syndrome)

## 2017-10-03 NOTE — DISCHARGE NOTE ADULT - HOSPITAL COURSE
This is a 22M with history of Sickle Cell disease with multiple hospitalizations for VOC, hx of ACS in past who presents to the hospital with complaints of b/l LE pain.  Admitted for Sickle cell crisis and placed on dilaudid PCA with bowel regimen.  Was noted with fevers, tachycardia and leukocytosis but no discernable signs of infection.  CXR clear, abd soft/NT; likely has SIRS in setting of his acute VOC.  Was given vanc/aztreonam in ED without further treatment with abx.  Ucx negative and original Bcx with gram positive cocci which is believed to be contaminant.  Repeat Bcx negative.     Symptoms improved, WBC improved and medically cleared for d/c home with f/u to Dr. Nguyễn. 22M with history of Sickle Cell disease with multiple hospitalizations for VOC, hx of ACS in past who presents to the hospital with complaints of b/l LE pain.  Admitted for Sickle cell crisis and placed on dilaudid PCA with bowel regimen.  Was noted with fevers, tachycardia and leukocytosis but no discernable signs of infection.  CXR clear, abd soft/NT; likely has SIRS in setting of his acute VOC.  Was given vanc/aztreonam in ED without further treatment with abx.  Ucx negative and original Bcx with gram positive cocci which is believed to be contaminant growing micrococcus species.  Repeat Bcx negative and afebrile for >72 hours prior to DC.     Symptoms improved, WBC improved and medically cleared for d/c home with f/u to Dr. Nguyễn.

## 2017-10-03 NOTE — DISCHARGE NOTE ADULT - PATIENT PORTAL LINK FT
“You can access the [facility] Patient Portal, offered by [facility], by registering with the following website: [email or web address]”

## 2017-10-03 NOTE — DISCHARGE NOTE ADULT - CARE PLAN
Principal Discharge DX:	Sickle cell crisis  Goal:	Symptom management  Instructions for follow-up, activity and diet:	Pain medication PRN.  Follow up with Dr. Nguyễn within one week of discharge.  Secondary Diagnosis:	SIRS (systemic inflammatory response syndrome)  Instructions for follow-up, activity and diet:	Repeat Blood culture negative and White count improving.  Follow up with Dr. Nguyễn within one week of discharge.  Secondary Diagnosis:	Iron overload due to repeated red blood cell transfusions  Instructions for follow-up, activity and diet:	Continue with Jadenu.  Follow up with Dr. Nguyễn within one week of discharge.

## 2017-10-03 NOTE — DISCHARGE NOTE ADULT - PLAN OF CARE
Symptom management Pain medication PRN.  Follow up with Dr. Nguyễn within one week of discharge. Repeat Blood culture negative and White count improving.  Follow up with Dr. Nguyễn within one week of discharge. Continue with Deisy.  Follow up with Dr. Nguyễn within one week of discharge.

## 2017-10-03 NOTE — PROGRESS NOTE ADULT - PROBLEM SELECTOR PROBLEM 3
Iron overload due to repeated red blood cell transfusions
Need for prophylactic measure

## 2017-10-03 NOTE — PROGRESS NOTE ADULT - ASSESSMENT
22M SCD c/b ACS and iron overload syndrome in past, p/w b/l LE pain, a/w sickle cell crisis. Currently afebrile.
22M SCD c/b ACS and iron overload syndrome in past, p/w b/l LE pain, a/w sickle cell crisis. Currently afebrile.
22M SCD c/b ACS and iron overload syndrome in past, p/w b/l LE pain, a/w sickle cell crisis. Having intermittent fevers.
22M SCD c/b ACS and iron overload syndrome in past, p/w b/l LE pain, a/w sickle cell crisis. Currently afebrile.
This is a 23yo M with PMH of SCD, hx of ACS in past and Iron Overload Syndrome who presents with bilateral lower extremity pain typical of his sickle cell pain crisis.  Also noted to have intermittent high fevers.

## 2017-10-03 NOTE — PROGRESS NOTE ADULT - PROBLEM SELECTOR PLAN 1
- DC PCA pump today, continue oxycodone 10mg q4 PRN  - c/w bowel regimen, monitor for constipation  - c/w incentive spirometry, check O2 sat on RA, if >92%, dc NC  - c/w IV fluid hydration (1/2 NS), folic  acid, hydrea  - appreciate heme input, no need for transfusion now, HD stable
- c/w dilaudid PCA: usage reviewed, patient currently reports pain acceptable on current setting  - bowel regimen (added miralax, c/w senna/colace (increased freq to tid), provide enema per patient request)  - c/w incentive spirometry, supplemental O2 prn  - c/w IV fluid hydration (1/2 NS)  - monitor CBC, retic count, LDH daily  - c/w folic acid, d/w heme re: continuing hydroxyurea
Improved, stable for DC home today  Continue oxycodone 10mg q4 PRN with bowel regimen  O2 sat on room air: 96%  - DC IV fluid hydration (1/2 NS), continue folic  acid, hydrea
- c/w dilaudid PCA: usage reviewed, patient currently reports pain acceptable on current setting  - c/w bowel regimen, monitor for constipation  - c/w incentive spirometry, supplemental O2 prn  - c/w IV fluid hydration (1/2 NS)  - monitor CBC, retic count, LDH daily  - c/w folic acid  - d/w heme/onc, c/w hydroxyurea (2000mg), no need for transfusion at this time  - apprec heme input
-c/w dilaudid PCA - usage reviewed    -c/w IVF hydration  -incentive spirometry  -supplemental O2 prn  -monitoring CBC, LDH, retic daily  -senna/colace for bowel regimen

## 2017-10-03 NOTE — DISCHARGE NOTE ADULT - SECONDARY DIAGNOSIS.
SIRS (systemic inflammatory response syndrome) Iron overload due to repeated red blood cell transfusions

## 2017-10-03 NOTE — PROGRESS NOTE ADULT - PROBLEM SELECTOR PLAN 2
Last febrile episode on 9/30  - Bcx from 9/28 with GPC in clusters and has been monitored off Abx with suspicion that fevers likely from SCC and that the + BCx is possible contaminant with micrococcus species. 9/29 Bcx negative 72 hours x2 bottles

## 2017-10-03 NOTE — DISCHARGE NOTE ADULT - CARE PROVIDER_API CALL
Kenia Nguyễn), Internal Medicine  [address] [address] [place], [place] [address]  Phone: [phone]  Fax: [phone]

## 2017-10-03 NOTE — DISCHARGE NOTE ADULT - MEDICATION SUMMARY - MEDICATIONS TO TAKE
I will START or STAY ON the medications listed below when I get home from the hospital:    oxyCODONE 10 mg oral tablet  -- 1 tab(s) by mouth every 4 hours, As needed, Moderate and severe pain MDD:60 mg  -- Indication: For Pain    ibuprofen 600 mg oral tablet  -- 1 tab(s) by mouth every 6 hours, As needed, mild pain  -- Indication: For Pain    hydroxyurea 500 mg oral capsule  -- 4 cap(s) by mouth once a day  -- Indication: For Sickle cell     Jadenu 360 mg oral tablet  -- 6 tab(s) by mouth once a day  -- Indication: For Sickle cell     senna oral tablet  -- 2 tab(s) by mouth once a day (at bedtime)  -- Indication: For Constipation    docusate sodium 100 mg oral capsule  -- 1 cap(s) by mouth 3 times a day  -- Indication: For Constipation    polyethylene glycol 3350 oral powder for reconstitution  -- 17 gram(s) by mouth once a day  -- Indication: For Constipation    Multiple Vitamins oral tablet  -- 1 tab(s) by mouth once a day  -- Indication: For Supplement    folic acid 1 mg oral tablet  -- 1 tab(s) by mouth once a day  -- Indication: For Supplement

## 2017-10-03 NOTE — PROGRESS NOTE ADULT - PROBLEM SELECTOR PLAN 3
- d/w pharm, Jadenu not available here in the hospital, patient will need to bring in his own supply...
Jadenu not available here in the hospital, patient will need to bring in his own supply...
Jadenu not available here in the hospital, patient will need to bring in his own supply...Patient to be discharged today. Advised hematology followup
- d/w pharm, Jadenu not available here in the hospital, patient will need to bring in his own supply...
-DVT ppx w/ HSQ

## 2017-10-04 LAB
BACTERIA BLD CULT: SIGNIFICANT CHANGE UP
BACTERIA BLD CULT: SIGNIFICANT CHANGE UP

## 2017-12-07 ENCOUNTER — LABORATORY RESULT (OUTPATIENT)
Age: 22
End: 2017-12-07

## 2017-12-07 ENCOUNTER — APPOINTMENT (OUTPATIENT)
Dept: INTERNAL MEDICINE | Facility: HOSPITAL | Age: 22
End: 2017-12-07
Payer: COMMERCIAL

## 2017-12-07 ENCOUNTER — OUTPATIENT (OUTPATIENT)
Dept: OUTPATIENT SERVICES | Facility: HOSPITAL | Age: 22
LOS: 1 days | End: 2017-12-07

## 2017-12-07 VITALS — DIASTOLIC BLOOD PRESSURE: 58 MMHG | OXYGEN SATURATION: 98 % | HEART RATE: 64 BPM | SYSTOLIC BLOOD PRESSURE: 134 MMHG

## 2017-12-07 DIAGNOSIS — Z92.89 PERSONAL HISTORY OF OTHER MEDICAL TREATMENT: ICD-10-CM

## 2017-12-07 LAB
ALBUMIN SERPL ELPH-MCNC: 4.9 G/DL — SIGNIFICANT CHANGE UP (ref 3.3–5)
ALP SERPL-CCNC: 72 U/L — SIGNIFICANT CHANGE UP (ref 40–120)
ALT FLD-CCNC: 15 U/L — SIGNIFICANT CHANGE UP (ref 4–41)
AST SERPL-CCNC: 56 U/L — HIGH (ref 4–40)
BASOPHILS # BLD AUTO: 0.04 K/UL — SIGNIFICANT CHANGE UP (ref 0–0.2)
BASOPHILS NFR BLD AUTO: 0.5 % — SIGNIFICANT CHANGE UP (ref 0–2)
BILIRUB SERPL-MCNC: 3 MG/DL — HIGH (ref 0.2–1.2)
BUN SERPL-MCNC: 4 MG/DL — LOW (ref 7–23)
CALCIUM SERPL-MCNC: 9.1 MG/DL — SIGNIFICANT CHANGE UP (ref 8.4–10.5)
CHLORIDE SERPL-SCNC: 102 MMOL/L — SIGNIFICANT CHANGE UP (ref 98–107)
CO2 SERPL-SCNC: 28 MMOL/L — SIGNIFICANT CHANGE UP (ref 22–31)
CREAT SERPL-MCNC: 0.65 MG/DL — SIGNIFICANT CHANGE UP (ref 0.5–1.3)
EOSINOPHIL # BLD AUTO: 0.21 K/UL — SIGNIFICANT CHANGE UP (ref 0–0.5)
EOSINOPHIL NFR BLD AUTO: 2.8 % — SIGNIFICANT CHANGE UP (ref 0–6)
GLUCOSE SERPL-MCNC: 85 MG/DL — SIGNIFICANT CHANGE UP (ref 70–99)
HCT VFR BLD CALC: 21.9 % — LOW (ref 39–50)
HGB BLD-MCNC: 8.2 G/DL — LOW (ref 13–17)
IMM GRANULOCYTES # BLD AUTO: 0.03 # — SIGNIFICANT CHANGE UP
IMM GRANULOCYTES NFR BLD AUTO: 0.4 % — SIGNIFICANT CHANGE UP (ref 0–1.5)
LYMPHOCYTES # BLD AUTO: 2.55 K/UL — SIGNIFICANT CHANGE UP (ref 1–3.3)
LYMPHOCYTES # BLD AUTO: 33.4 % — SIGNIFICANT CHANGE UP (ref 13–44)
MCHC RBC-ENTMCNC: 36.3 PG — HIGH (ref 27–34)
MCHC RBC-ENTMCNC: 37.4 % — HIGH (ref 32–36)
MCV RBC AUTO: 96.9 FL — SIGNIFICANT CHANGE UP (ref 80–100)
MONOCYTES # BLD AUTO: 1.3 K/UL — HIGH (ref 0–0.9)
MONOCYTES NFR BLD AUTO: 17 % — HIGH (ref 2–14)
NEUTROPHILS # BLD AUTO: 3.5 K/UL — SIGNIFICANT CHANGE UP (ref 1.8–7.4)
NEUTROPHILS NFR BLD AUTO: 45.9 % — SIGNIFICANT CHANGE UP (ref 43–77)
NRBC # FLD: 0.13 — SIGNIFICANT CHANGE UP
NRBC FLD-RTO: 1.7 — SIGNIFICANT CHANGE UP
PLATELET # BLD AUTO: 515 K/UL — HIGH (ref 150–400)
PMV BLD: 10 FL — SIGNIFICANT CHANGE UP (ref 7–13)
POTASSIUM SERPL-MCNC: 3.9 MMOL/L — SIGNIFICANT CHANGE UP (ref 3.5–5.3)
POTASSIUM SERPL-SCNC: 3.9 MMOL/L — SIGNIFICANT CHANGE UP (ref 3.5–5.3)
PROT SERPL-MCNC: 7.9 G/DL — SIGNIFICANT CHANGE UP (ref 6–8.3)
RBC # BLD: 2.26 M/UL — LOW (ref 4.2–5.8)
RBC # FLD: 21 % — HIGH (ref 10.3–14.5)
RETICS #: 0.1 10X6/UL — HIGH (ref 0.02–0.07)
RETICS/RBC NFR: 6.1 % — HIGH (ref 0.5–2.5)
SODIUM SERPL-SCNC: 141 MMOL/L — SIGNIFICANT CHANGE UP (ref 135–145)
WBC # BLD: 7.63 K/UL — SIGNIFICANT CHANGE UP (ref 3.8–10.5)
WBC # FLD AUTO: 7.63 K/UL — SIGNIFICANT CHANGE UP (ref 3.8–10.5)

## 2017-12-07 PROCEDURE — 99213 OFFICE O/P EST LOW 20 MIN: CPT

## 2017-12-08 DIAGNOSIS — Z23 ENCOUNTER FOR IMMUNIZATION: ICD-10-CM

## 2017-12-08 DIAGNOSIS — Z00.00 ENCOUNTER FOR GENERAL ADULT MEDICAL EXAMINATION WITHOUT ABNORMAL FINDINGS: ICD-10-CM

## 2017-12-08 LAB
HGB A MFR BLD: 0 % — LOW
HGB A2 MFR BLD: 4.3 % — HIGH (ref 2.4–3.5)
HGB ELECT COMMENT: SIGNIFICANT CHANGE UP
HGB F MFR BLD: 5.3 % — HIGH (ref 0–1.5)
HGB S MFR BLD: 90.4 % — HIGH (ref 0–0)

## 2017-12-11 DIAGNOSIS — Z92.89 PERSONAL HISTORY OF OTHER MEDICAL TREATMENT: ICD-10-CM

## 2018-01-30 NOTE — ED PROVIDER NOTE - CHPI ED SYMPTOMS POS
PAIN
Case dw Dr. Jaime, pt with significant stenosis of LAD and RCA, pt to be admitted to telemetry. MAR endorsed. Pt and pt's sister agrees with admission for cardiac monitoring. I had a detailed discussion with the patient and/or guardian regarding the historical points, exam findings, and any diagnostic results supporting the admit diagnosis.

## 2018-03-08 ENCOUNTER — APPOINTMENT (OUTPATIENT)
Dept: INTERNAL MEDICINE | Facility: HOSPITAL | Age: 23
End: 2018-03-08

## 2018-04-05 ENCOUNTER — LABORATORY RESULT (OUTPATIENT)
Age: 23
End: 2018-04-05

## 2018-04-05 ENCOUNTER — OUTPATIENT (OUTPATIENT)
Dept: OUTPATIENT SERVICES | Facility: HOSPITAL | Age: 23
LOS: 1 days | End: 2018-04-05

## 2018-04-05 ENCOUNTER — APPOINTMENT (OUTPATIENT)
Dept: INTERNAL MEDICINE | Facility: HOSPITAL | Age: 23
End: 2018-04-05
Payer: COMMERCIAL

## 2018-04-05 VITALS — OXYGEN SATURATION: 96 % | SYSTOLIC BLOOD PRESSURE: 136 MMHG | HEART RATE: 76 BPM | DIASTOLIC BLOOD PRESSURE: 50 MMHG

## 2018-04-05 VITALS — SYSTOLIC BLOOD PRESSURE: 110 MMHG | DIASTOLIC BLOOD PRESSURE: 60 MMHG

## 2018-04-05 LAB
ALBUMIN SERPL ELPH-MCNC: 4.6 G/DL — SIGNIFICANT CHANGE UP (ref 3.3–5)
ALP SERPL-CCNC: 79 U/L — SIGNIFICANT CHANGE UP (ref 40–120)
ALT FLD-CCNC: 16 U/L — SIGNIFICANT CHANGE UP (ref 4–41)
AMPHET UR-MCNC: NEGATIVE — SIGNIFICANT CHANGE UP
ANISOCYTOSIS BLD QL: SIGNIFICANT CHANGE UP
AST SERPL-CCNC: 48 U/L — HIGH (ref 4–40)
BARBITURATES UR SCN-MCNC: NEGATIVE — SIGNIFICANT CHANGE UP
BASOPHILS # BLD AUTO: 0.03 K/UL — SIGNIFICANT CHANGE UP (ref 0–0.2)
BASOPHILS NFR BLD AUTO: 0.4 % — SIGNIFICANT CHANGE UP (ref 0–2)
BASOPHILS NFR SPEC: 1.8 % — SIGNIFICANT CHANGE UP (ref 0–2)
BENZODIAZ UR-MCNC: NEGATIVE — SIGNIFICANT CHANGE UP
BILIRUB SERPL-MCNC: 2.8 MG/DL — HIGH (ref 0.2–1.2)
BUN SERPL-MCNC: 7 MG/DL — SIGNIFICANT CHANGE UP (ref 7–23)
CALCIUM SERPL-MCNC: 9.2 MG/DL — SIGNIFICANT CHANGE UP (ref 8.4–10.5)
CANNABINOIDS UR-MCNC: NEGATIVE — SIGNIFICANT CHANGE UP
CHLORIDE SERPL-SCNC: 101 MMOL/L — SIGNIFICANT CHANGE UP (ref 98–107)
CO2 SERPL-SCNC: 28 MMOL/L — SIGNIFICANT CHANGE UP (ref 22–31)
COCAINE METAB.OTHER UR-MCNC: NEGATIVE — SIGNIFICANT CHANGE UP
CREAT SERPL-MCNC: 0.66 MG/DL — SIGNIFICANT CHANGE UP (ref 0.5–1.3)
EOSINOPHIL # BLD AUTO: 0.2 K/UL — SIGNIFICANT CHANGE UP (ref 0–0.5)
EOSINOPHIL NFR BLD AUTO: 2.4 % — SIGNIFICANT CHANGE UP (ref 0–6)
EOSINOPHIL NFR FLD: 0.9 % — SIGNIFICANT CHANGE UP (ref 0–6)
GIANT PLATELETS BLD QL SMEAR: PRESENT — SIGNIFICANT CHANGE UP
GLUCOSE SERPL-MCNC: 72 MG/DL — SIGNIFICANT CHANGE UP (ref 70–99)
HCT VFR BLD CALC: 21.6 % — LOW (ref 39–50)
HGB BLD-MCNC: 7.7 G/DL — LOW (ref 13–17)
HOWELL-JOLLY BOD BLD QL SMEAR: PRESENT — SIGNIFICANT CHANGE UP
IMM GRANULOCYTES # BLD AUTO: 0.03 # — SIGNIFICANT CHANGE UP
IMM GRANULOCYTES NFR BLD AUTO: 0.4 % — SIGNIFICANT CHANGE UP (ref 0–1.5)
LYMPHOCYTES # BLD AUTO: 2.57 K/UL — SIGNIFICANT CHANGE UP (ref 1–3.3)
LYMPHOCYTES # BLD AUTO: 31.3 % — SIGNIFICANT CHANGE UP (ref 13–44)
LYMPHOCYTES NFR SPEC AUTO: 44.6 % — HIGH (ref 13–44)
MACROCYTES BLD QL: SIGNIFICANT CHANGE UP
MCHC RBC-ENTMCNC: 34.7 PG — HIGH (ref 27–34)
MCHC RBC-ENTMCNC: 35.6 % — SIGNIFICANT CHANGE UP (ref 32–36)
MCV RBC AUTO: 97.3 FL — SIGNIFICANT CHANGE UP (ref 80–100)
METHADONE UR-MCNC: NEGATIVE — SIGNIFICANT CHANGE UP
MONOCYTES # BLD AUTO: 1.12 K/UL — HIGH (ref 0–0.9)
MONOCYTES NFR BLD AUTO: 13.6 % — SIGNIFICANT CHANGE UP (ref 2–14)
MONOCYTES NFR BLD: 5.4 % — SIGNIFICANT CHANGE UP (ref 2–9)
NEUTROPHIL AB SER-ACNC: 41.1 % — LOW (ref 43–77)
NEUTROPHILS # BLD AUTO: 4.27 K/UL — SIGNIFICANT CHANGE UP (ref 1.8–7.4)
NEUTROPHILS NFR BLD AUTO: 51.9 % — SIGNIFICANT CHANGE UP (ref 43–77)
NRBC # FLD: 0.14 — SIGNIFICANT CHANGE UP
NRBC FLD-RTO: 1.7 — SIGNIFICANT CHANGE UP
OPIATES UR-MCNC: NEGATIVE — SIGNIFICANT CHANGE UP
OXYCODONE UR-MCNC: NEGATIVE — SIGNIFICANT CHANGE UP
PCP UR-MCNC: NEGATIVE — SIGNIFICANT CHANGE UP
PLATELET # BLD AUTO: 579 K/UL — HIGH (ref 150–400)
PLATELET COUNT - ESTIMATE: SIGNIFICANT CHANGE UP
PMV BLD: 10.3 FL — SIGNIFICANT CHANGE UP (ref 7–13)
POIKILOCYTOSIS BLD QL AUTO: SIGNIFICANT CHANGE UP
POLYCHROMASIA BLD QL SMEAR: SIGNIFICANT CHANGE UP
POTASSIUM SERPL-MCNC: 4.2 MMOL/L — SIGNIFICANT CHANGE UP (ref 3.5–5.3)
POTASSIUM SERPL-SCNC: 4.2 MMOL/L — SIGNIFICANT CHANGE UP (ref 3.5–5.3)
PROT SERPL-MCNC: 8.3 G/DL — SIGNIFICANT CHANGE UP (ref 6–8.3)
RBC # BLD: 2.22 M/UL — LOW (ref 4.2–5.8)
RBC # FLD: 21.4 % — HIGH (ref 10.3–14.5)
RETICS #: 129 K/UL — HIGH (ref 25–125)
RETICS/RBC NFR: 5.8 % — HIGH (ref 0.5–2.5)
SICKLE CELLS BLD QL SMEAR: SIGNIFICANT CHANGE UP
SODIUM SERPL-SCNC: 141 MMOL/L — SIGNIFICANT CHANGE UP (ref 135–145)
TARGETS BLD QL SMEAR: SIGNIFICANT CHANGE UP
VARIANT LYMPHS # BLD: 6.2 % — SIGNIFICANT CHANGE UP
WBC # BLD: 8.22 K/UL — SIGNIFICANT CHANGE UP (ref 3.8–10.5)
WBC # FLD AUTO: 8.22 K/UL — SIGNIFICANT CHANGE UP (ref 3.8–10.5)

## 2018-04-05 PROCEDURE — 99213 OFFICE O/P EST LOW 20 MIN: CPT

## 2018-04-11 DIAGNOSIS — E83.19 OTHER DISORDERS OF IRON METABOLISM: ICD-10-CM

## 2018-04-11 DIAGNOSIS — D57.1 SICKLE-CELL DISEASE WITHOUT CRISIS: ICD-10-CM

## 2018-07-16 ENCOUNTER — APPOINTMENT (OUTPATIENT)
Dept: INTERNAL MEDICINE | Facility: HOSPITAL | Age: 23
End: 2018-07-16

## 2018-09-13 ENCOUNTER — LABORATORY RESULT (OUTPATIENT)
Age: 23
End: 2018-09-13

## 2018-09-13 ENCOUNTER — OUTPATIENT (OUTPATIENT)
Dept: OUTPATIENT SERVICES | Facility: HOSPITAL | Age: 23
LOS: 1 days | End: 2018-09-13

## 2018-09-13 ENCOUNTER — MESSAGE (OUTPATIENT)
Age: 23
End: 2018-09-13

## 2018-09-13 ENCOUNTER — APPOINTMENT (OUTPATIENT)
Dept: INTERNAL MEDICINE | Facility: HOSPITAL | Age: 23
End: 2018-09-13
Payer: COMMERCIAL

## 2018-09-13 LAB
ALBUMIN SERPL ELPH-MCNC: 4.7 G/DL — SIGNIFICANT CHANGE UP (ref 3.3–5)
ALP SERPL-CCNC: 71 U/L — SIGNIFICANT CHANGE UP (ref 40–120)
ALT FLD-CCNC: 20 U/L — SIGNIFICANT CHANGE UP (ref 4–41)
ANISOCYTOSIS BLD QL: SLIGHT — SIGNIFICANT CHANGE UP
AST SERPL-CCNC: 56 U/L — HIGH (ref 4–40)
BASOPHILS # BLD AUTO: 0.04 K/UL — SIGNIFICANT CHANGE UP (ref 0–0.2)
BASOPHILS NFR BLD AUTO: 0.5 % — SIGNIFICANT CHANGE UP (ref 0–2)
BASOPHILS NFR SPEC: 1.9 % — SIGNIFICANT CHANGE UP (ref 0–2)
BILIRUB SERPL-MCNC: 2.7 MG/DL — HIGH (ref 0.2–1.2)
BLASTS # FLD: 0 % — SIGNIFICANT CHANGE UP (ref 0–0)
BUN SERPL-MCNC: 8 MG/DL — SIGNIFICANT CHANGE UP (ref 7–23)
CALCIUM SERPL-MCNC: 9.5 MG/DL — SIGNIFICANT CHANGE UP (ref 8.4–10.5)
CHLORIDE SERPL-SCNC: 104 MMOL/L — SIGNIFICANT CHANGE UP (ref 98–107)
CO2 SERPL-SCNC: 24 MMOL/L — SIGNIFICANT CHANGE UP (ref 22–31)
CREAT SERPL-MCNC: 0.65 MG/DL — SIGNIFICANT CHANGE UP (ref 0.5–1.3)
EOSINOPHIL # BLD AUTO: 0.23 K/UL — SIGNIFICANT CHANGE UP (ref 0–0.5)
EOSINOPHIL NFR BLD AUTO: 2.9 % — SIGNIFICANT CHANGE UP (ref 0–6)
EOSINOPHIL NFR FLD: 0.9 % — SIGNIFICANT CHANGE UP (ref 0–6)
FERRITIN SERPL-MCNC: 1175 NG/ML — HIGH (ref 30–400)
GIANT PLATELETS BLD QL SMEAR: PRESENT — SIGNIFICANT CHANGE UP
GLUCOSE SERPL-MCNC: 85 MG/DL — SIGNIFICANT CHANGE UP (ref 70–99)
HCT VFR BLD CALC: 20.9 % — CRITICAL LOW (ref 39–50)
HGB BLD-MCNC: 7.6 G/DL — LOW (ref 13–17)
HYPOCHROMIA BLD QL: SLIGHT — SIGNIFICANT CHANGE UP
IMM GRANULOCYTES # BLD AUTO: 0.02 # — SIGNIFICANT CHANGE UP
IMM GRANULOCYTES NFR BLD AUTO: 0.3 % — SIGNIFICANT CHANGE UP (ref 0–1.5)
LYMPHOCYTES # BLD AUTO: 2.65 K/UL — SIGNIFICANT CHANGE UP (ref 1–3.3)
LYMPHOCYTES # BLD AUTO: 33.6 % — SIGNIFICANT CHANGE UP (ref 13–44)
LYMPHOCYTES NFR SPEC AUTO: 30.8 % — SIGNIFICANT CHANGE UP (ref 13–44)
MACROCYTES BLD QL: SLIGHT — SIGNIFICANT CHANGE UP
MCHC RBC-ENTMCNC: 32.8 PG — SIGNIFICANT CHANGE UP (ref 27–34)
MCHC RBC-ENTMCNC: 36.4 % — HIGH (ref 32–36)
MCV RBC AUTO: 90.1 FL — SIGNIFICANT CHANGE UP (ref 80–100)
METAMYELOCYTES # FLD: 0 % — SIGNIFICANT CHANGE UP (ref 0–1)
MICROCYTES BLD QL: SLIGHT — SIGNIFICANT CHANGE UP
MONOCYTES # BLD AUTO: 1.38 K/UL — HIGH (ref 0–0.9)
MONOCYTES NFR BLD AUTO: 17.5 % — HIGH (ref 2–14)
MONOCYTES NFR BLD: 15 % — HIGH (ref 2–9)
MYELOCYTES NFR BLD: 0 % — SIGNIFICANT CHANGE UP (ref 0–0)
NEUTROPHIL AB SER-ACNC: 44.9 % — SIGNIFICANT CHANGE UP (ref 43–77)
NEUTROPHILS # BLD AUTO: 3.57 K/UL — SIGNIFICANT CHANGE UP (ref 1.8–7.4)
NEUTROPHILS NFR BLD AUTO: 45.2 % — SIGNIFICANT CHANGE UP (ref 43–77)
NEUTS BAND # BLD: 0 % — SIGNIFICANT CHANGE UP (ref 0–6)
NRBC # FLD: 0.17 — SIGNIFICANT CHANGE UP
NRBC FLD-RTO: 2.2 — SIGNIFICANT CHANGE UP
OTHER - HEMATOLOGY %: 0 — SIGNIFICANT CHANGE UP
OVALOCYTES BLD QL SMEAR: SIGNIFICANT CHANGE UP
PLATELET # BLD AUTO: 487 K/UL — HIGH (ref 150–400)
PLATELET COUNT - ESTIMATE: NORMAL — SIGNIFICANT CHANGE UP
PMV BLD: 10.5 FL — SIGNIFICANT CHANGE UP (ref 7–13)
POIKILOCYTOSIS BLD QL AUTO: SLIGHT — SIGNIFICANT CHANGE UP
POTASSIUM SERPL-MCNC: 4.3 MMOL/L — SIGNIFICANT CHANGE UP (ref 3.5–5.3)
POTASSIUM SERPL-SCNC: 4.3 MMOL/L — SIGNIFICANT CHANGE UP (ref 3.5–5.3)
PROMYELOCYTES # FLD: 0 % — SIGNIFICANT CHANGE UP (ref 0–0)
PROT SERPL-MCNC: 7.5 G/DL — SIGNIFICANT CHANGE UP (ref 6–8.3)
RBC # BLD: 2.32 M/UL — LOW (ref 4.2–5.8)
RBC # FLD: 22 % — HIGH (ref 10.3–14.5)
RETICS #: 217 K/UL — HIGH (ref 25–125)
RETICS/RBC NFR: 9.3 % — HIGH (ref 0.5–2.5)
SICKLE CELLS BLD QL SMEAR: SIGNIFICANT CHANGE UP
SMUDGE CELLS # BLD: PRESENT — SIGNIFICANT CHANGE UP
SODIUM SERPL-SCNC: 141 MMOL/L — SIGNIFICANT CHANGE UP (ref 135–145)
TARGETS BLD QL SMEAR: SIGNIFICANT CHANGE UP
VARIANT LYMPHS # BLD: 6.5 % — SIGNIFICANT CHANGE UP
WBC # BLD: 7.89 K/UL — SIGNIFICANT CHANGE UP (ref 3.8–10.5)
WBC # FLD AUTO: 7.89 K/UL — SIGNIFICANT CHANGE UP (ref 3.8–10.5)

## 2018-09-13 PROCEDURE — 99213 OFFICE O/P EST LOW 20 MIN: CPT

## 2018-09-17 DIAGNOSIS — D57.1 SICKLE-CELL DISEASE WITHOUT CRISIS: ICD-10-CM

## 2018-09-17 DIAGNOSIS — Z91.19 PATIENT'S NONCOMPLIANCE WITH OTHER MEDICAL TREATMENT AND REGIMEN: ICD-10-CM

## 2018-09-17 NOTE — HISTORY OF PRESENT ILLNESS
[de-identified] : 23 yo male with HGB SS, here for f/u. Taking his HU, 2000 mg QD. Admits to missing 2-3 doses of HU per week, mostly due to scheduling. Has 1-2 painful episodes per week. Never started l-glutamine\par PE: wdwn male in nad\par vss\par anicteric\par lungs- cta\par cor: rrr1/6 jese\par abd- benign\par ext: -c/c/e, no ulcers\par \par A/P- 23 yo male with HGB SS\par 1. continue HU- 2000 mg QD -take in am\par 2.  Oxycodone- 10 mg tab- #30\par ISTOP checked\par 3. routine labs\par 4. f/u three months\par Kenia Nguyễn MD

## 2018-09-19 ENCOUNTER — INPATIENT (INPATIENT)
Facility: HOSPITAL | Age: 23
LOS: 9 days | Discharge: HOME | End: 2018-09-29
Attending: HOSPITALIST | Admitting: HOSPITALIST
Payer: COMMERCIAL

## 2018-09-19 VITALS
OXYGEN SATURATION: 95 % | HEART RATE: 95 BPM | TEMPERATURE: 98 F | RESPIRATION RATE: 19 BRPM | SYSTOLIC BLOOD PRESSURE: 168 MMHG | DIASTOLIC BLOOD PRESSURE: 82 MMHG

## 2018-09-19 DIAGNOSIS — D57.00 HB-SS DISEASE WITH CRISIS, UNSPECIFIED: ICD-10-CM

## 2018-09-19 DIAGNOSIS — E86.0 DEHYDRATION: ICD-10-CM

## 2018-09-19 DIAGNOSIS — R07.89 OTHER CHEST PAIN: ICD-10-CM

## 2018-09-19 DIAGNOSIS — D72.829 ELEVATED WHITE BLOOD CELL COUNT, UNSPECIFIED: ICD-10-CM

## 2018-09-19 DIAGNOSIS — Z29.9 ENCOUNTER FOR PROPHYLACTIC MEASURES, UNSPECIFIED: ICD-10-CM

## 2018-09-19 LAB
ALBUMIN SERPL ELPH-MCNC: 4.8 G/DL — SIGNIFICANT CHANGE UP (ref 3.3–5)
ALP SERPL-CCNC: 68 U/L — SIGNIFICANT CHANGE UP (ref 40–120)
ALT FLD-CCNC: 21 U/L — SIGNIFICANT CHANGE UP (ref 4–41)
AST SERPL-CCNC: 69 U/L — HIGH (ref 4–40)
BASOPHILS # BLD AUTO: 0.05 K/UL — SIGNIFICANT CHANGE UP (ref 0–0.2)
BASOPHILS NFR BLD AUTO: 0.3 % — SIGNIFICANT CHANGE UP (ref 0–2)
BILIRUB SERPL-MCNC: 2.5 MG/DL — HIGH (ref 0.2–1.2)
BLD GP AB SCN SERPL QL: POSITIVE — SIGNIFICANT CHANGE UP
BUN SERPL-MCNC: 9 MG/DL — SIGNIFICANT CHANGE UP (ref 7–23)
CALCIUM SERPL-MCNC: 9.2 MG/DL — SIGNIFICANT CHANGE UP (ref 8.4–10.5)
CHLORIDE SERPL-SCNC: 101 MMOL/L — SIGNIFICANT CHANGE UP (ref 98–107)
CO2 SERPL-SCNC: 26 MMOL/L — SIGNIFICANT CHANGE UP (ref 22–31)
CREAT SERPL-MCNC: 0.68 MG/DL — SIGNIFICANT CHANGE UP (ref 0.5–1.3)
EOSINOPHIL # BLD AUTO: 0.09 K/UL — SIGNIFICANT CHANGE UP (ref 0–0.5)
EOSINOPHIL NFR BLD AUTO: 0.6 % — SIGNIFICANT CHANGE UP (ref 0–6)
GLUCOSE SERPL-MCNC: 84 MG/DL — SIGNIFICANT CHANGE UP (ref 70–99)
HCT VFR BLD CALC: 20.4 % — CRITICAL LOW (ref 39–50)
HGB BLD-MCNC: 6.8 G/DL — CRITICAL LOW (ref 13–17)
IMM GRANULOCYTES # BLD AUTO: 0.07 # — SIGNIFICANT CHANGE UP
IMM GRANULOCYTES NFR BLD AUTO: 0.5 % — SIGNIFICANT CHANGE UP (ref 0–1.5)
LYMPHOCYTES # BLD AUTO: 14.7 % — SIGNIFICANT CHANGE UP (ref 13–44)
LYMPHOCYTES # BLD AUTO: 2.23 K/UL — SIGNIFICANT CHANGE UP (ref 1–3.3)
MCHC RBC-ENTMCNC: 30 PG — SIGNIFICANT CHANGE UP (ref 27–34)
MCHC RBC-ENTMCNC: 33.3 % — SIGNIFICANT CHANGE UP (ref 32–36)
MCV RBC AUTO: 89.5 FL — SIGNIFICANT CHANGE UP (ref 80–100)
MONOCYTES # BLD AUTO: 1.5 K/UL — HIGH (ref 0–0.9)
MONOCYTES NFR BLD AUTO: 9.9 % — SIGNIFICANT CHANGE UP (ref 2–14)
NEUTROPHILS # BLD AUTO: 11.26 K/UL — HIGH (ref 1.8–7.4)
NEUTROPHILS NFR BLD AUTO: 74 % — SIGNIFICANT CHANGE UP (ref 43–77)
NRBC # FLD: 0.42 — SIGNIFICANT CHANGE UP
NRBC FLD-RTO: 2.8 — SIGNIFICANT CHANGE UP
PLATELET # BLD AUTO: 480 K/UL — HIGH (ref 150–400)
PMV BLD: 10 FL — SIGNIFICANT CHANGE UP (ref 7–13)
POTASSIUM SERPL-MCNC: 5.2 MMOL/L — SIGNIFICANT CHANGE UP (ref 3.5–5.3)
POTASSIUM SERPL-SCNC: 5.2 MMOL/L — SIGNIFICANT CHANGE UP (ref 3.5–5.3)
PROT SERPL-MCNC: 8.3 G/DL — SIGNIFICANT CHANGE UP (ref 6–8.3)
RBC # BLD: 2.28 M/UL — LOW (ref 4.2–5.8)
RBC # FLD: 23.7 % — HIGH (ref 10.3–14.5)
RETICS #: 148 K/UL — HIGH (ref 25–125)
RETICS/RBC NFR: 6.5 % — HIGH (ref 0.5–2.5)
RH IG SCN BLD-IMP: POSITIVE — SIGNIFICANT CHANGE UP
SODIUM SERPL-SCNC: 140 MMOL/L — SIGNIFICANT CHANGE UP (ref 135–145)
WBC # BLD: 15.2 K/UL — HIGH (ref 3.8–10.5)
WBC # FLD AUTO: 15.2 K/UL — HIGH (ref 3.8–10.5)

## 2018-09-19 PROCEDURE — 99223 1ST HOSP IP/OBS HIGH 75: CPT

## 2018-09-19 PROCEDURE — 71046 X-RAY EXAM CHEST 2 VIEWS: CPT | Mod: 26

## 2018-09-19 PROCEDURE — 86077 PHYS BLOOD BANK SERV XMATCH: CPT

## 2018-09-19 RX ORDER — DOCUSATE SODIUM 100 MG
100 CAPSULE ORAL THREE TIMES A DAY
Qty: 0 | Refills: 0 | Status: DISCONTINUED | OUTPATIENT
Start: 2018-09-19 | End: 2018-09-29

## 2018-09-19 RX ORDER — HYDROMORPHONE HYDROCHLORIDE 2 MG/ML
1 INJECTION INTRAMUSCULAR; INTRAVENOUS; SUBCUTANEOUS ONCE
Qty: 0 | Refills: 0 | Status: DISCONTINUED | OUTPATIENT
Start: 2018-09-19 | End: 2018-09-19

## 2018-09-19 RX ORDER — HYDROMORPHONE HYDROCHLORIDE 2 MG/ML
30 INJECTION INTRAMUSCULAR; INTRAVENOUS; SUBCUTANEOUS
Qty: 0 | Refills: 0 | Status: DISCONTINUED | OUTPATIENT
Start: 2018-09-19 | End: 2018-09-25

## 2018-09-19 RX ORDER — DEFERASIROX 180 MG/1
6 GRANULE ORAL
Qty: 0 | Refills: 0 | COMMUNITY

## 2018-09-19 RX ORDER — SODIUM CHLORIDE 9 MG/ML
1000 INJECTION INTRAMUSCULAR; INTRAVENOUS; SUBCUTANEOUS ONCE
Qty: 0 | Refills: 0 | Status: COMPLETED | OUTPATIENT
Start: 2018-09-19 | End: 2018-09-19

## 2018-09-19 RX ORDER — HYDROMORPHONE HYDROCHLORIDE 2 MG/ML
1 INJECTION INTRAMUSCULAR; INTRAVENOUS; SUBCUTANEOUS
Qty: 0 | Refills: 0 | Status: DISCONTINUED | OUTPATIENT
Start: 2018-09-19 | End: 2018-09-20

## 2018-09-19 RX ORDER — ONDANSETRON 8 MG/1
4 TABLET, FILM COATED ORAL EVERY 6 HOURS
Qty: 0 | Refills: 0 | Status: DISCONTINUED | OUTPATIENT
Start: 2018-09-19 | End: 2018-09-25

## 2018-09-19 RX ORDER — FOLIC ACID 0.8 MG
1 TABLET ORAL DAILY
Qty: 0 | Refills: 0 | Status: DISCONTINUED | OUTPATIENT
Start: 2018-09-19 | End: 2018-09-29

## 2018-09-19 RX ORDER — KETOROLAC TROMETHAMINE 30 MG/ML
15 SYRINGE (ML) INJECTION ONCE
Qty: 0 | Refills: 0 | Status: DISCONTINUED | OUTPATIENT
Start: 2018-09-19 | End: 2018-09-19

## 2018-09-19 RX ORDER — POLYETHYLENE GLYCOL 3350 17 G/17G
17 POWDER, FOR SOLUTION ORAL DAILY
Qty: 0 | Refills: 0 | Status: DISCONTINUED | OUTPATIENT
Start: 2018-09-19 | End: 2018-09-22

## 2018-09-19 RX ORDER — HYDROMORPHONE HYDROCHLORIDE 2 MG/ML
2 INJECTION INTRAMUSCULAR; INTRAVENOUS; SUBCUTANEOUS ONCE
Qty: 0 | Refills: 0 | Status: DISCONTINUED | OUTPATIENT
Start: 2018-09-19 | End: 2018-09-19

## 2018-09-19 RX ORDER — NALOXONE HYDROCHLORIDE 4 MG/.1ML
0.1 SPRAY NASAL
Qty: 0 | Refills: 0 | Status: DISCONTINUED | OUTPATIENT
Start: 2018-09-19 | End: 2018-09-25

## 2018-09-19 RX ORDER — SODIUM CHLORIDE 9 MG/ML
1000 INJECTION, SOLUTION INTRAVENOUS
Qty: 0 | Refills: 0 | Status: DISCONTINUED | OUTPATIENT
Start: 2018-09-19 | End: 2018-09-22

## 2018-09-19 RX ORDER — METOCLOPRAMIDE HCL 10 MG
10 TABLET ORAL ONCE
Qty: 0 | Refills: 0 | Status: COMPLETED | OUTPATIENT
Start: 2018-09-19 | End: 2018-09-20

## 2018-09-19 RX ORDER — HYDROXYUREA 500 MG/1
2000 CAPSULE ORAL DAILY
Qty: 0 | Refills: 0 | Status: DISCONTINUED | OUTPATIENT
Start: 2018-09-20 | End: 2018-09-29

## 2018-09-19 RX ORDER — ONDANSETRON 8 MG/1
4 TABLET, FILM COATED ORAL ONCE
Qty: 0 | Refills: 0 | Status: COMPLETED | OUTPATIENT
Start: 2018-09-19 | End: 2018-09-19

## 2018-09-19 RX ORDER — SENNA PLUS 8.6 MG/1
2 TABLET ORAL AT BEDTIME
Qty: 0 | Refills: 0 | Status: DISCONTINUED | OUTPATIENT
Start: 2018-09-19 | End: 2018-09-29

## 2018-09-19 RX ORDER — ENOXAPARIN SODIUM 100 MG/ML
40 INJECTION SUBCUTANEOUS EVERY 24 HOURS
Qty: 0 | Refills: 0 | Status: DISCONTINUED | OUTPATIENT
Start: 2018-09-19 | End: 2018-09-29

## 2018-09-19 RX ADMIN — HYDROMORPHONE HYDROCHLORIDE 1 MILLIGRAM(S): 2 INJECTION INTRAMUSCULAR; INTRAVENOUS; SUBCUTANEOUS at 19:41

## 2018-09-19 RX ADMIN — HYDROMORPHONE HYDROCHLORIDE 1 MILLIGRAM(S): 2 INJECTION INTRAMUSCULAR; INTRAVENOUS; SUBCUTANEOUS at 15:22

## 2018-09-19 RX ADMIN — HYDROMORPHONE HYDROCHLORIDE 2 MILLIGRAM(S): 2 INJECTION INTRAMUSCULAR; INTRAVENOUS; SUBCUTANEOUS at 21:52

## 2018-09-19 RX ADMIN — ONDANSETRON 4 MILLIGRAM(S): 8 TABLET, FILM COATED ORAL at 15:53

## 2018-09-19 RX ADMIN — ONDANSETRON 4 MILLIGRAM(S): 8 TABLET, FILM COATED ORAL at 21:37

## 2018-09-19 RX ADMIN — Medication 100 MILLIGRAM(S): at 22:49

## 2018-09-19 RX ADMIN — SODIUM CHLORIDE 1000 MILLILITER(S): 9 INJECTION INTRAMUSCULAR; INTRAVENOUS; SUBCUTANEOUS at 22:40

## 2018-09-19 RX ADMIN — HYDROMORPHONE HYDROCHLORIDE 1 MILLIGRAM(S): 2 INJECTION INTRAMUSCULAR; INTRAVENOUS; SUBCUTANEOUS at 16:41

## 2018-09-19 RX ADMIN — Medication 15 MILLIGRAM(S): at 15:22

## 2018-09-19 RX ADMIN — SENNA PLUS 2 TABLET(S): 8.6 TABLET ORAL at 22:49

## 2018-09-19 RX ADMIN — HYDROMORPHONE HYDROCHLORIDE 30 MILLILITER(S): 2 INJECTION INTRAMUSCULAR; INTRAVENOUS; SUBCUTANEOUS at 22:31

## 2018-09-19 RX ADMIN — HYDROMORPHONE HYDROCHLORIDE 1 MILLIGRAM(S): 2 INJECTION INTRAMUSCULAR; INTRAVENOUS; SUBCUTANEOUS at 15:53

## 2018-09-19 RX ADMIN — Medication 15 MILLIGRAM(S): at 15:53

## 2018-09-19 RX ADMIN — HYDROMORPHONE HYDROCHLORIDE 1 MILLIGRAM(S): 2 INJECTION INTRAMUSCULAR; INTRAVENOUS; SUBCUTANEOUS at 18:44

## 2018-09-19 RX ADMIN — HYDROMORPHONE HYDROCHLORIDE 2 MILLIGRAM(S): 2 INJECTION INTRAMUSCULAR; INTRAVENOUS; SUBCUTANEOUS at 21:37

## 2018-09-19 NOTE — H&P ADULT - PROBLEM SELECTOR PLAN 3
- moderately dry oral mucosa with whitish covering of tongue  - IVF NS 1 liter bolus, with maintenance IVF of 0.45 NS at 150 mL/Hr x 2 liters prescribed  - f/u for improvement

## 2018-09-19 NOTE — ED ADULT TRIAGE NOTE - CHIEF COMPLAINT QUOTE
Hx of sickle cell anemia and acute chest syndrome. Co diffuse chest pain since yesterday. Denies sob. Co 10/10 pain and crying in triage.

## 2018-09-19 NOTE — ED ADULT NURSE NOTE - NSIMPLEMENTINTERV_GEN_ALL_ED
Implemented All Universal Safety Interventions:  [place] to call system. Call bell, personal items and telephone within reach. Instruct patient to call for assistance. Room bathroom lighting operational. Non-slip footwear when patient is off stretcher. Physically safe environment: no spills, clutter or unnecessary equipment. Stretcher in lowest position, wheels locked, appropriate side rails in place.

## 2018-09-19 NOTE — H&P ADULT - ASSESSMENT
23 year old male, with past history significant for Sickle cell anemia, ACS, Cholecystectomy and Splenectomy, presented to the ED secondary to left sided chest pain.  Vital signs upon ED presentation as follows: BP = 168/82, HR = 95, RR = 17, T = 36.9 C (98.4 F), O2 Sat = 95% on RA.  CXR without any acute findings.  Diagnosed with Sickle Cell Crisis and Anemia.  Admitted for further management of:

## 2018-09-19 NOTE — H&P ADULT - NSHPSOCIALHISTORY_GEN_ALL_CORE
Single  Lives with parents  No history of smoking  No history of alcohol abuse  No history of illegal drug use

## 2018-09-19 NOTE — H&P ADULT - NSHPLABSRESULTS_GEN_ALL_CORE
6.8    15.20 )-----------( 480      ( 19 Sep 2018 15:15 )             20.4       09-19    140  |  101  |  9   ----------------------------<  84  5.2   |  26  |  0.68    Ca    9.2      19 Sep 2018 15:15    TPro  8.3  /  Alb  4.8  /  TBili  2.5<H>  /  DBili  x   /  AST  69<H>  /  ALT  21  /  AlkPhos  68  09-19          Lactate Trend      CAPILLARY BLOOD GLUCOSE 6.8    15.20 )-----------( 480      ( 19 Sep 2018 15:15 )             20.4       09-19    140  |  101  |  9   ----------------------------<  84  5.2   |  26  |  0.68    Ca    9.2      19 Sep 2018 15:15    TPro  8.3  /  Alb  4.8  /  TBili  2.5<H>  /  DBili  x   /  AST  69<H>  /  ALT  21  /  AlkPhos  68  09-19          Lactate Trend      CAPILLARY BLOOD GLUCOSE    ==============================================================    ECG = appears unchanged from prior study; NSR at 79 bpm, QTc = 428

## 2018-09-19 NOTE — H&P ADULT - PROBLEM SELECTOR PLAN 2
- left chest area, without radiation, 10/10 maximum intensity, present (with worsening) since prior week  - history of ACS, but does not appear to be ACS at present  - ECG appears unchanged from prior study; NSR at 79 bpm, QTc = 428  - O2 Sat = 95% on RA; 100% on NC __ liter/s  - CXR negative for any acute findings  - follow pulse oximetry  - if no improvement, may need CT of chest  - f/u [facility]BG

## 2018-09-19 NOTE — ED ADULT NURSE NOTE - OBJECTIVE STATEMENT
Jeramie RN: Pt received to [facility] A&Ox3 c/o generalized chest pain X1 week 2/2 sickle cell crisis. States this is his typical crisis pain, has been taking Oxycodone for pain with little to no relief. Also c/o intermittent nausea, no vomiting. 92-94% on RA, placed on 3L supplemental O2 and is now 97%. Denies SOB, dizziness, lightheadedness, HA, blurry vision. Last crisis about a year ago. 22G IV placed in R hand, blood work drawn and sent and medicated per MD orders. Safety maintained, report given to break coverage TREY Corrales.

## 2018-09-19 NOTE — H&P ADULT - PROBLEM SELECTOR PLAN 4
- WBC = 15.20  - no other signs/symptoms of infection appreciated presently  - likely due to stress demargination  - follow trend in AM  - close evaluate for any new signs/symptoms of infection

## 2018-09-19 NOTE — H&P ADULT - PROBLEM SELECTOR PLAN 1
- manifested as left sided chest pain, onset during the prior week and not relieved with home Motrin and oxycodone  - unknown trigger  - CXR negative, no flu-like symptoms, no fever, cough, shortness of breath  - ECG appears unchanged from prior study; NSR at 79 bpm, QTc = 428  - s/p Dilaudid 1 mg IV x 3, ketorolac 15 mg IV x one in ED w/o relief  - prescribed Dilaudid 1 mg IV Q2H PRN moderate/severe pain, until Dilaudid PCA initiated  - Dilaudid PCA once patient arrives on the floor  - prescribed 0.45 NS at 150 mL/Hr for 2 liters (adding also 1 liter NS bolus, then continuation of maintenance IVF)  - bowel regimen; colace, senna, miralax  - f/u repeat lab-work, also for LDH  - follow pulse oximetry  - continues on hydroxyurea 2000 mg daily, folic acid 1 mg daily  - Hematology consult in the AM (Kenia Nguyễn MD); pls call

## 2018-09-19 NOTE — ED PROVIDER NOTE - OBJECTIVE STATEMENT
23 M h/o sickle cell, (h/o acute chest in the past), p/w L sided chest pain. Started 2 days ago, but worsened today. Took 10mg oxycodone at home with little relief. No SOB/palpitations. No fevers/chills/cough. No joint pain or swelling. Unsure if this feels like prior acute chest. has not bee to ER in many months for VOC.  Follows with Dr Diaz.

## 2018-09-19 NOTE — ED PROVIDER NOTE - MEDICAL DECISION MAKING DETAILS
23 M with sickle cell, h/o acute chest, p/w L sided chest pain. Appears uncomfortable but normal O2 saturation, clear lungs, and non toxic appearing. Will obtain labs, CXR r/o acute chest, ekg and pain control

## 2018-09-20 LAB
24R-OH-CALCIDIOL SERPL-MCNC: 19.5 NG/ML — LOW (ref 30–80)
ALBUMIN SERPL ELPH-MCNC: 4.3 G/DL — SIGNIFICANT CHANGE UP (ref 3.3–5)
ALP SERPL-CCNC: 93 U/L — SIGNIFICANT CHANGE UP (ref 40–120)
ALT FLD-CCNC: 34 U/L — SIGNIFICANT CHANGE UP (ref 4–41)
APPEARANCE UR: CLEAR — SIGNIFICANT CHANGE UP
AST SERPL-CCNC: 86 U/L — HIGH (ref 4–40)
BACTERIA # UR AUTO: NEGATIVE — SIGNIFICANT CHANGE UP
BASE EXCESS BLDV CALC-SCNC: 2.4 MMOL/L — SIGNIFICANT CHANGE UP
BASOPHILS # BLD AUTO: 0.02 K/UL — SIGNIFICANT CHANGE UP (ref 0–0.2)
BASOPHILS NFR BLD AUTO: 0.2 % — SIGNIFICANT CHANGE UP (ref 0–2)
BILIRUB SERPL-MCNC: 3 MG/DL — HIGH (ref 0.2–1.2)
BILIRUB UR-MCNC: NEGATIVE — SIGNIFICANT CHANGE UP
BLOOD GAS VENOUS - CREATININE: 0.6 MG/DL — SIGNIFICANT CHANGE UP (ref 0.5–1.3)
BLOOD UR QL VISUAL: SIGNIFICANT CHANGE UP
BUN SERPL-MCNC: 8 MG/DL — SIGNIFICANT CHANGE UP (ref 7–23)
CALCIUM SERPL-MCNC: 8.6 MG/DL — SIGNIFICANT CHANGE UP (ref 8.4–10.5)
CHLORIDE BLDV-SCNC: 104 MMOL/L — SIGNIFICANT CHANGE UP (ref 96–108)
CHLORIDE SERPL-SCNC: 102 MMOL/L — SIGNIFICANT CHANGE UP (ref 98–107)
CO2 SERPL-SCNC: 25 MMOL/L — SIGNIFICANT CHANGE UP (ref 22–31)
COLOR SPEC: YELLOW — SIGNIFICANT CHANGE UP
CREAT SERPL-MCNC: 0.71 MG/DL — SIGNIFICANT CHANGE UP (ref 0.5–1.3)
EOSINOPHIL # BLD AUTO: 0.01 K/UL — SIGNIFICANT CHANGE UP (ref 0–0.5)
EOSINOPHIL NFR BLD AUTO: 0.1 % — SIGNIFICANT CHANGE UP (ref 0–6)
GAS PNL BLDV: 135 MMOL/L — LOW (ref 136–146)
GLUCOSE BLDV-MCNC: 102 — HIGH (ref 70–99)
GLUCOSE SERPL-MCNC: 104 MG/DL — HIGH (ref 70–99)
GLUCOSE UR-MCNC: NEGATIVE — SIGNIFICANT CHANGE UP
HAPTOGLOB SERPL-MCNC: < 20 MG/DL — LOW (ref 34–200)
HCO3 BLDV-SCNC: 26 MMOL/L — SIGNIFICANT CHANGE UP (ref 20–27)
HCT VFR BLD CALC: 17.1 % — CRITICAL LOW (ref 39–50)
HCT VFR BLDV CALC: 21.1 % — LOW (ref 39–51)
HGB BLD-MCNC: 6.3 G/DL — CRITICAL LOW (ref 13–17)
HGB BLDV-MCNC: 6.7 G/DL — CRITICAL LOW (ref 13–17)
HYALINE CASTS # UR AUTO: NEGATIVE — SIGNIFICANT CHANGE UP
IMM GRANULOCYTES # BLD AUTO: 0.13 # — SIGNIFICANT CHANGE UP
IMM GRANULOCYTES NFR BLD AUTO: 1 % — SIGNIFICANT CHANGE UP (ref 0–1.5)
KETONES UR-MCNC: NEGATIVE — SIGNIFICANT CHANGE UP
LACTATE BLDV-MCNC: 0.6 MMOL/L — SIGNIFICANT CHANGE UP (ref 0.5–2)
LDH SERPL L TO P-CCNC: 969 U/L — HIGH (ref 135–225)
LEUKOCYTE ESTERASE UR-ACNC: NEGATIVE — SIGNIFICANT CHANGE UP
LYMPHOCYTES # BLD AUTO: 18.5 % — SIGNIFICANT CHANGE UP (ref 13–44)
LYMPHOCYTES # BLD AUTO: 2.41 K/UL — SIGNIFICANT CHANGE UP (ref 1–3.3)
MAGNESIUM SERPL-MCNC: 1.9 MG/DL — SIGNIFICANT CHANGE UP (ref 1.6–2.6)
MCHC RBC-ENTMCNC: 33.5 PG — SIGNIFICANT CHANGE UP (ref 27–34)
MCHC RBC-ENTMCNC: 36.8 % — HIGH (ref 32–36)
MCV RBC AUTO: 91 FL — SIGNIFICANT CHANGE UP (ref 80–100)
MONOCYTES # BLD AUTO: 1.33 K/UL — HIGH (ref 0–0.9)
MONOCYTES NFR BLD AUTO: 10.2 % — SIGNIFICANT CHANGE UP (ref 2–14)
NEUTROPHILS # BLD AUTO: 9.14 K/UL — HIGH (ref 1.8–7.4)
NEUTROPHILS NFR BLD AUTO: 70 % — SIGNIFICANT CHANGE UP (ref 43–77)
NITRITE UR-MCNC: NEGATIVE — SIGNIFICANT CHANGE UP
NRBC # FLD: 0.36 — SIGNIFICANT CHANGE UP
NRBC FLD-RTO: 2.8 — SIGNIFICANT CHANGE UP
PCO2 BLDV: 50 MMHG — SIGNIFICANT CHANGE UP (ref 41–51)
PH BLDV: 7.36 PH — SIGNIFICANT CHANGE UP (ref 7.32–7.43)
PH UR: 6 — SIGNIFICANT CHANGE UP (ref 5–8)
PHOSPHATE SERPL-MCNC: 4.7 MG/DL — HIGH (ref 2.5–4.5)
PLATELET # BLD AUTO: 383 K/UL — SIGNIFICANT CHANGE UP (ref 150–400)
PMV BLD: 10.9 FL — SIGNIFICANT CHANGE UP (ref 7–13)
PO2 BLDV: 80 MMHG — HIGH (ref 35–40)
POTASSIUM BLDV-SCNC: 4.3 MMOL/L — SIGNIFICANT CHANGE UP (ref 3.4–4.5)
POTASSIUM SERPL-MCNC: 4.5 MMOL/L — SIGNIFICANT CHANGE UP (ref 3.5–5.3)
POTASSIUM SERPL-SCNC: 4.5 MMOL/L — SIGNIFICANT CHANGE UP (ref 3.5–5.3)
PROT SERPL-MCNC: 7.3 G/DL — SIGNIFICANT CHANGE UP (ref 6–8.3)
PROT UR-MCNC: 20 — SIGNIFICANT CHANGE UP
RBC # BLD: 1.88 M/UL — LOW (ref 4.2–5.8)
RBC # FLD: 23.4 % — HIGH (ref 10.3–14.5)
RBC CASTS # UR COMP ASSIST: HIGH (ref 0–?)
RETICS #: 111 K/UL — SIGNIFICANT CHANGE UP (ref 25–125)
RETICS/RBC NFR: 5.9 % — HIGH (ref 0.5–2.5)
SAO2 % BLDV: 92.1 % — HIGH (ref 60–85)
SODIUM SERPL-SCNC: 137 MMOL/L — SIGNIFICANT CHANGE UP (ref 135–145)
SP GR SPEC: 1.01 — SIGNIFICANT CHANGE UP (ref 1–1.04)
SQUAMOUS # UR AUTO: SIGNIFICANT CHANGE UP
TSH SERPL-MCNC: 2.7 UIU/ML — SIGNIFICANT CHANGE UP (ref 0.27–4.2)
UROBILINOGEN FLD QL: NORMAL — SIGNIFICANT CHANGE UP
WBC # BLD: 13.04 K/UL — HIGH (ref 3.8–10.5)
WBC # FLD AUTO: 13.04 K/UL — HIGH (ref 3.8–10.5)
WBC UR QL: SIGNIFICANT CHANGE UP (ref 0–?)

## 2018-09-20 PROCEDURE — 99233 SBSQ HOSP IP/OBS HIGH 50: CPT

## 2018-09-20 RX ORDER — ONDANSETRON 8 MG/1
4 TABLET, FILM COATED ORAL ONCE
Qty: 0 | Refills: 0 | Status: COMPLETED | OUTPATIENT
Start: 2018-09-20 | End: 2018-09-20

## 2018-09-20 RX ADMIN — HYDROMORPHONE HYDROCHLORIDE 30 MILLILITER(S): 2 INJECTION INTRAMUSCULAR; INTRAVENOUS; SUBCUTANEOUS at 19:20

## 2018-09-20 RX ADMIN — Medication 10 MILLIGRAM(S): at 00:21

## 2018-09-20 RX ADMIN — SODIUM CHLORIDE 150 MILLILITER(S): 9 INJECTION, SOLUTION INTRAVENOUS at 06:51

## 2018-09-20 RX ADMIN — Medication 1 MILLIGRAM(S): at 11:13

## 2018-09-20 RX ADMIN — SENNA PLUS 2 TABLET(S): 8.6 TABLET ORAL at 22:54

## 2018-09-20 RX ADMIN — SODIUM CHLORIDE 30 MILLILITER(S): 9 INJECTION, SOLUTION INTRAVENOUS at 11:14

## 2018-09-20 RX ADMIN — HYDROXYUREA 2000 MILLIGRAM(S): 500 CAPSULE ORAL at 11:13

## 2018-09-20 RX ADMIN — Medication 100 MILLIGRAM(S): at 06:51

## 2018-09-20 RX ADMIN — HYDROMORPHONE HYDROCHLORIDE 30 MILLILITER(S): 2 INJECTION INTRAMUSCULAR; INTRAVENOUS; SUBCUTANEOUS at 07:31

## 2018-09-20 RX ADMIN — Medication 100 MILLIGRAM(S): at 22:54

## 2018-09-20 RX ADMIN — SODIUM CHLORIDE 30 MILLILITER(S): 9 INJECTION, SOLUTION INTRAVENOUS at 22:54

## 2018-09-20 RX ADMIN — ONDANSETRON 4 MILLIGRAM(S): 8 TABLET, FILM COATED ORAL at 12:12

## 2018-09-20 RX ADMIN — SODIUM CHLORIDE 150 MILLILITER(S): 9 INJECTION, SOLUTION INTRAVENOUS at 00:21

## 2018-09-20 RX ADMIN — ONDANSETRON 4 MILLIGRAM(S): 8 TABLET, FILM COATED ORAL at 13:52

## 2018-09-20 NOTE — PROGRESS NOTE ADULT - SUBJECTIVE AND OBJECTIVE BOX
Patient is a 23y old  Male who presents with a chief complaint of Sickle cell crisis, Anemia (19 Sep 2018 20:26)      SUBJECTIVE / OVERNIGHT EVENTS:    MEDICATIONS  (STANDING):  docusate sodium 100 milliGRAM(s) Oral three times a day  enoxaparin Injectable 40 milliGRAM(s) SubCutaneous every 24 hours  folic acid 1 milliGRAM(s) Oral daily  HYDROmorphone PCA (1 mG/mL) 30 milliLiter(s) PCA Continuous PCA Continuous  hydroxyurea 2000 milliGRAM(s) Oral daily  senna 2 Tablet(s) Oral at bedtime  sodium chloride 0.45%. 1000 milliLiter(s) (30 mL/Hr) IV Continuous <Continuous>    MEDICATIONS  (PRN):  naloxone Injectable 0.1 milliGRAM(s) IV Push every 3 minutes PRN For ANY of the following changes in patient status:  A. RR LESS THAN 10 breaths per minute, B. Oxygen saturation LESS THAN 90%, C. Sedation score of 6  ondansetron Injectable 4 milliGRAM(s) IV Push every 6 hours PRN Nausea  polyethylene glycol 3350 17 Gram(s) Oral daily PRN Constipation      Vital Signs Last 24 Hrs  T(C): 36.7 (20 Sep 2018 07:33), Max: 37.5 (20 Sep 2018 02:20)  T(F): 98.1 (20 Sep 2018 07:33), Max: 99.5 (20 Sep 2018 02:20)  HR: 70 (20 Sep 2018 07:33) (68 - 95)  BP: 107/55 (20 Sep 2018 07:33) (107/55 - 168/82)  BP(mean): --  RR: 18 (20 Sep 2018 07:33) (17 - 20)  SpO2: 96% (20 Sep 2018 07:33) (94% - 100%)  CAPILLARY BLOOD GLUCOSE        I&O's Summary      PHYSICAL EXAM:  GENERAL: NAD, well-developed  HEAD:  Atraumatic, Normocephalic  EYES: EOMI, PERRLA, conjunctiva and sclera clear  NECK: Supple, No JVD  CHEST/LUNG: Clear to auscultation bilaterally; No wheeze  HEART: Regular rate and rhythm; No murmurs, rubs, or gallops  ABDOMEN: Soft, Nontender, Nondistended; Bowel sounds present  EXTREMITIES:  2+ Peripheral Pulses, No clubbing, cyanosis, or edema  PSYCH: AAOx3  NEUROLOGY: non-focal  SKIN: No rashes or lesions    LABS:                        6.3    13.04 )-----------( 383      ( 20 Sep 2018 05:30 )             17.1         137  |  102  |  8   ----------------------------<  104<H>  4.5   |  25  |  0.71    Ca    8.6      20 Sep 2018 05:30  Phos  4.7       Mg     1.9         TPro  7.3  /  Alb  4.3  /  TBili  3.0<H>  /  DBili  x   /  AST  86<H>  /  ALT  34  /  AlkPhos  93            Urinalysis Basic - ( 19 Sep 2018 23:40 )    Color: YELLOW / Appearance: CLEAR / S.015 / pH: 6.0  Gluc: NEGATIVE / Ketone: NEGATIVE  / Bili: NEGATIVE / Urobili: NORMAL   Blood: TRACE / Protein: 20 / Nitrite: NEGATIVE   Leuk Esterase: NEGATIVE / RBC: 11-25 / WBC 3-5   Sq Epi: OCC / Non Sq Epi: x / Bacteria: NEGATIVE            RADIOLOGY & ADDITIONAL TESTS:    Imaging Personally Reviewed:    Consultant(s) Notes Reviewed:      Care Discussed with Consultants/Other Providers: Patient is a 23y old  Male who presents with a chief complaint of Sickle cell crisis, Anemia (19 Sep 2018 20:26)      SUBJECTIVE / OVERNIGHT EVENTS: Pain mildly improved. Nausea and vomiting with pain medication. no SOB.    MEDICATIONS  (STANDING):  docusate sodium 100 milliGRAM(s) Oral three times a day  enoxaparin Injectable 40 milliGRAM(s) SubCutaneous every 24 hours  folic acid 1 milliGRAM(s) Oral daily  HYDROmorphone PCA (1 mG/mL) 30 milliLiter(s) PCA Continuous PCA Continuous  hydroxyurea 2000 milliGRAM(s) Oral daily  senna 2 Tablet(s) Oral at bedtime  sodium chloride 0.45%. 1000 milliLiter(s) (30 mL/Hr) IV Continuous <Continuous>    MEDICATIONS  (PRN):  naloxone Injectable 0.1 milliGRAM(s) IV Push every 3 minutes PRN For ANY of the following changes in patient status:  A. RR LESS THAN 10 breaths per minute, B. Oxygen saturation LESS THAN 90%, C. Sedation score of 6  ondansetron Injectable 4 milliGRAM(s) IV Push every 6 hours PRN Nausea  polyethylene glycol 3350 17 Gram(s) Oral daily PRN Constipation      Vital Signs Last 24 Hrs  T(C): 36.7 (20 Sep 2018 07:33), Max: 37.5 (20 Sep 2018 02:20)  T(F): 98.1 (20 Sep 2018 07:33), Max: 99.5 (20 Sep 2018 02:20)  HR: 70 (20 Sep 2018 07:33) (68 - 95)  BP: 107/55 (20 Sep 2018 07:33) (107/55 - 168/82)  BP(mean): --  RR: 18 (20 Sep 2018 07:33) (17 - 20)  SpO2: 96% (20 Sep 2018 07:33) (94% - 100%)  CAPILLARY BLOOD GLUCOSE        I&O's Summary      PHYSICAL EXAM:  GENERAL: NAD, well-developed  HEAD:  Atraumatic, Normocephalic  EYES: EOMI, PERRLA, conjunctiva and sclera clear  NECK: Supple, No JVD  CHEST/LUNG: Clear to auscultation bilaterally; No wheeze  HEART: Regular rate and rhythm; No murmurs, rubs, or gallops  ABDOMEN: Soft, Nontender, Nondistended; Bowel sounds present  EXTREMITIES:  2+ Peripheral Pulses, No clubbing, cyanosis, or edema  PSYCH: AAOx3  NEUROLOGY: non-focal  SKIN: No rashes or lesions    LABS:                        6.3    13.04 )-----------( 383      ( 20 Sep 2018 05:30 )             17.1         137  |  102  |  8   ----------------------------<  104<H>  4.5   |  25  |  0.71    Ca    8.6      20 Sep 2018 05:30  Phos  4.7       Mg     1.9         TPro  7.3  /  Alb  4.3  /  TBili  3.0<H>  /  DBili  x   /  AST  86<H>  /  ALT  34  /  AlkPhos  93            Urinalysis Basic - ( 19 Sep 2018 23:40 )    Color: YELLOW / Appearance: CLEAR / S.015 / pH: 6.0  Gluc: NEGATIVE / Ketone: NEGATIVE  / Bili: NEGATIVE / Urobili: NORMAL   Blood: TRACE / Protein: 20 / Nitrite: NEGATIVE   Leuk Esterase: NEGATIVE / RBC: 11-25 / WBC 3-5   Sq Epi: OCC / Non Sq Epi: x / Bacteria: NEGATIVE            RADIOLOGY & ADDITIONAL TESTS:    Imaging Personally Reviewed:  < from: Xray Chest 2 Views PA/Lat (18 @ 18:35) >  IMPRESSION:   Clear lungs.       < end of copied text >      Consultant(s) Notes Reviewed:      Care Discussed with Consultants/Other Providers:

## 2018-09-20 NOTE — PROGRESS NOTE ADULT - PROBLEM SELECTOR PLAN 2
- left chest area, without radiation, 10/10 maximum intensity, less likely ACS.   - ECG appears unchanged from prior study; NSR at 79 bpm, QTc = 428  - O2 Sat = 95% on RA  - CXR negative for any acute findings

## 2018-09-20 NOTE — PROGRESS NOTE ADULT - ASSESSMENT
23 year old male, with past history significant for Sickle cell anemia, ACS, Cholecystectomy and Splenectomy, presented to the ED secondary to left sided chest pain.  admitted with Sickle Cell Crisis and Anemia.

## 2018-09-21 DIAGNOSIS — A41.9 SEPSIS, UNSPECIFIED ORGANISM: ICD-10-CM

## 2018-09-21 LAB
ALBUMIN SERPL ELPH-MCNC: 4 G/DL — SIGNIFICANT CHANGE UP (ref 3.3–5)
ALBUMIN SERPL ELPH-MCNC: 4.5 G/DL — SIGNIFICANT CHANGE UP (ref 3.3–5)
ALP SERPL-CCNC: 85 U/L — SIGNIFICANT CHANGE UP (ref 40–120)
ALP SERPL-CCNC: 90 U/L — SIGNIFICANT CHANGE UP (ref 40–120)
ALT FLD-CCNC: 26 U/L — SIGNIFICANT CHANGE UP (ref 4–41)
ALT FLD-CCNC: 30 U/L — SIGNIFICANT CHANGE UP (ref 4–41)
APPEARANCE UR: CLEAR — SIGNIFICANT CHANGE UP
AST SERPL-CCNC: 65 U/L — HIGH (ref 4–40)
AST SERPL-CCNC: 70 U/L — HIGH (ref 4–40)
B PERT DNA SPEC QL NAA+PROBE: SIGNIFICANT CHANGE UP
BACTERIA # UR AUTO: NEGATIVE — SIGNIFICANT CHANGE UP
BASOPHILS # BLD AUTO: 0.02 K/UL — SIGNIFICANT CHANGE UP (ref 0–0.2)
BASOPHILS # BLD AUTO: 0.03 K/UL — SIGNIFICANT CHANGE UP (ref 0–0.2)
BASOPHILS NFR BLD AUTO: 0.1 % — SIGNIFICANT CHANGE UP (ref 0–2)
BASOPHILS NFR BLD AUTO: 0.2 % — SIGNIFICANT CHANGE UP (ref 0–2)
BILIRUB SERPL-MCNC: 3.3 MG/DL — HIGH (ref 0.2–1.2)
BILIRUB SERPL-MCNC: 6.1 MG/DL — HIGH (ref 0.2–1.2)
BILIRUB UR-MCNC: NEGATIVE — SIGNIFICANT CHANGE UP
BLD GP AB SCN SERPL QL: POSITIVE — SIGNIFICANT CHANGE UP
BLOOD UR QL VISUAL: SIGNIFICANT CHANGE UP
BUN SERPL-MCNC: 10 MG/DL — SIGNIFICANT CHANGE UP (ref 7–23)
BUN SERPL-MCNC: 17 MG/DL — SIGNIFICANT CHANGE UP (ref 7–23)
C PNEUM DNA SPEC QL NAA+PROBE: NOT DETECTED — SIGNIFICANT CHANGE UP
CALCIUM SERPL-MCNC: 8.5 MG/DL — SIGNIFICANT CHANGE UP (ref 8.4–10.5)
CALCIUM SERPL-MCNC: 8.7 MG/DL — SIGNIFICANT CHANGE UP (ref 8.4–10.5)
CHLORIDE SERPL-SCNC: 100 MMOL/L — SIGNIFICANT CHANGE UP (ref 98–107)
CHLORIDE SERPL-SCNC: 99 MMOL/L — SIGNIFICANT CHANGE UP (ref 98–107)
CO2 SERPL-SCNC: 26 MMOL/L — SIGNIFICANT CHANGE UP (ref 22–31)
CO2 SERPL-SCNC: 27 MMOL/L — SIGNIFICANT CHANGE UP (ref 22–31)
COLOR SPEC: YELLOW — SIGNIFICANT CHANGE UP
CREAT SERPL-MCNC: 0.74 MG/DL — SIGNIFICANT CHANGE UP (ref 0.5–1.3)
CREAT SERPL-MCNC: 0.85 MG/DL — SIGNIFICANT CHANGE UP (ref 0.5–1.3)
DAT C3-SP REAG RBC QL: NEGATIVE — SIGNIFICANT CHANGE UP
DAT POLY-SP REAG RBC QL: NEGATIVE — SIGNIFICANT CHANGE UP
DIRECT COOMBS IGG: NEGATIVE — SIGNIFICANT CHANGE UP
EOSINOPHIL # BLD AUTO: 0 K/UL — SIGNIFICANT CHANGE UP (ref 0–0.5)
EOSINOPHIL # BLD AUTO: 0.01 K/UL — SIGNIFICANT CHANGE UP (ref 0–0.5)
EOSINOPHIL NFR BLD AUTO: 0 % — SIGNIFICANT CHANGE UP (ref 0–6)
EOSINOPHIL NFR BLD AUTO: 0.1 % — SIGNIFICANT CHANGE UP (ref 0–6)
FLUAV H1 2009 PAND RNA SPEC QL NAA+PROBE: NOT DETECTED — SIGNIFICANT CHANGE UP
FLUAV H1 RNA SPEC QL NAA+PROBE: NOT DETECTED — SIGNIFICANT CHANGE UP
FLUAV H3 RNA SPEC QL NAA+PROBE: NOT DETECTED — SIGNIFICANT CHANGE UP
FLUAV SUBTYP SPEC NAA+PROBE: SIGNIFICANT CHANGE UP
FLUBV RNA SPEC QL NAA+PROBE: NOT DETECTED — SIGNIFICANT CHANGE UP
GLUCOSE SERPL-MCNC: 115 MG/DL — HIGH (ref 70–99)
GLUCOSE SERPL-MCNC: 137 MG/DL — HIGH (ref 70–99)
GLUCOSE UR-MCNC: NEGATIVE — SIGNIFICANT CHANGE UP
HADV DNA SPEC QL NAA+PROBE: NOT DETECTED — SIGNIFICANT CHANGE UP
HCOV 229E RNA SPEC QL NAA+PROBE: NOT DETECTED — SIGNIFICANT CHANGE UP
HCOV HKU1 RNA SPEC QL NAA+PROBE: NOT DETECTED — SIGNIFICANT CHANGE UP
HCOV NL63 RNA SPEC QL NAA+PROBE: NOT DETECTED — SIGNIFICANT CHANGE UP
HCOV OC43 RNA SPEC QL NAA+PROBE: NOT DETECTED — SIGNIFICANT CHANGE UP
HCT VFR BLD CALC: 16.2 % — CRITICAL LOW (ref 39–50)
HCT VFR BLD CALC: 17.1 % — CRITICAL LOW (ref 39–50)
HGB BLD-MCNC: 5.9 G/DL — CRITICAL LOW (ref 13–17)
HGB BLD-MCNC: 6.3 G/DL — CRITICAL LOW (ref 13–17)
HMPV RNA SPEC QL NAA+PROBE: NOT DETECTED — SIGNIFICANT CHANGE UP
HPIV1 RNA SPEC QL NAA+PROBE: NOT DETECTED — SIGNIFICANT CHANGE UP
HPIV2 RNA SPEC QL NAA+PROBE: NOT DETECTED — SIGNIFICANT CHANGE UP
HPIV3 RNA SPEC QL NAA+PROBE: NOT DETECTED — SIGNIFICANT CHANGE UP
HPIV4 RNA SPEC QL NAA+PROBE: NOT DETECTED — SIGNIFICANT CHANGE UP
HYALINE CASTS # UR AUTO: NEGATIVE — SIGNIFICANT CHANGE UP
IMM GRANULOCYTES # BLD AUTO: 0.09 # — SIGNIFICANT CHANGE UP
IMM GRANULOCYTES # BLD AUTO: 0.15 # — SIGNIFICANT CHANGE UP
IMM GRANULOCYTES NFR BLD AUTO: 0.7 % — SIGNIFICANT CHANGE UP (ref 0–1.5)
IMM GRANULOCYTES NFR BLD AUTO: 0.8 % — SIGNIFICANT CHANGE UP (ref 0–1.5)
INR BLD: 1.3 — HIGH (ref 0.88–1.17)
KETONES UR-MCNC: NEGATIVE — SIGNIFICANT CHANGE UP
LACTATE SERPL-SCNC: 1.1 MMOL/L — SIGNIFICANT CHANGE UP (ref 0.5–2)
LDH SERPL L TO P-CCNC: 1137 U/L — HIGH (ref 135–225)
LEUKOCYTE ESTERASE UR-ACNC: NEGATIVE — SIGNIFICANT CHANGE UP
LYMPHOCYTES # BLD AUTO: 1.73 K/UL — SIGNIFICANT CHANGE UP (ref 1–3.3)
LYMPHOCYTES # BLD AUTO: 11.6 % — LOW (ref 13–44)
LYMPHOCYTES # BLD AUTO: 13.2 % — SIGNIFICANT CHANGE UP (ref 13–44)
LYMPHOCYTES # BLD AUTO: 2.12 K/UL — SIGNIFICANT CHANGE UP (ref 1–3.3)
M PNEUMO DNA SPEC QL NAA+PROBE: NOT DETECTED — SIGNIFICANT CHANGE UP
MAGNESIUM SERPL-MCNC: 1.7 MG/DL — SIGNIFICANT CHANGE UP (ref 1.6–2.6)
MAGNESIUM SERPL-MCNC: 1.8 MG/DL — SIGNIFICANT CHANGE UP (ref 1.6–2.6)
MCHC RBC-ENTMCNC: 32.6 PG — SIGNIFICANT CHANGE UP (ref 27–34)
MCHC RBC-ENTMCNC: 33.5 PG — SIGNIFICANT CHANGE UP (ref 27–34)
MCHC RBC-ENTMCNC: 36.4 % — HIGH (ref 32–36)
MCHC RBC-ENTMCNC: 36.8 % — HIGH (ref 32–36)
MCV RBC AUTO: 88.6 FL — SIGNIFICANT CHANGE UP (ref 80–100)
MCV RBC AUTO: 92 FL — SIGNIFICANT CHANGE UP (ref 80–100)
MONOCYTES # BLD AUTO: 1.51 K/UL — HIGH (ref 0–0.9)
MONOCYTES # BLD AUTO: 2 K/UL — HIGH (ref 0–0.9)
MONOCYTES NFR BLD AUTO: 11 % — SIGNIFICANT CHANGE UP (ref 2–14)
MONOCYTES NFR BLD AUTO: 11.5 % — SIGNIFICANT CHANGE UP (ref 2–14)
NEUTROPHILS # BLD AUTO: 13.91 K/UL — HIGH (ref 1.8–7.4)
NEUTROPHILS # BLD AUTO: 9.77 K/UL — HIGH (ref 1.8–7.4)
NEUTROPHILS NFR BLD AUTO: 74.3 % — SIGNIFICANT CHANGE UP (ref 43–77)
NEUTROPHILS NFR BLD AUTO: 76.5 % — SIGNIFICANT CHANGE UP (ref 43–77)
NITRITE UR-MCNC: NEGATIVE — SIGNIFICANT CHANGE UP
NRBC # FLD: 0.31 — SIGNIFICANT CHANGE UP
NRBC # FLD: 0.39 — SIGNIFICANT CHANGE UP
NRBC FLD-RTO: 2.1 — SIGNIFICANT CHANGE UP
NRBC FLD-RTO: 2.4 — SIGNIFICANT CHANGE UP
PH UR: 6 — SIGNIFICANT CHANGE UP (ref 5–8)
PHOSPHATE SERPL-MCNC: 3 MG/DL — SIGNIFICANT CHANGE UP (ref 2.5–4.5)
PHOSPHATE SERPL-MCNC: 3.4 MG/DL — SIGNIFICANT CHANGE UP (ref 2.5–4.5)
PLATELET # BLD AUTO: 369 K/UL — SIGNIFICANT CHANGE UP (ref 150–400)
PLATELET # BLD AUTO: 369 K/UL — SIGNIFICANT CHANGE UP (ref 150–400)
PMV BLD: 10.2 FL — SIGNIFICANT CHANGE UP (ref 7–13)
PMV BLD: 10.7 FL — SIGNIFICANT CHANGE UP (ref 7–13)
POTASSIUM SERPL-MCNC: 4.1 MMOL/L — SIGNIFICANT CHANGE UP (ref 3.5–5.3)
POTASSIUM SERPL-MCNC: 4.3 MMOL/L — SIGNIFICANT CHANGE UP (ref 3.5–5.3)
POTASSIUM SERPL-SCNC: 4.1 MMOL/L — SIGNIFICANT CHANGE UP (ref 3.5–5.3)
POTASSIUM SERPL-SCNC: 4.3 MMOL/L — SIGNIFICANT CHANGE UP (ref 3.5–5.3)
PROT SERPL-MCNC: 7.3 G/DL — SIGNIFICANT CHANGE UP (ref 6–8.3)
PROT SERPL-MCNC: 7.6 G/DL — SIGNIFICANT CHANGE UP (ref 6–8.3)
PROT UR-MCNC: 50 — SIGNIFICANT CHANGE UP
PROTHROM AB SERPL-ACNC: 15 SEC — HIGH (ref 9.8–13.1)
RBC # BLD: 1.76 M/UL — LOW (ref 4.2–5.8)
RBC # BLD: 1.93 M/UL — LOW (ref 4.2–5.8)
RBC # FLD: 23.3 % — HIGH (ref 10.3–14.5)
RBC # FLD: 24.1 % — HIGH (ref 10.3–14.5)
RBC CASTS # UR COMP ASSIST: HIGH (ref 0–?)
RETICS #: 98 K/UL — SIGNIFICANT CHANGE UP (ref 25–125)
RETICS/RBC NFR: 5.1 % — HIGH (ref 0.5–2.5)
RH IG SCN BLD-IMP: POSITIVE — SIGNIFICANT CHANGE UP
RSV RNA SPEC QL NAA+PROBE: NOT DETECTED — SIGNIFICANT CHANGE UP
RV+EV RNA SPEC QL NAA+PROBE: NOT DETECTED — SIGNIFICANT CHANGE UP
SODIUM SERPL-SCNC: 137 MMOL/L — SIGNIFICANT CHANGE UP (ref 135–145)
SODIUM SERPL-SCNC: 139 MMOL/L — SIGNIFICANT CHANGE UP (ref 135–145)
SP GR SPEC: 1.01 — SIGNIFICANT CHANGE UP (ref 1–1.04)
SQUAMOUS # UR AUTO: SIGNIFICANT CHANGE UP
UROBILINOGEN FLD QL: NORMAL — SIGNIFICANT CHANGE UP
WBC # BLD: 13.14 K/UL — HIGH (ref 3.8–10.5)
WBC # BLD: 18.2 K/UL — HIGH (ref 3.8–10.5)
WBC # FLD AUTO: 13.14 K/UL — HIGH (ref 3.8–10.5)
WBC # FLD AUTO: 18.2 K/UL — HIGH (ref 3.8–10.5)
WBC UR QL: SIGNIFICANT CHANGE UP (ref 0–?)

## 2018-09-21 PROCEDURE — 99233 SBSQ HOSP IP/OBS HIGH 50: CPT

## 2018-09-21 PROCEDURE — 71250 CT THORAX DX C-: CPT | Mod: 26

## 2018-09-21 PROCEDURE — 99222 1ST HOSP IP/OBS MODERATE 55: CPT | Mod: GC

## 2018-09-21 PROCEDURE — 99291 CRITICAL CARE FIRST HOUR: CPT

## 2018-09-21 PROCEDURE — 71045 X-RAY EXAM CHEST 1 VIEW: CPT | Mod: 26

## 2018-09-21 RX ORDER — SODIUM CHLORIDE 0.65 %
1 AEROSOL, SPRAY (ML) NASAL ONCE
Qty: 0 | Refills: 0 | Status: COMPLETED | OUTPATIENT
Start: 2018-09-21 | End: 2018-09-21

## 2018-09-21 RX ORDER — IBUPROFEN 200 MG
600 TABLET ORAL ONCE
Qty: 0 | Refills: 0 | Status: COMPLETED | OUTPATIENT
Start: 2018-09-21 | End: 2018-09-21

## 2018-09-21 RX ORDER — ACETAMINOPHEN 500 MG
1000 TABLET ORAL ONCE
Qty: 0 | Refills: 0 | Status: COMPLETED | OUTPATIENT
Start: 2018-09-21 | End: 2018-09-21

## 2018-09-21 RX ORDER — PIPERACILLIN AND TAZOBACTAM 4; .5 G/20ML; G/20ML
3.38 INJECTION, POWDER, LYOPHILIZED, FOR SOLUTION INTRAVENOUS EVERY 8 HOURS
Qty: 0 | Refills: 0 | Status: DISCONTINUED | OUTPATIENT
Start: 2018-09-21 | End: 2018-09-29

## 2018-09-21 RX ORDER — VANCOMYCIN HCL 1 G
1000 VIAL (EA) INTRAVENOUS ONCE
Qty: 0 | Refills: 0 | Status: COMPLETED | OUTPATIENT
Start: 2018-09-21 | End: 2018-09-21

## 2018-09-21 RX ORDER — ACETAMINOPHEN 500 MG
650 TABLET ORAL ONCE
Qty: 0 | Refills: 0 | Status: COMPLETED | OUTPATIENT
Start: 2018-09-21 | End: 2018-09-21

## 2018-09-21 RX ADMIN — SENNA PLUS 2 TABLET(S): 8.6 TABLET ORAL at 22:05

## 2018-09-21 RX ADMIN — Medication 1 MILLIGRAM(S): at 16:05

## 2018-09-21 RX ADMIN — HYDROMORPHONE HYDROCHLORIDE 30 MILLILITER(S): 2 INJECTION INTRAMUSCULAR; INTRAVENOUS; SUBCUTANEOUS at 06:24

## 2018-09-21 RX ADMIN — HYDROXYUREA 2000 MILLIGRAM(S): 500 CAPSULE ORAL at 16:05

## 2018-09-21 RX ADMIN — Medication 250 MILLIGRAM(S): at 03:56

## 2018-09-21 RX ADMIN — SODIUM CHLORIDE 30 MILLILITER(S): 9 INJECTION, SOLUTION INTRAVENOUS at 20:15

## 2018-09-21 RX ADMIN — HYDROMORPHONE HYDROCHLORIDE 30 MILLILITER(S): 2 INJECTION INTRAMUSCULAR; INTRAVENOUS; SUBCUTANEOUS at 07:34

## 2018-09-21 RX ADMIN — Medication 100 MILLIGRAM(S): at 16:05

## 2018-09-21 RX ADMIN — Medication 650 MILLIGRAM(S): at 02:54

## 2018-09-21 RX ADMIN — Medication 600 MILLIGRAM(S): at 18:31

## 2018-09-21 RX ADMIN — ONDANSETRON 4 MILLIGRAM(S): 8 TABLET, FILM COATED ORAL at 00:10

## 2018-09-21 RX ADMIN — SODIUM CHLORIDE 30 MILLILITER(S): 9 INJECTION, SOLUTION INTRAVENOUS at 06:27

## 2018-09-21 RX ADMIN — PIPERACILLIN AND TAZOBACTAM 25 GRAM(S): 4; .5 INJECTION, POWDER, LYOPHILIZED, FOR SOLUTION INTRAVENOUS at 18:26

## 2018-09-21 RX ADMIN — Medication 600 MILLIGRAM(S): at 06:45

## 2018-09-21 RX ADMIN — Medication 1 SPRAY(S): at 06:29

## 2018-09-21 RX ADMIN — Medication 400 MILLIGRAM(S): at 19:46

## 2018-09-21 RX ADMIN — HYDROMORPHONE HYDROCHLORIDE 30 MILLILITER(S): 2 INJECTION INTRAMUSCULAR; INTRAVENOUS; SUBCUTANEOUS at 20:14

## 2018-09-21 RX ADMIN — PIPERACILLIN AND TAZOBACTAM 25 GRAM(S): 4; .5 INJECTION, POWDER, LYOPHILIZED, FOR SOLUTION INTRAVENOUS at 10:32

## 2018-09-21 RX ADMIN — Medication 100 MILLIGRAM(S): at 22:05

## 2018-09-21 NOTE — CHART NOTE - NSCHARTNOTEFT_GEN_A_CORE
I received a call from the resident stating that the patient spiked rectal temp of 100.5 and hgb decreased from 6.3 to 5.8. Also, CT chest from earlier today showed bibasilar lung consolidations, greater on the left suspicious for PNA. These findings are concerning for acute chest. Case was discussed with hematology consult service, Dr. Aggarwal, who has been following closely. He discussed case with attending who agrees that patient might be developing acute chest syndrome.     Plan:  - continue broad spectrum antibiotics with vanc/zosyn  - transer to MICU  - obtain full infectious workp  - Bart to be placed tonight in anticipation for exchange transfusion  - called blood bank, discussed case with fellow, Dr. Main I received a oage at 9:46pm from the resident stating that the patient had been spiking fevers up to 105 during the afternoon and just had a confirmed rectal temp of 100.4 and a mild drop in hgb from 6.3 to 5.8. RRT was called earlier today around 7pm for acute hypoxic respiratory failure. Also, CT chest from earlier today showed bibasilar lung consolidations, greater on the left suspicious for PNA. These findings are concerning for acute chest. Case was discussed with hematology consult service, Dr. Aggarwal, who has been following closely. He discussed case with attending who agrees that patient might be developing acute chest syndrome.     Plan:  - continue broad spectrum antibiotics with vanc/zosyn  - transer to MICU  - obtain full infectious workp  - [place] to be placed tonight in anticipation for exchange transfusion  - called blood bank, discussed case with fellow, Dr. Main - will discuss with blood bank attending and proceed with exchange tranfusion   - spoke with MICU attending I received a oage at 9:46pm from the resident stating that the patient had been spiking fevers up to 105 during the afternoon and just had a confirmed rectal temp of 100.4 and a mild drop in hgb from 6.3 to 5.8. RRT was called earlier today around 7pm for acute hypoxic respiratory failure. Also, CT chest from earlier today showed bibasilar lung consolidations, greater on the left suspicious for PNA. These findings are concerning for acute chest. Case was discussed with hematology consult service, Dr. Aggarwal, who has been following closely. He discussed case with attending who agrees that patient might be developing acute chest syndrome.     Plan:  - continue broad spectrum antibiotics with vanc/zosyn  - transer to MICU  - obtain full infectious workp  - Bart to be placed tonight in anticipation for exchange transfusion  - called blood bank, discussed case with fellow, Dr. Main -  will proceed with exchange transfusion   - spoke with MICU attending

## 2018-09-21 NOTE — CHART NOTE - NSCHARTNOTEFT_GEN_A_CORE
MICU Accept Note    CHIEF COMPLAINT: sickle cell crisis    HPI / INTERVAL HISTORY:    Pt is a 23 year old male, with past medical history significant for Sickle cell anemia, ACS, Iron overload (due to repeated pRBCs transfusions), Cholecystectomy and Splenectomy, presented to the ED secondary to left sided chest pain.  Patient reports onset of left chest pain during the prior week, with worsening over time, and not relieved with Motrin 600 mg PRN or oxycodone 10 mg PRN.  Had nausea in the ED.  No trigger identified; no sick contacts, maintains adequate hydration, no trauma to chest.  No reports of fevers, chills, headache, rhinorrhea, coughing, shortness of breath, vomiting, abdominal pain, diarrhea, constipation or dysuria.  Last sickle cell crisis was months ago and was managed without need for hospital admission.    Patient was admitetd for h Sickle Cell Crisis and Anemia. Patient's pain was managed with a Dilaudid PCA on the floors. Patient on  repeatedly spiked fevers, up to 105.1 at which point an RRT was called. At that time patient was in signifcant respiratory distress. Patient was managed with IV tylenol. Concern was for acute chest syndrome at which point decision was made for RBC exchange and MICU transfer      PAST MEDICAL & SURGICAL HISTORY:  Iron overload due to repeated red blood cell transfusions  Sickle cell anemia  Acute chest syndrome  Sickle Cell Disease: HbSS  S/P Splenectomy  S/P Laparotomy for spleen removal  S/P Laparoscopic Cholecystectomy      FAMILY HISTORY:  Family history of sickle cell anemia (Sibling)      SOCIAL HISTORY:  Smoking: [ x] Never Smoked  [  ] Former Smoker (# packs x # years)  [  ] Current Smoker (# packs x # years)  Substance Use: denies  EtOH Use: denies  Marital Status: [x] Single  [  ]   [  ]   [  ]             Allergies    ceftriaxone (Unknown)    Intolerances          REVIEW OF SYSTEMS:  Constitutional: +fevers, + chills, no weight loss, weight gain  HEENT: No vision problems, eye pain, nasal congestion, rhinorrhea, sore throat, dysphagia  CV: + chest pain, no orthopnea, palpitations  Resp: No cough, dyspnea, wheezing, hemoptysis  GI: No nausea, vomiting, diarrhea, constipation, abdominal pain  : [ ] dysuria [ ] nocturia [ ] hematuria [ ] increased urinary frequency  Musculoskeletal: [ ] back pain [ ] myalgias [ ] arthralgias [ ] fracture  Skin: [ ] rash [ ] itch  Neurological: [ ] headache [ ] dizziness [ ] syncope [ ] weakness [ ] numbness  Psychiatric: [ ] anxiety [ ] depression  Endocrine: [ ] diabetes [ ] thyroid problem  Hematologic/Lymphatic: [ ] anemia [ ] bleeding problem  Allergic/Immunologic: [ ] itchy eyes [ ] nasal discharge [ ] hives [ ] angioedema  [ ] All other systems negative  [ ] Unable to assess ROS because ________    OBJECTIVE:  ICU Vital Signs Last 24 Hrs  T(C): 37.1 (21 Sep 2018 23:40), Max: 40.6 (21 Sep 2018 19:40)  T(F): 98.7 (21 Sep 2018 23:40), Max: 105.1 (21 Sep 2018 19:48)  HR: 88 (21 Sep 2018 23:40) (81 - 117)  BP: 121/61 (21 Sep 2018 19:51) (108/45 - 122/50)  BP(mean): --  ABP: --  ABP(mean): --  RR: 19 (21 Sep 2018 23:40) (18 - 22)  SpO2: 96% (21 Sep 2018 23:40) (78% - 98%)        CAPILLARY BLOOD GLUCOSE      POCT Blood Glucose.: 144 mg/dL (21 Sep 2018 19:49)      PHYSICAL EXAM:    GENERAL: NAD, well-developed  HEAD:  Atraumatic, Normocephalic  EYES: EOMI, PERRLA, conjunctiva and sclera clear  NECK: Supple, No JVD  CHEST/LUNG: Clear to auscultation bilaterally; No rhonchis, rales, or wheezing  HEART: Regular rate and rhythm; S1, S2; No murmurs, rubs, or gallops  ABDOMEN: Soft, Nontender, Nondistended; Normoactive bowel sounds  EXTREMITIES:  2+ Peripheral Pulses, No clubbing, cyanosis, or edema  PSYCH: A&Ox3  NEUROLOGY: non-focal  SKIN: No rashes or lesions  LINES:     HOSPITAL MEDICATIONS:  MEDICATIONS  (STANDING):  docusate sodium 100 milliGRAM(s) Oral three times a day  enoxaparin Injectable 40 milliGRAM(s) SubCutaneous every 24 hours  folic acid 1 milliGRAM(s) Oral daily  HYDROmorphone PCA (1 mG/mL) 30 milliLiter(s) PCA Continuous PCA Continuous  hydroxyurea 2000 milliGRAM(s) Oral daily  piperacillin/tazobactam IVPB. 3.375 Gram(s) IV Intermittent every 8 hours  senna 2 Tablet(s) Oral at bedtime  sodium chloride 0.45%. 1000 milliLiter(s) (30 mL/Hr) IV Continuous <Continuous>    MEDICATIONS  (PRN):  naloxone Injectable 0.1 milliGRAM(s) IV Push every 3 minutes PRN For ANY of the following changes in patient status:  A. RR LESS THAN 10 breaths per minute, B. Oxygen saturation LESS THAN 90%, C. Sedation score of 6  ondansetron Injectable 4 milliGRAM(s) IV Push every 6 hours PRN Nausea  polyethylene glycol 3350 17 Gram(s) Oral daily PRN Constipation      LABS:                        5.9    18.20 )-----------( 369      ( 21 Sep 2018 20:05 )             16.2     Hgb Trend: 5.9<--, 6.3<--, 6.3<--, 6.8<--      139  |  100  |  17  ----------------------------<  137<H>  4.1   |  27  |  0.85    Ca    8.5      21 Sep 2018 20:05  Phos  3.0       Mg     1.7         TPro  7.3  /  Alb  4.0  /  TBili  6.1<H>  /  DBili  x   /  AST  70<H>  /  ALT  26  /  AlkPhos  85      Creatinine Trend: 0.85<--, 0.74<--, 0.71<--, 0.68<--, 0.65<--  PT/INR - ( 21 Sep 2018 20:05 )   PT: 15.0 SEC;   INR: 1.30            Urinalysis Basic - ( 21 Sep 2018 06:42 )    Color: YELLOW / Appearance: CLEAR / S.015 / pH: 6.0  Gluc: NEGATIVE / Ketone: NEGATIVE  / Bili: NEGATIVE / Urobili: NORMAL   Blood: SMALL / Protein: 50 / Nitrite: NEGATIVE   Leuk Esterase: NEGATIVE / RBC: 6-10 / WBC 0-2   Sq Epi: OCC / Non Sq Epi: x / Bacteria: NEGATIVE        Venous Blood Gas:   @ 05:30  7.36/50/80/26/92.1  VBG Lactate: 0.6        RADIOLOGY & ADDITIONAL TESTS:    < from: CT Chest No Cont (18 @ 15:22) >    FINDINGS:    CHEST:     LUNGS AND LARGE AIRWAYS: Patent central airways.      Bibasilar lung   consolidations, greater on the left..    PLEURA: No pleural effusion.    VESSELS: Within normal limits.    HEART: Cardiomegaly. No pericardial effusion. Low-density cardiac blood   pole relative to myocardium suggests anemia.    MEDIASTINUM AND CAIN: No lymphadenopathy. Redemonstrated calcified right   upper paratracheal left para-aortic lymph nodes.    CHEST WALL AND LOWER NECK: Prominent left axillary lymph nodes. Mild   gynecomastia.    VISUALIZED UPPER ABDOMEN: Status post splenectomy. Status post   cholecystectomy.    BONES: Diffuse osseous sclerosis.    IMPRESSION:      Bibasilar lung consolidations, greater on the left suspicious for   pneumonia.    Prominent left axillary lymph nodes similar to the prior study.    < end of copied text >                    Assessment and Plan:  Pt is a 24yo M with PMHx for Sickle cell anemia, ACS, Iron overload (due to repeated pRBCs transfusions), Cholecystectomy and Splenectomy, presents with fever, chest pain and bibasilar lung consolidations, concerning for acute chest syndrome    #Neuro  - awake, alert, oriented x3  - dilaudid PCA for pain control    #Resp  - saturating well on RA  - On admission CXR negative, no symptoms, RVP negative. met sepsis criteria  - CXR : Small left pleural effusion with adjacent atelectasis  - CT Chest : Bibasilar lung consolidations, greater on the left suspicious for pneumonia, on zosyn  - adding azithromycin for atypical coverage  - jefferson pending placement for RBC exchange     #CV  - L sided chest pain, improving with pain meds  - EKG on admission unchanged from prior study; NSR at 79 bpm, QTc = 428; unlikely ACS  - tachycardic, likely 2/2 to fever and pain    #GI  - regular diet  - bowel regimen; colace, senna, miralax    #ID  - febrile to 105.1 while on zosyn  - cultured : pending blood cx,   - increasing WBC count: 15.2 -> 18.2  - lactate 1.1  - UA negative, RVP negative    #Renal  - BUN/Cr 17/.85  - relatively stable since admission     #Heme  - declining H/H 7.6/20.9 -> 5.9/16.2  - elevated LDH (>900) and low haptoglobin  - pt + for numerous RBC-Abs  - heme/onc on board: called blood bank, discussed case with fellow, Dr. Main -  will proceed with exchange transfusion tonight  - hydroxyurea 2g daily  - 1/2 NS @ 30cc    #Endo  - no acute concerns    #DVT PPx  - Lovenox sq MICU Accept Note    CHIEF COMPLAINT: sickle cell crisis    HPI / INTERVAL HISTORY:    Pt is a 23 year old male, with past medical history significant for Sickle cell anemia, ACS, Iron overload (due to repeated pRBCs transfusions), Cholecystectomy and Splenectomy, presented to the ED secondary to left sided chest pain.  Patient reports onset of left chest pain during the prior week, with worsening over time, and not relieved with Motrin 600 mg PRN or oxycodone 10 mg PRN.  Had nausea in the ED.  No trigger identified; no sick contacts, maintains adequate hydration, no trauma to chest.  No reports of fevers, chills, headache, rhinorrhea, coughing, shortness of breath, vomiting, abdominal pain, diarrhea, constipation or dysuria.  Last sickle cell crisis was months ago and was managed without need for hospital admission.    Patient was admitetd for h Sickle Cell Crisis and Anemia. Patient's pain was managed with a Dilaudid PCA on the floors. Patient on  repeatedly spiked fevers, up to 105.1 at which point an RRT was called. At that time patient was in signifcant respiratory distress. Patient was managed with IV tylenol. Concern was for acute chest syndrome at which point decision was made for RBC exchange and MICU transfer      PAST MEDICAL & SURGICAL HISTORY:  Iron overload due to repeated red blood cell transfusions  Sickle cell anemia  Acute chest syndrome  Sickle Cell Disease: HbSS  S/P Splenectomy  S/P Laparotomy for spleen removal  S/P Laparoscopic Cholecystectomy      FAMILY HISTORY:  Family history of sickle cell anemia (Sibling)      SOCIAL HISTORY:  Smoking: [ x] Never Smoked  [  ] Former Smoker (# packs x # years)  [  ] Current Smoker (# packs x # years)  Substance Use: denies  EtOH Use: denies  Marital Status: [x] Single  [  ]   [  ]   [  ]             Allergies    ceftriaxone (Unknown)    Intolerances          REVIEW OF SYSTEMS:  Constitutional: +fevers, + chills, no weight loss, weight gain  HEENT: No vision problems, eye pain, nasal congestion, rhinorrhea, sore throat, dysphagia  CV: + chest pain, no orthopnea, palpitations  Resp: No cough, dyspnea, wheezing, hemoptysis  GI: No nausea, vomiting, diarrhea, constipation, abdominal pain  : [ ] dysuria [ ] nocturia [ ] hematuria [ ] increased urinary frequency  Musculoskeletal: [ ] back pain [ ] myalgias [ ] arthralgias [ ] fracture  Skin: [ ] rash [ ] itch  Neurological: [ ] headache [ ] dizziness [ ] syncope [ ] weakness [ ] numbness  Psychiatric: [ ] anxiety [ ] depression  Endocrine: [ ] diabetes [ ] thyroid problem  Hematologic/Lymphatic: [ ] anemia [ ] bleeding problem  Allergic/Immunologic: [ ] itchy eyes [ ] nasal discharge [ ] hives [ ] angioedema  [x ] All other systems negative  [ ] Unable to assess ROS because ________    OBJECTIVE:  ICU Vital Signs Last 24 Hrs  T(C): 37.1 (21 Sep 2018 23:40), Max: 40.6 (21 Sep 2018 19:40)  T(F): 98.7 (21 Sep 2018 23:40), Max: 105.1 (21 Sep 2018 19:48)  HR: 88 (21 Sep 2018 23:40) (81 - 117)  BP: 121/61 (21 Sep 2018 19:51) (108/45 - 122/50)  BP(mean): --  ABP: --  ABP(mean): --  RR: 19 (21 Sep 2018 23:40) (18 - 22)  SpO2: 96% (21 Sep 2018 23:40) (78% - 98%)        CAPILLARY BLOOD GLUCOSE      POCT Blood Glucose.: 144 mg/dL (21 Sep 2018 19:49)      PHYSICAL EXAM:    GENERAL: NAD, well-developed  HEAD:  Atraumatic, Normocephalic  EYES: EOMI, PERRLA, conjunctiva and sclera clear  NECK: Supple, No JVD  CHEST/LUNG: Clear to auscultation bilaterally; No rhonchis, rales, or wheezing  HEART: Regular rate and rhythm; S1, S2; No murmurs, rubs, or gallops  ABDOMEN: Soft, Nontender, Nondistended; Normoactive bowel sounds  EXTREMITIES:  2+ Peripheral Pulses, No clubbing, cyanosis, or edema  PSYCH: A&Ox3  NEUROLOGY: non-focal  SKIN: No rashes or lesions  LINES:     HOSPITAL MEDICATIONS:  MEDICATIONS  (STANDING):  docusate sodium 100 milliGRAM(s) Oral three times a day  enoxaparin Injectable 40 milliGRAM(s) SubCutaneous every 24 hours  folic acid 1 milliGRAM(s) Oral daily  HYDROmorphone PCA (1 mG/mL) 30 milliLiter(s) PCA Continuous PCA Continuous  hydroxyurea 2000 milliGRAM(s) Oral daily  piperacillin/tazobactam IVPB. 3.375 Gram(s) IV Intermittent every 8 hours  senna 2 Tablet(s) Oral at bedtime  sodium chloride 0.45%. 1000 milliLiter(s) (30 mL/Hr) IV Continuous <Continuous>    MEDICATIONS  (PRN):  naloxone Injectable 0.1 milliGRAM(s) IV Push every 3 minutes PRN For ANY of the following changes in patient status:  A. RR LESS THAN 10 breaths per minute, B. Oxygen saturation LESS THAN 90%, C. Sedation score of 6  ondansetron Injectable 4 milliGRAM(s) IV Push every 6 hours PRN Nausea  polyethylene glycol 3350 17 Gram(s) Oral daily PRN Constipation      LABS:                        5.9    18.20 )-----------( 369      ( 21 Sep 2018 20:05 )             16.2     Hgb Trend: 5.9<--, 6.3<--, 6.3<--, 6.8<--      139  |  100  |  17  ----------------------------<  137<H>  4.1   |  27  |  0.85    Ca    8.5      21 Sep 2018 20:05  Phos  3.0       Mg     1.7         TPro  7.3  /  Alb  4.0  /  TBili  6.1<H>  /  DBili  x   /  AST  70<H>  /  ALT  26  /  AlkPhos  85      Creatinine Trend: 0.85<--, 0.74<--, 0.71<--, 0.68<--, 0.65<--  PT/INR - ( 21 Sep 2018 20:05 )   PT: 15.0 SEC;   INR: 1.30            Urinalysis Basic - ( 21 Sep 2018 06:42 )    Color: YELLOW / Appearance: CLEAR / S.015 / pH: 6.0  Gluc: NEGATIVE / Ketone: NEGATIVE  / Bili: NEGATIVE / Urobili: NORMAL   Blood: SMALL / Protein: 50 / Nitrite: NEGATIVE   Leuk Esterase: NEGATIVE / RBC: 6-10 / WBC 0-2   Sq Epi: OCC / Non Sq Epi: x / Bacteria: NEGATIVE        Venous Blood Gas:   @ 05:30  7.36/50/80/26/92.1  VBG Lactate: 0.6        RADIOLOGY & ADDITIONAL TESTS:    < from: CT Chest No Cont (18 @ 15:22) >    FINDINGS:    CHEST:     LUNGS AND LARGE AIRWAYS: Patent central airways.      Bibasilar lung   consolidations, greater on the left..    PLEURA: No pleural effusion.    VESSELS: Within normal limits.    HEART: Cardiomegaly. No pericardial effusion. Low-density cardiac blood   pole relative to myocardium suggests anemia.    MEDIASTINUM AND CAIN: No lymphadenopathy. Redemonstrated calcified right   upper paratracheal left para-aortic lymph nodes.    CHEST WALL AND LOWER NECK: Prominent left axillary lymph nodes. Mild   gynecomastia.    VISUALIZED UPPER ABDOMEN: Status post splenectomy. Status post   cholecystectomy.    BONES: Diffuse osseous sclerosis.    IMPRESSION:      Bibasilar lung consolidations, greater on the left suspicious for   pneumonia.    Prominent left axillary lymph nodes similar to the prior study.    < end of copied text >                    Assessment and Plan:  Pt is a 22yo M with PMHx for Sickle cell anemia, ACS, Iron overload (due to repeated pRBCs transfusions), Cholecystectomy and Splenectomy, presents with fever, chest pain and bibasilar lung consolidations, concerning for acute chest syndrome    #Neuro  - awake, alert, oriented x3  - dilaudid PCA for pain control    #Resp  - saturating well on RA  - On admission CXR negative, no symptoms, RVP negative. met sepsis criteria  - CXR : Small left pleural effusion with adjacent atelectasis  - CT Chest : Bibasilar lung consolidations, greater on the left suspicious for pneumonia, on zosyn  - adding azithromycin for atypical coverage  - jefferson pending placement for RBC exchange     #CV  - L sided chest pain, improving with pain meds  - EKG on admission unchanged from prior study; NSR at 79 bpm, QTc = 428; unlikely ACS  - tachycardic, likely 2/2 to fever and pain    #GI  - regular diet  - bowel regimen; colace, senna, miralax    #ID  - febrile to 105.1 while on zosyn. Will add Azithromycin for atypical, check urine legionella   - cultured : pending blood cx,   - increasing WBC count: 15.2 -> 18.2  - lactate 1.1  - UA negative, RVP negative    #Renal  - BUN/Cr 17/.85  - relatively stable since admission     #Heme  - declining H/H 7.6/20.9 -> 5.9/16.2  - elevated LDH (>900) and low haptoglobin  - pt + for numerous RBC-Abs  - heme/onc on board: called blood bank, discussed case with fellow, Dr. Main -  will proceed with exchange transfusion tonight  - hydroxyurea 2g daily  - 1/2 NS @ 30cc    #Endo  - no acute concerns    #DVT PPx  - Lovenox sq MICU Accept Note    CHIEF COMPLAINT: sickle cell crisis    HPI / INTERVAL HISTORY:    Pt is a 23 year old male, with past medical history significant for Sickle cell anemia, ACS, Iron overload (due to repeated pRBCs transfusions), Cholecystectomy and Splenectomy, presented to the ED secondary to left sided chest pain.  Patient reports onset of left chest pain during the prior week, with worsening over time, and not relieved with Motrin 600 mg PRN or oxycodone 10 mg PRN.  Had nausea in the ED.  No trigger identified; no sick contacts, maintains adequate hydration, no trauma to chest.  No reports of fevers, chills, headache, rhinorrhea, coughing, shortness of breath, vomiting, abdominal pain, diarrhea, constipation or dysuria.  Last sickle cell crisis was months ago and was managed without need for hospital admission.    Patient was admitetd for h Sickle Cell Crisis and Anemia. Patient's pain was managed with a Dilaudid PCA on the floors. Patient on  repeatedly spiked fevers, up to 105.1 at which point an RRT was called. At that time patient was in signifcant respiratory distress. Patient was managed with IV tylenol. Concern was for acute chest syndrome at which point decision was made for RBC exchange and MICU transfer      PAST MEDICAL & SURGICAL HISTORY:  Iron overload due to repeated red blood cell transfusions  Sickle cell anemia  Acute chest syndrome  Sickle Cell Disease: HbSS  S/P Splenectomy  S/P Laparotomy for spleen removal  S/P Laparoscopic Cholecystectomy      FAMILY HISTORY:  Family history of sickle cell anemia (Sibling)      SOCIAL HISTORY:  Smoking: [ x] Never Smoked  [  ] Former Smoker (# packs x # years)  [  ] Current Smoker (# packs x # years)  Substance Use: denies  EtOH Use: denies  Marital Status: [x] Single  [  ]   [  ]   [  ]             Allergies    ceftriaxone (Unknown)    Intolerances          REVIEW OF SYSTEMS:  Constitutional: +fevers, + chills, no weight loss, weight gain  HEENT: No vision problems, eye pain, nasal congestion, rhinorrhea, sore throat, dysphagia  CV: + chest pain, no orthopnea, palpitations  Resp: No cough, dyspnea, wheezing, hemoptysis  GI: No nausea, vomiting, diarrhea, constipation, abdominal pain  : [ ] dysuria [ ] nocturia [ ] hematuria [ ] increased urinary frequency  Musculoskeletal: [ ] back pain [ ] myalgias [ ] arthralgias [ ] fracture  Skin: [ ] rash [ ] itch  Neurological: [ ] headache [ ] dizziness [ ] syncope [ ] weakness [ ] numbness  Psychiatric: [ ] anxiety [ ] depression  Endocrine: [ ] diabetes [ ] thyroid problem  Hematologic/Lymphatic: [ ] anemia [ ] bleeding problem  Allergic/Immunologic: [ ] itchy eyes [ ] nasal discharge [ ] hives [ ] angioedema  [x ] All other systems negative  [ ] Unable to assess ROS because ________    OBJECTIVE:  ICU Vital Signs Last 24 Hrs  T(C): 37.1 (21 Sep 2018 23:40), Max: 40.6 (21 Sep 2018 19:40)  T(F): 98.7 (21 Sep 2018 23:40), Max: 105.1 (21 Sep 2018 19:48)  HR: 88 (21 Sep 2018 23:40) (81 - 117)  BP: 121/61 (21 Sep 2018 19:51) (108/45 - 122/50)  BP(mean): --  ABP: --  ABP(mean): --  RR: 19 (21 Sep 2018 23:40) (18 - 22)  SpO2: 96% (21 Sep 2018 23:40) (78% - 98%)        CAPILLARY BLOOD GLUCOSE      POCT Blood Glucose.: 144 mg/dL (21 Sep 2018 19:49)      PHYSICAL EXAM:    GENERAL: NAD, well-developed  HEAD:  Atraumatic, Normocephalic  EYES: EOMI, PERRLA, conjunctiva and sclera clear  NECK: Supple, No JVD  CHEST/LUNG: Clear to auscultation bilaterally; No rhonchis, rales, or wheezing  HEART: Regular rate and rhythm; S1, S2; No murmurs, rubs, or gallops  ABDOMEN: Soft, Nontender, Nondistended; Normoactive bowel sounds  EXTREMITIES:  2+ Peripheral Pulses, No clubbing, cyanosis, or edema  PSYCH: A&Ox3  NEUROLOGY: non-focal  SKIN: No rashes or lesions  LINES:     HOSPITAL MEDICATIONS:  MEDICATIONS  (STANDING):  docusate sodium 100 milliGRAM(s) Oral three times a day  enoxaparin Injectable 40 milliGRAM(s) SubCutaneous every 24 hours  folic acid 1 milliGRAM(s) Oral daily  HYDROmorphone PCA (1 mG/mL) 30 milliLiter(s) PCA Continuous PCA Continuous  hydroxyurea 2000 milliGRAM(s) Oral daily  piperacillin/tazobactam IVPB. 3.375 Gram(s) IV Intermittent every 8 hours  senna 2 Tablet(s) Oral at bedtime  sodium chloride 0.45%. 1000 milliLiter(s) (30 mL/Hr) IV Continuous <Continuous>    MEDICATIONS  (PRN):  naloxone Injectable 0.1 milliGRAM(s) IV Push every 3 minutes PRN For ANY of the following changes in patient status:  A. RR LESS THAN 10 breaths per minute, B. Oxygen saturation LESS THAN 90%, C. Sedation score of 6  ondansetron Injectable 4 milliGRAM(s) IV Push every 6 hours PRN Nausea  polyethylene glycol 3350 17 Gram(s) Oral daily PRN Constipation      LABS:                        5.9    18.20 )-----------( 369      ( 21 Sep 2018 20:05 )             16.2     Hgb Trend: 5.9<--, 6.3<--, 6.3<--, 6.8<--      139  |  100  |  17  ----------------------------<  137<H>  4.1   |  27  |  0.85    Ca    8.5      21 Sep 2018 20:05  Phos  3.0       Mg     1.7         TPro  7.3  /  Alb  4.0  /  TBili  6.1<H>  /  DBili  x   /  AST  70<H>  /  ALT  26  /  AlkPhos  85      Creatinine Trend: 0.85<--, 0.74<--, 0.71<--, 0.68<--, 0.65<--  PT/INR - ( 21 Sep 2018 20:05 )   PT: 15.0 SEC;   INR: 1.30            Urinalysis Basic - ( 21 Sep 2018 06:42 )    Color: YELLOW / Appearance: CLEAR / S.015 / pH: 6.0  Gluc: NEGATIVE / Ketone: NEGATIVE  / Bili: NEGATIVE / Urobili: NORMAL   Blood: SMALL / Protein: 50 / Nitrite: NEGATIVE   Leuk Esterase: NEGATIVE / RBC: 6-10 / WBC 0-2   Sq Epi: OCC / Non Sq Epi: x / Bacteria: NEGATIVE        Venous Blood Gas:   @ 05:30  7.36/50/80/26/92.1  VBG Lactate: 0.6        RADIOLOGY & ADDITIONAL TESTS:    < from: CT Chest No Cont (18 @ 15:22) >    FINDINGS:    CHEST:     LUNGS AND LARGE AIRWAYS: Patent central airways.      Bibasilar lung   consolidations, greater on the left..    PLEURA: No pleural effusion.    VESSELS: Within normal limits.    HEART: Cardiomegaly. No pericardial effusion. Low-density cardiac blood   pole relative to myocardium suggests anemia.    MEDIASTINUM AND CAIN: No lymphadenopathy. Redemonstrated calcified right   upper paratracheal left para-aortic lymph nodes.    CHEST WALL AND LOWER NECK: Prominent left axillary lymph nodes. Mild   gynecomastia.    VISUALIZED UPPER ABDOMEN: Status post splenectomy. Status post   cholecystectomy.    BONES: Diffuse osseous sclerosis.    IMPRESSION:      Bibasilar lung consolidations, greater on the left suspicious for   pneumonia.    Prominent left axillary lymph nodes similar to the prior study.    < end of copied text >                    Assessment and Plan:  Pt is a 22yo M with PMHx for Sickle cell anemia, ACS, Iron overload (due to repeated pRBCs transfusions), Cholecystectomy and Splenectomy, presents with fever, chest pain and bibasilar lung consolidations, concerning for acute chest syndrome    #Neuro  - awake, alert, oriented x3  - dilaudid PCA for pain control    #Resp  - saturating well on RA  - On admission CXR negative, no symptoms, RVP negative. met sepsis criteria  - CXR : Small left pleural effusion with adjacent atelectasis  - CT Chest : Bibasilar lung consolidations, greater on the left suspicious for pneumonia, on zosyn  - adding azithromycin for atypical coverage  - jefferson pending placement for RBC exchange     #CV  - L sided chest pain, improving with pain meds  - EKG on admission unchanged from prior study; NSR at 79 bpm, QTc = 428; unlikely ACS  - tachycardic, likely 2/2 to fever and pain    #GI  - regular diet  - bowel regimen; colace, senna, miralax    #ID  - febrile to 105.1 while on zosyn. Will add Azithromycin for atypical, check urine legionella   - cultured : pending blood cx,   - increasing WBC count: 15.2 -> 18.2  - lactate 1.1  - UA negative, RVP negative    #Renal  - BUN/Cr 17/.85  - relatively stable since admission     #Heme  - declining H/H 7.6/20.9 -> 5.9/16.2  - elevated LDH (>900) and low haptoglobin  - pt + for numerous RBC-Abs  - heme/onc on board: called blood bank, discussed case with fellow, Dr. Main -  will proceed with exchange transfusion tonight  - hydroxyurea 2g daily  - / NS @ 30cc    #Endo  - no acute concerns    #DVT PPx  - Lovenox sq      Attending Attestation  Pt seen and examined with resident  24 yo with Sickle cell anemia admitted 2 days ago with chest pain and low grade fever; has developed worsening anemia with elevated LDH and lowered haptoglobin, fever, consolidation on Chest CT transferred to MICU for RBC exchange for acute chest syndrome  - send blood for Hgb electrophoresis prior to transfusion  - transfuse 1 U PRBC prior to RBC exchange  - send CBC after transfusion and prior to RBC exchange  - Abx to cover encapsulated organisms/PNA  - f/u blood cultures  - pain management via PCA pump dilaudid MICU Accept Note    CHIEF COMPLAINT: sickle cell crisis    HPI / INTERVAL HISTORY:    Pt is a 23 year old male, with past medical history significant for Sickle cell anemia, ACS, Iron overload (due to repeated pRBCs transfusions), Cholecystectomy and Splenectomy, presented to the ED secondary to left sided chest pain.  Patient reports onset of left chest pain during the prior week, with worsening over time, and not relieved with Motrin 600 mg PRN or oxycodone 10 mg PRN.  Had nausea in the ED.  No trigger identified; no sick contacts, maintains adequate hydration, no trauma to chest.  No reports of fevers, chills, headache, rhinorrhea, coughing, shortness of breath, vomiting, abdominal pain, diarrhea, constipation or dysuria.  Last sickle cell crisis was months ago and was managed without need for hospital admission.    Patient was admitetd for h Sickle Cell Crisis and Anemia. Patient's pain was managed with a Dilaudid PCA on the floors. Patient on  repeatedly spiked fevers, up to 105.1 at which point an RRT was called. At that time patient was in signifcant respiratory distress. Patient was managed with IV tylenol. Concern was for acute chest syndrome at which point decision was made for RBC exchange and MICU transfer      PAST MEDICAL & SURGICAL HISTORY:  Iron overload due to repeated red blood cell transfusions  Sickle cell anemia  Acute chest syndrome  Sickle Cell Disease: HbSS  S/P Splenectomy  S/P Laparotomy for spleen removal  S/P Laparoscopic Cholecystectomy      FAMILY HISTORY:  Family history of sickle cell anemia (Sibling)      SOCIAL HISTORY:  Smoking: [ x] Never Smoked  [  ] Former Smoker (# packs x # years)  [  ] Current Smoker (# packs x # years)  Substance Use: denies  EtOH Use: denies  Marital Status: [x] Single  [  ]   [  ]   [  ]             Allergies    ceftriaxone (Unknown)    Intolerances          REVIEW OF SYSTEMS:  Constitutional: +fevers, + chills, no weight loss, weight gain  HEENT: No vision problems, eye pain, nasal congestion, rhinorrhea, sore throat, dysphagia  CV: + chest pain, no orthopnea, palpitations  Resp: No cough, dyspnea, wheezing, hemoptysis  GI: No nausea, vomiting, diarrhea, constipation, abdominal pain  : [ ] dysuria [ ] nocturia [ ] hematuria [ ] increased urinary frequency  Musculoskeletal: [ ] back pain [ ] myalgias [ ] arthralgias [ ] fracture  Skin: [ ] rash [ ] itch  Neurological: [ ] headache [ ] dizziness [ ] syncope [ ] weakness [ ] numbness  Psychiatric: [ ] anxiety [ ] depression  Endocrine: [ ] diabetes [ ] thyroid problem  Hematologic/Lymphatic: [ ] anemia [ ] bleeding problem  Allergic/Immunologic: [ ] itchy eyes [ ] nasal discharge [ ] hives [ ] angioedema  [x ] All other systems negative  [ ] Unable to assess ROS because ________    OBJECTIVE:  ICU Vital Signs Last 24 Hrs  T(C): 37.1 (21 Sep 2018 23:40), Max: 40.6 (21 Sep 2018 19:40)  T(F): 98.7 (21 Sep 2018 23:40), Max: 105.1 (21 Sep 2018 19:48)  HR: 88 (21 Sep 2018 23:40) (81 - 117)  BP: 121/61 (21 Sep 2018 19:51) (108/45 - 122/50)  BP(mean): --  ABP: --  ABP(mean): --  RR: 19 (21 Sep 2018 23:40) (18 - 22)  SpO2: 96% (21 Sep 2018 23:40) (78% - 98%)        CAPILLARY BLOOD GLUCOSE      POCT Blood Glucose.: 144 mg/dL (21 Sep 2018 19:49)      PHYSICAL EXAM:    GENERAL: NAD, well-developed  HEAD:  Atraumatic, Normocephalic  EYES: EOMI, PERRLA, conjunctiva and sclera clear  NECK: Supple, No JVD  CHEST/LUNG: Clear to auscultation bilaterally; No rhonchis, rales, or wheezing  HEART: Regular rate and rhythm; S1, S2; No murmurs, rubs, or gallops  ABDOMEN: Soft, Nontender, Nondistended; Normoactive bowel sounds  EXTREMITIES:  2+ Peripheral Pulses, No clubbing, cyanosis, or edema  PSYCH: A&Ox3  NEUROLOGY: non-focal  SKIN: No rashes or lesions  LINES:     HOSPITAL MEDICATIONS:  MEDICATIONS  (STANDING):  docusate sodium 100 milliGRAM(s) Oral three times a day  enoxaparin Injectable 40 milliGRAM(s) SubCutaneous every 24 hours  folic acid 1 milliGRAM(s) Oral daily  HYDROmorphone PCA (1 mG/mL) 30 milliLiter(s) PCA Continuous PCA Continuous  hydroxyurea 2000 milliGRAM(s) Oral daily  piperacillin/tazobactam IVPB. 3.375 Gram(s) IV Intermittent every 8 hours  senna 2 Tablet(s) Oral at bedtime  sodium chloride 0.45%. 1000 milliLiter(s) (30 mL/Hr) IV Continuous <Continuous>    MEDICATIONS  (PRN):  naloxone Injectable 0.1 milliGRAM(s) IV Push every 3 minutes PRN For ANY of the following changes in patient status:  A. RR LESS THAN 10 breaths per minute, B. Oxygen saturation LESS THAN 90%, C. Sedation score of 6  ondansetron Injectable 4 milliGRAM(s) IV Push every 6 hours PRN Nausea  polyethylene glycol 3350 17 Gram(s) Oral daily PRN Constipation      LABS:                        5.9    18.20 )-----------( 369      ( 21 Sep 2018 20:05 )             16.2     Hgb Trend: 5.9<--, 6.3<--, 6.3<--, 6.8<--      139  |  100  |  17  ----------------------------<  137<H>  4.1   |  27  |  0.85    Ca    8.5      21 Sep 2018 20:05  Phos  3.0       Mg     1.7         TPro  7.3  /  Alb  4.0  /  TBili  6.1<H>  /  DBili  x   /  AST  70<H>  /  ALT  26  /  AlkPhos  85      Creatinine Trend: 0.85<--, 0.74<--, 0.71<--, 0.68<--, 0.65<--  PT/INR - ( 21 Sep 2018 20:05 )   PT: 15.0 SEC;   INR: 1.30            Urinalysis Basic - ( 21 Sep 2018 06:42 )    Color: YELLOW / Appearance: CLEAR / S.015 / pH: 6.0  Gluc: NEGATIVE / Ketone: NEGATIVE  / Bili: NEGATIVE / Urobili: NORMAL   Blood: SMALL / Protein: 50 / Nitrite: NEGATIVE   Leuk Esterase: NEGATIVE / RBC: 6-10 / WBC 0-2   Sq Epi: OCC / Non Sq Epi: x / Bacteria: NEGATIVE        Venous Blood Gas:   @ 05:30  7.36/50/80/26/92.1  VBG Lactate: 0.6        RADIOLOGY & ADDITIONAL TESTS:    < from: CT Chest No Cont (18 @ 15:22) >    FINDINGS:    CHEST:     LUNGS AND LARGE AIRWAYS: Patent central airways.      Bibasilar lung   consolidations, greater on the left..    PLEURA: No pleural effusion.    VESSELS: Within normal limits.    HEART: Cardiomegaly. No pericardial effusion. Low-density cardiac blood   pole relative to myocardium suggests anemia.    MEDIASTINUM AND CAIN: No lymphadenopathy. Redemonstrated calcified right   upper paratracheal left para-aortic lymph nodes.    CHEST WALL AND LOWER NECK: Prominent left axillary lymph nodes. Mild   gynecomastia.    VISUALIZED UPPER ABDOMEN: Status post splenectomy. Status post   cholecystectomy.    BONES: Diffuse osseous sclerosis.    IMPRESSION:      Bibasilar lung consolidations, greater on the left suspicious for   pneumonia.    Prominent left axillary lymph nodes similar to the prior study.    < end of copied text >                    Assessment and Plan:  Pt is a 22yo M with PMHx for Sickle cell anemia, ACS, Iron overload (due to repeated pRBCs transfusions), Cholecystectomy and Splenectomy, presents with fever, chest pain and bibasilar lung consolidations, concerning for acute chest syndrome    #Neuro  - awake, alert, oriented x3  - dilaudid PCA for pain control    #Resp  - saturating well on RA  - On admission CXR negative, no symptoms, RVP negative. met sepsis criteria  - CXR : Small left pleural effusion with adjacent atelectasis  - CT Chest : Bibasilar lung consolidations, greater on the left suspicious for pneumonia, on zosyn  - adding azithromycin for atypical coverage  - jefferson pending placement for RBC exchange     #CV  - L sided chest pain, improving with pain meds  - EKG on admission unchanged from prior study; NSR at 79 bpm, QTc = 428; unlikely ACS  - tachycardic, likely 2/2 to fever and pain    #GI  - regular diet  - bowel regimen; colace, senna, miralax    #ID  - febrile to 105.1 while on zosyn. Will add Azithromycin for atypical, check urine legionella   - cultured : pending blood cx,   - increasing WBC count: 15.2 -> 18.2  - lactate 1.1  - UA negative, RVP negative    #Renal  - BUN/Cr 17/.85  - relatively stable since admission     #Heme  - declining H/H 7.6/20.9 -> 5.9/16.2  - elevated LDH (>900) and low haptoglobin  - pt + for numerous RBC-Abs  - heme/onc on board: called blood bank, discussed case with fellow, Dr. Main -  will proceed with exchange transfusion tonight  - hydroxyurea 2g daily  - / NS @ 30cc    #Endo  - no acute concerns    #DVT PPx  - Lovenox sq      Attending Attestation  Pt seen and examined with resident  24 yo with Sickle cell anemia admitted 2 days ago with chest pain and low grade fever; has developed worsening anemia with elevated LDH and lowered haptoglobin, fever, consolidation on Chest CT transferred to MICU for RBC exchange for acute chest syndrome  - send blood for Hgb electrophoresis prior to transfusion  - transfuse 1 U PRBC prior to RBC exchange  - send CBC after transfusion and prior to RBC exchange  - Abx to cover encapsulated organisms/PNA, atypicals  - f/u blood cultures  - pain management via PCA pump dilaudid  very guarded  cc time spent 40 min MICU Accept Note    CHIEF COMPLAINT: sickle cell crisis    HPI / INTERVAL HISTORY:    Pt is a 23 year old male, with past medical history significant for Sickle cell anemia, ACS, Iron overload (due to repeated pRBCs transfusions), Cholecystectomy and Splenectomy, presented to the ED secondary to left sided chest pain.  Patient reports onset of left chest pain during the prior week, with worsening over time, and not relieved with Motrin 600 mg PRN or oxycodone 10 mg PRN.  Had nausea in the ED.  No trigger identified; no sick contacts, maintains adequate hydration, no trauma to chest.  No reports of fevers, chills, headache, rhinorrhea, coughing, shortness of breath, vomiting, abdominal pain, diarrhea, constipation or dysuria.  Last sickle cell crisis was months ago and was managed without need for hospital admission.    Patient was admitetd for h Sickle Cell Crisis and Anemia. Patient's pain was managed with a Dilaudid PCA on the floors. Patient on  repeatedly spiked fevers, up to 105.1 at which point an RRT was called. At that time patient was in signifcant respiratory distress. Patient was managed with IV tylenol. Concern was for acute chest syndrome at which point decision was made for RBC exchange and MICU transfer      PAST MEDICAL & SURGICAL HISTORY:  Iron overload due to repeated red blood cell transfusions  Sickle cell anemia  Acute chest syndrome  Sickle Cell Disease: HbSS  S/P Splenectomy  S/P Laparotomy for spleen removal  S/P Laparoscopic Cholecystectomy      FAMILY HISTORY:  Family history of sickle cell anemia (Sibling)      SOCIAL HISTORY:  Smoking: [ x] Never Smoked  [  ] Former Smoker (# packs x # years)  [  ] Current Smoker (# packs x # years)  Substance Use: denies  EtOH Use: denies  Marital Status: [x] Single  [  ]   [  ]   [  ]             Allergies    ceftriaxone (Unknown)    Intolerances          REVIEW OF SYSTEMS:  Constitutional: +fevers, + chills, no weight loss, weight gain  HEENT: No vision problems, eye pain, nasal congestion, rhinorrhea, sore throat, dysphagia  CV: + chest pain, no orthopnea, palpitations  Resp: No cough, dyspnea, wheezing, hemoptysis  GI: No nausea, vomiting, diarrhea, constipation, abdominal pain  : [ ] dysuria [ ] nocturia [ ] hematuria [ ] increased urinary frequency  Musculoskeletal: [ ] back pain [ ] myalgias [ ] arthralgias [ ] fracture  Skin: [ ] rash [ ] itch  Neurological: [ ] headache [ ] dizziness [ ] syncope [ ] weakness [ ] numbness  Psychiatric: [ ] anxiety [ ] depression  Endocrine: [ ] diabetes [ ] thyroid problem  Hematologic/Lymphatic: [ ] anemia [ ] bleeding problem  Allergic/Immunologic: [ ] itchy eyes [ ] nasal discharge [ ] hives [ ] angioedema  [x ] All other systems negative  [ ] Unable to assess ROS because ________    OBJECTIVE:  ICU Vital Signs Last 24 Hrs  T(C): 37.1 (21 Sep 2018 23:40), Max: 40.6 (21 Sep 2018 19:40)  T(F): 98.7 (21 Sep 2018 23:40), Max: 105.1 (21 Sep 2018 19:48)  HR: 88 (21 Sep 2018 23:40) (81 - 117)  BP: 121/61 (21 Sep 2018 19:51) (108/45 - 122/50)  BP(mean): --  ABP: --  ABP(mean): --  RR: 19 (21 Sep 2018 23:40) (18 - 22)  SpO2: 96% (21 Sep 2018 23:40) (78% - 98%)        CAPILLARY BLOOD GLUCOSE      POCT Blood Glucose.: 144 mg/dL (21 Sep 2018 19:49)      PHYSICAL EXAM:    GENERAL: NAD, well-developed  HEAD:  Atraumatic, Normocephalic  EYES: EOMI, PERRLA, conjunctiva and sclera clear  NECK: Supple, No JVD  CHEST/LUNG: Clear to auscultation bilaterally; No rhonchis, rales, or wheezing  HEART: Regular rate and rhythm; S1, S2; No murmurs, rubs, or gallops  ABDOMEN: Soft, Nontender, Nondistended; Normoactive bowel sounds  EXTREMITIES:  2+ Peripheral Pulses, No clubbing, cyanosis, or edema  PSYCH: A&Ox3  NEUROLOGY: non-focal  SKIN: No rashes or lesions  LINES:     HOSPITAL MEDICATIONS:  MEDICATIONS  (STANDING):  docusate sodium 100 milliGRAM(s) Oral three times a day  enoxaparin Injectable 40 milliGRAM(s) SubCutaneous every 24 hours  folic acid 1 milliGRAM(s) Oral daily  HYDROmorphone PCA (1 mG/mL) 30 milliLiter(s) PCA Continuous PCA Continuous  hydroxyurea 2000 milliGRAM(s) Oral daily  piperacillin/tazobactam IVPB. 3.375 Gram(s) IV Intermittent every 8 hours  senna 2 Tablet(s) Oral at bedtime  sodium chloride 0.45%. 1000 milliLiter(s) (30 mL/Hr) IV Continuous <Continuous>    MEDICATIONS  (PRN):  naloxone Injectable 0.1 milliGRAM(s) IV Push every 3 minutes PRN For ANY of the following changes in patient status:  A. RR LESS THAN 10 breaths per minute, B. Oxygen saturation LESS THAN 90%, C. Sedation score of 6  ondansetron Injectable 4 milliGRAM(s) IV Push every 6 hours PRN Nausea  polyethylene glycol 3350 17 Gram(s) Oral daily PRN Constipation      LABS:                        5.9    18.20 )-----------( 369      ( 21 Sep 2018 20:05 )             16.2     Hgb Trend: 5.9<--, 6.3<--, 6.3<--, 6.8<--      139  |  100  |  17  ----------------------------<  137<H>  4.1   |  27  |  0.85    Ca    8.5      21 Sep 2018 20:05  Phos  3.0       Mg     1.7         TPro  7.3  /  Alb  4.0  /  TBili  6.1<H>  /  DBili  x   /  AST  70<H>  /  ALT  26  /  AlkPhos  85      Creatinine Trend: 0.85<--, 0.74<--, 0.71<--, 0.68<--, 0.65<--  PT/INR - ( 21 Sep 2018 20:05 )   PT: 15.0 SEC;   INR: 1.30            Urinalysis Basic - ( 21 Sep 2018 06:42 )    Color: YELLOW / Appearance: CLEAR / S.015 / pH: 6.0  Gluc: NEGATIVE / Ketone: NEGATIVE  / Bili: NEGATIVE / Urobili: NORMAL   Blood: SMALL / Protein: 50 / Nitrite: NEGATIVE   Leuk Esterase: NEGATIVE / RBC: 6-10 / WBC 0-2   Sq Epi: OCC / Non Sq Epi: x / Bacteria: NEGATIVE        Venous Blood Gas:   @ 05:30  7.36/50/80/26/92.1  VBG Lactate: 0.6        RADIOLOGY & ADDITIONAL TESTS:    < from: CT Chest No Cont (18 @ 15:22) >    FINDINGS:    CHEST:     LUNGS AND LARGE AIRWAYS: Patent central airways.      Bibasilar lung   consolidations, greater on the left..    PLEURA: No pleural effusion.    VESSELS: Within normal limits.    HEART: Cardiomegaly. No pericardial effusion. Low-density cardiac blood   pole relative to myocardium suggests anemia.    MEDIASTINUM AND CAIN: No lymphadenopathy. Redemonstrated calcified right   upper paratracheal left para-aortic lymph nodes.    CHEST WALL AND LOWER NECK: Prominent left axillary lymph nodes. Mild   gynecomastia.    VISUALIZED UPPER ABDOMEN: Status post splenectomy. Status post   cholecystectomy.    BONES: Diffuse osseous sclerosis.    IMPRESSION:      Bibasilar lung consolidations, greater on the left suspicious for   pneumonia.    Prominent left axillary lymph nodes similar to the prior study.    < end of copied text >                    Assessment and Plan:  Pt is a 24yo M with PMHx for Sickle cell anemia, ACS, Iron overload (due to repeated pRBCs transfusions), Cholecystectomy and Splenectomy, presents with fever, chest pain and bibasilar lung consolidations, concerning for acute chest syndrome    #Neuro  - awake, alert, oriented x3  - dilaudid PCA for pain control    #Resp  - saturating well on RA  - On admission CXR negative, no symptoms, RVP negative. met sepsis criteria  - CXR : Small left pleural effusion with adjacent atelectasis  - CT Chest : Bibasilar lung consolidations, greater on the left suspicious for pneumonia, on zosyn  - adding azithromycin for atypical coverage  - jefferson pending placement for RBC exchange     #CV  - L sided chest pain, improving with pain meds  - EKG on admission unchanged from prior study; NSR at 79 bpm, QTc = 428; unlikely ACS  - tachycardic, likely 2/2 to fever and pain    #GI  - regular diet  - bowel regimen; colace, senna, miralax    #ID  - febrile to 105.1 while on zosyn. Will add Azithromycin for atypical, check urine legionella   - cultured : pending blood cx,   - increasing WBC count: 15.2 -> 18.2  - lactate 1.1  - UA negative, RVP negative    #Renal  - BUN/Cr 17/.85  - relatively stable since admission     #Heme  - declining H/H 7.6/20.9 -> 5.9/16.2  - elevated LDH (>900) and low haptoglobin  - pt + for numerous RBC-Abs  - heme/onc on board: called blood bank, discussed case with fellow, Dr. Main -  will proceed with exchange transfusion tonight  - hydroxyurea 2g daily  - / NS @ 150cc    #Endo  - no acute concerns    #DVT PPx  - Lovenox sq      Attending Attestation  Pt seen and examined with resident  22 yo with Sickle cell anemia admitted 2 days ago with chest pain and low grade fever; has developed worsening anemia with elevated LDH and lowered haptoglobin, fever, consolidation on Chest CT transferred to MICU for RBC exchange for acute chest syndrome  - send blood for Hgb electrophoresis prior to transfusion  - transfuse 1 U PRBC prior to RBC exchange  - send CBC after transfusion and prior to RBC exchange  - Abx to cover encapsulated organisms/PNA, atypicals  - f/u blood cultures  - pain management via PCA pump dilaudid  very guarded  cc time spent 40 min

## 2018-09-21 NOTE — CHART NOTE - NSCHARTNOTEFT_GEN_A_CORE
RRT called for hyperthermia up to 105 rectally. Patient evaluated at bedside also noticed significant resp distress. Imaging from earlier today showed bibasilar consolidation concerning for PNA. Recommended c/w IV tylenol. C/w abx and awaiting recent blood culture results. Ice packs given to patient and plan for hyperthermia blanket if persistently high temp. Low threshold to consider acute chest syndrome. Low threshold to consult MICU.    Wesley Tamez PGY3  [id number]

## 2018-09-21 NOTE — PROVIDER CONTACT NOTE (OTHER) - ASSESSMENT
patient in the bed, states he has the chills- notably shaking. oral temp 101.8. patient currently on IV zosyn, patient pan cultured this AM patient in the bed, states he has the chills- notably shaking. oral temp 104. patient currently on IV zosyn, patient pan cultured this AM

## 2018-09-21 NOTE — CHART NOTE - NSCHARTNOTEFT_GEN_A_CORE
ADS NIGHT COVERAGE    Notified by RN that patient temp 104 orally, patient had temp 1 hour prior 101. Pt using hot packs all over for pain. Writer asked to repeat temp rectally which showed temp 105. RRT was called for hyperthermia and hypoxia. Monitor patient clinically, if no improvement in temp and worsening vitals will call MICU consult.     Lionel BUCK  [id number]

## 2018-09-21 NOTE — PROGRESS NOTE ADULT - SUBJECTIVE AND OBJECTIVE BOX
Patient is a 23y old  Male who presents with a chief complaint of Sickle cell crisis, Anemia (21 Sep 2018 10:38)      SUBJECTIVE / OVERNIGHT EVENTS: febrile Tmax 101.6 overnight. Pt reported pain controlled on PCA pump. no SOB, nausea improving.     MEDICATIONS  (STANDING):  docusate sodium 100 milliGRAM(s) Oral three times a day  enoxaparin Injectable 40 milliGRAM(s) SubCutaneous every 24 hours  folic acid 1 milliGRAM(s) Oral daily  HYDROmorphone PCA (1 mG/mL) 30 milliLiter(s) PCA Continuous PCA Continuous  hydroxyurea 2000 milliGRAM(s) Oral daily  piperacillin/tazobactam IVPB. 3.375 Gram(s) IV Intermittent every 8 hours  senna 2 Tablet(s) Oral at bedtime  sodium chloride 0.45%. 1000 milliLiter(s) (30 mL/Hr) IV Continuous <Continuous>    MEDICATIONS  (PRN):  naloxone Injectable 0.1 milliGRAM(s) IV Push every 3 minutes PRN For ANY of the following changes in patient status:  A. RR LESS THAN 10 breaths per minute, B. Oxygen saturation LESS THAN 90%, C. Sedation score of 6  ondansetron Injectable 4 milliGRAM(s) IV Push every 6 hours PRN Nausea  polyethylene glycol 3350 17 Gram(s) Oral daily PRN Constipation      Vital Signs Last 24 Hrs  T(C): 37.1 (21 Sep 2018 10:17), Max: 38.7 (21 Sep 2018 02:17)  T(F): 98.8 (21 Sep 2018 10:17), Max: 101.6 (21 Sep 2018 02:17)  HR: 89 (21 Sep 2018 10:17) (67 - 89)  BP: 117/61 (21 Sep 2018 10:17) (108/53 - 122/59)  BP(mean): --  RR: 18 (21 Sep 2018 10:17) (18 - 18)  SpO2: 94% (21 Sep 2018 10:17) (94% - 98%)  CAPILLARY BLOOD GLUCOSE        I&O's Summary      PHYSICAL EXAM:  GENERAL: NAD,   HEAD:  Atraumatic, Normocephalic  EYES: EOMI, PERRLA, conjunctiva and sclera clear  NECK: Supple, No JVD  CHEST/LUNG: Clear to auscultation bilaterally; No wheeze  HEART: Regular rate and rhythm; No murmurs, rubs, or gallops  ABDOMEN: Soft, Nontender, Nondistended; Bowel sounds present  EXTREMITIES:  2+ Peripheral Pulses, No clubbing, cyanosis, or edema  PSYCH: AAOx3  NEUROLOGY: non-focal  SKIN: No rashes or lesions    LABS:                        6.3    13.14 )-----------( 369      ( 21 Sep 2018 02:45 )             17.1         137  |  99  |  10  ----------------------------<  115<H>  4.3   |  26  |  0.74    Ca    8.7      21 Sep 2018 02:45  Phos  3.4       Mg     1.8         TPro  7.6  /  Alb  4.5  /  TBili  3.3<H>  /  DBili  x   /  AST  65<H>  /  ALT  30  /  AlkPhos  90            Urinalysis Basic - ( 21 Sep 2018 06:42 )    Color: YELLOW / Appearance: CLEAR / S.015 / pH: 6.0  Gluc: NEGATIVE / Ketone: NEGATIVE  / Bili: NEGATIVE / Urobili: NORMAL   Blood: SMALL / Protein: 50 / Nitrite: NEGATIVE   Leuk Esterase: NEGATIVE / RBC: 6-10 / WBC 0-2   Sq Epi: OCC / Non Sq Epi: x / Bacteria: NEGATIVE    Respiratory Virus Panel by PCR (13 @ 04:59)    RVP (PCR) Result: Negative            RADIOLOGY & ADDITIONAL TESTS:    Imaging Personally Reviewed:    Consultant(s) Notes Reviewed:  heme    Care Discussed with Consultants/Other Providers:

## 2018-09-21 NOTE — CONSULT NOTE ADULT - SUBJECTIVE AND OBJECTIVE BOX
23 year old male, with past history significant for Sickle cell anemia, ACS, Iron overload (due to repeated pRBCs transfusions), Cholecystectomy and Splenectomy, presented to the ED secondary to left sided chest pain.  Patient reports onset of left chest pain during the prior week, with worsening over time, and not relieved with Motrin 600 mg PRN or oxycodone 10 mg PRN. No trigger identified; no sick contacts, maintains adequate hydration, no trauma to chest.  Pain intensity at 10/10 maximum; 10/10 at the time of being seen.  No reports of fevers, chills, headache, rhinorrhea, coughing, shortness of breath, vomiting, abdominal pain, diarrhea, constipation or dysuria.  Last sickle cell crisis was months ago and was managed without need for hospital admission.    Pt noted to be febrile 101.6 9/21 a.m. Pain improved with PCA pump. Pt is currently on Vancomycin and Zosyn.    REVIEW OF SYSTEMS:  CONSTITUTIONAL: + fever. No weakness  EYES/ENT: No visual changes, no throat pain   RESPIRATORY: No cough, wheezing, hemoptysis; No shortness of breath  CARDIOVASCULAR:+ L sided chest pain  GASTROINTESTINAL: No abdominal, nausea, vomiting, or hematemesis; No diarrhea or constipation. No melena or hematochezia.  GENITOURINARY: No dysuria, frequency or hematuria  NEUROLOGICAL: No dizziness, numbness, or weakness  SKIN: No itching, burning, rashes, or lesions   All other review of systems is negative unless indicated above.    VITAL SIGNS:  Vital Signs Last 24 Hrs  T(C): 37.1 (21 Sep 2018 10:17), Max: 38.7 (21 Sep 2018 02:17)  T(F): 98.8 (21 Sep 2018 10:17), Max: 101.6 (21 Sep 2018 02:17)  HR: 89 (21 Sep 2018 10:17) (67 - 89)  BP: 117/61 (21 Sep 2018 10:17) (108/53 - 124/70)  BP(mean): --  RR: 18 (21 Sep 2018 10:17) (18 - 18)  SpO2: 96% (21 Sep 2018 06:17) (96% - 98%)    PHYSICAL EXAM:     GENERAL: appear sleepy, no acute distress  HEENT: NC/AT, EOMI, neck supple, MMM  RESPIRATORY: LCTAB/L, no rhonchi, rales, or wheezing  CARDIOVASCULAR: RRR, no murmurs, gallops, rubs  ABDOMINAL: soft, non-tender, non-distended, positive bowel sounds   EXTREMITIES: no clubbing, cyanosis, or edema  NEUROLOGICAL:  non-focal  SKIN: no rashes or lesions   MUSCULOSKELETAL: no gross joint deformity                          6.3    13.14 )-----------( 369      ( 21 Sep 2018 02:45 )             17.1     09-21    137  |  99  |  10  ----------------------------<  115<H>  4.3   |  26  |  0.74    Ca    8.7      21 Sep 2018 02:45  Phos  3.4     09-21  Mg     1.8     09-21    TPro  7.6  /  Alb  4.5  /  TBili  3.3<H>  /  DBili  x   /  AST  65<H>  /  ALT  30  /  AlkPhos  90  09-21      CAPILLARY BLOOD GLUCOSE          MEDICATIONS  (STANDING):  docusate sodium 100 milliGRAM(s) Oral three times a day  enoxaparin Injectable 40 milliGRAM(s) SubCutaneous every 24 hours  folic acid 1 milliGRAM(s) Oral daily  HYDROmorphone PCA (1 mG/mL) 30 milliLiter(s) PCA Continuous PCA Continuous  hydroxyurea 2000 milliGRAM(s) Oral daily  piperacillin/tazobactam IVPB. 3.375 Gram(s) IV Intermittent every 8 hours  senna 2 Tablet(s) Oral at bedtime  sodium chloride 0.45%. 1000 milliLiter(s) (30 mL/Hr) IV Continuous <Continuous>    < from: Xray Chest 2 Views PA/Lat (09.19.18 @ 18:35) >  FINDINGS:  The lungs are clear.  There are no pleural effusions or pneumothorax.  The cardiomediastinal silhouette is within normal limits.  The visualized osseous and soft tissue structures demonstrate no acute   pathology.    IMPRESSION:   Clear lungs.     < end of copied text >

## 2018-09-21 NOTE — PROVIDER CONTACT NOTE (CRITICAL VALUE NOTIFICATION) - ACTION/TREATMENT ORDERED:
Patient will continue to be monitored and safety maintained
Will continue to monitor patient.
will cont to monitor
will cont to monitor; pending hematology consult in am

## 2018-09-21 NOTE — CONSULT NOTE ADULT - ASSESSMENT
23 year old male, with past history significant for Sickle cell anemia, ACS, Iron overload (due to repeated pRBCs transfusions), Cholecystectomy and Splenectomy, presented to the ED secondary to left sided chest pain a/w sickle cell pain crisis.    # sickle cell pain crisis  -unclear trigger, pt was febrile to Tm 100.6 this a.m. UA neg,  RVP neg, CXR clear  -f/u Bcx  -Pt is hemolyzing with elevated LDH and low haptoglobin, but Hgb stable around 6.3; chest pain improved with pain meds, low concern for ACS.  -Pt is positive for multiple antibodies. No indication for blood transfusion in the moment.  -continue supportive care with pain control, IVF, Incentive spirometry  -please call with questions if any clinical changes arise      Anish Aggarwal MD.  Heme-Onc fellow, PGY 4  [phone]; [id number]

## 2018-09-21 NOTE — PROVIDER CONTACT NOTE (OTHER) - SITUATION
rectal temp 105, patient has increased rigors rectal temp 105, patient has increased rigors, change mental status

## 2018-09-21 NOTE — PROVIDER CONTACT NOTE (OTHER) - ASSESSMENT
patient in the bed, states he has the chills- notably shaking. oral temp 101.8. patient currently on IV zosyn, patient pan cultured this AM

## 2018-09-21 NOTE — PROGRESS NOTE ADULT - ASSESSMENT
23 year old male, with past history significant for Sickle cell anemia, ACS, Cholecystectomy and Splenectomy, presented to the ED secondary to left sided chest pain.  Admitted with Sickle Cell Crisis and Anemia. Pt developed febrile T 101.6, with leukocytosis on 9/20/18, met sepsis criteria.

## 2018-09-21 NOTE — PROVIDER CONTACT NOTE (OTHER) - ASSESSMENT
patient in the bed, states he has the chills- notably shaking. rectal temp patient in the bed, states he has the chills- notably shaking. rectal temp 105

## 2018-09-21 NOTE — PROGRESS NOTE ADULT - PROBLEM SELECTOR PLAN 2
- moderately dry oral mucosa with whitish covering of tongue  - maintenance IVF of 0.45 NS at 150 mL/Hr

## 2018-09-22 LAB
BASE EXCESS BLDV CALC-SCNC: 3 MMOL/L — SIGNIFICANT CHANGE UP
BASOPHILS # BLD AUTO: 0.03 K/UL — SIGNIFICANT CHANGE UP (ref 0–0.2)
BASOPHILS # BLD AUTO: 0.05 K/UL — SIGNIFICANT CHANGE UP (ref 0–0.2)
BASOPHILS NFR BLD AUTO: 0.3 % — SIGNIFICANT CHANGE UP (ref 0–2)
BASOPHILS NFR BLD AUTO: 0.3 % — SIGNIFICANT CHANGE UP (ref 0–2)
BUN SERPL-MCNC: 15 MG/DL — SIGNIFICANT CHANGE UP (ref 7–23)
CALCIUM SERPL-MCNC: 8.6 MG/DL — SIGNIFICANT CHANGE UP (ref 8.4–10.5)
CHLORIDE SERPL-SCNC: 103 MMOL/L — SIGNIFICANT CHANGE UP (ref 98–107)
CO2 SERPL-SCNC: 25 MMOL/L — SIGNIFICANT CHANGE UP (ref 22–31)
CREAT SERPL-MCNC: 0.66 MG/DL — SIGNIFICANT CHANGE UP (ref 0.5–1.3)
EOSINOPHIL # BLD AUTO: 0.01 K/UL — SIGNIFICANT CHANGE UP (ref 0–0.5)
EOSINOPHIL # BLD AUTO: 0.01 K/UL — SIGNIFICANT CHANGE UP (ref 0–0.5)
EOSINOPHIL NFR BLD AUTO: 0.1 % — SIGNIFICANT CHANGE UP (ref 0–6)
EOSINOPHIL NFR BLD AUTO: 0.1 % — SIGNIFICANT CHANGE UP (ref 0–6)
GLUCOSE SERPL-MCNC: 133 MG/DL — HIGH (ref 70–99)
HAPTOGLOB SERPL-MCNC: < 20 MG/DL — LOW (ref 34–200)
HCO3 BLDV-SCNC: 27 MMOL/L — SIGNIFICANT CHANGE UP (ref 20–27)
HCT VFR BLD CALC: 18.5 % — CRITICAL LOW (ref 39–50)
HCT VFR BLD CALC: 24.9 % — LOW (ref 39–50)
HGB BLD-MCNC: 6.8 G/DL — CRITICAL LOW (ref 13–17)
HGB BLD-MCNC: 8.8 G/DL — LOW (ref 13–17)
IMM GRANULOCYTES # BLD AUTO: 0.08 # — SIGNIFICANT CHANGE UP
IMM GRANULOCYTES # BLD AUTO: 0.17 # — SIGNIFICANT CHANGE UP
IMM GRANULOCYTES NFR BLD AUTO: 0.7 % — SIGNIFICANT CHANGE UP (ref 0–1.5)
IMM GRANULOCYTES NFR BLD AUTO: 0.9 % — SIGNIFICANT CHANGE UP (ref 0–1.5)
L PNEUMO AG UR QL: NEGATIVE — SIGNIFICANT CHANGE UP
LDH SERPL L TO P-CCNC: 1294 U/L — HIGH (ref 135–225)
LYMPHOCYTES # BLD AUTO: 1.38 K/UL — SIGNIFICANT CHANGE UP (ref 1–3.3)
LYMPHOCYTES # BLD AUTO: 1.93 K/UL — SIGNIFICANT CHANGE UP (ref 1–3.3)
LYMPHOCYTES # BLD AUTO: 12.2 % — LOW (ref 13–44)
LYMPHOCYTES # BLD AUTO: 9.9 % — LOW (ref 13–44)
MCHC RBC-ENTMCNC: 31.3 PG — SIGNIFICANT CHANGE UP (ref 27–34)
MCHC RBC-ENTMCNC: 32.7 PG — SIGNIFICANT CHANGE UP (ref 27–34)
MCHC RBC-ENTMCNC: 35.3 % — SIGNIFICANT CHANGE UP (ref 32–36)
MCHC RBC-ENTMCNC: 36.8 % — HIGH (ref 32–36)
MCV RBC AUTO: 88.6 FL — SIGNIFICANT CHANGE UP (ref 80–100)
MCV RBC AUTO: 88.9 FL — SIGNIFICANT CHANGE UP (ref 80–100)
MONOCYTES # BLD AUTO: 1.08 K/UL — HIGH (ref 0–0.9)
MONOCYTES # BLD AUTO: 2.35 K/UL — HIGH (ref 0–0.9)
MONOCYTES NFR BLD AUTO: 12 % — SIGNIFICANT CHANGE UP (ref 2–14)
MONOCYTES NFR BLD AUTO: 9.5 % — SIGNIFICANT CHANGE UP (ref 2–14)
NEUTROPHILS # BLD AUTO: 15 K/UL — HIGH (ref 1.8–7.4)
NEUTROPHILS # BLD AUTO: 8.74 K/UL — HIGH (ref 1.8–7.4)
NEUTROPHILS NFR BLD AUTO: 76.8 % — SIGNIFICANT CHANGE UP (ref 43–77)
NEUTROPHILS NFR BLD AUTO: 77.2 % — HIGH (ref 43–77)
NRBC # FLD: 0.17 — SIGNIFICANT CHANGE UP
NRBC # FLD: 0.35 — SIGNIFICANT CHANGE UP
NRBC FLD-RTO: 1.5 — SIGNIFICANT CHANGE UP
NRBC FLD-RTO: 1.8 — SIGNIFICANT CHANGE UP
PCO2 BLDV: 47 MMHG — SIGNIFICANT CHANGE UP (ref 41–51)
PH BLDV: 7.39 PH — SIGNIFICANT CHANGE UP (ref 7.32–7.43)
PLATELET # BLD AUTO: 218 K/UL — SIGNIFICANT CHANGE UP (ref 150–400)
PLATELET # BLD AUTO: 380 K/UL — SIGNIFICANT CHANGE UP (ref 150–400)
PMV BLD: 10.4 FL — SIGNIFICANT CHANGE UP (ref 7–13)
PMV BLD: 9.9 FL — SIGNIFICANT CHANGE UP (ref 7–13)
PO2 BLDV: 50 MMHG — HIGH (ref 35–40)
POTASSIUM SERPL-MCNC: 3.6 MMOL/L — SIGNIFICANT CHANGE UP (ref 3.5–5.3)
POTASSIUM SERPL-SCNC: 3.6 MMOL/L — SIGNIFICANT CHANGE UP (ref 3.5–5.3)
RBC # BLD: 2.08 M/UL — LOW (ref 4.2–5.8)
RBC # BLD: 2.81 M/UL — LOW (ref 4.2–5.8)
RBC # FLD: 16.8 % — HIGH (ref 10.3–14.5)
RBC # FLD: 22 % — HIGH (ref 10.3–14.5)
RETICS #: 136 K/UL — HIGH (ref 25–125)
RETICS/RBC NFR: 6.6 % — HIGH (ref 0.5–2.5)
SAO2 % BLDV: 69.7 % — SIGNIFICANT CHANGE UP (ref 60–85)
SODIUM SERPL-SCNC: 140 MMOL/L — SIGNIFICANT CHANGE UP (ref 135–145)
SPECIMEN SOURCE: SIGNIFICANT CHANGE UP
SPECIMEN SOURCE: SIGNIFICANT CHANGE UP
WBC # BLD: 11.32 K/UL — HIGH (ref 3.8–10.5)
WBC # BLD: 19.51 K/UL — HIGH (ref 3.8–10.5)
WBC # FLD AUTO: 11.32 K/UL — HIGH (ref 3.8–10.5)
WBC # FLD AUTO: 19.51 K/UL — HIGH (ref 3.8–10.5)

## 2018-09-22 PROCEDURE — 99291 CRITICAL CARE FIRST HOUR: CPT

## 2018-09-22 PROCEDURE — 36010 PLACE CATHETER IN VEIN: CPT

## 2018-09-22 PROCEDURE — 71045 X-RAY EXAM CHEST 1 VIEW: CPT | Mod: 26

## 2018-09-22 PROCEDURE — 99223 1ST HOSP IP/OBS HIGH 75: CPT | Mod: 25

## 2018-09-22 PROCEDURE — 36512 APHERESIS RBC: CPT

## 2018-09-22 PROCEDURE — 99233 SBSQ HOSP IP/OBS HIGH 50: CPT | Mod: GC

## 2018-09-22 RX ORDER — CHLORHEXIDINE GLUCONATE 213 G/1000ML
1 SOLUTION TOPICAL
Qty: 0 | Refills: 0 | Status: DISCONTINUED | OUTPATIENT
Start: 2018-09-22 | End: 2018-09-29

## 2018-09-22 RX ORDER — AZITHROMYCIN 500 MG/1
TABLET, FILM COATED ORAL
Qty: 0 | Refills: 0 | Status: DISCONTINUED | OUTPATIENT
Start: 2018-09-22 | End: 2018-09-22

## 2018-09-22 RX ORDER — POLYETHYLENE GLYCOL 3350 17 G/17G
17 POWDER, FOR SOLUTION ORAL AT BEDTIME
Qty: 0 | Refills: 0 | Status: DISCONTINUED | OUTPATIENT
Start: 2018-09-22 | End: 2018-09-29

## 2018-09-22 RX ORDER — AZITHROMYCIN 500 MG/1
500 TABLET, FILM COATED ORAL DAILY
Qty: 0 | Refills: 0 | Status: DISCONTINUED | OUTPATIENT
Start: 2018-09-22 | End: 2018-09-22

## 2018-09-22 RX ORDER — ACETAMINOPHEN 500 MG
1000 TABLET ORAL ONCE
Qty: 0 | Refills: 0 | Status: COMPLETED | OUTPATIENT
Start: 2018-09-22 | End: 2018-09-22

## 2018-09-22 RX ORDER — METHYLNALTREXONE BROMIDE 12 MG/.6ML
12 INJECTION, SOLUTION SUBCUTANEOUS ONCE
Qty: 0 | Refills: 0 | Status: COMPLETED | OUTPATIENT
Start: 2018-09-22 | End: 2018-09-22

## 2018-09-22 RX ORDER — CALCIUM GLUCONATE 100 MG/ML
2 VIAL (ML) INTRAVENOUS ONCE
Qty: 0 | Refills: 0 | Status: COMPLETED | OUTPATIENT
Start: 2018-09-22 | End: 2018-09-22

## 2018-09-22 RX ORDER — SODIUM CHLORIDE 9 MG/ML
1000 INJECTION, SOLUTION INTRAVENOUS
Qty: 0 | Refills: 0 | Status: DISCONTINUED | OUTPATIENT
Start: 2018-09-22 | End: 2018-09-29

## 2018-09-22 RX ORDER — ACETAMINOPHEN 500 MG
650 TABLET ORAL ONCE
Qty: 0 | Refills: 0 | Status: COMPLETED | OUTPATIENT
Start: 2018-09-22 | End: 2018-09-22

## 2018-09-22 RX ORDER — AZITHROMYCIN 500 MG/1
500 TABLET, FILM COATED ORAL ONCE
Qty: 0 | Refills: 0 | Status: COMPLETED | OUTPATIENT
Start: 2018-09-22 | End: 2018-09-22

## 2018-09-22 RX ORDER — DIPHENHYDRAMINE HCL 50 MG
50 CAPSULE ORAL ONCE
Qty: 0 | Refills: 0 | Status: COMPLETED | OUTPATIENT
Start: 2018-09-22 | End: 2018-09-22

## 2018-09-22 RX ADMIN — HYDROXYUREA 2000 MILLIGRAM(S): 500 CAPSULE ORAL at 12:21

## 2018-09-22 RX ADMIN — POLYETHYLENE GLYCOL 3350 17 GRAM(S): 17 POWDER, FOR SOLUTION ORAL at 08:45

## 2018-09-22 RX ADMIN — SODIUM CHLORIDE 150 MILLILITER(S): 9 INJECTION, SOLUTION INTRAVENOUS at 22:16

## 2018-09-22 RX ADMIN — METHYLNALTREXONE BROMIDE 12 MILLIGRAM(S): 12 INJECTION, SOLUTION SUBCUTANEOUS at 14:21

## 2018-09-22 RX ADMIN — Medication 1 MILLIGRAM(S): at 11:53

## 2018-09-22 RX ADMIN — Medication 50 MILLIGRAM(S): at 12:21

## 2018-09-22 RX ADMIN — SODIUM CHLORIDE 150 MILLILITER(S): 9 INJECTION, SOLUTION INTRAVENOUS at 07:41

## 2018-09-22 RX ADMIN — Medication 100 MILLIGRAM(S): at 14:21

## 2018-09-22 RX ADMIN — Medication 400 MILLIGRAM(S): at 20:15

## 2018-09-22 RX ADMIN — HYDROMORPHONE HYDROCHLORIDE 30 MILLILITER(S): 2 INJECTION INTRAMUSCULAR; INTRAVENOUS; SUBCUTANEOUS at 19:11

## 2018-09-22 RX ADMIN — Medication 650 MILLIGRAM(S): at 06:30

## 2018-09-22 RX ADMIN — Medication 100 MILLIGRAM(S): at 22:16

## 2018-09-22 RX ADMIN — Medication 650 MILLIGRAM(S): at 07:00

## 2018-09-22 RX ADMIN — PIPERACILLIN AND TAZOBACTAM 25 GRAM(S): 4; .5 INJECTION, POWDER, LYOPHILIZED, FOR SOLUTION INTRAVENOUS at 22:16

## 2018-09-22 RX ADMIN — SODIUM CHLORIDE 150 MILLILITER(S): 9 INJECTION, SOLUTION INTRAVENOUS at 02:11

## 2018-09-22 RX ADMIN — Medication 100 GRAM(S): at 12:22

## 2018-09-22 RX ADMIN — PIPERACILLIN AND TAZOBACTAM 25 GRAM(S): 4; .5 INJECTION, POWDER, LYOPHILIZED, FOR SOLUTION INTRAVENOUS at 06:08

## 2018-09-22 RX ADMIN — ENOXAPARIN SODIUM 40 MILLIGRAM(S): 100 INJECTION SUBCUTANEOUS at 06:09

## 2018-09-22 RX ADMIN — ONDANSETRON 4 MILLIGRAM(S): 8 TABLET, FILM COATED ORAL at 08:45

## 2018-09-22 RX ADMIN — PIPERACILLIN AND TAZOBACTAM 25 GRAM(S): 4; .5 INJECTION, POWDER, LYOPHILIZED, FOR SOLUTION INTRAVENOUS at 14:21

## 2018-09-22 RX ADMIN — HYDROMORPHONE HYDROCHLORIDE 30 MILLILITER(S): 2 INJECTION INTRAMUSCULAR; INTRAVENOUS; SUBCUTANEOUS at 07:13

## 2018-09-22 RX ADMIN — SENNA PLUS 2 TABLET(S): 8.6 TABLET ORAL at 22:16

## 2018-09-22 RX ADMIN — Medication 100 MILLIGRAM(S): at 06:09

## 2018-09-22 RX ADMIN — Medication 1000 MILLIGRAM(S): at 21:15

## 2018-09-22 RX ADMIN — CHLORHEXIDINE GLUCONATE 1 APPLICATION(S): 213 SOLUTION TOPICAL at 11:52

## 2018-09-22 RX ADMIN — POLYETHYLENE GLYCOL 3350 17 GRAM(S): 17 POWDER, FOR SOLUTION ORAL at 22:16

## 2018-09-22 RX ADMIN — Medication 650 MILLIGRAM(S): at 12:22

## 2018-09-22 RX ADMIN — AZITHROMYCIN 250 MILLIGRAM(S): 500 TABLET, FILM COATED ORAL at 02:01

## 2018-09-22 NOTE — PROGRESS NOTE ADULT - ASSESSMENT
Pt is a 24yo M with PMHx for Sickle cell anemia, ACS, Iron overload (due to repeated pRBCs transfusions), Cholecystectomy and Splenectomy, presents with fever, chest pain and bibasilar lung consolidations, concerning for acute chest syndrome currently undergoing exchange transfusion.    #Neuro  - awake, alert, oriented x3  - dilaudid PCA for pain control    #Resp  - saturating well on nasal canula  - On admission CXR negative, no symptoms, RVP negative. met sepsis criteria  - CXR 9/18: Small left pleural effusion with adjacent atelectasis  - CT Chest 9/18: Bibasilar lung consolidations, greater on the left suspicious for pneumonia, on zosyn  - adding azithromycin for atypical coverage, will d/c if urine legionella negative  - urine legionella  - [facility], currently receiving RBC exchange transfusion    #CV  - EKG on admission unchanged from prior study; NSR at 79 bpm, QTc = 428; unlikely ACS  - tachycardic, likely 2/2 to fever and pain    #GI  - regular diet  - bowel regimen; colace, senna, miralax  - one dose of methylnaltrexone in context of large doses of opioids and consipation    #ID  - febrile to 105.1 while on zosyn. Will add Azithromycin for atypical, F/U urine legionella   - cultured 9/21: negative blood cx 24 hrs   - increasing WBC count: 15.2 -> 18.2 -> 19.51  - UA negative, RVP negative    #Renal  - BUN/Cr 15/.66  - stable since admission     #Heme  - declining H/H 7.6/20.9 -> 5.9/16.2 -> 6.8/18.5 s/p blood transfusion  - elevated LDH (>900) and low haptoglobin  - pt + for numerous RBC-Abs  - Currently undergoing exchange transfusion, will get post-exchange transfusion labs  - hydroxyurea 2g daily  - c/w 1/2 NS @ 150cc    #Endo  - no acute concerns    #DVT PPx  - Lovenox sq Pt is a 24yo M with PMHx for Sickle cell anemia, ACS, Iron overload (due to repeated pRBCs transfusions), Cholecystectomy and Splenectomy, presents with fever, chest pain and bibasilar lung consolidations, concerning for acute chest syndrome currently undergoing exchange transfusion.    #Neuro  - awake, alert, oriented x3  - dilaudid PCA for pain control    #Resp  - saturating well on nasal canula  - On admission CXR negative, no symptoms, RVP negative. met sepsis criteria  - CXR 9/18: Small left pleural effusion with adjacent atelectasis  - CT Chest 9/18: Bibasilar lung consolidations, greater on the left suspicious for pneumonia, on zosyn  - adding azithromycin for atypical coverage, will d/c if urine legionella negative  - urine legionella  [facility]    #CV  - EKG on admission unchanged from prior study; NSR at 79 bpm, QTc = 428; unlikely ACS  - tachycardic, likely 2/2 to fever and pain    #GI  - regular diet  - bowel regimen; colace, senna, miralax  - one dose of methylnaltrexone in context of large doses of opioids and consipation    #ID  - febrile to 105.1 while on zosyn. Will add Azithromycin for atypical, F/U urine legionella   - cultured 9/21: negative blood cx 24 hrs   - increasing WBC count: 15.2 -> 18.2 -> 19.51  - UA negative, RVP negative    #Renal  - BUN/Cr 15/.66  - stable since admission     #Heme  - declining H/H 7.6/20.9 -> 5.9/16.2 -> 6.8/18.5 s/p 1 unit of prbc  - elevated LDH (>900) and low haptoglobin  - pt + for numerous RBC-Abs  - Currently undergoing exchange transfusion, will get post-exchange transfusion labs  - hydroxyurea 2g daily  - c/w 1/2 NS @ 150cc    #Endo  - no acute concerns    #DVT PPx  - Lovenox sq

## 2018-09-22 NOTE — PROGRESS NOTE ADULT - SUBJECTIVE AND OBJECTIVE BOX
INTERVAL HPI/OVERNIGHT EVENTS:  Patient S&E at bedside. Pt with high grade fevers up to 105F overnight with respiratory distress and hypoxia, RRT was called, and pt transferred to MICU for acute chest and exchange transfusion. Seen by blood bank and received 1 unit of pRBCs prior to exchange. Currently sitting in a chair with NRB, endorses CP and SOB.     MEDICATIONS  (STANDING):  azithromycin  IVPB      chlorhexidine 4% Liquid 1 Application(s) Topical <User Schedule>  docusate sodium 100 milliGRAM(s) Oral three times a day  enoxaparin Injectable 40 milliGRAM(s) SubCutaneous every 24 hours  folic acid 1 milliGRAM(s) Oral daily  HYDROmorphone PCA (1 mG/mL) 30 milliLiter(s) PCA Continuous PCA Continuous  hydroxyurea 2000 milliGRAM(s) Oral daily  piperacillin/tazobactam IVPB. 3.375 Gram(s) IV Intermittent every 8 hours  polyethylene glycol 3350 17 Gram(s) Oral at bedtime  senna 2 Tablet(s) Oral at bedtime  sodium chloride 0.45%. 1000 milliLiter(s) (150 mL/Hr) IV Continuous <Continuous>    MEDICATIONS  (PRN):  naloxone Injectable 0.1 milliGRAM(s) IV Push every 3 minutes PRN For ANY of the following changes in patient status:  A. RR LESS THAN 10 breaths per minute, B. Oxygen saturation LESS THAN 90%, C. Sedation score of 6  ondansetron Injectable 4 milliGRAM(s) IV Push every 6 hours PRN Nausea    Allergies    ceftriaxone (Unknown)    Intolerances    VITAL SIGNS:  T(F): 100.9 (09-22-18 @ 06:00)  HR: 78 (09-22-18 @ 09:00)  BP: 121/55 (09-22-18 @ 09:00)  RR: 30 (09-22-18 @ 09:00)  SpO2: 100% (09-22-18 @ 09:00)  Wt(kg): --    PHYSICAL EXAM:  Constitutional: sitting in a chair, appears tired and in mild distress  Eyes: EOMI, + scleral icterus  Neck: supple  Respiratory: on NRB, respirations unlabored, bibasilar crackles  Cardiovascular: RRR, S1/S2  Gastrointestinal: +BS, soft, NTND  Extremities: no LE edema  Neurological: AAOx3    LABS:                        6.8    19.51 )-----------( 380      ( 22 Sep 2018 07:20 )             18.5     09-22    140  |  103  |  15  ----------------------------<  133<H>  3.6   |  25  |  0.66    Ca    8.6      22 Sep 2018 07:20  Phos  3.0     09-21  Mg     1.7     09-21    TPro  7.3  /  Alb  4.0  /  TBili  6.1<H>  /  DBili  x   /  AST  70<H>  /  ALT  26  /  AlkPhos  85  09-21    PT/INR - ( 21 Sep 2018 20:05 )   PT: 15.0 SEC;   INR: 1.30       RADIOLOGY & ADDITIONAL TESTS:  - CT Chest No Cont (09.21.18 @ 15:22) >  IMPRESSION:  Bibasilar lung consolidations, greater on the left suspicious for pneumonia. Prominent left axillary lymph nodes similar to the prior study.

## 2018-09-22 NOTE — CONSULT NOTE ADULT - SUBJECTIVE AND OBJECTIVE BOX
Patient is a 23y old  Male who presents with a chief complaint of Sickle cell anemia crisis.      HPI:  23 year old male, with past history significant for Sickle cell anemia, ACS, Iron overload (due to repeated pRBCs transfusions), Cholecystectomy and Splenectomy, presented to the ED secondary to left sided chest pain.  Seen and evaluated at bedside with sister present; appears to be experiencing painful left chest discomfort.  Patient reports onset of left chest pain during the prior week, with worsening over time, and not relieved with Motrin 600 mg PRN or oxycodone 10 mg PRN.  No trigger identified; no sick contacts, maintains adequate hydration, no trauma to chest.  Pain intensity at 10/10 maximum; 10/10 at the time of being seen.  No reports of fevers, chills, headache, rhinorrhea, coughing, shortness of breath, vomiting, abdominal pain, diarrhea, constipation or dysuria.  Last sickle cell crisis was months ago and was managed without need for hospital admission.    Vital signs upon ED presentation as follows: BP = 168/82, HR = 95, RR = 17, T = 36.9 C (98.4 F), O2 Sat = 95% on RA.  CXR without any acute findings.    Chest CT on 09/21/18: Bibasilar lung consolidations, greater on the left suspicious for pneumonia.    PAST MEDICAL & SURGICAL HISTORY:  Iron overload due to repeated red blood cell transfusions  Sickle cell anemia  Acute chest syndrome  Sickle Cell Disease: HbSS  S/P Splenectomy  S/P Laparotomy for spleen removal  S/P Laparoscopic Cholecystectomy      MEDICATIONS  (STANDING):  docusate sodium 100 milliGRAM(s) Oral three times a day  enoxaparin Injectable 40 milliGRAM(s) SubCutaneous every 24 hours  folic acid 1 milliGRAM(s) Oral daily  HYDROmorphone PCA (1 mG/mL) 30 milliLiter(s) PCA Continuous PCA Continuous  hydroxyurea 2000 milliGRAM(s) Oral daily  piperacillin/tazobactam IVPB. 3.375 Gram(s) IV Intermittent every 8 hours  senna 2 Tablet(s) Oral at bedtime  sodium chloride 0.45%. 1000 milliLiter(s) (30 mL/Hr) IV Continuous <Continuous>    MEDICATIONS  (PRN):  naloxone Injectable 0.1 milliGRAM(s) IV Push every 3 minutes PRN For ANY of the following changes in patient status:  A. RR LESS THAN 10 breaths per minute, B. Oxygen saturation LESS THAN 90%, C. Sedation score of 6  ondansetron Injectable 4 milliGRAM(s) IV Push every 6 hours PRN Nausea  polyethylene glycol 3350 17 Gram(s) Oral daily PRN Constipation      FAMILY HISTORY:  Family history of sickle cell anemia (Sibling)        Allergies:  ceftriaxone (Unknown)    REVIEW OF SYSTEMS:    CONSTITUTIONAL: + fever. No weakness  EYES/ENT: No visual changes, no throat pain   RESPIRATORY: No cough, wheezing, hemoptysis; No shortness of breath  CARDIOVASCULAR:+ L sided chest pain  GASTROINTESTINAL: No abdominal, nausea, vomiting, or hematemesis; No diarrhea or constipation. No melena or hematochezia.  GENITOURINARY: No dysuria, frequency or hematuria  NEUROLOGICAL: No dizziness, numbness, or weakness  SKIN: No itching, burning, rashes, or lesions   All other review of systems is negative unless indicated above.    Vital Signs Last 24 Hrs  T(C): 37.1 (21 Sep 2018 23:40), Max: 40.6 (21 Sep 2018 19:40)  T(F): 98.7 (21 Sep 2018 23:40), Max: 105.1 (21 Sep 2018 19:48)  HR: 88 (21 Sep 2018 23:40) (81 - 117)  BP: 96/50 (21 Sep 2018 23:40) (96/50 - 122/50)  BP(mean): --  RR: 19 (21 Sep 2018 23:40) (18 - 22)  SpO2: 96% (21 Sep 2018 23:40) (78% - 98%)    PHYSICAL EXAM:  GENERAL: sleepy, no acute distress  HEENT: neck supple, MMM  RESPIRATORY: enhanced breath sounds on left lower lung. Few crackles bibasilar.  CARDIOVASCULAR: RRR, no murmurs, gallops, rubs  ABDOMINAL: soft, non-tender, non-distended, positive bowel sounds   EXTREMITIES: no clubbing, cyanosis, or edema  NEUROLOGICAL:  non-focal  SKIN: no rashes or lesions   MUSCULOSKELETAL: no gross joint deformity                          5.9    18.20 )-----------( 369      ( 21 Sep 2018 20:05 )             16.2     Reticulocyte Percent: 5.1 % (09-21 @ 02:45)  Reticulocyte Percent: 5.9 % (09-20 @ 05:30)  Reticulocyte Percent: 6.5 % (09-19 @ 15:15)    Hematocrit: 16.2 % (09-21 @ 20:05)  Hematocrit: 17.1 % (09-21 @ 02:45)  Hematocrit: 17.1 % (09-20 @ 05:30)  Hematocrit: 20.4 % (09-19 @ 15:15)    09-21    139  |  100  |  17  ----------------------------<  137<H>  4.1   |  27  |  0.85    Ca    8.5      21 Sep 2018 20:05  Phos  3.0     09-21  Mg     1.7     09-21    TPro  7.3  /  Alb  4.0  /  TBili  6.1<H>  /  DBili  x   /  AST  70<H>  /  ALT  26  /  AlkPhos  85  09-21    Lactate Dehydrogenase, Serum: 1137 U/L (09-21 @ 02:45)  Lactate Dehydrogenase, Serum: 969 U/L (09-20 @ 05:30)    Haptoglobin, Serum: < 20 mg/dL (09-20 @ 05:30)    PT/INR - ( 21 Sep 2018 20:05 )   PT: 15.0 SEC;   INR: 1.30       Type and Screen 09-21 @ 21:26  --  Negative  Type and Screen 09-19 @ 17:03  --  Negative

## 2018-09-22 NOTE — CONSULT NOTE ADULT - ASSESSMENT
23 year old male, with past history significant for Sickle cell anemia, ACS, Iron overload (due to repeated pRBCs transfusions), Cholecystectomy and Splenectomy, presented to the ED on 09/19/18 secondary to left sided chest pain.  Seen and evaluated at bedside with mother present.  Patient reports onset of left chest pain during the prior week, with worsening over time, and not relieved with Motrin 600 mg PRN or oxycodone 10 mg PRN.  No triggers identified; no sick contacts, maintains adequate hydration, no trauma to chest.  Pain intensity at 10/10 maximum; 10/10 at the time of being seen.  No reports of fevers, chills, headache, rhinorrhea, coughing, shortness of breath, vomiting, abdominal pain, diarrhea, constipation or dysuria.  Last sickle cell crisis was months ago and was managed without need for hospital admission.    Vital signs upon ED presentation as follows: BP = 168/82, HR = 95, RR = 17, T = 36.9 C (98.4 F), O2 Sat = 95% on RA.  CXR without any acute findings.    Chest CT on 09/21/18: Bibasilar lung consolidations, greater on the left suspicious for pneumonia (acute chest syndrome?). Hematology service contacted us at 10:30 p.m. with request for evaluation for RBC exchange. We agree with performing an RBC exchange with fraction of cells remaining of approximately 30% and target hematocrit of 28%. Upon consultation with the [facility] we recommend transfusing one unit of pRBC prior to RBC exchange. Case has been discussed with the MICU attending (Dr. Yue Larson). Dr. Larson will order hemoglobin electrophoresis pre-transfusion and post RBC exchange.

## 2018-09-22 NOTE — PROGRESS NOTE ADULT - SUBJECTIVE AND OBJECTIVE BOX
MICU Transfer Note    Transfer from: MICU  Transfer to:  (  ) Medicine    (  ) Telemetry    (  ) RCU    (  ) Palliative    (  ) Stroke Unit    (  ) _______________  [facility]          ASSESSMENT & PLAN:         For Follow-Up:          Vital Signs Last 24 Hrs  T(C): 38.3 (22 Sep 2018 06:00), Max: 40.6 (21 Sep 2018 19:40)  T(F): 100.9 (22 Sep 2018 06:00), Max: 105.1 (21 Sep 2018 19:48)  HR: 83 (22 Sep 2018 10:00) (78 - 117)  BP: 120/54 (22 Sep 2018 10:00) (96/50 - 125/68)  BP(mean): 69 (22 Sep 2018 10:00) (55 - 81)  RR: 22 (22 Sep 2018 10:00) (18 - 33)  SpO2: 94% (22 Sep 2018 10:00) (78% - 100%)  I&O's Summary    21 Sep 2018 07:01  -  22 Sep 2018 07:00  --------------------------------------------------------  IN: 1700 mL / OUT: 800 mL / NET: 900 mL          MEDICATIONS  (STANDING):  acetaminophen   Tablet .. 650 milliGRAM(s) Oral once  azithromycin  IVPB      calcium gluconate IVPB 2 Gram(s) IV Intermittent once  chlorhexidine 4% Liquid 1 Application(s) Topical <User Schedule>  diphenhydrAMINE   Capsule 50 milliGRAM(s) Oral once  docusate sodium 100 milliGRAM(s) Oral three times a day  enoxaparin Injectable 40 milliGRAM(s) SubCutaneous every 24 hours  folic acid 1 milliGRAM(s) Oral daily  HYDROmorphone PCA (1 mG/mL) 30 milliLiter(s) PCA Continuous PCA Continuous  hydroxyurea 2000 milliGRAM(s) Oral daily  piperacillin/tazobactam IVPB. 3.375 Gram(s) IV Intermittent every 8 hours  polyethylene glycol 3350 17 Gram(s) Oral at bedtime  senna 2 Tablet(s) Oral at bedtime  sodium chloride 0.45%. 1000 milliLiter(s) (150 mL/Hr) IV Continuous <Continuous>    MEDICATIONS  (PRN):  naloxone Injectable 0.1 milliGRAM(s) IV Push every 3 minutes PRN For ANY of the following changes in patient status:  A. RR LESS THAN 10 breaths per minute, B. Oxygen saturation LESS THAN 90%, C. Sedation score of 6  ondansetron Injectable 4 milliGRAM(s) IV Push every 6 hours PRN Nausea        LABS                                            6.8                   Neurophils% (auto):   76.8   (09-22 @ 07:20):    19.51)-----------(380          Lymphocytes% (auto):  9.9                                           18.5                   Eosinphils% (auto):   0.1      Manual%: Neutrophils x    ; Lymphocytes x    ; Eosinophils x    ; Bands%: x    ; Blasts x                                    140    |  103    |  15                  Calcium: 8.6   / iCa: x      (09-22 @ 07:20)    ----------------------------<  133       Magnesium: x                                3.6     |  25     |  0.66             Phosphorous: x        TPro  7.3    /  Alb  4.0    /  TBili  6.1    /  DBili  x      /  AST  70     /  ALT  26     /  AlkPhos  85     21 Sep 2018 20:05    ( 09-21 @ 20:05 )   PT: 15.0 SEC;   INR: 1.30   aPTT: x Iván Lucas, PGY1  MICU Team  [phone]    SUBJECTIVE: Patient seen and examined at bedside.     Interval Events:    OBJECTIVE:    VITAL SIGNS:  ICU Vital Signs Last 24 Hrs  T(C): 38.3 (22 Sep 2018 06:00), Max: 40.6 (21 Sep 2018 19:40)  T(F): 100.9 (22 Sep 2018 06:00), Max: 105.1 (21 Sep 2018 19:48)  HR: 83 (22 Sep 2018 10:00) (78 - 117)  BP: 120/54 (22 Sep 2018 10:00) (96/50 - 125/68)  BP(mean): 69 (22 Sep 2018 10:00) (55 - 81)  ABP: --  ABP(mean): --  RR: 22 (22 Sep 2018 10:00) (18 - 33)  SpO2: 94% (22 Sep 2018 10:00) (78% - 100%)         @ 07:01  -   @ 07:00  --------------------------------------------------------  IN: 1700 mL / OUT: 800 mL / NET: 900 mL      CAPILLARY BLOOD GLUCOSE      POCT Blood Glucose.: 144 mg/dL (21 Sep 2018 19:49)      PHYSICAL EXAM:    General:   HEENT:   Neck:  Respiratory:   Cardiovascular:   Abdomen:   Extremities:   Skin:   Neurological:    MEDICATIONS:  MEDICATIONS  (STANDING):  azithromycin  IVPB      chlorhexidine 4% Liquid 1 Application(s) Topical <User Schedule>  docusate sodium 100 milliGRAM(s) Oral three times a day  enoxaparin Injectable 40 milliGRAM(s) SubCutaneous every 24 hours  folic acid 1 milliGRAM(s) Oral daily  HYDROmorphone PCA (1 mG/mL) 30 milliLiter(s) PCA Continuous PCA Continuous  hydroxyurea 2000 milliGRAM(s) Oral daily  methylnaltrexone Injectable 12 milliGRAM(s) SubCutaneous once  piperacillin/tazobactam IVPB. 3.375 Gram(s) IV Intermittent every 8 hours  polyethylene glycol 3350 17 Gram(s) Oral at bedtime  senna 2 Tablet(s) Oral at bedtime  sodium chloride 0.45%. 1000 milliLiter(s) (150 mL/Hr) IV Continuous <Continuous>    MEDICATIONS  (PRN):  naloxone Injectable 0.1 milliGRAM(s) IV Push every 3 minutes PRN For ANY of the following changes in patient status:  A. RR LESS THAN 10 breaths per minute, B. Oxygen saturation LESS THAN 90%, C. Sedation score of 6  ondansetron Injectable 4 milliGRAM(s) IV Push every 6 hours PRN Nausea      ALLERGIES:  Allergies    ceftriaxone (Unknown)    Intolerances        LABS:                        6.8    19.51 )-----------( 380      ( 22 Sep 2018 07:20 )             18.5     CBC Full  -  ( 22 Sep 2018 07:20 )  WBC Count : 19.51 K/uL  Hemoglobin : 6.8 g/dL  Hematocrit : 18.5 %  Platelet Count - Automated : 380 K/uL  Mean Cell Volume : 88.9 fL  Mean Cell Hemoglobin : 32.7 pg  Mean Cell Hemoglobin Concentration : 36.8 %  Auto Neutrophil # : 15.00 K/uL  Auto Lymphocyte # : 1.93 K/uL  Auto Monocyte # : 2.35 K/uL  Auto Eosinophil # : 0.01 K/uL  Auto Basophil # : 0.05 K/uL  Auto Neutrophil % : 76.8 %  Auto Lymphocyte % : 9.9 %  Auto Monocyte % : 12.0 %  Auto Eosinophil % : 0.1 %  Auto Basophil % : 0.3 %        140  |  103  |  15  ----------------------------<  133<H>  3.6   |  25  |  0.66    Ca    8.6      22 Sep 2018 07:20  Phos  3.0       Mg     1.7         TPro  7.3  /  Alb  4.0  /  TBili  6.1<H>  /  DBili  x   /  AST  70<H>  /  ALT  26  /  AlkPhos  85      Creatinine Trend: 0.66<--, 0.85<--, 0.74<--, 0.71<--, 0.68<--, 0.65<--  LIVER FUNCTIONS - ( 21 Sep 2018 20:05 )  Alb: 4.0 g/dL / Pro: 7.3 g/dL / ALK PHOS: 85 u/L / ALT: 26 u/L / AST: 70 u/L / GGT: x           PT/INR - ( 21 Sep 2018 20:05 )   PT: 15.0 SEC;   INR: 1.30              Urinalysis Basic - ( 21 Sep 2018 06:42 )    Color: YELLOW / Appearance: CLEAR / S.015 / pH: 6.0  Gluc: NEGATIVE / Ketone: NEGATIVE  / Bili: NEGATIVE / Urobili: NORMAL   Blood: SMALL / Protein: 50 / Nitrite: NEGATIVE   Leuk Esterase: NEGATIVE / RBC: 6-10 / WBC 0-2   Sq Epi: OCC / Non Sq Epi: x / Bacteria: NEGATIVE        EKG:   MICROBIOLOGY:    Culture - Blood (collected 21 Sep 2018 05:46)  Source: BLOOD VENOUS  Preliminary Report (22 Sep 2018 05:46):    NO ORGANISMS ISOLATED    NO ORGANISMS ISOLATED AT 24 HOURS    Culture - Blood (collected 21 Sep 2018 05:46)  Source: BLOOD PERIPHERAL  Preliminary Report (22 Sep 2018 05:46):    NO ORGANISMS ISOLATED    NO ORGANISMS ISOLATED AT 24 HOURS      IMAGING:    RADIOLOGY & ADDITIONAL TESTS: Reviewed. Iván Lucas, PGY1  MICU Team  [phone]    SUBJECTIVE: Patient seen and examined at bedside. Continues to be SOB and in pain. has had no BM.     Interval Events:    OBJECTIVE:    VITAL SIGNS:  ICU Vital Signs Last 24 Hrs  T(C): 38.3 (22 Sep 2018 06:00), Max: 40.6 (21 Sep 2018 19:40)  T(F): 100.9 (22 Sep 2018 06:00), Max: 105.1 (21 Sep 2018 19:48)  HR: 83 (22 Sep 2018 10:00) (78 - 117)  BP: 120/54 (22 Sep 2018 10:00) (96/50 - 125/68)  BP(mean): 69 (22 Sep 2018 10:00) (55 - 81)  ABP: --  ABP(mean): --  RR: 22 (22 Sep 2018 10:00) (18 - 33)  SpO2: 94% (22 Sep 2018 10:00) (78% - 100%)         @ 07:01  -   @ 07:00  --------------------------------------------------------  IN: 1700 mL / OUT: 800 mL / NET: 900 mL      CAPILLARY BLOOD GLUCOSE      POCT Blood Glucose.: 144 mg/dL (21 Sep 2018 19:49)      PHYSICAL EXAM:    GENERAL: NAD, well-developed  HEAD:  Atraumatic, Normocephalic  EYES: EOMI, PERRLA, conjunctiva and sclera clear  NECK: Supple, No JVD  CHEST/LUNG: Clear to auscultation bilaterally; No rhonchis, rales, or wheezing  HEART: Regular rate and rhythm; S1, S2; No murmurs, rubs, or gallops  ABDOMEN: Soft, Nontender, Nondistended; Normoactive bowel sounds  EXTREMITIES:  2+ Peripheral Pulses, No clubbing, cyanosis, or edema  PSYCH: A&Ox3  NEUROLOGY: non-focal  SKIN: No rashes or lesions    MEDICATIONS:  MEDICATIONS  (STANDING):  azithromycin  IVPB      chlorhexidine 4% Liquid 1 Application(s) Topical <User Schedule>  docusate sodium 100 milliGRAM(s) Oral three times a day  enoxaparin Injectable 40 milliGRAM(s) SubCutaneous every 24 hours  folic acid 1 milliGRAM(s) Oral daily  HYDROmorphone PCA (1 mG/mL) 30 milliLiter(s) PCA Continuous PCA Continuous  hydroxyurea 2000 milliGRAM(s) Oral daily  methylnaltrexone Injectable 12 milliGRAM(s) SubCutaneous once  piperacillin/tazobactam IVPB. 3.375 Gram(s) IV Intermittent every 8 hours  polyethylene glycol 3350 17 Gram(s) Oral at bedtime  senna 2 Tablet(s) Oral at bedtime  sodium chloride 0.45%. 1000 milliLiter(s) (150 mL/Hr) IV Continuous <Continuous>    MEDICATIONS  (PRN):  naloxone Injectable 0.1 milliGRAM(s) IV Push every 3 minutes PRN For ANY of the following changes in patient status:  A. RR LESS THAN 10 breaths per minute, B. Oxygen saturation LESS THAN 90%, C. Sedation score of 6  ondansetron Injectable 4 milliGRAM(s) IV Push every 6 hours PRN Nausea      ALLERGIES:  Allergies    ceftriaxone (Unknown)    Intolerances    LABS:                        6.8    19.51 )-----------( 380      ( 22 Sep 2018 07:20 )             18.5     CBC Full  -  ( 22 Sep 2018 07:20 )  WBC Count : 19.51 K/uL  Hemoglobin : 6.8 g/dL  Hematocrit : 18.5 %  Platelet Count - Automated : 380 K/uL  Mean Cell Volume : 88.9 fL  Mean Cell Hemoglobin : 32.7 pg  Mean Cell Hemoglobin Concentration : 36.8 %  Auto Neutrophil # : 15.00 K/uL  Auto Lymphocyte # : 1.93 K/uL  Auto Monocyte # : 2.35 K/uL  Auto Eosinophil # : 0.01 K/uL  Auto Basophil # : 0.05 K/uL  Auto Neutrophil % : 76.8 %  Auto Lymphocyte % : 9.9 %  Auto Monocyte % : 12.0 %  Auto Eosinophil % : 0.1 %  Auto Basophil % : 0.3 %        140  |  103  |  15  ----------------------------<  133<H>  3.6   |  25  |  0.66    Ca    8.6      22 Sep 2018 07:20  Phos  3.0     09-21  Mg     1.7     -    TPro  7.3  /  Alb  4.0  /  TBili  6.1<H>  /  DBili  x   /  AST  70<H>  /  ALT  26  /  AlkPhos  85      Creatinine Trend: 0.66<--, 0.85<--, 0.74<--, 0.71<--, 0.68<--, 0.65<--  LIVER FUNCTIONS - ( 21 Sep 2018 20:05 )  Alb: 4.0 g/dL / Pro: 7.3 g/dL / ALK PHOS: 85 u/L / ALT: 26 u/L / AST: 70 u/L / GGT: x           PT/INR - ( 21 Sep 2018 20:05 )   PT: 15.0 SEC;   INR: 1.30              Urinalysis Basic - ( 21 Sep 2018 06:42 )    Color: YELLOW / Appearance: CLEAR / S.015 / pH: 6.0  Gluc: NEGATIVE / Ketone: NEGATIVE  / Bili: NEGATIVE / Urobili: NORMAL   Blood: SMALL / Protein: 50 / Nitrite: NEGATIVE   Leuk Esterase: NEGATIVE / RBC: 6-10 / WBC 0-2   Sq Epi: OCC / Non Sq Epi: x / Bacteria: NEGATIVE        EKG:   MICROBIOLOGY:    Culture - Blood (collected 21 Sep 2018 05:46)  Source: BLOOD VENOUS  Preliminary Report (22 Sep 2018 05:46):    NO ORGANISMS ISOLATED    NO ORGANISMS ISOLATED AT 24 HOURS    Culture - Blood (collected 21 Sep 2018 05:46)  Source: BLOOD PERIPHERAL  Preliminary Report (22 Sep 2018 05:46):    NO ORGANISMS ISOLATED    NO ORGANISMS ISOLATED AT 24 HOURS      IMAGING:    RADIOLOGY & ADDITIONAL TESTS: Reviewed. Iván Lucas, PGY1  MICU Team  [phone]    SUBJECTIVE: Patient seen and examined at bedside. Continues to be SOB and in pain. has had no BM. recieved 1 unit of pRBC overnight.    Interval Events:    OBJECTIVE:    VITAL SIGNS:  ICU Vital Signs Last 24 Hrs  T(C): 38.3 (22 Sep 2018 06:00), Max: 40.6 (21 Sep 2018 19:40)  T(F): 100.9 (22 Sep 2018 06:00), Max: 105.1 (21 Sep 2018 19:48)  HR: 83 (22 Sep 2018 10:00) (78 - 117)  BP: 120/54 (22 Sep 2018 10:00) (96/50 - 125/68)  BP(mean): 69 (22 Sep 2018 10:00) (55 - 81)  ABP: --  ABP(mean): --  RR: 22 (22 Sep 2018 10:00) (18 - 33)  SpO2: 94% (22 Sep 2018 10:00) (78% - 100%)         @ 07:01  -   @ 07:00  --------------------------------------------------------  IN: 1700 mL / OUT: 800 mL / NET: 900 mL      CAPILLARY BLOOD GLUCOSE      POCT Blood Glucose.: 144 mg/dL (21 Sep 2018 19:49)      PHYSICAL EXAM:    GENERAL: NAD, well-developed  HEAD:  Atraumatic, Normocephalic  EYES: EOMI, PERRLA, conjunctiva and sclera clear  NECK: Supple, No JVD  CHEST/LUNG: Clear to auscultation bilaterally; No rhonchis, rales, or wheezing  HEART: Regular rate and rhythm; S1, S2; No murmurs, rubs, or gallops  ABDOMEN: Soft, Nontender, Nondistended; Normoactive bowel sounds  EXTREMITIES:  2+ Peripheral Pulses, No clubbing, cyanosis, or edema  PSYCH: A&Ox3  NEUROLOGY: non-focal  SKIN: No rashes or lesions    MEDICATIONS:  MEDICATIONS  (STANDING):  azithromycin  IVPB      chlorhexidine 4% Liquid 1 Application(s) Topical <User Schedule>  docusate sodium 100 milliGRAM(s) Oral three times a day  enoxaparin Injectable 40 milliGRAM(s) SubCutaneous every 24 hours  folic acid 1 milliGRAM(s) Oral daily  HYDROmorphone PCA (1 mG/mL) 30 milliLiter(s) PCA Continuous PCA Continuous  hydroxyurea 2000 milliGRAM(s) Oral daily  methylnaltrexone Injectable 12 milliGRAM(s) SubCutaneous once  piperacillin/tazobactam IVPB. 3.375 Gram(s) IV Intermittent every 8 hours  polyethylene glycol 3350 17 Gram(s) Oral at bedtime  senna 2 Tablet(s) Oral at bedtime  sodium chloride 0.45%. 1000 milliLiter(s) (150 mL/Hr) IV Continuous <Continuous>    MEDICATIONS  (PRN):  naloxone Injectable 0.1 milliGRAM(s) IV Push every 3 minutes PRN For ANY of the following changes in patient status:  A. RR LESS THAN 10 breaths per minute, B. Oxygen saturation LESS THAN 90%, C. Sedation score of 6  ondansetron Injectable 4 milliGRAM(s) IV Push every 6 hours PRN Nausea      ALLERGIES:  Allergies    ceftriaxone (Unknown)    Intolerances    LABS:                        6.8    19.51 )-----------( 380      ( 22 Sep 2018 07:20 )             18.5     CBC Full  -  ( 22 Sep 2018 07:20 )  WBC Count : 19.51 K/uL  Hemoglobin : 6.8 g/dL  Hematocrit : 18.5 %  Platelet Count - Automated : 380 K/uL  Mean Cell Volume : 88.9 fL  Mean Cell Hemoglobin : 32.7 pg  Mean Cell Hemoglobin Concentration : 36.8 %  Auto Neutrophil # : 15.00 K/uL  Auto Lymphocyte # : 1.93 K/uL  Auto Monocyte # : 2.35 K/uL  Auto Eosinophil # : 0.01 K/uL  Auto Basophil # : 0.05 K/uL  Auto Neutrophil % : 76.8 %  Auto Lymphocyte % : 9.9 %  Auto Monocyte % : 12.0 %  Auto Eosinophil % : 0.1 %  Auto Basophil % : 0.3 %        140  |  103  |  15  ----------------------------<  133<H>  3.6   |  25  |  0.66    Ca    8.6      22 Sep 2018 07:20  Phos  3.0     09-21  Mg     1.7         TPro  7.3  /  Alb  4.0  /  TBili  6.1<H>  /  DBili  x   /  AST  70<H>  /  ALT  26  /  AlkPhos  85      Creatinine Trend: 0.66<--, 0.85<--, 0.74<--, 0.71<--, 0.68<--, 0.65<--  LIVER FUNCTIONS - ( 21 Sep 2018 20:05 )  Alb: 4.0 g/dL / Pro: 7.3 g/dL / ALK PHOS: 85 u/L / ALT: 26 u/L / AST: 70 u/L / GGT: x           PT/INR - ( 21 Sep 2018 20:05 )   PT: 15.0 SEC;   INR: 1.30              Urinalysis Basic - ( 21 Sep 2018 06:42 )    Color: YELLOW / Appearance: CLEAR / S.015 / pH: 6.0  Gluc: NEGATIVE / Ketone: NEGATIVE  / Bili: NEGATIVE / Urobili: NORMAL   Blood: SMALL / Protein: 50 / Nitrite: NEGATIVE   Leuk Esterase: NEGATIVE / RBC: 6-10 / WBC 0-2   Sq Epi: OCC / Non Sq Epi: x / Bacteria: NEGATIVE        EKG:   MICROBIOLOGY:    Culture - Blood (collected 21 Sep 2018 05:46)  Source: BLOOD VENOUS  Preliminary Report (22 Sep 2018 05:46):    NO ORGANISMS ISOLATED    NO ORGANISMS ISOLATED AT 24 HOURS    Culture - Blood (collected 21 Sep 2018 05:46)  Source: BLOOD PERIPHERAL  Preliminary Report (22 Sep 2018 05:46):    NO ORGANISMS ISOLATED    NO ORGANISMS ISOLATED AT 24 HOURS      IMAGING:    RADIOLOGY & ADDITIONAL TESTS: Reviewed.

## 2018-09-22 NOTE — PROGRESS NOTE ADULT - ASSESSMENT
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    1. Acute chest syndrome: pt with hypoxia requiring NRB, CT Chest with bibasilar lung consolidations, and fevers meeting criteria for acute chest syndrome  - seen by blood bank, received 1 unit of pRBCs overnight, s/p central line placement, plan for exchange transfusion  - send Hgb electropharesis pre and post exchange transfusion, goal HgbS of < 30% post exchange transfusion  - c/w broad spectrum antibiotics  - RVP negative, blood cultures NGTD at 24 hours, f/u final results  - c/w 1/2 NS and pain control  - O2 supplementation and care per MICU  - c/w folic acid and hydrea  - incentive spirometry  - c/w bowel regimen  - monitor daily CBC with retic count, CMP, and LDH     Lindsey Hallman, PGY-5  Hematology-Oncology Fellow  Pager: [phone] ([facility]), [phone] ([facility])

## 2018-09-22 NOTE — ED PROCEDURE NOTE - CPROC ED INFUS LINE DETAIL1
All lumen(s) aspirated and flushed without difficulty./The catheter was placed using sterile technique./The location was identified, and the area was draped and prepped./The guidewire was recovered./Ultrasound guidance was used during placement.

## 2018-09-23 LAB
ALBUMIN SERPL ELPH-MCNC: 3.1 G/DL — LOW (ref 3.3–5)
ALBUMIN SERPL ELPH-MCNC: 3.2 G/DL — LOW (ref 3.3–5)
ALP SERPL-CCNC: 60 U/L — SIGNIFICANT CHANGE UP (ref 40–120)
ALP SERPL-CCNC: 71 U/L — SIGNIFICANT CHANGE UP (ref 40–120)
ALT FLD-CCNC: 19 U/L — SIGNIFICANT CHANGE UP (ref 4–41)
ALT FLD-CCNC: 20 U/L — SIGNIFICANT CHANGE UP (ref 4–41)
AST SERPL-CCNC: 37 U/L — SIGNIFICANT CHANGE UP (ref 4–40)
AST SERPL-CCNC: 42 U/L — HIGH (ref 4–40)
BASOPHILS # BLD AUTO: 0.05 K/UL — SIGNIFICANT CHANGE UP (ref 0–0.2)
BASOPHILS NFR BLD AUTO: 0.4 % — SIGNIFICANT CHANGE UP (ref 0–2)
BILIRUB SERPL-MCNC: 2.3 MG/DL — HIGH (ref 0.2–1.2)
BILIRUB SERPL-MCNC: 2.9 MG/DL — HIGH (ref 0.2–1.2)
BUN SERPL-MCNC: 6 MG/DL — LOW (ref 7–23)
BUN SERPL-MCNC: 7 MG/DL — SIGNIFICANT CHANGE UP (ref 7–23)
CALCIUM SERPL-MCNC: 8 MG/DL — LOW (ref 8.4–10.5)
CALCIUM SERPL-MCNC: 8.4 MG/DL — SIGNIFICANT CHANGE UP (ref 8.4–10.5)
CHLORIDE SERPL-SCNC: 98 MMOL/L — SIGNIFICANT CHANGE UP (ref 98–107)
CHLORIDE SERPL-SCNC: 99 MMOL/L — SIGNIFICANT CHANGE UP (ref 98–107)
CO2 SERPL-SCNC: 26 MMOL/L — SIGNIFICANT CHANGE UP (ref 22–31)
CO2 SERPL-SCNC: 26 MMOL/L — SIGNIFICANT CHANGE UP (ref 22–31)
CREAT SERPL-MCNC: 0.51 MG/DL — SIGNIFICANT CHANGE UP (ref 0.5–1.3)
CREAT SERPL-MCNC: 0.66 MG/DL — SIGNIFICANT CHANGE UP (ref 0.5–1.3)
EOSINOPHIL # BLD AUTO: 0.1 K/UL — SIGNIFICANT CHANGE UP (ref 0–0.5)
EOSINOPHIL NFR BLD AUTO: 0.8 % — SIGNIFICANT CHANGE UP (ref 0–6)
GLUCOSE SERPL-MCNC: 102 MG/DL — HIGH (ref 70–99)
GLUCOSE SERPL-MCNC: 96 MG/DL — SIGNIFICANT CHANGE UP (ref 70–99)
HAPTOGLOB SERPL-MCNC: < 20 MG/DL — LOW (ref 34–200)
HCT VFR BLD CALC: 25.7 % — LOW (ref 39–50)
HGB BLD-MCNC: 8.9 G/DL — LOW (ref 13–17)
IMM GRANULOCYTES # BLD AUTO: 0.07 # — SIGNIFICANT CHANGE UP
IMM GRANULOCYTES NFR BLD AUTO: 0.5 % — SIGNIFICANT CHANGE UP (ref 0–1.5)
LDH SERPL L TO P-CCNC: 842 U/L — HIGH (ref 135–225)
LYMPHOCYTES # BLD AUTO: 17.6 % — SIGNIFICANT CHANGE UP (ref 13–44)
LYMPHOCYTES # BLD AUTO: 2.27 K/UL — SIGNIFICANT CHANGE UP (ref 1–3.3)
MAGNESIUM SERPL-MCNC: 1.7 MG/DL — SIGNIFICANT CHANGE UP (ref 1.6–2.6)
MAGNESIUM SERPL-MCNC: 1.8 MG/DL — SIGNIFICANT CHANGE UP (ref 1.6–2.6)
MCHC RBC-ENTMCNC: 30.6 PG — SIGNIFICANT CHANGE UP (ref 27–34)
MCHC RBC-ENTMCNC: 34.6 % — SIGNIFICANT CHANGE UP (ref 32–36)
MCV RBC AUTO: 88.3 FL — SIGNIFICANT CHANGE UP (ref 80–100)
MONOCYTES # BLD AUTO: 1.63 K/UL — HIGH (ref 0–0.9)
MONOCYTES NFR BLD AUTO: 12.6 % — SIGNIFICANT CHANGE UP (ref 2–14)
NEUTROPHILS # BLD AUTO: 8.81 K/UL — HIGH (ref 1.8–7.4)
NEUTROPHILS NFR BLD AUTO: 68.1 % — SIGNIFICANT CHANGE UP (ref 43–77)
NRBC # FLD: 0.34 — SIGNIFICANT CHANGE UP
NRBC FLD-RTO: 2.6 — SIGNIFICANT CHANGE UP
PHOSPHATE SERPL-MCNC: 3 MG/DL — SIGNIFICANT CHANGE UP (ref 2.5–4.5)
PHOSPHATE SERPL-MCNC: 3.1 MG/DL — SIGNIFICANT CHANGE UP (ref 2.5–4.5)
PLATELET # BLD AUTO: 274 K/UL — SIGNIFICANT CHANGE UP (ref 150–400)
PMV BLD: 10.5 FL — SIGNIFICANT CHANGE UP (ref 7–13)
POTASSIUM SERPL-MCNC: 3.8 MMOL/L — SIGNIFICANT CHANGE UP (ref 3.5–5.3)
POTASSIUM SERPL-MCNC: 4 MMOL/L — SIGNIFICANT CHANGE UP (ref 3.5–5.3)
POTASSIUM SERPL-SCNC: 3.8 MMOL/L — SIGNIFICANT CHANGE UP (ref 3.5–5.3)
POTASSIUM SERPL-SCNC: 4 MMOL/L — SIGNIFICANT CHANGE UP (ref 3.5–5.3)
PROT SERPL-MCNC: 5.9 G/DL — LOW (ref 6–8.3)
PROT SERPL-MCNC: 6.4 G/DL — SIGNIFICANT CHANGE UP (ref 6–8.3)
RBC # BLD: 2.91 M/UL — LOW (ref 4.2–5.8)
RBC # FLD: 18.1 % — HIGH (ref 10.3–14.5)
SODIUM SERPL-SCNC: 135 MMOL/L — SIGNIFICANT CHANGE UP (ref 135–145)
SODIUM SERPL-SCNC: 137 MMOL/L — SIGNIFICANT CHANGE UP (ref 135–145)
WBC # BLD: 12.93 K/UL — HIGH (ref 3.8–10.5)
WBC # FLD AUTO: 12.93 K/UL — HIGH (ref 3.8–10.5)

## 2018-09-23 PROCEDURE — 99233 SBSQ HOSP IP/OBS HIGH 50: CPT | Mod: GC

## 2018-09-23 PROCEDURE — 99291 CRITICAL CARE FIRST HOUR: CPT

## 2018-09-23 RX ORDER — AZITHROMYCIN 500 MG/1
500 TABLET, FILM COATED ORAL DAILY
Qty: 0 | Refills: 0 | Status: DISCONTINUED | OUTPATIENT
Start: 2018-09-23 | End: 2018-09-25

## 2018-09-23 RX ORDER — LACTULOSE 10 G/15ML
15 SOLUTION ORAL ONCE
Qty: 0 | Refills: 0 | Status: COMPLETED | OUTPATIENT
Start: 2018-09-23 | End: 2018-09-23

## 2018-09-23 RX ORDER — LACTULOSE 10 G/15ML
15 SOLUTION ORAL ONCE
Qty: 0 | Refills: 0 | Status: DISCONTINUED | OUTPATIENT
Start: 2018-09-23 | End: 2018-09-23

## 2018-09-23 RX ADMIN — HYDROMORPHONE HYDROCHLORIDE 30 MILLILITER(S): 2 INJECTION INTRAMUSCULAR; INTRAVENOUS; SUBCUTANEOUS at 19:41

## 2018-09-23 RX ADMIN — Medication 100 MILLIGRAM(S): at 21:19

## 2018-09-23 RX ADMIN — Medication 1 MILLIGRAM(S): at 11:58

## 2018-09-23 RX ADMIN — Medication 100 MILLIGRAM(S): at 15:04

## 2018-09-23 RX ADMIN — HYDROMORPHONE HYDROCHLORIDE 30 MILLILITER(S): 2 INJECTION INTRAMUSCULAR; INTRAVENOUS; SUBCUTANEOUS at 07:23

## 2018-09-23 RX ADMIN — SENNA PLUS 2 TABLET(S): 8.6 TABLET ORAL at 21:19

## 2018-09-23 RX ADMIN — Medication 100 MILLIGRAM(S): at 06:40

## 2018-09-23 RX ADMIN — PIPERACILLIN AND TAZOBACTAM 25 GRAM(S): 4; .5 INJECTION, POWDER, LYOPHILIZED, FOR SOLUTION INTRAVENOUS at 15:04

## 2018-09-23 RX ADMIN — POLYETHYLENE GLYCOL 3350 17 GRAM(S): 17 POWDER, FOR SOLUTION ORAL at 21:19

## 2018-09-23 RX ADMIN — PIPERACILLIN AND TAZOBACTAM 25 GRAM(S): 4; .5 INJECTION, POWDER, LYOPHILIZED, FOR SOLUTION INTRAVENOUS at 06:40

## 2018-09-23 RX ADMIN — SODIUM CHLORIDE 150 MILLILITER(S): 9 INJECTION, SOLUTION INTRAVENOUS at 11:58

## 2018-09-23 RX ADMIN — CHLORHEXIDINE GLUCONATE 1 APPLICATION(S): 213 SOLUTION TOPICAL at 11:58

## 2018-09-23 RX ADMIN — HYDROXYUREA 2000 MILLIGRAM(S): 500 CAPSULE ORAL at 11:57

## 2018-09-23 RX ADMIN — PIPERACILLIN AND TAZOBACTAM 25 GRAM(S): 4; .5 INJECTION, POWDER, LYOPHILIZED, FOR SOLUTION INTRAVENOUS at 21:19

## 2018-09-23 RX ADMIN — LACTULOSE 15 GRAM(S): 10 SOLUTION ORAL at 12:19

## 2018-09-23 RX ADMIN — HYDROMORPHONE HYDROCHLORIDE 30 MILLILITER(S): 2 INJECTION INTRAMUSCULAR; INTRAVENOUS; SUBCUTANEOUS at 20:08

## 2018-09-23 RX ADMIN — ONDANSETRON 4 MILLIGRAM(S): 8 TABLET, FILM COATED ORAL at 11:53

## 2018-09-23 RX ADMIN — AZITHROMYCIN 500 MILLIGRAM(S): 500 TABLET, FILM COATED ORAL at 16:28

## 2018-09-23 NOTE — PROGRESS NOTE ADULT - ASSESSMENT
Pt is a 24yo M with PMHx for Sickle cell anemia, ACS, Iron overload (due to repeated pRBCs transfusions), Cholecystectomy and Splenectomy, presents with fever, chest pain and bibasilar lung consolidations, concerning for acute chest syndrome s/p exchange transfusion.    #Neuro  - awake, alert, oriented x3  - dilaudid PCA for pain control    #Resp  - unable to tolerate off of nonrebreather  - On admission CXR negative, no symptoms, RVP negative. met sepsis criteria  - CXR 9/18: Small left pleural effusion with adjacent atelectasis  - CT Chest 9/18: Bibasilar lung consolidations, greater on the left suspicious for pneumonia, on zosyn  - adding azithromycin for atypical coverage, will d/c if urine legionella negative  - urine legionella negative  - switch to high flow nasal cannula.     #CV  - EKG on admission unchanged from prior study; NSR at 79 bpm, QTc = 428; unlikely ACS  - tachycardic, likely 2/2 to fever and pain    #GI  - regular diet  - bowel regimen; colace, senna, miralax  - one dose of methylnaltrexone in context of large doses of opioids and consipation    #ID  - febrile to 105.1 while on zosyn. Will add Azithromycin for atypical, F/U urine legionella   - cultured 9/21: negative blood cx 24 hrs   - increasing WBC count: 15.2 -> 18.2 -> 19.51  - UA negative, RVP negative    #Renal  - BUN/Cr 7/.66  - stable since admission     #Heme  -  H/H 7.6/20.9 -> 5.9/16.2 -> 6.8/18.5 s/p 1 unit of prbc Hg 8.9  - elevated LDH (>900) and low haptoglobin  - pt + for numerous RBC-Abs  - hydroxyurea 2g daily  - c/w 1/2 NS @ 150cc    #Endo  - no acute concerns    #DVT PPx  - Lovenox sq

## 2018-09-23 NOTE — PROGRESS NOTE ADULT - ASSESSMENT
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    # Acute chest syndrome  - Hypoxic respiratory failure in 9/22 requiring NRB  - CT Chest with bibasilar lung consolidations, and fevers meeting criteria for acute chest syndrome  - seen by blood bank- underwent exchange transfusion 9/23  - send Hgb electropharesis pre and post exchange transfusion, goal HgbS of < 30% post exchange transfusion  - c/w broad spectrum antibiotics  - RVP negative, blood cultures NGTD   - c/w 1/2 NS and pain control  - O2 supplementation and care per MICU  - c/w folic acid and hydrea  - incentive spirometry  - c/w bowel regimen  - monitor daily CBC with retic count, CMP, and LDH 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    # Acute chest syndrome  - Hypoxic respiratory failure in 9/22 requiring NRB  - CT Chest with bibasilar lung consolidations, and fevers meeting criteria for acute chest syndrome  - seen by blood bank- underwent exchange transfusion 9/23  - pre and post transfusion Hgb electropharesis pending (lab closed during the weekend) goal HgbS of < 30% post exchange transfusion  - c/w broad spectrum antibiotics  - RVP negative, blood cultures NGTD   - c/w 1/2 NS and pain control  - O2 supplementation and care per MICU  - c/w folic acid and hydrea  - incentive spirometry  - c/w bowel regimen  - monitor daily CBC with retic count, CMP, and LDH     Caroline Downs PGY-4  [phone]

## 2018-09-23 NOTE — PROGRESS NOTE ADULT - SUBJECTIVE AND OBJECTIVE BOX
INTERVAL HPI/OVERNIGHT EVENTS:  Patient underwent exchange transfusion yesterday. Most recent CBC from this AM with hgb 8.9. Patient with a temp of 100.1 this morning.     VITAL SIGNS:  T(F): 100.1 (09-23-18 @ 06:00)  HR: 89 (09-23-18 @ 08:00)  BP: 122/61 (09-23-18 @ 08:00)  RR: 35 (09-23-18 @ 08:00)  SpO2: 98% (09-23-18 @ 08:00)  Wt(kg): --    PHYSICAL EXAM:    Constitutional:   Eyes:   Neck:   Respiratory:   Cardiovascular:   Gastrointestinal:  Extremities:   Neurological:       MEDICATIONS  (STANDING):  chlorhexidine 4% Liquid 1 Application(s) Topical <User Schedule>  docusate sodium 100 milliGRAM(s) Oral three times a day  enoxaparin Injectable 40 milliGRAM(s) SubCutaneous every 24 hours  folic acid 1 milliGRAM(s) Oral daily  HYDROmorphone PCA (1 mG/mL) 30 milliLiter(s) PCA Continuous PCA Continuous  hydroxyurea 2000 milliGRAM(s) Oral daily  piperacillin/tazobactam IVPB. 3.375 Gram(s) IV Intermittent every 8 hours  polyethylene glycol 3350 17 Gram(s) Oral at bedtime  senna 2 Tablet(s) Oral at bedtime  sodium chloride 0.45%. 1000 milliLiter(s) (150 mL/Hr) IV Continuous <Continuous>    MEDICATIONS  (PRN):  naloxone Injectable 0.1 milliGRAM(s) IV Push every 3 minutes PRN For ANY of the following changes in patient status:  A. RR LESS THAN 10 breaths per minute, B. Oxygen saturation LESS THAN 90%, C. Sedation score of 6  ondansetron Injectable 4 milliGRAM(s) IV Push every 6 hours PRN Nausea      Allergies    ceftriaxone (Unknown)    Intolerances        LABS:                        8.9    12.93 )-----------( 274      ( 23 Sep 2018 04:53 )             25.7     09-23    135  |  98  |  7   ----------------------------<  102<H>  4.0   |  26  |  0.66    Ca    8.0<L>      23 Sep 2018 04:53  Phos  3.0     09-23  Mg     1.7     09-23    TPro  5.9<L>  /  Alb  3.1<L>  /  TBili  2.9<H>  /  DBili  x   /  AST  42<H>  /  ALT  19  /  AlkPhos  60  09-23    PT/INR - ( 21 Sep 2018 20:05 )   PT: 15.0 SEC;   INR: 1.30                RADIOLOGY & ADDITIONAL TESTS:  Studies reviewed. INTERVAL HPI/OVERNIGHT EVENTS:  Patient seen and examined this morning. He was sitting in bed sleeping. Patient underwent exchange transfusion yesterday. Most recent CBC from this AM with hgb 8.9. He had a temp of 100.1 this morning. Patient is still requiring NRB for O2 supplementation. He reports difficulty breathing as well as dark brown sputum production. Denies fever, chills, n/v/d.     VITAL SIGNS:  T(F): 100.1 (09-23-18 @ 06:00)  HR: 89 (09-23-18 @ 08:00)  BP: 122/61 (09-23-18 @ 08:00)  RR: 35 (09-23-18 @ 08:00)  SpO2: 98% (09-23-18 @ 08:00)  Wt(kg): --    PHYSICAL EXAM:    Constitutional: lethargic male sitting in bed, NRB in place  Eyes: anicteric  Neck: supple   Respiratory: decreased breath sounds in left lung  Cardiovascular: RRR  Gastrointestinal: soft, non tender non distended  Extremities: no peripheral edema  Neurological: lethargic but arousable, speaking in full sentences, non focal deficits      MEDICATIONS  (STANDING):  chlorhexidine 4% Liquid 1 Application(s) Topical <User Schedule>  docusate sodium 100 milliGRAM(s) Oral three times a day  enoxaparin Injectable 40 milliGRAM(s) SubCutaneous every 24 hours  folic acid 1 milliGRAM(s) Oral daily  HYDROmorphone PCA (1 mG/mL) 30 milliLiter(s) PCA Continuous PCA Continuous  hydroxyurea 2000 milliGRAM(s) Oral daily  piperacillin/tazobactam IVPB. 3.375 Gram(s) IV Intermittent every 8 hours  polyethylene glycol 3350 17 Gram(s) Oral at bedtime  senna 2 Tablet(s) Oral at bedtime  sodium chloride 0.45%. 1000 milliLiter(s) (150 mL/Hr) IV Continuous <Continuous>    MEDICATIONS  (PRN):  naloxone Injectable 0.1 milliGRAM(s) IV Push every 3 minutes PRN For ANY of the following changes in patient status:  A. RR LESS THAN 10 breaths per minute, B. Oxygen saturation LESS THAN 90%, C. Sedation score of 6  ondansetron Injectable 4 milliGRAM(s) IV Push every 6 hours PRN Nausea      Allergies    ceftriaxone (Unknown)    Intolerances        LABS:                        8.9    12.93 )-----------( 274      ( 23 Sep 2018 04:53 )             25.7     09-23    135  |  98  |  7   ----------------------------<  102<H>  4.0   |  26  |  0.66    Ca    8.0<L>      23 Sep 2018 04:53  Phos  3.0     09-23  Mg     1.7     09-23    TPro  5.9<L>  /  Alb  3.1<L>  /  TBili  2.9<H>  /  DBili  x   /  AST  42<H>  /  ALT  19  /  AlkPhos  60  09-23    PT/INR - ( 21 Sep 2018 20:05 )   PT: 15.0 SEC;   INR: 1.30                RADIOLOGY & ADDITIONAL TESTS:  Studies reviewed.

## 2018-09-23 NOTE — PROGRESS NOTE ADULT - SUBJECTIVE AND OBJECTIVE BOX
CHIEF COMPLAINT:Patient is a 23y old  Male who presents with a chief complaint of Sickle cell crisis, Anemia (23 Sep 2018 09:11)      Subjective: The patient was seen and examined at bedside. It was hard for him     REVIEW OF SYSTEMS:  Constitutional: [x ] negative [ ] fevers [ ] chills [ ] weight loss [ ] weight gain  HEENT: [x ] negative [ ] dry eyes [ ] eye irritation [ ] postnasal drip [ ] nasal congestion  CV: [ ] negative  [ x] chest pain [ ] orthopnea [ ] palpitations [ ] murmur  Resp: [ ] negative [ ] cough [x ] shortness of breath [x ] dyspnea [ ] wheezing [ ] sputum [ ] hemoptysis  GI: [ ] negative [ ] nausea [ ] vomiting [ ] diarrhea [x ] constipation [ ] abd pain [ ] dysphagia   : [x ] negative [ ] dysuria [ ] nocturia [ ] hematuria [ ] increased urinary frequency  Musculoskeletal: [x ] negative [ ] back pain [ ] myalgias [ ] arthralgias [ ] fracture  Skin: [x ] negative [ ] rash [ ] itch  Neurological: [x ] negative [ ] headache [ ] dizziness [ ] syncope [ ] weakness [ ] numbness  Psychiatric: [x ] negative [ ] anxiety [ ] depression  Endocrine: [x ] negative [ ] diabetes [ ] thyroid problem  Hematologic/Lymphatic: [ ] negative [x ] anemia [ ] bleeding problem  Allergic/Immunologic: [ x] negative [ ] itchy eyes [ ] nasal discharge [ ] hives [ ] angioedema  [x ] All other systems negative  [ ] Unable to assess ROS because ________    OBJECTIVE:  ICU Vital Signs Last 24 Hrs  T(C): 37.5 (23 Sep 2018 08:00), Max: 38.6 (22 Sep 2018 20:00)  T(F): 99.5 (23 Sep 2018 08:00), Max: 101.5 (22 Sep 2018 20:00)  HR: 75 (23 Sep 2018 10:00) (68 - 103)  BP: 118/51 (23 Sep 2018 10:00) (110/54 - 127/57)  BP(mean): 65 (23 Sep 2018 10:00) (62 - 83)  ABP: --  ABP(mean): --  RR: 35 (23 Sep 2018 10:00) (25 - 41)  SpO2: 99% (23 Sep 2018 10:00) (93% - 100%)        09-22 @ 07:01 - 09-23 @ 07:00  --------------------------------------------------------  IN: 3390 mL / OUT: 2800 mL / NET: 590 mL    09-23 @ 07:01 - 09-23 @ 10:50  --------------------------------------------------------  IN: 600 mL / OUT: 0 mL / NET: 600 mL      CAPILLARY BLOOD GLUCOSE  POCT Blood Glucose.: 144 mg/dL (21 Sep 2018 19:49)      PHYSICAL EXAM:  GENERAL: lethargic, appearing tired, well-developed  HEAD:  Atraumatic, Normocephalic  EYES:, conjunctiva and sclera clear  NECK: Supple, No JVD  CHEST/LUNG: tachypneic, Clear to auscultation bilaterally; No rhonchis, rales, or wheezing  HEART: Regular rate and rhythm; S1, S2; No murmurs, rubs, or gallops  ABDOMEN: Soft, Nontender, Nondistended; Normoactive bowel sounds  EXTREMITIES:  2+ Peripheral Pulses, No clubbing, cyanosis, or edema  PSYCH: A&Ox3  NEUROLOGY: non-focal  SKIN: No rashes or lesions      HOSPITAL MEDICATIONS:  Standing Meds:  azithromycin   Tablet 500 milliGRAM(s) Oral daily  chlorhexidine 4% Liquid 1 Application(s) Topical <User Schedule>  docusate sodium 100 milliGRAM(s) Oral three times a day  enoxaparin Injectable 40 milliGRAM(s) SubCutaneous every 24 hours  folic acid 1 milliGRAM(s) Oral daily  HYDROmorphone PCA (1 mG/mL) 30 milliLiter(s) PCA Continuous PCA Continuous  hydroxyurea 2000 milliGRAM(s) Oral daily  lactulose Syrup 15 Gram(s) Oral once  piperacillin/tazobactam IVPB. 3.375 Gram(s) IV Intermittent every 8 hours  polyethylene glycol 3350 17 Gram(s) Oral at bedtime  senna 2 Tablet(s) Oral at bedtime  sodium chloride 0.45%. 1000 milliLiter(s) IV Continuous <Continuous>      PRN Meds:  naloxone Injectable 0.1 milliGRAM(s) IV Push every 3 minutes PRN  ondansetron Injectable 4 milliGRAM(s) IV Push every 6 hours PRN      LABS:                        8.9    12.93 )-----------( 274      ( 23 Sep 2018 04:53 )             25.7     Hgb Trend: 8.9<--, 8.8<--, 6.8<--, 5.9<--, 6.3<--  09-23    135  |  98  |  7   ----------------------------<  102<H>  4.0   |  26  |  0.66    Ca    8.0<L>      23 Sep 2018 04:53  Phos  3.0     09-23  Mg     1.7     09-23    TPro  5.9<L>  /  Alb  3.1<L>  /  TBili  2.9<H>  /  DBili  x   /  AST  42<H>  /  ALT  19  /  AlkPhos  60  09-23    Creatinine Trend: 0.66<--, 0.66<--, 0.85<--, 0.74<--, 0.71<--, 0.68<--  PT/INR - ( 21 Sep 2018 20:05 )   PT: 15.0 SEC;   INR: 1.30       Venous Blood Gas:  09-22 @ 07:27  7.39/47/50/27/69.7  VBG Lactate: --    MICROBIOLOGY:     Culture - Blood (collected 21 Sep 2018 05:46)  Source: BLOOD VENOUS  Preliminary Report (23 Sep 2018 05:46):    NO ORGANISMS ISOLATED    NO ORGANISMS ISOLATED AT 48 HRS.    Culture - Blood (collected 21 Sep 2018 05:46)  Source: BLOOD PERIPHERAL  Preliminary Report (23 Sep 2018 05:46):    NO ORGANISMS ISOLATED    NO ORGANISMS ISOLATED AT 48 HRS.

## 2018-09-24 LAB
ALBUMIN SERPL ELPH-MCNC: 3.2 G/DL — LOW (ref 3.3–5)
ALP SERPL-CCNC: 65 U/L — SIGNIFICANT CHANGE UP (ref 40–120)
ALT FLD-CCNC: 17 U/L — SIGNIFICANT CHANGE UP (ref 4–41)
AST SERPL-CCNC: 29 U/L — SIGNIFICANT CHANGE UP (ref 4–40)
BASOPHILS # BLD AUTO: 0.03 K/UL — SIGNIFICANT CHANGE UP (ref 0–0.2)
BASOPHILS NFR BLD AUTO: 0.3 % — SIGNIFICANT CHANGE UP (ref 0–2)
BILIRUB SERPL-MCNC: 2.6 MG/DL — HIGH (ref 0.2–1.2)
BUN SERPL-MCNC: 6 MG/DL — LOW (ref 7–23)
CALCIUM SERPL-MCNC: 8.4 MG/DL — SIGNIFICANT CHANGE UP (ref 8.4–10.5)
CHLORIDE SERPL-SCNC: 100 MMOL/L — SIGNIFICANT CHANGE UP (ref 98–107)
CO2 SERPL-SCNC: 26 MMOL/L — SIGNIFICANT CHANGE UP (ref 22–31)
CREAT SERPL-MCNC: 0.5 MG/DL — SIGNIFICANT CHANGE UP (ref 0.5–1.3)
EOSINOPHIL # BLD AUTO: 0.22 K/UL — SIGNIFICANT CHANGE UP (ref 0–0.5)
EOSINOPHIL NFR BLD AUTO: 1.9 % — SIGNIFICANT CHANGE UP (ref 0–6)
GLUCOSE SERPL-MCNC: 86 MG/DL — SIGNIFICANT CHANGE UP (ref 70–99)
HAPTOGLOB SERPL-MCNC: < 20 MG/DL — LOW (ref 34–200)
HCT VFR BLD CALC: 25.2 % — LOW (ref 39–50)
HGB A MFR BLD: 0 % — LOW
HGB A MFR BLD: 65.2 % — LOW
HGB A2 MFR BLD: 3.3 % — SIGNIFICANT CHANGE UP (ref 2.4–3.5)
HGB A2 MFR BLD: 4.4 % — HIGH (ref 2.4–3.5)
HGB BLD-MCNC: 8.8 G/DL — LOW (ref 13–17)
HGB F MFR BLD: 2.3 % — HIGH (ref 0–1.5)
HGB F MFR BLD: 6 % — HIGH (ref 0–1.5)
HGB S MFR BLD: 29.2 % — HIGH (ref 0–0)
HGB S MFR BLD: 89.6 % — HIGH (ref 0–0)
IMM GRANULOCYTES # BLD AUTO: 0.07 # — SIGNIFICANT CHANGE UP
IMM GRANULOCYTES NFR BLD AUTO: 0.6 % — SIGNIFICANT CHANGE UP (ref 0–1.5)
LDH SERPL L TO P-CCNC: 746 U/L — HIGH (ref 135–225)
LYMPHOCYTES # BLD AUTO: 2.52 K/UL — SIGNIFICANT CHANGE UP (ref 1–3.3)
LYMPHOCYTES # BLD AUTO: 22.2 % — SIGNIFICANT CHANGE UP (ref 13–44)
MAGNESIUM SERPL-MCNC: 1.8 MG/DL — SIGNIFICANT CHANGE UP (ref 1.6–2.6)
MCHC RBC-ENTMCNC: 31.1 PG — SIGNIFICANT CHANGE UP (ref 27–34)
MCHC RBC-ENTMCNC: 34.9 % — SIGNIFICANT CHANGE UP (ref 32–36)
MCV RBC AUTO: 89 FL — SIGNIFICANT CHANGE UP (ref 80–100)
MONOCYTES # BLD AUTO: 1.57 K/UL — HIGH (ref 0–0.9)
MONOCYTES NFR BLD AUTO: 13.8 % — SIGNIFICANT CHANGE UP (ref 2–14)
NEUTROPHILS # BLD AUTO: 6.95 K/UL — SIGNIFICANT CHANGE UP (ref 1.8–7.4)
NEUTROPHILS NFR BLD AUTO: 61.2 % — SIGNIFICANT CHANGE UP (ref 43–77)
NRBC # FLD: 0.21 — SIGNIFICANT CHANGE UP
NRBC FLD-RTO: 1.8 — SIGNIFICANT CHANGE UP
PHOSPHATE SERPL-MCNC: 3.9 MG/DL — SIGNIFICANT CHANGE UP (ref 2.5–4.5)
PLATELET # BLD AUTO: 327 K/UL — SIGNIFICANT CHANGE UP (ref 150–400)
PMV BLD: 10.4 FL — SIGNIFICANT CHANGE UP (ref 7–13)
POTASSIUM SERPL-MCNC: 4 MMOL/L — SIGNIFICANT CHANGE UP (ref 3.5–5.3)
POTASSIUM SERPL-SCNC: 4 MMOL/L — SIGNIFICANT CHANGE UP (ref 3.5–5.3)
PREALB SERPL-MCNC: 11 MG/DL — LOW (ref 20–40)
PROT SERPL-MCNC: 6.3 G/DL — SIGNIFICANT CHANGE UP (ref 6–8.3)
RBC # BLD: 2.83 M/UL — LOW (ref 4.2–5.8)
RBC # FLD: 18.3 % — HIGH (ref 10.3–14.5)
SODIUM SERPL-SCNC: 139 MMOL/L — SIGNIFICANT CHANGE UP (ref 135–145)
WBC # BLD: 11.36 K/UL — HIGH (ref 3.8–10.5)
WBC # FLD AUTO: 11.36 K/UL — HIGH (ref 3.8–10.5)

## 2018-09-24 PROCEDURE — 99231 SBSQ HOSP IP/OBS SF/LOW 25: CPT | Mod: GC

## 2018-09-24 RX ADMIN — HYDROXYUREA 2000 MILLIGRAM(S): 500 CAPSULE ORAL at 12:40

## 2018-09-24 RX ADMIN — SODIUM CHLORIDE 150 MILLILITER(S): 9 INJECTION, SOLUTION INTRAVENOUS at 07:26

## 2018-09-24 RX ADMIN — HYDROMORPHONE HYDROCHLORIDE 30 MILLILITER(S): 2 INJECTION INTRAMUSCULAR; INTRAVENOUS; SUBCUTANEOUS at 19:07

## 2018-09-24 RX ADMIN — PIPERACILLIN AND TAZOBACTAM 25 GRAM(S): 4; .5 INJECTION, POWDER, LYOPHILIZED, FOR SOLUTION INTRAVENOUS at 14:12

## 2018-09-24 RX ADMIN — Medication 100 MILLIGRAM(S): at 14:13

## 2018-09-24 RX ADMIN — HYDROMORPHONE HYDROCHLORIDE 30 MILLILITER(S): 2 INJECTION INTRAMUSCULAR; INTRAVENOUS; SUBCUTANEOUS at 07:23

## 2018-09-24 RX ADMIN — SODIUM CHLORIDE 150 MILLILITER(S): 9 INJECTION, SOLUTION INTRAVENOUS at 19:08

## 2018-09-24 RX ADMIN — CHLORHEXIDINE GLUCONATE 1 APPLICATION(S): 213 SOLUTION TOPICAL at 06:28

## 2018-09-24 RX ADMIN — Medication 1 MILLIGRAM(S): at 12:38

## 2018-09-24 RX ADMIN — SODIUM CHLORIDE 150 MILLILITER(S): 9 INJECTION, SOLUTION INTRAVENOUS at 06:28

## 2018-09-24 RX ADMIN — PIPERACILLIN AND TAZOBACTAM 25 GRAM(S): 4; .5 INJECTION, POWDER, LYOPHILIZED, FOR SOLUTION INTRAVENOUS at 06:28

## 2018-09-24 RX ADMIN — PIPERACILLIN AND TAZOBACTAM 25 GRAM(S): 4; .5 INJECTION, POWDER, LYOPHILIZED, FOR SOLUTION INTRAVENOUS at 22:01

## 2018-09-24 RX ADMIN — AZITHROMYCIN 500 MILLIGRAM(S): 500 TABLET, FILM COATED ORAL at 14:06

## 2018-09-24 RX ADMIN — Medication 100 MILLIGRAM(S): at 22:01

## 2018-09-24 RX ADMIN — Medication 100 MILLIGRAM(S): at 06:28

## 2018-09-24 NOTE — CONSULT NOTE ADULT - SUBJECTIVE AND OBJECTIVE BOX
INTERVAL HPI/OVERNIGHT EVENTS:  Patient S&E at bedside. No o/n events, patient resting comfortably. Patient reports feeling better, looks significantly improved from yesterday. Denies fever, chills. Encouraged to use the incentive spirometry.    VITAL SIGNS:  T(F): 98.3 (09-24-18 @ 16:00)  HR: 83 (09-24-18 @ 17:00)  BP: 126/64 (09-24-18 @ 16:00)  RR: 24 (09-24-18 @ 17:01)  SpO2: 96% (09-24-18 @ 17:01)  Wt(kg): --    PHYSICAL EXAM:    Constitutional: NAD  Eyes: EOMI, sclera non-icteric  Neck: supple, no masses  Respiratory: CTA b/l, good air entry b/l  Cardiovascular: RRR, no M/R/G  Gastrointestinal: soft, NTND, no masses palpable  Extremities: no c/c/e  Neurological: AAOx3      MEDICATIONS  (STANDING):  azithromycin   Tablet 500 milliGRAM(s) Oral daily  chlorhexidine 4% Liquid 1 Application(s) Topical <User Schedule>  docusate sodium 100 milliGRAM(s) Oral three times a day  enoxaparin Injectable 40 milliGRAM(s) SubCutaneous every 24 hours  folic acid 1 milliGRAM(s) Oral daily  HYDROmorphone PCA (1 mG/mL) 30 milliLiter(s) PCA Continuous PCA Continuous  hydroxyurea 2000 milliGRAM(s) Oral daily  piperacillin/tazobactam IVPB. 3.375 Gram(s) IV Intermittent every 8 hours  polyethylene glycol 3350 17 Gram(s) Oral at bedtime  senna 2 Tablet(s) Oral at bedtime  sodium chloride 0.45%. 1000 milliLiter(s) (150 mL/Hr) IV Continuous <Continuous>    MEDICATIONS  (PRN):  naloxone Injectable 0.1 milliGRAM(s) IV Push every 3 minutes PRN For ANY of the following changes in patient status:  A. RR LESS THAN 10 breaths per minute, B. Oxygen saturation LESS THAN 90%, C. Sedation score of 6  ondansetron Injectable 4 milliGRAM(s) IV Push every 6 hours PRN Nausea      Allergies    ceftriaxone (Unknown)    Intolerances        LABS:                        8.8    11.36 )-----------( 327      ( 24 Sep 2018 05:20 )             25.2     09-24    139  |  100  |  6<L>  ----------------------------<  86  4.0   |  26  |  0.50    Ca    8.4      24 Sep 2018 05:20  Phos  3.9     09-24  Mg     1.8     09-24    TPro  6.3  /  Alb  3.2<L>  /  TBili  2.6<H>  /  DBili  x   /  AST  29  /  ALT  17  /  AlkPhos  65  09-24          RADIOLOGY & ADDITIONAL TESTS:  Studies reviewed.    ASSESSMENT & PLAN:

## 2018-09-24 NOTE — CONSULT NOTE ADULT - ASSESSMENT
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, Iron overload (due to repeated pRBCs transfusions), s/p Cholecystectomy and Splenectomy, p/w left sided chest pain a/w sickle cell pain crisis, overnight with high grade fevers with hypoxia and respiratory distress transferred to MICU for acute chest syndrome and exchange transfusion, now stable     # Acute chest syndrome  - Hypoxic respiratory failure on 9/22 requiring NRB  - CT Chest with bibasilar lung consolidations, and fevers meeting criteria for acute chest syndrome  - seen by blood bank- underwent exchange transfusion 9/23  - post exchange transfusion HgbS of of 28%; at goal   - c/w broad spectrum antibiotics  - RVP negative, blood cultures NGTD   - c/w 1/2 NS and pain control  - O2 supplementation and care per MICU  - c/w folic acid and hydrea  - incentive spirometry  - c/w bowel regimen  - monitor daily CBC with retic count, CMP, and LDH     Case seen and discussed with Dr. Ray Armenta PGY-4  [phone]

## 2018-09-24 NOTE — PROGRESS NOTE ADULT - ASSESSMENT
Pt is a 22yo M with PMHx for Sickle cell anemia, ACS, Iron overload (due to repeated pRBCs transfusions), Cholecystectomy and Splenectomy, presents with fever, chest pain and bibasilar lung consolidations, concerning for acute chest syndrome s/p exchange transfusion.    1) Neuro  - awake, alert, oriented x3  - dilaudid PCA for pain control    2) Resp  - Able to tolerate off Hi-flow. Will see how pt does on non-rebreather today.  - On admission CXR negative, no symptoms, RVP negative. met sepsis criteria  - Ultrasound today shows left lower lung consolidation and small effusion.  - adding azithromycin for atypical coverage, will d/c if urine legionella negative  - urine legionella negative  - switch to high flow nasal cannula.     #CV  - EKG on admission unchanged from prior study; NSR at 79 bpm, QTc = 428; unlikely ACS  - tachycardic, likely 2/2 to fever and pain    #GI  - regular diet  - bowel regimen; colace, senna, miralax  - one dose of methylnaltrexone in context of large doses of opioids and consipation    #ID  - febrile to 105.1 while on zosyn. Will add Azithromycin for atypical, F/U urine legionella   - cultured 9/21: negative blood cx 24 hrs   - increasing WBC count: 15.2 -> 18.2 -> 19.51  - UA negative, RVP negative    #Renal  - BUN/Cr 7/.66  - stable since admission     #Heme  -  H/H 7.6/20.9 -> 5.9/16.2 -> 6.8/18.5 s/p 1 unit of prbc Hg 8.9  - elevated LDH (>900) and low haptoglobin  - pt + for numerous RBC-Abs  - hydroxyurea 2g daily  - c/w 1/2 NS @ 150cc    #Endo  - no acute concerns    #DVT PPx  - Lovenox sq Pt is a 24yo M with PMHx for Sickle cell anemia, ACS, Iron overload (due to repeated pRBCs transfusions), Cholecystectomy and Splenectomy, presents with fever, chest pain and bibasilar lung consolidations, concerning for acute chest syndrome s/p exchange transfusion.    1) Neuro  - awake, alert, oriented x3  - dilaudid PCA for pain control    2) Resp  - Able to tolerate off Hi-flow. Will see how pt does on non-rebreather today.  - On admission CXR negative, no symptoms, RVP negative. met sepsis criteria  - Ultrasound today shows left lower lung consolidation and small effusion.  - adding azithromycin for atypical coverage, will d/c if urine legionella negative  - urine legionella negative  - switch to high flow nasal cannula.     3) CV  - EKG on admission unchanged from prior study; NSR at 79 bpm, QTc = 428; unlikely ACS  - tachycardic, likely 2/2 to fever and pain    4) GI  - regular diet  - bowel regimen; colace, senna, miralax  - one dose of methylnaltrexone in context of large doses of opioids and consipation    5) ID  - febrile to 105.1 while on zosyn. Added azithromycin.  - Afebrile for the past shift.  - cultured 9/21: negative blood cx 72 hrs   - WBC count stabilizing to 11.36  - Likely consolidation in LLL  - UA negative, RVP negative    6) Renal  - BUN/Cr stable since admission.    7) Heme  -  hemoglobin 8.8 today from 8.9 yesterday  - elevated LDH (>700) and low haptoglobin  - pt + for numerous RBC-Abs  - hydroxyurea 2g daily  - c/w 1/2 NS @ 150cc    8) Endo  - no acute concerns    9) DVT PPx  - Lovenox sq

## 2018-09-24 NOTE — PROGRESS NOTE ADULT - SUBJECTIVE AND OBJECTIVE BOX
CHIEF COMPLAINT:Patient is a 23y old  Male who presents with a chief complaint of Sickle cell crisis, Anemia (23 Sep 2018 09:11)      Subjective: The patient was seen and examined at bedside. It was hard for him     REVIEW OF SYSTEMS:  Constitutional: [x ] negative [ ] fevers [ ] chills [ ] weight loss [ ] weight gain  HEENT: [x ] negative [ ] dry eyes [ ] eye irritation [ ] postnasal drip [ ] nasal congestion  CV: [ ] negative  [ x] chest pain [ ] orthopnea [ ] palpitations [ ] murmur  Resp: [ ] negative [ ] cough [x ] shortness of breath [x ] dyspnea [ ] wheezing [ ] sputum [ ] hemoptysis  GI: [ ] negative [ ] nausea [ ] vomiting [ ] diarrhea [x ] constipation [ ] abd pain [ ] dysphagia   : [x ] negative [ ] dysuria [ ] nocturia [ ] hematuria [ ] increased urinary frequency  Musculoskeletal: [x ] negative [ ] back pain [ ] myalgias [ ] arthralgias [ ] fracture  Skin: [x ] negative [ ] rash [ ] itch  Neurological: [x ] negative [ ] headache [ ] dizziness [ ] syncope [ ] weakness [ ] numbness  Psychiatric: [x ] negative [ ] anxiety [ ] depression  Endocrine: [x ] negative [ ] diabetes [ ] thyroid problem  Hematologic/Lymphatic: [ ] negative [x ] anemia [ ] bleeding problem  Allergic/Immunologic: [ x] negative [ ] itchy eyes [ ] nasal discharge [ ] hives [ ] angioedema  [x ] All other systems negative    OBJECTIVE:  ICU Vital Signs Last 24 Hrs  T(C): 36.5 (24 Sep 2018 12:00), Max: 36.7 (23 Sep 2018 20:00)  T(F): 97.7 (24 Sep 2018 12:00), Max: 98.1 (24 Sep 2018 08:00)  HR: 74 (24 Sep 2018 12:00) (59 - 81)  BP: 131/65 (24 Sep 2018 12:00) (103/59 - 132/83)  BP(mean): 79 (24 Sep 2018 12:00) (63 - 95)  ABP: --  ABP(mean): --  RR: 33 (24 Sep 2018 12:00) (23 - 44)  SpO2: 95% (24 Sep 2018 12:00) (95% - 100%)      CAPILLARY BLOOD GLUCOSE  POCT Blood Glucose.: 144 mg/dL (21 Sep 2018 19:49)      PHYSICAL EXAM:  GENERAL: lethargic, appearing tired, well-developed  HEAD:  Atraumatic, Normocephalic  EYES:, conjunctiva and sclera clear  NECK: Supple, No JVD  CHEST/LUNG: tachypneic, Clear to auscultation bilaterally; No rhonchis, rales, or wheezing  HEART: Regular rate and rhythm; S1, S2; No murmurs, rubs, or gallops  ABDOMEN: Soft, Nontender, Nondistended; Normoactive bowel sounds  EXTREMITIES:  2+ Peripheral Pulses, No clubbing, cyanosis, or edema  PSYCH: A&Ox3  NEUROLOGY: non-focal  SKIN: No rashes or lesions      HOSPITAL MEDICATIONS:  azithromycin   Tablet 500 milliGRAM(s) Oral daily  chlorhexidine 4% Liquid 1 Application(s) Topical <User Schedule>  docusate sodium 100 milliGRAM(s) Oral three times a day  enoxaparin Injectable 40 milliGRAM(s) SubCutaneous every 24 hours  folic acid 1 milliGRAM(s) Oral daily  HYDROmorphone PCA (1 mG/mL) 30 milliLiter(s) PCA Continuous PCA Continuous  hydroxyurea 2000 milliGRAM(s) Oral daily  piperacillin/tazobactam IVPB. 3.375 Gram(s) IV Intermittent every 8 hours  polyethylene glycol 3350 17 Gram(s) Oral at bedtime  senna 2 Tablet(s) Oral at bedtime  sodium chloride 0.45%. 1000 milliLiter(s) (150 mL/Hr) IV Continuous <Continuous>        PRN Meds:  naloxone Injectable 0.1 milliGRAM(s) IV Push every 3 minutes PRN For ANY of the following changes in patient status:  A. RR LESS THAN 10 breaths per minute, B. Oxygen saturation LESS THAN 90%, C. Sedation score of 6  ondansetron Injectable 4 milliGRAM(s) IV Push every 6 hours PRN Nausea        LABS:              (09-24 @ 05:20)                      8.8  11.36 )-----------( 327                 25.2    Neutrophils = 6.95 (61.2%)  Lymphocytes = 2.52 (22.2%)  Eosinophils = 0.22 (1.9%)  Basophils = 0.03 (0.3%)  Monocytes = 1.57 (13.8%)  Bands = --%    09-24    139  |  100  |  6<L>  ----------------------------<  86  4.0   |  26  |  0.50    Ca    8.4      24 Sep 2018 05:20  Phos  3.9     09-24  Mg     1.8     09-24    TPro  6.3  /  Alb  3.2<L>  /  TBili  2.6<H>  /  DBili  x   /  AST  29  /  ALT  17  /  AlkPhos  65  09-24      MICROBIOLOGY:     Blood cx negative at 72hrs x 2

## 2018-09-25 LAB
ALBUMIN SERPL ELPH-MCNC: 3.4 G/DL — SIGNIFICANT CHANGE UP (ref 3.3–5)
ALP SERPL-CCNC: 74 U/L — SIGNIFICANT CHANGE UP (ref 40–120)
ALT FLD-CCNC: 16 U/L — SIGNIFICANT CHANGE UP (ref 4–41)
AST SERPL-CCNC: 26 U/L — SIGNIFICANT CHANGE UP (ref 4–40)
BILIRUB SERPL-MCNC: 2.5 MG/DL — HIGH (ref 0.2–1.2)
BUN SERPL-MCNC: 6 MG/DL — LOW (ref 7–23)
CALCIUM SERPL-MCNC: 8.4 MG/DL — SIGNIFICANT CHANGE UP (ref 8.4–10.5)
CHLORIDE SERPL-SCNC: 99 MMOL/L — SIGNIFICANT CHANGE UP (ref 98–107)
CO2 SERPL-SCNC: 28 MMOL/L — SIGNIFICANT CHANGE UP (ref 22–31)
CREAT SERPL-MCNC: 0.5 MG/DL — SIGNIFICANT CHANGE UP (ref 0.5–1.3)
GLUCOSE SERPL-MCNC: 103 MG/DL — HIGH (ref 70–99)
HAPTOGLOB SERPL-MCNC: < 20 MG/DL — LOW (ref 34–200)
HCT VFR BLD CALC: 23.8 % — LOW (ref 39–50)
HGB BLD-MCNC: 8.2 G/DL — LOW (ref 13–17)
LDH SERPL L TO P-CCNC: 671 U/L — HIGH (ref 135–225)
MAGNESIUM SERPL-MCNC: 1.8 MG/DL — SIGNIFICANT CHANGE UP (ref 1.6–2.6)
MCHC RBC-ENTMCNC: 30.1 PG — SIGNIFICANT CHANGE UP (ref 27–34)
MCHC RBC-ENTMCNC: 34.5 % — SIGNIFICANT CHANGE UP (ref 32–36)
MCV RBC AUTO: 87.5 FL — SIGNIFICANT CHANGE UP (ref 80–100)
NRBC # FLD: 0.17 — SIGNIFICANT CHANGE UP
NRBC FLD-RTO: 1.5 — SIGNIFICANT CHANGE UP
PHOSPHATE SERPL-MCNC: 4.6 MG/DL — HIGH (ref 2.5–4.5)
PLATELET # BLD AUTO: 335 K/UL — SIGNIFICANT CHANGE UP (ref 150–400)
PMV BLD: 10 FL — SIGNIFICANT CHANGE UP (ref 7–13)
POTASSIUM SERPL-MCNC: 3.6 MMOL/L — SIGNIFICANT CHANGE UP (ref 3.5–5.3)
POTASSIUM SERPL-SCNC: 3.6 MMOL/L — SIGNIFICANT CHANGE UP (ref 3.5–5.3)
PROT SERPL-MCNC: 6.4 G/DL — SIGNIFICANT CHANGE UP (ref 6–8.3)
RBC # BLD: 2.72 M/UL — LOW (ref 4.2–5.8)
RBC # FLD: 18.6 % — HIGH (ref 10.3–14.5)
SODIUM SERPL-SCNC: 138 MMOL/L — SIGNIFICANT CHANGE UP (ref 135–145)
WBC # BLD: 11.08 K/UL — HIGH (ref 3.8–10.5)
WBC # FLD AUTO: 11.08 K/UL — HIGH (ref 3.8–10.5)

## 2018-09-25 RX ORDER — HYDROMORPHONE HYDROCHLORIDE 2 MG/ML
1 INJECTION INTRAMUSCULAR; INTRAVENOUS; SUBCUTANEOUS
Qty: 0 | Refills: 0 | Status: DISCONTINUED | OUTPATIENT
Start: 2018-09-25 | End: 2018-09-26

## 2018-09-25 RX ORDER — AZITHROMYCIN 500 MG/1
500 TABLET, FILM COATED ORAL DAILY
Qty: 0 | Refills: 0 | Status: DISCONTINUED | OUTPATIENT
Start: 2018-09-25 | End: 2018-09-28

## 2018-09-25 RX ORDER — HYDROMORPHONE HYDROCHLORIDE 2 MG/ML
2 INJECTION INTRAMUSCULAR; INTRAVENOUS; SUBCUTANEOUS
Qty: 0 | Refills: 0 | Status: DISCONTINUED | OUTPATIENT
Start: 2018-09-25 | End: 2018-09-25

## 2018-09-25 RX ADMIN — AZITHROMYCIN 500 MILLIGRAM(S): 500 TABLET, FILM COATED ORAL at 14:11

## 2018-09-25 RX ADMIN — Medication 100 MILLIGRAM(S): at 14:11

## 2018-09-25 RX ADMIN — Medication 100 MILLIGRAM(S): at 21:24

## 2018-09-25 RX ADMIN — PIPERACILLIN AND TAZOBACTAM 25 GRAM(S): 4; .5 INJECTION, POWDER, LYOPHILIZED, FOR SOLUTION INTRAVENOUS at 21:23

## 2018-09-25 RX ADMIN — SODIUM CHLORIDE 150 MILLILITER(S): 9 INJECTION, SOLUTION INTRAVENOUS at 07:15

## 2018-09-25 RX ADMIN — SENNA PLUS 2 TABLET(S): 8.6 TABLET ORAL at 21:24

## 2018-09-25 RX ADMIN — HYDROXYUREA 2000 MILLIGRAM(S): 500 CAPSULE ORAL at 11:14

## 2018-09-25 RX ADMIN — Medication 100 MILLIGRAM(S): at 06:42

## 2018-09-25 RX ADMIN — CHLORHEXIDINE GLUCONATE 1 APPLICATION(S): 213 SOLUTION TOPICAL at 06:42

## 2018-09-25 RX ADMIN — PIPERACILLIN AND TAZOBACTAM 25 GRAM(S): 4; .5 INJECTION, POWDER, LYOPHILIZED, FOR SOLUTION INTRAVENOUS at 06:42

## 2018-09-25 RX ADMIN — Medication 1 MILLIGRAM(S): at 11:14

## 2018-09-25 RX ADMIN — PIPERACILLIN AND TAZOBACTAM 25 GRAM(S): 4; .5 INJECTION, POWDER, LYOPHILIZED, FOR SOLUTION INTRAVENOUS at 14:11

## 2018-09-25 RX ADMIN — HYDROMORPHONE HYDROCHLORIDE 30 MILLILITER(S): 2 INJECTION INTRAMUSCULAR; INTRAVENOUS; SUBCUTANEOUS at 07:14

## 2018-09-25 NOTE — CHART NOTE - NSCHARTNOTEFT_GEN_A_CORE
Patient is a 23y old  Male who presents with a chief complaint of Sickle cell crisis, Anemia      HPI:  23 year old male, with past history significant for Sickle cell anemia, ACS, Iron overload (due to repeated pRBCs transfusions), Cholecystectomy and Splenectomy, presented to the ED secondary to left sided chest pain.  Seen and evaluated at bedside with sister present; appears to be experiencing painful left chest discomfort.  Patient reports onset of left chest pain during the prior week, with worsening over time, and not relieved with Motrin 600 mg PRN or oxycodone 10 mg PRN.  Had nausea in the ED.  No trigger identified; no sick contacts, maintains adequate hydration, no trauma to chest.  Pain intensity at 10/10 maximum; 10/10 at the time of being seen.  No reports of fevers, chills, headache, rhinorrhea, coughing, shortness of breath, vomiting, abdominal pain, diarrhea, constipation or dysuria.  Last sickle cell crisis was months ago and was managed without need for hospital admission.    Vital signs upon ED presentation as follows: BP = 168/82, HR = 95, RR = 17, T = 36.9 C (98.4 F), O2 Sat = 95% on RA.  CXR without any acute findings.  Diagnosed with Sickle Cell Crisis and Anemia.  Prescribed ketorolac 15 mg IV x one, Dilaudid 1 mg IV x 3 and Zofran 4 mg IV x one in the ED. (19 Sep 2018 20:26)    Patient's course complicated by RRT for temperature to 105, diagnosed with acute chest syndrome and transferred to MICU for further management. Patient was transfused 4u pRBC and had plasma exchange. He was also started on high flow nasal cannula and a dilaudid PCA. He has now been titrated down to 4L nasal cannula and is on day 4/10 of antibiotics.    Patient seen and examined at bedside. He denies SOB, chest pain, and abdominal pain at this time.    PMHx/PSHx:   PAST MEDICAL & SURGICAL HISTORY:  Iron overload due to repeated red blood cell transfusions  Sickle cell anemia  Acute chest syndrome  Sickle Cell Disease: HbSS  S/P Splenectomy  S/P Laparotomy for spleen removal  S/P Laparoscopic Cholecystectomy      Social Hx: No alcohol, tobacco, or illicit substance use    Allergies: Allergies    ceftriaxone (Unknown)    Intolerances        Medications Standing   MEDICATIONS  (STANDING):  azithromycin   Tablet 500 milliGRAM(s) Oral daily  chlorhexidine 4% Liquid 1 Application(s) Topical <User Schedule>  docusate sodium 100 milliGRAM(s) Oral three times a day  enoxaparin Injectable 40 milliGRAM(s) SubCutaneous every 24 hours  folic acid 1 milliGRAM(s) Oral daily  hydroxyurea 2000 milliGRAM(s) Oral daily  piperacillin/tazobactam IVPB. 3.375 Gram(s) IV Intermittent every 8 hours  polyethylene glycol 3350 17 Gram(s) Oral at bedtime  senna 2 Tablet(s) Oral at bedtime  sodium chloride 0.45%. 1000 milliLiter(s) (150 mL/Hr) IV Continuous <Continuous>      Medications PRN   MEDICATIONS  (PRN):      Physical Exam:   General: NAD , NC in place  HEENT: EOMI, PEERL, Nl OP, Nl thyroid gland, No lymphadenopathy, No JVD   CVS: RRR, No murmurs/gallops/regurg  Resp: CTA bilaterally, no rhonchi, rales, wheeze  Abd: Nl BS, soft, NT, ND   : No zapata  Ext: No edema     LABS   CBC                       8.2    11.08 )-----------( 335      ( 25 Sep 2018 02:30 )             23.8     CMP 09-25    138  |  99  |  6<L>  ----------------------------<  103<H>  3.6   |  28  |  0.50    Ca    8.4      25 Sep 2018 02:30  Phos  4.6     09-25  Mg     1.8     09-25    TPro  6.4  /  Alb  3.4  /  TBili  2.5<H>  /  DBili  x   /  AST  26  /  ALT  16  /  AlkPhos  74  09-25        Radiology: CXR 9/22, reviewed by me    Assessment: 22yo M with PMHx for Sickle cell anemia, ACS, Iron overload (due to repeated pRBCs transfusions), Cholecystectomy and Splenectomy, presents with fever, chest pain and bibasilar lung consolidations, concerning for acute chest syndrome s/p exchange transfusion. Patient now with improved oxygenation and pain, ready for transfer to medicine floors    Plan:   1) Acute Chest Syndrome  -S/p RBC exchange with improved pain and oxygenation  -Day 4/10 antibiotics, continue Zosyn and Azithromycin for now  -Follow up heme/onc recommendations  -Pain control with Dilaudid, now off PCA    2) DVT Prophylaxis  -Lovenox SQ    Stanley Muhammad, PGY-3  MAR  [id number]

## 2018-09-25 NOTE — CHART NOTE - NSCHARTNOTEFT_GEN_A_CORE
:  Lyndsay Nieto MD, PhD  PGY-2| Internal Medicine  [phone] / [phone]    INDICATION:  Bedside ultrasounds during rounds    PROCEDURE:  [x] LIMITED ECHO  [x] LIMITED CHEST  [ ] LIMITED RETROPERITONEAL  [ ] LIMITED ABDOMINAL  [ ] LIMITED DVT  [ ] NEEDLE GUIDANCE VASCULAR  [ ] NEEDLE GUIDANCE THORACENTESIS  [ ] NEEDLE GUIDANCE PARACENTESIS  [ ] NEEDLE GUIDANCE PERICARDIOCENTESIS  [ ] OTHER    FINDINGS:  Right lung with A lines throughout  Left lung with A lines, consolidation appreciated in LLL  Heart with no wall motion abnormalities or effusions    INTERPRETATION:  LLL pneumonia

## 2018-09-25 NOTE — PROGRESS NOTE ADULT - ASSESSMENT
Pt is a 22yo M with PMHx for Sickle cell anemia, ACS, Iron overload (due to repeated pRBCs transfusions), Cholecystectomy and Splenectomy, presents with fever, chest pain and bibasilar lung consolidations, concerning for acute chest syndrome s/p exchange transfusion.    1) Neuro  - awake, alert, oriented x3  - dilaudid PCA for pain control    2) Resp  - Able to tolerate on 4L NC  - On admission CXR negative, no symptoms, RVP negative. met sepsis criteria  - Ultrasound today shows left lower lung consolidation and small effusion.  - Azithromycin day 4/10 today for atypical coverage.  - Pip-Tazo day 4/10 for pna coverage  - urine legionella negative  - switch to high flow nasal cannula.     3) CV  - EKG on admission unchanged from prior study; NSR at 79 bpm, QTc = 428; unlikely ACS  - tachycardic, likely 2/2 to fever and pain    4) GI  - regular diet  - bowel regimen; colace, senna, miralax  - one dose of methylnaltrexone in context of large doses of opioids and consipation    5) ID  - febrile to 105.1 while on zosyn. Added azithromycin.  - Afebrile for the past 2 days  - cultured 9/21: negative blood cx 72 hrs   - WBC count stabilizing  - Likely consolidation in LLL  - UA negative, RVP negative    6) Renal  - BUN/Cr stable since admission.    7) Heme  -  hemoglobin stable today  - elevated LDH (>600) and low haptoglobin  - pt + for numerous RBC-Abs  - hydroxyurea 2g daily  - c/w 1/2 NS @ 150cc    8) Endo  - no acute concerns    9) DVT PPx  - Lovenox sq

## 2018-09-25 NOTE — PROGRESS NOTE ADULT - SUBJECTIVE AND OBJECTIVE BOX
CHIEF COMPLAINT:Patient is a 23y old  Male who presents with a chief complaint of Sickle cell crisis, Anemia (23 Sep 2018 09:11)      Subjective: The patient was seen and examined at bedside. No complaints today. Had a difficult time sleeping and said he needed a good amount of dilaudid. Denies any fevers or shortness of breath    REVIEW OF SYSTEMS:  Constitutional: [x ] negative [ ] fevers [ ] chills [ ] weight loss [ ] weight gain  HEENT: [x ] negative [ ] dry eyes [ ] eye irritation [ ] postnasal drip [ ] nasal congestion  CV: [ ] negative  [ x] chest pain [ ] orthopnea [ ] palpitations [ ] murmur  Resp: [ ] negative [ ] cough [x ] shortness of breath [x ] dyspnea [ ] wheezing [ ] sputum [ ] hemoptysis  GI: [ ] negative [ ] nausea [ ] vomiting [ ] diarrhea [x ] constipation [ ] abd pain [ ] dysphagia   : [x ] negative [ ] dysuria [ ] nocturia [ ] hematuria [ ] increased urinary frequency  Musculoskeletal: [x ] negative [ ] back pain [ ] myalgias [ ] arthralgias [ ] fracture  Skin: [x ] negative [ ] rash [ ] itch  Neurological: [x ] negative [ ] headache [ ] dizziness [ ] syncope [ ] weakness [ ] numbness  Psychiatric: [x ] negative [ ] anxiety [ ] depression  Endocrine: [x ] negative [ ] diabetes [ ] thyroid problem  Hematologic/Lymphatic: [ ] negative [x ] anemia [ ] bleeding problem  Allergic/Immunologic: [ x] negative [ ] itchy eyes [ ] nasal discharge [ ] hives [ ] angioedema  [x ] All other systems negative    OBJECTIVE:  ICU Vital Signs Last 24 Hrs  T(C): 36.8 (25 Sep 2018 08:00), Max: 36.9 (24 Sep 2018 20:00)  T(F): 98.2 (25 Sep 2018 08:00), Max: 98.5 (24 Sep 2018 20:00)  HR: 64 (25 Sep 2018 11:00) (63 - 86)  BP: 114/64 (25 Sep 2018 11:00) (106/43 - 132/62)  BP(mean): 75 (25 Sep 2018 11:00) (56 - 88)  ABP: --  ABP(mean): --  RR: 28 (25 Sep 2018 11:31) (20 - 43)  SpO2: 95% (25 Sep 2018 11:31) (90% - 100%)    PHYSICAL EXAM:  GENERAL: lethargic, appearing tired, well-developed  HEAD:  Atraumatic, Normocephalic  EYES:, conjunctiva and sclera clear  NECK: Supple, No JVD  CHEST/LUNG: tachypneic, Clear to auscultation bilaterally; No rhonchis, rales, or wheezing  HEART: Regular rate and rhythm; S1, S2; No murmurs, rubs, or gallops  ABDOMEN: Soft, Nontender, Nondistended; Normoactive bowel sounds  EXTREMITIES:  2+ Peripheral Pulses, No clubbing, cyanosis, or edema  PSYCH: A&Ox3  NEUROLOGY: non-focal  SKIN: No rashes or lesions      HOSPITAL MEDICATIONS:  MEDICATIONS  (STANDING):  azithromycin   Tablet 500 milliGRAM(s) Oral daily  chlorhexidine 4% Liquid 1 Application(s) Topical <User Schedule>  docusate sodium 100 milliGRAM(s) Oral three times a day  enoxaparin Injectable 40 milliGRAM(s) SubCutaneous every 24 hours  folic acid 1 milliGRAM(s) Oral daily  HYDROmorphone PCA (1 mG/mL) 30 milliLiter(s) PCA Continuous PCA Continuous  hydroxyurea 2000 milliGRAM(s) Oral daily  piperacillin/tazobactam IVPB. 3.375 Gram(s) IV Intermittent every 8 hours  polyethylene glycol 3350 17 Gram(s) Oral at bedtime  senna 2 Tablet(s) Oral at bedtime  sodium chloride 0.45%. 1000 milliLiter(s) (150 mL/Hr) IV Continuous <Continuous>          PRN Meds:  MEDICATIONS  (PRN):  naloxone Injectable 0.1 milliGRAM(s) IV Push every 3 minutes PRN For ANY of the following changes in patient status:  A. RR LESS THAN 10 breaths per minute, B. Oxygen saturation LESS THAN 90%, C. Sedation score of 6  ondansetron Injectable 4 milliGRAM(s) IV Push every 6 hours PRN Nausea        LABS:             (09-25 @ 02:30)                      8.2  11.08 )-----------( 335                 23.8    Neutrophils = -- (--%)  Lymphocytes = -- (--%)  Eosinophils = -- (--%)  Basophils = -- (--%)  Monocytes = -- (--%)  Bands = --%    09-25    138  |  99  |  6<L>  ----------------------------<  103<H>  3.6   |  28  |  0.50    Ca    8.4      25 Sep 2018 02:30  Phos  4.6     09-25  Mg     1.8     09-25    TPro  6.4  /  Alb  3.4  /  TBili  2.5<H>  /  DBili  x   /  AST  26  /  ALT  16  /  AlkPhos  74  09-25    MICROBIOLOGY:     Blood cx negative at 72hrs x 2

## 2018-09-25 NOTE — CHART NOTE - NSCHARTNOTEFT_GEN_A_CORE
MICU Transfer Note    Transfer from: MICU  Transfer to:  ( x ) Medicine    (  ) Telemetry    (  ) RCU    (  ) Palliative    (  ) Stroke Unit    (  ) _______________  [facility]          ASSESSMENT & PLAN:         For Follow-Up:          Vital Signs Last 24 Hrs  T(C): 36.8 (25 Sep 2018 08:00), Max: 36.9 (24 Sep 2018 20:00)  T(F): 98.2 (25 Sep 2018 08:00), Max: 98.5 (24 Sep 2018 20:00)  HR: 64 (25 Sep 2018 11:00) (63 - 86)  BP: 114/64 (25 Sep 2018 11:00) (106/43 - 132/62)  BP(mean): 75 (25 Sep 2018 11:00) (56 - 88)  RR: 28 (25 Sep 2018 11:31) (20 - 43)  SpO2: 95% (25 Sep 2018 11:31) (90% - 100%)  I&O's Summary    24 Sep 2018 07:01  -  25 Sep 2018 07:00  --------------------------------------------------------  IN: 3800 mL / OUT: 6675 mL / NET: -2875 mL    25 Sep 2018 07:01  -  25 Sep 2018 11:58  --------------------------------------------------------  IN: 600 mL / OUT: 1530 mL / NET: -930 mL          MEDICATIONS  (STANDING):  azithromycin   Tablet 500 milliGRAM(s) Oral daily  chlorhexidine 4% Liquid 1 Application(s) Topical <User Schedule>  docusate sodium 100 milliGRAM(s) Oral three times a day  enoxaparin Injectable 40 milliGRAM(s) SubCutaneous every 24 hours  folic acid 1 milliGRAM(s) Oral daily  HYDROmorphone PCA (1 mG/mL) 30 milliLiter(s) PCA Continuous PCA Continuous  hydroxyurea 2000 milliGRAM(s) Oral daily  piperacillin/tazobactam IVPB. 3.375 Gram(s) IV Intermittent every 8 hours  polyethylene glycol 3350 17 Gram(s) Oral at bedtime  senna 2 Tablet(s) Oral at bedtime  sodium chloride 0.45%. 1000 milliLiter(s) (150 mL/Hr) IV Continuous <Continuous>    MEDICATIONS  (PRN):  naloxone Injectable 0.1 milliGRAM(s) IV Push every 3 minutes PRN For ANY of the following changes in patient status:  A. RR LESS THAN 10 breaths per minute, B. Oxygen saturation LESS THAN 90%, C. Sedation score of 6  ondansetron Injectable 4 milliGRAM(s) IV Push every 6 hours PRN Nausea        LABS                                            8.2                   Neurophils% (auto):   x      (09-25 @ 02:30):    11.08)-----------(335          Lymphocytes% (auto):  x                                             23.8                   Eosinphils% (auto):   x        Manual%: Neutrophils x    ; Lymphocytes x    ; Eosinophils x    ; Bands%: x    ; Blasts x                                    138    |  99     |  6                   Calcium: 8.4   / iCa: x      (09-25 @ 02:30)    ----------------------------<  103       Magnesium: 1.8                              3.6     |  28     |  0.50             Phosphorous: 4.6      TPro  6.4    /  Alb  3.4    /  TBili  2.5    /  DBili  x      /  AST  26     /  ALT  16     /  AlkPhos  74     25 Sep 2018 02:30 MICU Transfer Note    Transfer from: MICU  Transfer to:  ( x ) Medicine    (  ) Telemetry    (  ) RCU    (  ) Palliative    (  ) Stroke Unit    (  ) _______________  Accepting physican:      MICU COURSE:    23 year old male, with past history significant for Sickle cell anemia, ACS, Cholecystectomy and Splenectomy, presented to the ED secondary to left sided chest pain.  Vital signs upon ED presentation as follows: BP = 168/82, HR = 95, RR = 17, T = 36.9 C (98.4 F), O2 Sat = 95% on RA.  CXR without any acute findings.  Diagnosed with Sickle Cell Crisis and Anemia. He was admitted to the MICU and was found to be febrile. He was started on Vancomycin/Zosyn and obtained multiple blood cultures. His Vancomycin was dc'd after one dose given the low chance of a gram positive infection.    He was given 4u PRBC and started on plasmapharesis. He required Hi-Flow NC for his oxygen, dilaudid PCA pump for pain management and an aggressive bowel regimen for his constipation.   On the 2nd day he was started on azithromycin for atypical coverage.  He improved over several days with abx and RBC exchange. He was found to have a significant consolidation in his LLL suggesting a LLL pneumonia. His oxygen requirements reduced to regular 4L NC and he was afebrile since starting azithromycin.      ASSESSMENT & PLAN:   Pt is a 24yo M with PMHx for Sickle cell anemia, ACS, Iron overload (due to repeated pRBCs transfusions), Cholecystectomy and Splenectomy, presents with fever, chest pain and bibasilar lung consolidations, concerning for acute chest syndrome s/p exchange transfusion.    1) Neuro  - awake, alert, oriented x3  - dilaudid PCA for pain control    2) Resp  - Able to tolerate on 4L NC  - On admission CXR negative, no symptoms, RVP negative. met sepsis criteria  - Ultrasound today shows left lower lung consolidation and small effusion.  - Azithromycin day 4/10 today for atypical coverage.  - Pip-Tazo day 4/10 for pna coverage  - urine legionella negative  - switch to high flow nasal cannula.     3) CV  - EKG on admission unchanged from prior study; NSR at 79 bpm, QTc = 428; unlikely ACS  - tachycardic, likely 2/2 to fever and pain    4) GI  - regular diet  - bowel regimen; colace, senna, miralax  - one dose of methylnaltrexone in context of large doses of opioids and consipation    5) ID  - febrile to 105.1 while on zosyn. Added azithromycin.  - Afebrile for the past 2 days  - cultured 9/21: negative blood cx 72 hrs   - WBC count stabilizing  - Likely consolidation in LLL  - UA negative, RVP negative    6) Renal  - BUN/Cr stable since admission.    7) Heme  -  hemoglobin stable today  - elevated LDH (>600) and low haptoglobin  - pt + for numerous RBC-Abs  - hydroxyurea 2g daily  - c/w 1/2 NS @ 150cc    8) Endo  - no acute concerns    9) DVT PPx  - Lovenox sq        For Follow-Up:    - Continue Azithromycin and Zosyn for 6 more days (10 days total to be given)  - F/U hemolysis labs and f/u Hematology recommendations      Vital Signs Last 24 Hrs  T(C): 36.8 (25 Sep 2018 08:00), Max: 36.9 (24 Sep 2018 20:00)  T(F): 98.2 (25 Sep 2018 08:00), Max: 98.5 (24 Sep 2018 20:00)  HR: 64 (25 Sep 2018 11:00) (63 - 86)  BP: 114/64 (25 Sep 2018 11:00) (106/43 - 132/62)  BP(mean): 75 (25 Sep 2018 11:00) (56 - 88)  RR: 28 (25 Sep 2018 11:31) (20 - 43)  SpO2: 95% (25 Sep 2018 11:31) (90% - 100%)  I&O's Summary    24 Sep 2018 07:01  -  25 Sep 2018 07:00  --------------------------------------------------------  IN: 3800 mL / OUT: 6675 mL / NET: -2875 mL    25 Sep 2018 07:01  -  25 Sep 2018 11:58  --------------------------------------------------------  IN: 600 mL / OUT: 1530 mL / NET: -930 mL          MEDICATIONS  (STANDING):  azithromycin   Tablet 500 milliGRAM(s) Oral daily  chlorhexidine 4% Liquid 1 Application(s) Topical <User Schedule>  docusate sodium 100 milliGRAM(s) Oral three times a day  enoxaparin Injectable 40 milliGRAM(s) SubCutaneous every 24 hours  folic acid 1 milliGRAM(s) Oral daily  HYDROmorphone PCA (1 mG/mL) 30 milliLiter(s) PCA Continuous PCA Continuous  hydroxyurea 2000 milliGRAM(s) Oral daily  piperacillin/tazobactam IVPB. 3.375 Gram(s) IV Intermittent every 8 hours  polyethylene glycol 3350 17 Gram(s) Oral at bedtime  senna 2 Tablet(s) Oral at bedtime  sodium chloride 0.45%. 1000 milliLiter(s) (150 mL/Hr) IV Continuous <Continuous>    MEDICATIONS  (PRN):  naloxone Injectable 0.1 milliGRAM(s) IV Push every 3 minutes PRN For ANY of the following changes in patient status:  A. RR LESS THAN 10 breaths per minute, B. Oxygen saturation LESS THAN 90%, C. Sedation score of 6  ondansetron Injectable 4 milliGRAM(s) IV Push every 6 hours PRN Nausea        LABS                                            8.2                   Neurophils% (auto):   x      (09-25 @ 02:30):    11.08)-----------(335          Lymphocytes% (auto):  x                                             23.8                   Eosinphils% (auto):   x        Manual%: Neutrophils x    ; Lymphocytes x    ; Eosinophils x    ; Bands%: x    ; Blasts x                                    138    |  99     |  6                   Calcium: 8.4   / iCa: x      (09-25 @ 02:30)    ----------------------------<  103       Magnesium: 1.8                              3.6     |  28     |  0.50             Phosphorous: 4.6      TPro  6.4    /  Alb  3.4    /  TBili  2.5    /  DBili  x      /  AST  26     /  ALT  16     /  AlkPhos  74     25 Sep 2018 02:30 MICU Transfer Note    Transfer from: MICU  Transfer to:  ( x ) Medicine    (  ) Telemetry    (  ) RCU    (  ) Palliative    (  ) Stroke Unit    (  ) _______________ [id number]  Accepting physican: Dr. Shetty      MICU COURSE:    23 year old male, with past history significant for Sickle cell anemia, ACS, Cholecystectomy and Splenectomy, presented to the ED secondary to left sided chest pain.  Vital signs upon ED presentation as follows: BP = 168/82, HR = 95, RR = 17, T = 36.9 C (98.4 F), O2 Sat = 95% on RA.  CXR without any acute findings.  Diagnosed with Sickle Cell Crisis and Anemia. He was admitted to the MICU and was found to be febrile. He was started on Vancomycin/Zosyn and obtained multiple blood cultures. His Vancomycin was dc'd after one dose given the low chance of a gram positive infection.    He was given 4u PRBC and started on plasmapharesis. He required Hi-Flow NC for his oxygen, dilaudid PCA pump for pain management and an aggressive bowel regimen for his constipation.   On the 2nd day he was started on azithromycin for atypical coverage.  He improved over several days with abx and RBC exchange. He was found to have a significant consolidation in his LLL suggesting a LLL pneumonia. His oxygen requirements reduced to regular 4L NC and he was afebrile since starting azithromycin.      ASSESSMENT & PLAN:   Pt is a 22yo M with PMHx for Sickle cell anemia, ACS, Iron overload (due to repeated pRBCs transfusions), Cholecystectomy and Splenectomy, presents with fever, chest pain and bibasilar lung consolidations, concerning for acute chest syndrome s/p exchange transfusion.    1) Neuro  - awake, alert, oriented x3  - dilaudid PCA for pain control    2) Resp  - Able to tolerate on 4L NC  - On admission CXR negative, no symptoms, RVP negative. met sepsis criteria  - Ultrasound today shows left lower lung consolidation and small effusion.  - Azithromycin day 4/10 today for atypical coverage.  - Pip-Tazo day 4/10 for pna coverage  - urine legionella negative  - switch to high flow nasal cannula.     3) CV  - EKG on admission unchanged from prior study; NSR at 79 bpm, QTc = 428; unlikely ACS  - tachycardic, likely 2/2 to fever and pain    4) GI  - regular diet  - bowel regimen; colace, senna, miralax  - one dose of methylnaltrexone in context of large doses of opioids and consipation    5) ID  - febrile to 105.1 while on zosyn. Added azithromycin.  - Afebrile for the past 2 days  - cultured 9/21: negative blood cx 72 hrs   - WBC count stabilizing  - Likely consolidation in LLL  - UA negative, RVP negative    6) Renal  - BUN/Cr stable since admission.    7) Heme  -  hemoglobin stable today  - elevated LDH (>600) and low haptoglobin  - pt + for numerous RBC-Abs  - hydroxyurea 2g daily  - c/w 1/2 NS @ 150cc    8) Endo  - no acute concerns    9) DVT PPx  - Lovenox sq        For Follow-Up:    - Continue Azithromycin and Zosyn for 6 more days (10 days total to be given)  - F/U hemolysis labs and f/u Hematology recommendations      Vital Signs Last 24 Hrs  T(C): 36.8 (25 Sep 2018 08:00), Max: 36.9 (24 Sep 2018 20:00)  T(F): 98.2 (25 Sep 2018 08:00), Max: 98.5 (24 Sep 2018 20:00)  HR: 64 (25 Sep 2018 11:00) (63 - 86)  BP: 114/64 (25 Sep 2018 11:00) (106/43 - 132/62)  BP(mean): 75 (25 Sep 2018 11:00) (56 - 88)  RR: 28 (25 Sep 2018 11:31) (20 - 43)  SpO2: 95% (25 Sep 2018 11:31) (90% - 100%)  I&O's Summary    24 Sep 2018 07:01  -  25 Sep 2018 07:00  --------------------------------------------------------  IN: 3800 mL / OUT: 6675 mL / NET: -2875 mL    25 Sep 2018 07:01  -  25 Sep 2018 11:58  --------------------------------------------------------  IN: 600 mL / OUT: 1530 mL / NET: -930 mL          MEDICATIONS  (STANDING):  azithromycin   Tablet 500 milliGRAM(s) Oral daily  chlorhexidine 4% Liquid 1 Application(s) Topical <User Schedule>  docusate sodium 100 milliGRAM(s) Oral three times a day  enoxaparin Injectable 40 milliGRAM(s) SubCutaneous every 24 hours  folic acid 1 milliGRAM(s) Oral daily  HYDROmorphone PCA (1 mG/mL) 30 milliLiter(s) PCA Continuous PCA Continuous  hydroxyurea 2000 milliGRAM(s) Oral daily  piperacillin/tazobactam IVPB. 3.375 Gram(s) IV Intermittent every 8 hours  polyethylene glycol 3350 17 Gram(s) Oral at bedtime  senna 2 Tablet(s) Oral at bedtime  sodium chloride 0.45%. 1000 milliLiter(s) (150 mL/Hr) IV Continuous <Continuous>    MEDICATIONS  (PRN):  naloxone Injectable 0.1 milliGRAM(s) IV Push every 3 minutes PRN For ANY of the following changes in patient status:  A. RR LESS THAN 10 breaths per minute, B. Oxygen saturation LESS THAN 90%, C. Sedation score of 6  ondansetron Injectable 4 milliGRAM(s) IV Push every 6 hours PRN Nausea        LABS                                            8.2                   Neurophils% (auto):   x      (09-25 @ 02:30):    11.08)-----------(335          Lymphocytes% (auto):  x                                             23.8                   Eosinphils% (auto):   x        Manual%: Neutrophils x    ; Lymphocytes x    ; Eosinophils x    ; Bands%: x    ; Blasts x                                    138    |  99     |  6                   Calcium: 8.4   / iCa: x      (09-25 @ 02:30)    ----------------------------<  103       Magnesium: 1.8                              3.6     |  28     |  0.50             Phosphorous: 4.6      TPro  6.4    /  Alb  3.4    /  TBili  2.5    /  DBili  x      /  AST  26     /  ALT  16     /  AlkPhos  74     25 Sep 2018 02:30

## 2018-09-26 DIAGNOSIS — D57.01 HB-SS DISEASE WITH ACUTE CHEST SYNDROME: ICD-10-CM

## 2018-09-26 LAB
ALBUMIN SERPL ELPH-MCNC: 3.6 G/DL — SIGNIFICANT CHANGE UP (ref 3.3–5)
ALP SERPL-CCNC: 75 U/L — SIGNIFICANT CHANGE UP (ref 40–120)
ALT FLD-CCNC: 14 U/L — SIGNIFICANT CHANGE UP (ref 4–41)
AST SERPL-CCNC: 24 U/L — SIGNIFICANT CHANGE UP (ref 4–40)
BACTERIA BLD CULT: SIGNIFICANT CHANGE UP
BACTERIA BLD CULT: SIGNIFICANT CHANGE UP
BILIRUB SERPL-MCNC: 2.9 MG/DL — HIGH (ref 0.2–1.2)
BUN SERPL-MCNC: 7 MG/DL — SIGNIFICANT CHANGE UP (ref 7–23)
CALCIUM SERPL-MCNC: 8.9 MG/DL — SIGNIFICANT CHANGE UP (ref 8.4–10.5)
CHLORIDE SERPL-SCNC: 101 MMOL/L — SIGNIFICANT CHANGE UP (ref 98–107)
CO2 SERPL-SCNC: 25 MMOL/L — SIGNIFICANT CHANGE UP (ref 22–31)
CREAT SERPL-MCNC: 0.59 MG/DL — SIGNIFICANT CHANGE UP (ref 0.5–1.3)
GLUCOSE SERPL-MCNC: 94 MG/DL — SIGNIFICANT CHANGE UP (ref 70–99)
HAPTOGLOB SERPL-MCNC: < 20 MG/DL — LOW (ref 34–200)
HCT VFR BLD CALC: 26 % — LOW (ref 39–50)
HGB BLD-MCNC: 8.9 G/DL — LOW (ref 13–17)
LDH SERPL L TO P-CCNC: 627 U/L — HIGH (ref 135–225)
MCHC RBC-ENTMCNC: 30.9 PG — SIGNIFICANT CHANGE UP (ref 27–34)
MCHC RBC-ENTMCNC: 34.2 % — SIGNIFICANT CHANGE UP (ref 32–36)
MCV RBC AUTO: 90.3 FL — SIGNIFICANT CHANGE UP (ref 80–100)
NRBC # FLD: 0.18 — SIGNIFICANT CHANGE UP
NRBC FLD-RTO: 1.9 — SIGNIFICANT CHANGE UP
PLATELET # BLD AUTO: 418 K/UL — HIGH (ref 150–400)
PMV BLD: 10 FL — SIGNIFICANT CHANGE UP (ref 7–13)
POTASSIUM SERPL-MCNC: 3.5 MMOL/L — SIGNIFICANT CHANGE UP (ref 3.5–5.3)
POTASSIUM SERPL-SCNC: 3.5 MMOL/L — SIGNIFICANT CHANGE UP (ref 3.5–5.3)
PROT SERPL-MCNC: 7 G/DL — SIGNIFICANT CHANGE UP (ref 6–8.3)
RBC # BLD: 2.88 M/UL — LOW (ref 4.2–5.8)
RBC # FLD: 18.5 % — HIGH (ref 10.3–14.5)
RETICS #: 169 K/UL — HIGH (ref 25–125)
RETICS/RBC NFR: 5.9 % — HIGH (ref 0.5–2.5)
SODIUM SERPL-SCNC: 140 MMOL/L — SIGNIFICANT CHANGE UP (ref 135–145)
WBC # BLD: 9.68 K/UL — SIGNIFICANT CHANGE UP (ref 3.8–10.5)
WBC # FLD AUTO: 9.68 K/UL — SIGNIFICANT CHANGE UP (ref 3.8–10.5)

## 2018-09-26 PROCEDURE — 99233 SBSQ HOSP IP/OBS HIGH 50: CPT

## 2018-09-26 RX ORDER — OXYCODONE HYDROCHLORIDE 5 MG/1
5 TABLET ORAL EVERY 4 HOURS
Qty: 0 | Refills: 0 | Status: DISCONTINUED | OUTPATIENT
Start: 2018-09-26 | End: 2018-09-29

## 2018-09-26 RX ORDER — OXYCODONE HYDROCHLORIDE 5 MG/1
10 TABLET ORAL EVERY 4 HOURS
Qty: 0 | Refills: 0 | Status: DISCONTINUED | OUTPATIENT
Start: 2018-09-26 | End: 2018-09-29

## 2018-09-26 RX ADMIN — HYDROXYUREA 2000 MILLIGRAM(S): 500 CAPSULE ORAL at 11:41

## 2018-09-26 RX ADMIN — PIPERACILLIN AND TAZOBACTAM 25 GRAM(S): 4; .5 INJECTION, POWDER, LYOPHILIZED, FOR SOLUTION INTRAVENOUS at 21:35

## 2018-09-26 RX ADMIN — Medication 1 MILLIGRAM(S): at 11:41

## 2018-09-26 RX ADMIN — Medication 100 MILLIGRAM(S): at 11:41

## 2018-09-26 RX ADMIN — PIPERACILLIN AND TAZOBACTAM 25 GRAM(S): 4; .5 INJECTION, POWDER, LYOPHILIZED, FOR SOLUTION INTRAVENOUS at 05:39

## 2018-09-26 RX ADMIN — SODIUM CHLORIDE 150 MILLILITER(S): 9 INJECTION, SOLUTION INTRAVENOUS at 00:47

## 2018-09-26 RX ADMIN — HYDROMORPHONE HYDROCHLORIDE 1 MILLIGRAM(S): 2 INJECTION INTRAMUSCULAR; INTRAVENOUS; SUBCUTANEOUS at 01:08

## 2018-09-26 RX ADMIN — Medication 100 MILLIGRAM(S): at 05:39

## 2018-09-26 RX ADMIN — SODIUM CHLORIDE 150 MILLILITER(S): 9 INJECTION, SOLUTION INTRAVENOUS at 21:35

## 2018-09-26 RX ADMIN — HYDROMORPHONE HYDROCHLORIDE 1 MILLIGRAM(S): 2 INJECTION INTRAMUSCULAR; INTRAVENOUS; SUBCUTANEOUS at 00:47

## 2018-09-26 RX ADMIN — PIPERACILLIN AND TAZOBACTAM 25 GRAM(S): 4; .5 INJECTION, POWDER, LYOPHILIZED, FOR SOLUTION INTRAVENOUS at 13:51

## 2018-09-26 RX ADMIN — AZITHROMYCIN 500 MILLIGRAM(S): 500 TABLET, FILM COATED ORAL at 11:41

## 2018-09-26 NOTE — PROGRESS NOTE ADULT - ASSESSMENT
Pt is a 22yo M with PMHx for Sickle cell anemia, ACS, Iron overload (due to repeated pRBCs transfusions), Cholecystectomy and Splenectomy, presents with fever, chest pain and bibasilar lung consolidations, concerning for acute chest syndrome, s/p micu stay, s/p exchange transfusion now doing well.

## 2018-09-26 NOTE — PROGRESS NOTE ADULT - PROBLEM SELECTOR PLAN 2
cont pain control, cont iv hydration, O2 as needed, incentive spirometry, heme f/u, cont hydrea, folate and bowel regimen. cont pain control, cont iv hydration, O2 as needed, incentive spirometry, heme f/u, cont hydrea, folate and bowel regimen.  f/u heme recs

## 2018-09-26 NOTE — PROGRESS NOTE ADULT - SUBJECTIVE AND OBJECTIVE BOX
Patient is a 23y old  Male who presents with a chief complaint of Sickle cell crisis, Anemia (25 Sep 2018 11:43)      SUBJECTIVE / OVERNIGHT EVENTS: Pt seen and examined at 1:15pm, no overnight events, pt had chest discomfort last night but now has resolved, agreeable for taking po oxycodone, no sob, no other complaints, had bm yesterday.    MEDICATIONS  (STANDING):  azithromycin   Tablet 500 milliGRAM(s) Oral daily  chlorhexidine 4% Liquid 1 Application(s) Topical <User Schedule>  docusate sodium 100 milliGRAM(s) Oral three times a day  enoxaparin Injectable 40 milliGRAM(s) SubCutaneous every 24 hours  folic acid 1 milliGRAM(s) Oral daily  hydroxyurea 2000 milliGRAM(s) Oral daily  piperacillin/tazobactam IVPB. 3.375 Gram(s) IV Intermittent every 8 hours  polyethylene glycol 3350 17 Gram(s) Oral at bedtime  senna 2 Tablet(s) Oral at bedtime  sodium chloride 0.45%. 1000 milliLiter(s) (150 mL/Hr) IV Continuous <Continuous>    MEDICATIONS  (PRN):  oxyCODONE    IR 5 milliGRAM(s) Oral every 4 hours PRN Moderate Pain (4 - 6)  oxyCODONE    IR 10 milliGRAM(s) Oral every 4 hours PRN Severe Pain (7 - 10)      Vital Signs Last 24 Hrs  T(C): 37.2 (26 Sep 2018 10:17), Max: 37.4 (25 Sep 2018 22:26)  T(F): 98.9 (26 Sep 2018 10:17), Max: 99.3 (25 Sep 2018 22:26)  HR: 71 (26 Sep 2018 10:17) (67 - 74)  BP: 110/65 (26 Sep 2018 10:17) (110/65 - 131/74)  BP(mean): 86 (25 Sep 2018 18:07) (71 - 86)  RR: 24 (26 Sep 2018 10:17) (22 - 32)  SpO2: 95% (26 Sep 2018 10:17) (90% - 98%)  CAPILLARY BLOOD GLUCOSE        I&O's Summary    25 Sep 2018 07:01  -  26 Sep 2018 07:00  --------------------------------------------------------  IN: 1770 mL / OUT: 3505 mL / NET: -1735 mL    26 Sep 2018 07:01  -  26 Sep 2018 16:16  --------------------------------------------------------  IN: 0 mL / OUT: 900 mL / NET: -900 mL        PHYSICAL EXAM:  GENERAL: NAD, well-developed  CHEST/LUNG: Clear to auscultation bilaterally; No wheeze  HEART: Regular rate and rhythm; No murmurs  ABDOMEN: Soft, Nontender, Nondistended  EXTREMITIES: no LE edema  PSYCH: calm  NEUROLOGY:AAOx3   SKIN: No rashes or lesions    LABS:                        8.9    9.68  )-----------( 418      ( 26 Sep 2018 11:15 )             26.0     09-26    140  |  101  |  7   ----------------------------<  94  3.5   |  25  |  0.59    Ca    8.9      26 Sep 2018 11:15  Phos  4.6     09-25  Mg     1.8     09-25    TPro  7.0  /  Alb  3.6  /  TBili  2.9<H>  /  DBili  x   /  AST  24  /  ALT  14  /  AlkPhos  75  09-26              RADIOLOGY & ADDITIONAL TESTS:    Imaging Personally Reviewed:    Consultant(s) Notes Reviewed:      Care Discussed with Consultants/Other Providers:

## 2018-09-27 DIAGNOSIS — E87.6 HYPOKALEMIA: ICD-10-CM

## 2018-09-27 LAB
BUN SERPL-MCNC: 7 MG/DL — SIGNIFICANT CHANGE UP (ref 7–23)
CALCIUM SERPL-MCNC: 8.8 MG/DL — SIGNIFICANT CHANGE UP (ref 8.4–10.5)
CHLORIDE SERPL-SCNC: 103 MMOL/L — SIGNIFICANT CHANGE UP (ref 98–107)
CO2 SERPL-SCNC: 22 MMOL/L — SIGNIFICANT CHANGE UP (ref 22–31)
CREAT SERPL-MCNC: 0.65 MG/DL — SIGNIFICANT CHANGE UP (ref 0.5–1.3)
GLUCOSE SERPL-MCNC: 94 MG/DL — SIGNIFICANT CHANGE UP (ref 70–99)
HCT VFR BLD CALC: 25 % — LOW (ref 39–50)
HGB BLD-MCNC: 8.4 G/DL — LOW (ref 13–17)
LDH SERPL L TO P-CCNC: 614 U/L — HIGH (ref 135–225)
MAGNESIUM SERPL-MCNC: 1.8 MG/DL — SIGNIFICANT CHANGE UP (ref 1.6–2.6)
MCHC RBC-ENTMCNC: 29.9 PG — SIGNIFICANT CHANGE UP (ref 27–34)
MCHC RBC-ENTMCNC: 33.6 % — SIGNIFICANT CHANGE UP (ref 32–36)
MCV RBC AUTO: 89 FL — SIGNIFICANT CHANGE UP (ref 80–100)
NRBC # FLD: 0.12 — SIGNIFICANT CHANGE UP
PHOSPHATE SERPL-MCNC: 4.2 MG/DL — SIGNIFICANT CHANGE UP (ref 2.5–4.5)
PLATELET # BLD AUTO: 533 K/UL — HIGH (ref 150–400)
PMV BLD: 10.2 FL — SIGNIFICANT CHANGE UP (ref 7–13)
POTASSIUM SERPL-MCNC: 3.4 MMOL/L — LOW (ref 3.5–5.3)
POTASSIUM SERPL-SCNC: 3.4 MMOL/L — LOW (ref 3.5–5.3)
RBC # BLD: 2.81 M/UL — LOW (ref 4.2–5.8)
RBC # FLD: 18.3 % — HIGH (ref 10.3–14.5)
RETICS #: 143 K/UL — HIGH (ref 25–125)
RETICS/RBC NFR: 5.1 % — HIGH (ref 0.5–2.5)
SODIUM SERPL-SCNC: 139 MMOL/L — SIGNIFICANT CHANGE UP (ref 135–145)
WBC # BLD: 13.5 K/UL — HIGH (ref 3.8–10.5)
WBC # FLD AUTO: 13.5 K/UL — HIGH (ref 3.8–10.5)

## 2018-09-27 PROCEDURE — 99233 SBSQ HOSP IP/OBS HIGH 50: CPT

## 2018-09-27 RX ORDER — POTASSIUM CHLORIDE 20 MEQ
20 PACKET (EA) ORAL ONCE
Qty: 0 | Refills: 0 | Status: COMPLETED | OUTPATIENT
Start: 2018-09-27 | End: 2018-09-27

## 2018-09-27 RX ORDER — HYDROMORPHONE HYDROCHLORIDE 2 MG/ML
1 INJECTION INTRAMUSCULAR; INTRAVENOUS; SUBCUTANEOUS ONCE
Qty: 0 | Refills: 0 | Status: DISCONTINUED | OUTPATIENT
Start: 2018-09-27 | End: 2018-09-27

## 2018-09-27 RX ADMIN — HYDROMORPHONE HYDROCHLORIDE 1 MILLIGRAM(S): 2 INJECTION INTRAMUSCULAR; INTRAVENOUS; SUBCUTANEOUS at 04:15

## 2018-09-27 RX ADMIN — HYDROMORPHONE HYDROCHLORIDE 1 MILLIGRAM(S): 2 INJECTION INTRAMUSCULAR; INTRAVENOUS; SUBCUTANEOUS at 03:49

## 2018-09-27 RX ADMIN — AZITHROMYCIN 500 MILLIGRAM(S): 500 TABLET, FILM COATED ORAL at 11:09

## 2018-09-27 RX ADMIN — PIPERACILLIN AND TAZOBACTAM 25 GRAM(S): 4; .5 INJECTION, POWDER, LYOPHILIZED, FOR SOLUTION INTRAVENOUS at 14:26

## 2018-09-27 RX ADMIN — HYDROXYUREA 2000 MILLIGRAM(S): 500 CAPSULE ORAL at 11:09

## 2018-09-27 RX ADMIN — PIPERACILLIN AND TAZOBACTAM 25 GRAM(S): 4; .5 INJECTION, POWDER, LYOPHILIZED, FOR SOLUTION INTRAVENOUS at 05:21

## 2018-09-27 RX ADMIN — Medication 1 MILLIGRAM(S): at 11:09

## 2018-09-27 RX ADMIN — Medication 20 MILLIEQUIVALENT(S): at 11:09

## 2018-09-27 RX ADMIN — Medication 100 MILLIGRAM(S): at 11:09

## 2018-09-27 NOTE — PROGRESS NOTE ADULT - SUBJECTIVE AND OBJECTIVE BOX
Patient is a 23y old  Male who presents with a chief complaint of Sickle cell crisis, Anemia (26 Sep 2018 16:10)      SUBJECTIVE / OVERNIGHT EVENTS: Pt seen and examined at 11:55am, no overnight events, pt has mild left sided chest discomfort but better than before per pt, no sob, no other complaints, had bm yesterday.    MEDICATIONS  (STANDING):  azithromycin   Tablet 500 milliGRAM(s) Oral daily  chlorhexidine 4% Liquid 1 Application(s) Topical <User Schedule>  docusate sodium 100 milliGRAM(s) Oral three times a day  enoxaparin Injectable 40 milliGRAM(s) SubCutaneous every 24 hours  folic acid 1 milliGRAM(s) Oral daily  hydroxyurea 2000 milliGRAM(s) Oral daily  piperacillin/tazobactam IVPB. 3.375 Gram(s) IV Intermittent every 8 hours  polyethylene glycol 3350 17 Gram(s) Oral at bedtime  senna 2 Tablet(s) Oral at bedtime  sodium chloride 0.45%. 1000 milliLiter(s) (150 mL/Hr) IV Continuous <Continuous>    MEDICATIONS  (PRN):  oxyCODONE    IR 5 milliGRAM(s) Oral every 4 hours PRN Moderate Pain (4 - 6)  oxyCODONE    IR 10 milliGRAM(s) Oral every 4 hours PRN Severe Pain (7 - 10)      Vital Signs Last 24 Hrs  T(C): 36.7 (27 Sep 2018 14:41), Max: 37.7 (26 Sep 2018 21:21)  T(F): 98 (27 Sep 2018 14:41), Max: 99.8 (26 Sep 2018 21:21)  HR: 62 (27 Sep 2018 14:41) (62 - 72)  BP: 108/62 (27 Sep 2018 14:41) (108/62 - 126/66)  BP(mean): --  RR: 16 (27 Sep 2018 14:41) (16 - 18)  SpO2: 95% (27 Sep 2018 14:41) (92% - 99%)  CAPILLARY BLOOD GLUCOSE        I&O's Summary    26 Sep 2018 07:01  -  27 Sep 2018 07:00  --------------------------------------------------------  IN: 0 mL / OUT: 1900 mL / NET: -1900 mL        PHYSICAL EXAM:  GENERAL: NAD, well-developed  CHEST/LUNG: Clear to auscultation bilaterally; No wheeze  HEART: Regular rate and rhythm; No murmurs  ABDOMEN: Soft, Nontender, Nondistended  EXTREMITIES: no LE edema  PSYCH: calm  NEUROLOGY:AAOx3   SKIN: No rashes or lesions      LABS:                        8.4    13.50 )-----------( 533      ( 27 Sep 2018 07:34 )             25.0     09-27    139  |  103  |  7   ----------------------------<  94  3.4<L>   |  22  |  0.65    Ca    8.8      27 Sep 2018 07:34  Phos  4.2     09-27  Mg     1.8     09-27    TPro  7.0  /  Alb  3.6  /  TBili  2.9<H>  /  DBili  x   /  AST  24  /  ALT  14  /  AlkPhos  75  09-26              RADIOLOGY & ADDITIONAL TESTS:    Imaging Personally Reviewed:    Consultant(s) Notes Reviewed:      Care Discussed with Consultants/Other Providers:

## 2018-09-27 NOTE — PROGRESS NOTE ADULT - PROBLEM SELECTOR PLAN 2
cont pain control, cont iv hydration, O2 as needed, incentive spirometry, heme f/u, cont hydrea, folate and bowel regimen.  f/u heme recs

## 2018-09-27 NOTE — DIETITIAN INITIAL EVALUATION ADULT. - OTHER INFO
Initial Dietitian Evaluation 2/2 to extended length of stay. Admitted with Dx Sickle Cell Crisis. Pt reports to good po intakes of meals from home as he does not like hospital trays. Denies chewing, swallowing difficulties or any nausea, vomiting, diarrhea, constipation. Currently completing 2-3 Ensure Enlive daily. Noted with low Prealbumin---encouraged increased intake of protein rich foods.

## 2018-09-27 NOTE — PROVIDER CONTACT NOTE (OTHER) - ASSESSMENT
patient c/o chest pain at this time. Patient states that it is a 7 on a scale of 0-10 and it does not radiate anywhere. patient does not want pain medication ordered at this time. last set of vitals placed in sunrise, bp 112/57, hr 68, temp 99, rr 18 and O2 93% on room air.

## 2018-09-28 ENCOUNTER — TRANSCRIPTION ENCOUNTER (OUTPATIENT)
Age: 23
End: 2018-09-28

## 2018-09-28 LAB
ALBUMIN SERPL ELPH-MCNC: 3.7 G/DL — SIGNIFICANT CHANGE UP (ref 3.3–5)
ALP SERPL-CCNC: 71 U/L — SIGNIFICANT CHANGE UP (ref 40–120)
ALT FLD-CCNC: 11 U/L — SIGNIFICANT CHANGE UP (ref 4–41)
AST SERPL-CCNC: 22 U/L — SIGNIFICANT CHANGE UP (ref 4–40)
BASOPHILS # BLD AUTO: 0.09 K/UL — SIGNIFICANT CHANGE UP (ref 0–0.2)
BASOPHILS NFR BLD AUTO: 0.9 % — SIGNIFICANT CHANGE UP (ref 0–2)
BILIRUB SERPL-MCNC: 2.3 MG/DL — HIGH (ref 0.2–1.2)
BUN SERPL-MCNC: 7 MG/DL — SIGNIFICANT CHANGE UP (ref 7–23)
CALCIUM SERPL-MCNC: 8.9 MG/DL — SIGNIFICANT CHANGE UP (ref 8.4–10.5)
CHLORIDE SERPL-SCNC: 102 MMOL/L — SIGNIFICANT CHANGE UP (ref 98–107)
CO2 SERPL-SCNC: 24 MMOL/L — SIGNIFICANT CHANGE UP (ref 22–31)
CREAT SERPL-MCNC: 0.63 MG/DL — SIGNIFICANT CHANGE UP (ref 0.5–1.3)
EOSINOPHIL # BLD AUTO: 0.25 K/UL — SIGNIFICANT CHANGE UP (ref 0–0.5)
EOSINOPHIL NFR BLD AUTO: 2.4 % — SIGNIFICANT CHANGE UP (ref 0–6)
GLUCOSE SERPL-MCNC: 89 MG/DL — SIGNIFICANT CHANGE UP (ref 70–99)
HCT VFR BLD CALC: 25.7 % — LOW (ref 39–50)
HGB BLD-MCNC: 8.6 G/DL — LOW (ref 13–17)
IMM GRANULOCYTES # BLD AUTO: 0.03 # — SIGNIFICANT CHANGE UP
IMM GRANULOCYTES NFR BLD AUTO: 0.3 % — SIGNIFICANT CHANGE UP (ref 0–1.5)
LDH SERPL L TO P-CCNC: 577 U/L — HIGH (ref 135–225)
LYMPHOCYTES # BLD AUTO: 3.26 K/UL — SIGNIFICANT CHANGE UP (ref 1–3.3)
LYMPHOCYTES # BLD AUTO: 31 % — SIGNIFICANT CHANGE UP (ref 13–44)
MCHC RBC-ENTMCNC: 30.3 PG — SIGNIFICANT CHANGE UP (ref 27–34)
MCHC RBC-ENTMCNC: 33.5 % — SIGNIFICANT CHANGE UP (ref 32–36)
MCV RBC AUTO: 90.5 FL — SIGNIFICANT CHANGE UP (ref 80–100)
MONOCYTES # BLD AUTO: 1.54 K/UL — HIGH (ref 0–0.9)
MONOCYTES NFR BLD AUTO: 14.7 % — HIGH (ref 2–14)
NEUTROPHILS # BLD AUTO: 5.34 K/UL — SIGNIFICANT CHANGE UP (ref 1.8–7.4)
NEUTROPHILS NFR BLD AUTO: 50.7 % — SIGNIFICANT CHANGE UP (ref 43–77)
NRBC # FLD: 0.09 — SIGNIFICANT CHANGE UP
PLATELET # BLD AUTO: 651 K/UL — HIGH (ref 150–400)
PMV BLD: 10.3 FL — SIGNIFICANT CHANGE UP (ref 7–13)
POTASSIUM SERPL-MCNC: 3.6 MMOL/L — SIGNIFICANT CHANGE UP (ref 3.5–5.3)
POTASSIUM SERPL-SCNC: 3.6 MMOL/L — SIGNIFICANT CHANGE UP (ref 3.5–5.3)
PROT SERPL-MCNC: 7.2 G/DL — SIGNIFICANT CHANGE UP (ref 6–8.3)
RBC # BLD: 2.84 M/UL — LOW (ref 4.2–5.8)
RBC # FLD: 18.6 % — HIGH (ref 10.3–14.5)
SODIUM SERPL-SCNC: 140 MMOL/L — SIGNIFICANT CHANGE UP (ref 135–145)
WBC # BLD: 10.51 K/UL — HIGH (ref 3.8–10.5)
WBC # FLD AUTO: 10.51 K/UL — HIGH (ref 3.8–10.5)

## 2018-09-28 PROCEDURE — 99233 SBSQ HOSP IP/OBS HIGH 50: CPT

## 2018-09-28 RX ORDER — DOCUSATE SODIUM 100 MG
1 CAPSULE ORAL
Qty: 0 | Refills: 0 | DISCHARGE
Start: 2018-09-28

## 2018-09-28 RX ORDER — SENNA PLUS 8.6 MG/1
2 TABLET ORAL
Qty: 0 | Refills: 0 | DISCHARGE
Start: 2018-09-28

## 2018-09-28 RX ADMIN — OXYCODONE HYDROCHLORIDE 10 MILLIGRAM(S): 5 TABLET ORAL at 23:55

## 2018-09-28 RX ADMIN — OXYCODONE HYDROCHLORIDE 10 MILLIGRAM(S): 5 TABLET ORAL at 12:06

## 2018-09-28 RX ADMIN — OXYCODONE HYDROCHLORIDE 10 MILLIGRAM(S): 5 TABLET ORAL at 16:38

## 2018-09-28 RX ADMIN — OXYCODONE HYDROCHLORIDE 10 MILLIGRAM(S): 5 TABLET ORAL at 23:07

## 2018-09-28 RX ADMIN — SODIUM CHLORIDE 150 MILLILITER(S): 9 INJECTION, SOLUTION INTRAVENOUS at 11:49

## 2018-09-28 RX ADMIN — Medication 1 MILLIGRAM(S): at 11:49

## 2018-09-28 RX ADMIN — OXYCODONE HYDROCHLORIDE 10 MILLIGRAM(S): 5 TABLET ORAL at 13:06

## 2018-09-28 RX ADMIN — HYDROXYUREA 2000 MILLIGRAM(S): 500 CAPSULE ORAL at 11:49

## 2018-09-28 RX ADMIN — AZITHROMYCIN 500 MILLIGRAM(S): 500 TABLET, FILM COATED ORAL at 11:49

## 2018-09-28 RX ADMIN — PIPERACILLIN AND TAZOBACTAM 25 GRAM(S): 4; .5 INJECTION, POWDER, LYOPHILIZED, FOR SOLUTION INTRAVENOUS at 10:07

## 2018-09-28 RX ADMIN — SODIUM CHLORIDE 150 MILLILITER(S): 9 INJECTION, SOLUTION INTRAVENOUS at 18:36

## 2018-09-28 RX ADMIN — PIPERACILLIN AND TAZOBACTAM 25 GRAM(S): 4; .5 INJECTION, POWDER, LYOPHILIZED, FOR SOLUTION INTRAVENOUS at 18:36

## 2018-09-28 RX ADMIN — OXYCODONE HYDROCHLORIDE 10 MILLIGRAM(S): 5 TABLET ORAL at 17:38

## 2018-09-28 RX ADMIN — PIPERACILLIN AND TAZOBACTAM 25 GRAM(S): 4; .5 INJECTION, POWDER, LYOPHILIZED, FOR SOLUTION INTRAVENOUS at 01:01

## 2018-09-28 NOTE — PROGRESS NOTE ADULT - SUBJECTIVE AND OBJECTIVE BOX
Patient is a 23y old  Male who presents with a chief complaint of Sickle cell crisis, Anemia (27 Sep 2018 15:12)      SUBJECTIVE / OVERNIGHT EVENTS: Pt seen and examined at 11:45am, no overnight events, pt reports having left sided chest discomfort but has not taken po pain meds yet, states after he takes meds if his chest pain is better then wants to go home, no sob, no other complaints.        MEDICATIONS  (STANDING):  azithromycin   Tablet 500 milliGRAM(s) Oral daily  chlorhexidine 4% Liquid 1 Application(s) Topical <User Schedule>  docusate sodium 100 milliGRAM(s) Oral three times a day  enoxaparin Injectable 40 milliGRAM(s) SubCutaneous every 24 hours  folic acid 1 milliGRAM(s) Oral daily  hydroxyurea 2000 milliGRAM(s) Oral daily  piperacillin/tazobactam IVPB. 3.375 Gram(s) IV Intermittent every 8 hours  polyethylene glycol 3350 17 Gram(s) Oral at bedtime  senna 2 Tablet(s) Oral at bedtime  sodium chloride 0.45%. 1000 milliLiter(s) (150 mL/Hr) IV Continuous <Continuous>    MEDICATIONS  (PRN):  oxyCODONE    IR 5 milliGRAM(s) Oral every 4 hours PRN Moderate Pain (4 - 6)  oxyCODONE    IR 10 milliGRAM(s) Oral every 4 hours PRN Severe Pain (7 - 10)      Vital Signs Last 24 Hrs  T(C): 36.8 (28 Sep 2018 12:06), Max: 37.2 (28 Sep 2018 09:41)  T(F): 98.3 (28 Sep 2018 12:06), Max: 99 (28 Sep 2018 09:41)  HR: 60 (28 Sep 2018 12:06) (58 - 67)  BP: 117/59 (28 Sep 2018 12:06) (117/59 - 122/73)  BP(mean): --  RR: 18 (28 Sep 2018 12:06) (17 - 18)  SpO2: 96% (28 Sep 2018 12:06) (94% - 97%)  CAPILLARY BLOOD GLUCOSE        I&O's Summary    27 Sep 2018 07:01  -  28 Sep 2018 07:00  --------------------------------------------------------  IN: 1325 mL / OUT: 400 mL / NET: 925 mL    28 Sep 2018 07:01  -  28 Sep 2018 15:20  --------------------------------------------------------  IN: 0 mL / OUT: 300 mL / NET: -300 mL        PHYSICAL EXAM:  GENERAL: NAD, well-developed  CHEST/LUNG: Clear to auscultation bilaterally; No wheeze  HEART: Regular rate and rhythm; No murmurs  ABDOMEN: Soft, Nontender, Nondistended  EXTREMITIES: no LE edema  PSYCH: calm  NEUROLOGY:AAOx3   SKIN: No rashes or lesions      LABS:                        8.6    10.51 )-----------( 651      ( 28 Sep 2018 07:00 )             25.7     09-28    140  |  102  |  7   ----------------------------<  89  3.6   |  24  |  0.63    Ca    8.9      28 Sep 2018 07:00  Phos  4.2     09-27  Mg     1.8     09-27    TPro  7.2  /  Alb  3.7  /  TBili  2.3<H>  /  DBili  x   /  AST  22  /  ALT  11  /  AlkPhos  71  09-28              RADIOLOGY & ADDITIONAL TESTS:    Imaging Personally Reviewed:    Consultant(s) Notes Reviewed:      Care Discussed with Consultants/Other Providers:

## 2018-09-28 NOTE — DISCHARGE NOTE ADULT - CARE PROVIDER_API CALL
Kenia Nguyễn), Internal Medicine  [address] [address] [place], [place] [address]  Phone: [phone]  Fax: [phone]

## 2018-09-28 NOTE — DISCHARGE NOTE ADULT - PATIENT PORTAL LINK FT
You can access the [facility] Patient Portal, offered by [facility], by registering with the following website: [email or web address]

## 2018-09-28 NOTE — DISCHARGE NOTE ADULT - CARE PLAN
Principal Discharge DX:	Acute chest syndrome  Goal:	remain stable  Assessment and plan of treatment:	- you were admitted to MICU this admission, s/p exchange transfusion on 9/23 doing well  - cont po Oxycodone, cont to monitor closely for worsening symptoms  - Azithromycin completed in the hospital. treated with Zosyn for PNA coverage, urine legionella negative, RVP negative.   - please complete antibiotics Levaquin to complete total 10 days course as prescribed  - Follow up with PCP as outpatient for further management  Secondary Diagnosis:	Anemia  Assessment and plan of treatment:	- Heme following, continue pain control, cont hydration, O2 as needed, incentive spirometry, continue Hydrea 2g po daily, Folate and bowel regimen.  - Follow up with PCP as outpatient for further management  Secondary Diagnosis:	Chest pain, localized  Assessment and plan of treatment:	- EKG- NSR at 79 bpm, QTc = 428;   - Follow up with PCP as outpatient for further management  Secondary Diagnosis:	Sepsis, due to unspecified organism  Assessment and plan of treatment:	- complete Levaquin as prescribed to complete 10 days  - Follow up with PCP as outpatient for further management  Secondary Diagnosis:	Sickle cell crisis  Assessment and plan of treatment:	- Heme following, cont pain control medications as prescribed, continue hydration, O2 as needed, incentive spirometry, cont Hydrea 2g po daily, Folate and bowel regimen.  - Follow up with PCP as outpatient for further management Principal Discharge DX:	Acute chest syndrome  Goal:	remain stable  Assessment and plan of treatment:	- you were admitted to MICU this admission, s/p exchange transfusion on 9/23 doing well  - cont po Oxycodone, cont to monitor closely for worsening symptoms  - Azithromycin completed in the hospital. treated with Zosyn for PNA coverage, urine legionella negative, RVP negative.   - please complete antibiotics Levaquin to complete total 10 days course as prescribed  - Follow up with PCP as outpatient for further management  Secondary Diagnosis:	Anemia  Assessment and plan of treatment:	- Heme following, continue pain control, cont hydration, O2 as needed, incentive spirometry, continue Hydrea 2g po daily, Folate and bowel regimen.  - Follow up with PCP as outpatient for further management  Secondary Diagnosis:	Chest pain, localized  Assessment and plan of treatment:	- EKG- NSR at 79 bpm, QTc = 428;   - Follow up with PCP as outpatient for further management  Secondary Diagnosis:	Sepsis, due to unspecified organism  Assessment and plan of treatment:	- complete Levaquin as prescribed to complete 10 days. Continue Levaquin for 3 days to complete 10 days.   - Follow up with PCP as outpatient for further management  Secondary Diagnosis:	Sickle cell crisis  Assessment and plan of treatment:	- Heme following, cont pain control medications as prescribed, continue hydration, O2 as needed, incentive spirometry, cont Hydrea 2g po daily, Folate and bowel regimen.  - Follow up with PCP as outpatient for further management

## 2018-09-28 NOTE — DISCHARGE NOTE ADULT - CARE PROVIDERS DIRECT ADDRESSES
[email or web address]
mammogram

## 2018-09-28 NOTE — DISCHARGE NOTE ADULT - PLAN OF CARE
remain stable - you were admitted to MICU this admission, s/p exchange transfusion on 9/23 doing well  - cont po Oxycodone, cont to monitor closely for worsening symptoms  - Azithromycin completed in the hospital. treated with Zosyn for PNA coverage, urine legionella negative, RVP negative.   - please complete antibiotics Levaquin to complete total 10 days course as prescribed  - Follow up with PCP as outpatient for further management - Heme following, continue pain control, cont hydration, O2 as needed, incentive spirometry, continue Hydrea 2g po daily, Folate and bowel regimen.  - Follow up with PCP as outpatient for further management - EKG- NSR at 79 bpm, QTc = 428;   - Follow up with PCP as outpatient for further management - complete Levaquin as prescribed to complete 10 days  - Follow up with PCP as outpatient for further management - Heme following, cont pain control medications as prescribed, continue hydration, O2 as needed, incentive spirometry, cont Hydrea 2g po daily, Folate and bowel regimen.  - Follow up with PCP as outpatient for further management - complete Levaquin as prescribed to complete 10 days. Continue Levaquin for 3 days to complete 10 days.   - Follow up with PCP as outpatient for further management

## 2018-09-28 NOTE — DISCHARGE NOTE ADULT - MEDICATION SUMMARY - MEDICATIONS TO TAKE
I will START or STAY ON the medications listed below when I get home from the hospital:    ibuprofen 600 mg oral tablet  -- 1 tab(s) by mouth every 6 hours, As needed, mild pain  -- Indication: For pain management    oxyCODONE 10 mg oral tablet  -- 1 tab(s) by mouth every 4 hours, As needed, Moderate and severe pain MDD:6 tabs  -- Indication: For pain management    hydroxyurea 500 mg oral capsule  -- 4 cap(s) by mouth once a day  -- Indication: For Sickle cell crisis    senna oral tablet  -- 2 tab(s) by mouth once a day (at bedtime)  -- Indication: For Constipation    docusate sodium 100 mg oral capsule  -- 1 cap(s) by mouth 3 times a day  -- Indication: For Constipation    Levaquin 750 mg oral tablet  -- 1 tab(s) by mouth once a day   -- Avoid prolonged or excessive exposure to direct and/or artificial sunlight while taking this medication.  Do not take dairy products, antacids, or iron preparations within one hour of this medication.  Finish all this medication unless otherwise directed by prescriber.  May cause drowsiness or dizziness.  Medication should be taken with plenty of water.    -- Indication: For Sepsis, due to unspecified organism    folic acid 1 mg oral tablet  -- 1 tab(s) by mouth once a day  -- Indication: For Anemia

## 2018-09-28 NOTE — DISCHARGE NOTE ADULT - MEDICATION SUMMARY - MEDICATIONS TO STOP TAKING
I will STOP taking the medications listed below when I get home from the hospital:    Jadenu 360 mg oral tablet  -- 6 tab(s) by mouth once a day

## 2018-09-28 NOTE — DISCHARGE NOTE ADULT - HOSPITAL COURSE
Pt is a 22yo M with PMHx for Sickle cell anemia, ACS, Iron overload (due to repeated pRBCs transfusions), Cholecystectomy and Splenectomy, presents with fever, chest pain and bibasilar lung consolidations, concerning for acute chest syndrome, s/p micu stay, s/p exchange transfusion now doing well.    Hospital Course  Acute chest syndrome - - s/p MICU admission, s/p exchange transfusion on 9/23 doing well, cont po oxycodone, cont to monitor closely for worsening symptoms if chest pain is improved can d/c home. Azithromycin day 6 today for atypical coverage, will d/c Zithromax. Pip-Tazo day 6/10 for pna coverage, urine legionella negative, RVP neg  can be d/c on po Levaquin to complete total 10 days course.     Sickle cell crisis - Heme following, cont pain control, cont iv hydration, O2 as needed, incentive spirometry, cont Hydrea 2g po daily, Folate and bowel regimen.    CV- EKG- NSR at 79 bpm, QTc = 428; unlikely ACS, tachycardic, likely 2/2 to fever and pain    Constipation- regular diet, bowel regimen; colace, senna, Miralax    DVT PPx- Lovenox sq Pt is a 24yo M with PMHx for Sickle cell anemia, ACS, Iron overload (due to repeated pRBCs transfusions), Cholecystectomy and Splenectomy, presents with fever, chest pain and bibasilar lung consolidations, concerning for acute chest syndrome, s/p micu stay, s/p exchange transfusion now doing well.    Hospital Course  Acute chest syndrome - - s/p MICU admission, s/p exchange transfusion on 9/23 doing well, cont po oxycodone, cont to monitor closely for worsening symptoms if chest pain is improved can d/c home. Azithromycin day 6 today for atypical coverage, will d/c Zithromax. Pip-Tazo day 6/10 for pna coverage, urine legionella negative, RVP neg  can be d/c on po Levaquin to complete total 10 days course.     Sickle cell crisis - Heme following, cont pain control, cont iv hydration, O2 as needed, incentive spirometry, cont Hydrea 2g po daily, Folate and bowel regimen.    CV- EKG- NSR at 79 bpm, QTc = 428; unlikely ACS, tachycardic, likely 2/2 to fever and pain    Constipation- regular diet, bowel regimen; colace, senna, Miralax    DVT PPx- Lovenox sq    Dispo- Stable for discharge home.  Follow up with PCP within 1 week of discharge.

## 2018-09-29 VITALS
TEMPERATURE: 99 F | OXYGEN SATURATION: 98 % | RESPIRATION RATE: 18 BRPM | HEART RATE: 60 BPM | DIASTOLIC BLOOD PRESSURE: 88 MMHG | SYSTOLIC BLOOD PRESSURE: 111 MMHG

## 2018-09-29 PROCEDURE — 99239 HOSP IP/OBS DSCHRG MGMT >30: CPT

## 2018-09-29 RX ORDER — OXYCODONE HYDROCHLORIDE 5 MG/1
1 TABLET ORAL
Qty: 30 | Refills: 0
Start: 2018-09-29 | End: 2018-10-03

## 2018-09-29 RX ORDER — KETOROLAC TROMETHAMINE 30 MG/ML
15 SYRINGE (ML) INJECTION ONCE
Qty: 0 | Refills: 0 | Status: DISCONTINUED | OUTPATIENT
Start: 2018-09-29 | End: 2018-09-29

## 2018-09-29 RX ADMIN — OXYCODONE HYDROCHLORIDE 5 MILLIGRAM(S): 5 TABLET ORAL at 02:10

## 2018-09-29 RX ADMIN — OXYCODONE HYDROCHLORIDE 5 MILLIGRAM(S): 5 TABLET ORAL at 01:24

## 2018-09-29 RX ADMIN — HYDROXYUREA 2000 MILLIGRAM(S): 500 CAPSULE ORAL at 12:07

## 2018-09-29 RX ADMIN — Medication 15 MILLIGRAM(S): at 05:25

## 2018-09-29 RX ADMIN — Medication 1 MILLIGRAM(S): at 12:06

## 2018-09-29 RX ADMIN — Medication 15 MILLIGRAM(S): at 05:45

## 2018-09-29 RX ADMIN — PIPERACILLIN AND TAZOBACTAM 25 GRAM(S): 4; .5 INJECTION, POWDER, LYOPHILIZED, FOR SOLUTION INTRAVENOUS at 02:02

## 2018-09-29 RX ADMIN — PIPERACILLIN AND TAZOBACTAM 25 GRAM(S): 4; .5 INJECTION, POWDER, LYOPHILIZED, FOR SOLUTION INTRAVENOUS at 10:16

## 2018-09-29 RX ADMIN — SODIUM CHLORIDE 150 MILLILITER(S): 9 INJECTION, SOLUTION INTRAVENOUS at 01:24

## 2018-09-29 RX ADMIN — SODIUM CHLORIDE 150 MILLILITER(S): 9 INJECTION, SOLUTION INTRAVENOUS at 13:41

## 2018-09-29 NOTE — PROGRESS NOTE ADULT - PROBLEM SELECTOR PLAN 3
lovenox for dvt ppx
replete k
- moderately dry oral mucosa with whitish covering of tongue  - maintenance IVF of 0.45 NS at 150 mL/Hr
- On admission CXR negative, no flu-like symptoms, no fever, cough, shortness of breath, unclear etiology. RVP negative. However Pt developed febrile T 101.6, with leukocytosis on 9/20/18, met sepsis criteria. Empirically covered with zosyn for possible PNA, s/p vanco once, f/u CT chest, Bx (sent after Abx)

## 2018-09-29 NOTE — PROGRESS NOTE ADULT - REASON FOR ADMISSION
Sickle cell crisis, Anemia

## 2018-09-29 NOTE — PROGRESS NOTE ADULT - PROBLEM SELECTOR PLAN 2
cont pain control, cont iv hydration, O2 as needed, incentive spirometry, heme f/u, cont hydrea, folate and bowel regimen

## 2018-09-29 NOTE — PROGRESS NOTE ADULT - PROBLEM SELECTOR PROBLEM 3
Hypokalemia
Need for prophylactic measure
Dehydration
Sepsis, due to unspecified organism

## 2018-09-29 NOTE — PROGRESS NOTE ADULT - PROBLEM SELECTOR PROBLEM 2
Sickle cell crisis
Chest pain, localized
Dehydration

## 2018-09-29 NOTE — PROGRESS NOTE ADULT - SUBJECTIVE AND OBJECTIVE BOX
Patient is a 23y old  Male who presents with a chief complaint of Sickle cell crisis, Anemia (28 Sep 2018 18:33)        SUBJECTIVE / OVERNIGHT EVENTS:  no acute events o/n  pain controlled, feels well  wants to go home     MEDICATIONS  (STANDING):  chlorhexidine 4% Liquid 1 Application(s) Topical <User Schedule>  docusate sodium 100 milliGRAM(s) Oral three times a day  enoxaparin Injectable 40 milliGRAM(s) SubCutaneous every 24 hours  folic acid 1 milliGRAM(s) Oral daily  hydroxyurea 2000 milliGRAM(s) Oral daily  piperacillin/tazobactam IVPB. 3.375 Gram(s) IV Intermittent every 8 hours  polyethylene glycol 3350 17 Gram(s) Oral at bedtime  senna 2 Tablet(s) Oral at bedtime  sodium chloride 0.45%. 1000 milliLiter(s) (150 mL/Hr) IV Continuous <Continuous>    MEDICATIONS  (PRN):  oxyCODONE    IR 5 milliGRAM(s) Oral every 4 hours PRN Moderate Pain (4 - 6)  oxyCODONE    IR 10 milliGRAM(s) Oral every 4 hours PRN Severe Pain (7 - 10)      Vital Signs Last 24 Hrs  T(C): 37.1 (29 Sep 2018 11:04), Max: 37.3 (29 Sep 2018 01:51)  T(F): 98.7 (29 Sep 2018 11:04), Max: 99.2 (29 Sep 2018 01:51)  HR: 72 (29 Sep 2018 11:04) (58 - 72)  BP: 121/74 (29 Sep 2018 11:04) (114/64 - 122/60)  BP(mean): --  RR: 18 (29 Sep 2018 11:04) (18 - 20)  SpO2: 99% (29 Sep 2018 11:04) (95% - 99%)  CAPILLARY BLOOD GLUCOSE        I&O's Summary    28 Sep 2018 07:01  -  29 Sep 2018 07:00  --------------------------------------------------------  IN: 2670 mL / OUT: 300 mL / NET: 2370 mL        PHYSICAL EXAM:  GENERAL: NAD, well-developed  CHEST/LUNG: Clear to auscultation bilaterally; No wheeze  HEART: Regular rate and rhythm; No murmurs  ABDOMEN: Soft, Nontender, Nondistended  EXTREMITIES: no LE edema  PSYCH: calm  NEUROLOGY:AAOx3   SKIN: No rashes or lesions      LABS:                        8.6    10.51 )-----------( 651      ( 28 Sep 2018 07:00 )             25.7     09-28    140  |  102  |  7   ----------------------------<  89  3.6   |  24  |  0.63    Ca    8.9      28 Sep 2018 07:00    TPro  7.2  /  Alb  3.7  /  TBili  2.3<H>  /  DBili  x   /  AST  22  /  ALT  11  /  AlkPhos  71  09-28              RADIOLOGY & ADDITIONAL TESTS:    Imaging Personally Reviewed:  Consultant(s) Notes Reviewed:    Care Discussed with Consultants/Other Providers:

## 2018-09-29 NOTE — PROGRESS NOTE ADULT - PROVIDER SPECIALTY LIST ADULT
Critical Care
Heme/Onc
Hospitalist
MICU
MICU
Critical Care
Heme/Onc
Hospitalist

## 2018-09-29 NOTE — PROGRESS NOTE ADULT - PROBLEM SELECTOR PROBLEM 1
Acute chest syndrome
Sickle cell crisis
Sickle cell crisis

## 2018-09-29 NOTE — PROGRESS NOTE ADULT - ATTENDING COMMENTS
Patient examined and case reviewed in detail on bedside rounds
Pt with Acute chest in SCC s/p transfusion exchange yesterday. Change NRB to High Flow NC and encouraged incentive spirometer use, increase po intake. Bowel regimen increased while on PCA pump, IVF.
Critically ill with acute chest syndrome and LLL PNA Still on high flow nasal O2    Frequent bedside visits with therapy change today.   Crit Care Time Today 35 min+  Critical Care Ultrasonography  Indication:  Lung: translobar consolidation LLL with scattered B line with predominant A line pattern  Cardiac: Nl LV fxn, no RV dilation, no     Impression: No cardiac limitation Exam c/w PNA and not ACS
Pt is a 22yo M with PMHx for Sickle cell anemia, ACS, Iron overload (due to repeated pRBCs transfusions), Cholecystectomy and Splenectomy, presents with fever, chest pain and bibasilar lung consolidations, concerning for acute chest syndrome/PNA. Currently undergoing exchange transfusion. O2 supplement via NRB right now, broad spectrum antibiotics.
He was given 4u PRBC and started on plasmapharesis. He required Hi-Flow NC for his oxygen, dilaudid PCA pump for pain management and an aggressive bowel regimen for his constipation.   On the 2nd day he was started on azithromycin for atypical coverage.  He improved over several days with abx and RBC exchange. He was found to have a significant consolidation in his LLL suggesting a LLL pneumonia. His oxygen requirements reduced to regular 4L NC and he was afebrile since starting azithromycin.      ASSESSMENT & PLAN:       2) Resp  -       4  5) ID  - febrile to 105.1 while on zosyn. Added azithromycin.    7) Heme  -  hemoglobin stable today  - elevated LDH (>600) and low haptoglobin  - pt + for numerous RBC-Abs  - hydroxyurea 2g daily  - c/w 1/2 NS @ 150cc    8) Endo  - no acute concerns    9) DVT PPx  - Lovenox sq        For Follow-Up:    - Continue Azithromycin and Zosyn for 6 more days (10 days total to be given)  - F/U hemolysis labs and f/u Hematology recommendations
Medically stable for d/c home on po oxycodone, to f/u Dr. Nguyễn in 1-2 weeks    35 min spent on dc planning

## 2018-09-29 NOTE — PROGRESS NOTE ADULT - PROBLEM SELECTOR PLAN 1
s/p exchange transfusion on 9/23 doing well  S/p azithro course for atypical coverage   - Pip-Tazo day 7/10 for pna coverage  - urine legionella negative  - rvp neg  can be d/c on po levaquin to complete total 10 days course
s/p exchange transfusion on 9/23 doing well, change dilaudid to po oxycodone, cont to monitor closely for worsening symptoms  Azithromycin day 5/10 today for atypical coverage.  - Pip-Tazo day 5/10 for pna coverage  - urine legionella negative  rvp neg
s/p exchange transfusion on 9/23 doing well, cont po oxycodone, cont to monitor closely for worsening symptoms  Azithromycin day 6/10 today for atypical coverage.  - Pip-Tazo day 6/10 for pna coverage  - urine legionella negative  rvp neg  d/c planning for tomorrow if remains stable
s/p exchange transfusion on 9/23 doing well, cont po oxycodone, cont to monitor closely for worsening symptoms if chest pain is improved can d/c home today.  Azithromycin day 6 today for atypical coverage, will d/c zithromax  - Pip-Tazo day 6/10 for pna coverage  - urine legionella negative  rvp neg  can be d/c on po levaquin to complete total 10 days course
- manifested as left sided chest pain, onset during the prior week and not relieved with home Motrin and oxycodone  - CXR negative, no flu-like symptoms, no fever, cough, shortness of breath  - ECG appears unchanged from prior study; NSR at 79 bpm, QTc = 428  - Dilaudid PCA, 0.45 NS, symptomatic support  - bowel regimen; colace, senna, miralax  - f/u Retic count, LDH  - follow pulse oximetry  - continues on hydroxyurea 2000 mg daily, folic acid 1 mg daily  - Hematology consult in the AM (Kenia Nguyễn MD); pls call
- manifested as left sided chest pain, onset during the prior week and not relieved with home Motrin and oxycodone  - CXR negative, no flu-like symptoms, no fever, cough, shortness of breath, unclear etiology on admission. However Pt developed febrile T 101.6, with leukocytosis on 9/20/18, met sepsis criteria. Empirically covered with zosyn for possible PNA, s/p vanco once, f/u CT chest, Bx (sent after Abx)  - - left chest area, without radiation, 10/10 maximum intensity, less likely ACS. ECG appears unchanged from prior study; NSR at 79 bpm, QTc = 428  - Dilaudid PCA, 0.45 NS, symptomatic support  - bowel regimen; colace, senna, miralax  - f/u Retic count, LDH  - follow pulse oximetry  - continues on hydroxyurea 2000 mg daily, folic acid 1 mg daily  - Hematology consult appreciated, avoid PRBC transfusion due to Hx of Iron overload as long as Pt asymptomatic.

## 2018-10-02 NOTE — DISCHARGE NOTE ADULT - MEDICATION SUMMARY - MEDICATIONS TO CHANGE
.
I will SWITCH the dose or number of times a day I take the medications listed below when I get home from the hospital:  None

## 2018-11-29 ENCOUNTER — TRANSCRIPTION ENCOUNTER (OUTPATIENT)
Age: 23
End: 2018-11-29

## 2018-12-13 ENCOUNTER — OUTPATIENT (OUTPATIENT)
Dept: OUTPATIENT SERVICES | Facility: HOSPITAL | Age: 23
LOS: 1 days | End: 2018-12-13

## 2018-12-13 ENCOUNTER — APPOINTMENT (OUTPATIENT)
Dept: INTERNAL MEDICINE | Facility: HOSPITAL | Age: 23
End: 2018-12-13
Payer: COMMERCIAL

## 2018-12-13 ENCOUNTER — LABORATORY RESULT (OUTPATIENT)
Age: 23
End: 2018-12-13

## 2018-12-13 VITALS
HEIGHT: 71 IN | SYSTOLIC BLOOD PRESSURE: 121 MMHG | HEART RATE: 62 BPM | BODY MASS INDEX: 25.98 KG/M2 | WEIGHT: 185.56 LBS | OXYGEN SATURATION: 95 % | DIASTOLIC BLOOD PRESSURE: 53 MMHG

## 2018-12-13 LAB
ALBUMIN SERPL ELPH-MCNC: 4.6 G/DL — SIGNIFICANT CHANGE UP (ref 3.3–5)
ALP SERPL-CCNC: 82 U/L — SIGNIFICANT CHANGE UP (ref 40–120)
ALT FLD-CCNC: 17 U/L — SIGNIFICANT CHANGE UP (ref 4–41)
ANISOCYTOSIS BLD QL: SIGNIFICANT CHANGE UP
AST SERPL-CCNC: 50 U/L — HIGH (ref 4–40)
BASOPHILS # BLD AUTO: 0.06 K/UL — SIGNIFICANT CHANGE UP (ref 0–0.2)
BASOPHILS NFR BLD AUTO: 0.7 % — SIGNIFICANT CHANGE UP (ref 0–2)
BASOPHILS NFR SPEC: 0 % — SIGNIFICANT CHANGE UP (ref 0–2)
BILIRUB SERPL-MCNC: 2.3 MG/DL — HIGH (ref 0.2–1.2)
BLASTS # FLD: 0 % — SIGNIFICANT CHANGE UP (ref 0–0)
BUN SERPL-MCNC: 7 MG/DL — SIGNIFICANT CHANGE UP (ref 7–23)
CALCIUM SERPL-MCNC: 9.9 MG/DL — SIGNIFICANT CHANGE UP (ref 8.4–10.5)
CHLORIDE SERPL-SCNC: 103 MMOL/L — SIGNIFICANT CHANGE UP (ref 98–107)
CO2 SERPL-SCNC: 27 MMOL/L — SIGNIFICANT CHANGE UP (ref 22–31)
CREAT SERPL-MCNC: 0.67 MG/DL — SIGNIFICANT CHANGE UP (ref 0.5–1.3)
ELLIPTOCYTES BLD QL SMEAR: SIGNIFICANT CHANGE UP
EOSINOPHIL # BLD AUTO: 0.21 K/UL — SIGNIFICANT CHANGE UP (ref 0–0.5)
EOSINOPHIL NFR BLD AUTO: 2.5 % — SIGNIFICANT CHANGE UP (ref 0–6)
EOSINOPHIL NFR FLD: 1.8 % — SIGNIFICANT CHANGE UP (ref 0–6)
GIANT PLATELETS BLD QL SMEAR: PRESENT — SIGNIFICANT CHANGE UP
GLUCOSE SERPL-MCNC: 78 MG/DL — SIGNIFICANT CHANGE UP (ref 70–99)
HCT VFR BLD CALC: 25.4 % — LOW (ref 39–50)
HGB BLD-MCNC: 9.1 G/DL — LOW (ref 13–17)
HYPOCHROMIA BLD QL: SIGNIFICANT CHANGE UP
IMM GRANULOCYTES # BLD AUTO: 0.03 # — SIGNIFICANT CHANGE UP
IMM GRANULOCYTES NFR BLD AUTO: 0.4 % — SIGNIFICANT CHANGE UP (ref 0–1.5)
LYMPHOCYTES # BLD AUTO: 2.96 K/UL — SIGNIFICANT CHANGE UP (ref 1–3.3)
LYMPHOCYTES # BLD AUTO: 34.8 % — SIGNIFICANT CHANGE UP (ref 13–44)
LYMPHOCYTES NFR SPEC AUTO: 27.9 % — SIGNIFICANT CHANGE UP (ref 13–44)
MACROCYTES BLD QL: SIGNIFICANT CHANGE UP
MCHC RBC-ENTMCNC: 33.6 PG — SIGNIFICANT CHANGE UP (ref 27–34)
MCHC RBC-ENTMCNC: 35.8 % — SIGNIFICANT CHANGE UP (ref 32–36)
MCV RBC AUTO: 93.7 FL — SIGNIFICANT CHANGE UP (ref 80–100)
METAMYELOCYTES # FLD: 0 % — SIGNIFICANT CHANGE UP (ref 0–1)
MONOCYTES # BLD AUTO: 1.66 K/UL — HIGH (ref 0–0.9)
MONOCYTES NFR BLD AUTO: 19.5 % — HIGH (ref 2–14)
MONOCYTES NFR BLD: 12.6 % — HIGH (ref 2–9)
MYELOCYTES NFR BLD: 0 % — SIGNIFICANT CHANGE UP (ref 0–0)
NEUTROPHIL AB SER-ACNC: 51.4 % — SIGNIFICANT CHANGE UP (ref 43–77)
NEUTROPHILS # BLD AUTO: 3.59 K/UL — SIGNIFICANT CHANGE UP (ref 1.8–7.4)
NEUTROPHILS NFR BLD AUTO: 42.1 % — LOW (ref 43–77)
NEUTS BAND # BLD: 0 % — SIGNIFICANT CHANGE UP (ref 0–6)
NRBC # BLD: 2.7 /100WBC — SIGNIFICANT CHANGE UP
NRBC # FLD: 0.13 — SIGNIFICANT CHANGE UP
NRBC FLD-RTO: 1.5 — SIGNIFICANT CHANGE UP
OTHER - HEMATOLOGY %: 0 — SIGNIFICANT CHANGE UP
PLATELET # BLD AUTO: 528 K/UL — HIGH (ref 150–400)
PLATELET COUNT - ESTIMATE: SIGNIFICANT CHANGE UP
PMV BLD: 10.8 FL — SIGNIFICANT CHANGE UP (ref 7–13)
POIKILOCYTOSIS BLD QL AUTO: SIGNIFICANT CHANGE UP
POLYCHROMASIA BLD QL SMEAR: SIGNIFICANT CHANGE UP
POTASSIUM SERPL-MCNC: 4.7 MMOL/L — SIGNIFICANT CHANGE UP (ref 3.5–5.3)
POTASSIUM SERPL-SCNC: 4.7 MMOL/L — SIGNIFICANT CHANGE UP (ref 3.5–5.3)
PROMYELOCYTES # FLD: 0 % — SIGNIFICANT CHANGE UP (ref 0–0)
PROT SERPL-MCNC: 8.7 G/DL — HIGH (ref 6–8.3)
RBC # BLD: 2.71 M/UL — LOW (ref 4.2–5.8)
RBC # FLD: 22.5 % — HIGH (ref 10.3–14.5)
RETICS #: 233 K/UL — HIGH (ref 25–125)
RETICS/RBC NFR: 8.6 % — HIGH (ref 0.5–2.5)
SICKLE CELLS BLD QL SMEAR: SIGNIFICANT CHANGE UP
SMUDGE CELLS # BLD: PRESENT — SIGNIFICANT CHANGE UP
SODIUM SERPL-SCNC: 140 MMOL/L — SIGNIFICANT CHANGE UP (ref 135–145)
TARGETS BLD QL SMEAR: SLIGHT — SIGNIFICANT CHANGE UP
VARIANT LYMPHS # BLD: 6.3 % — SIGNIFICANT CHANGE UP
WBC # BLD: 8.51 K/UL — SIGNIFICANT CHANGE UP (ref 3.8–10.5)
WBC # FLD AUTO: 8.51 K/UL — SIGNIFICANT CHANGE UP (ref 3.8–10.5)

## 2018-12-13 PROCEDURE — 99213 OFFICE O/P EST LOW 20 MIN: CPT

## 2018-12-14 DIAGNOSIS — D57.1 SICKLE-CELL DISEASE WITHOUT CRISIS: ICD-10-CM

## 2018-12-16 NOTE — HISTORY OF PRESENT ILLNESS
[de-identified] : 23 yo male with HGB SS, here for f/u. Taking his HU, 2000 mg QD. No longer missing HU, and is having fewer pain episodes. Never started l-glutamine\par PE: wdwn male in nad\par vss\par anicteric\par lungs- cta\par cor: rrr1/6 jese\par abd- benign\par ext: -c/c/e, no ulcers\par \par A/P- 23 yo male with HGB SS\par 1. continue HU- 2000 mg QD -take in am\par 2.  Oxycodone- 10 mg tab- #30\par ISTOP checked\par 3. routine labs\par 4. f/u three months\par 5. emphasized importance of ophtho f/u- referral made\par Kenia Nguyễn MD

## 2019-01-01 NOTE — PROVIDER CONTACT NOTE (CRITICAL VALUE NOTIFICATION) - SITUATION
H/H 4/11.5
H+H =4.7/13.8
H+H= 5.2/14.7
H/H 4.3 / 13.1
Hbg- 6.5 Hct 17.9
Hemoglobin 4.1 Hematocrit 13.5. Pt stable.
Patient morning labs revealed Hemoglobin/Hematocrit: 5.1/13.8
Patient with HH 3.6/ 11.
Pt H/H 5.1/14.0
Pt hemoglobin is 5.0
hemoglobin 6.6, hematocrit 21.6
patient hemoglobin-5.2, and hematicrit- 14.7
pt h/h low
same as above
Term [place] Vaginal Delivery (>/= 37 weeks)

## 2019-03-13 ENCOUNTER — TRANSCRIPTION ENCOUNTER (OUTPATIENT)
Age: 24
End: 2019-03-13

## 2019-03-21 ENCOUNTER — LABORATORY RESULT (OUTPATIENT)
Age: 24
End: 2019-03-21

## 2019-03-21 ENCOUNTER — OUTPATIENT (OUTPATIENT)
Dept: OUTPATIENT SERVICES | Facility: HOSPITAL | Age: 24
LOS: 1 days | End: 2019-03-21

## 2019-03-21 ENCOUNTER — APPOINTMENT (OUTPATIENT)
Dept: INTERNAL MEDICINE | Facility: HOSPITAL | Age: 24
End: 2019-03-21
Payer: COMMERCIAL

## 2019-03-21 VITALS
DIASTOLIC BLOOD PRESSURE: 54 MMHG | HEIGHT: 71 IN | SYSTOLIC BLOOD PRESSURE: 126 MMHG | WEIGHT: 189 LBS | HEART RATE: 65 BPM | BODY MASS INDEX: 26.46 KG/M2 | OXYGEN SATURATION: 97 %

## 2019-03-21 LAB
ALBUMIN SERPL ELPH-MCNC: 4.4 G/DL — SIGNIFICANT CHANGE UP (ref 3.3–5)
ALP SERPL-CCNC: 80 U/L — SIGNIFICANT CHANGE UP (ref 40–120)
ALT FLD-CCNC: 18 U/L — SIGNIFICANT CHANGE UP (ref 4–41)
ANION GAP SERPL CALC-SCNC: 11 MMO/L — SIGNIFICANT CHANGE UP (ref 7–14)
ANISOCYTOSIS BLD QL: SIGNIFICANT CHANGE UP
AST SERPL-CCNC: 59 U/L — HIGH (ref 4–40)
BASOPHILS # BLD AUTO: 0.06 K/UL — SIGNIFICANT CHANGE UP (ref 0–0.2)
BASOPHILS NFR BLD AUTO: 0.7 % — SIGNIFICANT CHANGE UP (ref 0–2)
BILIRUB SERPL-MCNC: 2.3 MG/DL — HIGH (ref 0.2–1.2)
BUN SERPL-MCNC: 10 MG/DL — SIGNIFICANT CHANGE UP (ref 7–23)
CALCIUM SERPL-MCNC: 9.7 MG/DL — SIGNIFICANT CHANGE UP (ref 8.4–10.5)
CHLORIDE SERPL-SCNC: 103 MMOL/L — SIGNIFICANT CHANGE UP (ref 98–107)
CO2 SERPL-SCNC: 26 MMOL/L — SIGNIFICANT CHANGE UP (ref 22–31)
CREAT SERPL-MCNC: 0.66 MG/DL — SIGNIFICANT CHANGE UP (ref 0.5–1.3)
EOSINOPHIL # BLD AUTO: 0.23 K/UL — SIGNIFICANT CHANGE UP (ref 0–0.5)
EOSINOPHIL NFR BLD AUTO: 2.7 % — SIGNIFICANT CHANGE UP (ref 0–6)
FERRITIN SERPL-MCNC: 1311 NG/ML — HIGH (ref 30–400)
GLUCOSE SERPL-MCNC: 82 MG/DL — SIGNIFICANT CHANGE UP (ref 70–99)
HCT VFR BLD CALC: 21.9 % — LOW (ref 39–50)
HGB BLD-MCNC: 7.7 G/DL — LOW (ref 13–17)
HOWELL-JOLLY BOD BLD QL SMEAR: PRESENT — SIGNIFICANT CHANGE UP
IMM GRANULOCYTES NFR BLD AUTO: 0.5 % — SIGNIFICANT CHANGE UP (ref 0–1.5)
LYMPHOCYTES # BLD AUTO: 3.73 K/UL — HIGH (ref 1–3.3)
LYMPHOCYTES # BLD AUTO: 44.5 % — HIGH (ref 13–44)
MANUAL SMEAR VERIFICATION: SIGNIFICANT CHANGE UP
MCHC RBC-ENTMCNC: 31.8 PG — SIGNIFICANT CHANGE UP (ref 27–34)
MCHC RBC-ENTMCNC: 35.2 % — SIGNIFICANT CHANGE UP (ref 32–36)
MCV RBC AUTO: 90.5 FL — SIGNIFICANT CHANGE UP (ref 80–100)
MONOCYTES # BLD AUTO: 1.24 K/UL — HIGH (ref 0–0.9)
MONOCYTES NFR BLD AUTO: 14.8 % — HIGH (ref 2–14)
NEUTROPHILS # BLD AUTO: 3.09 K/UL — SIGNIFICANT CHANGE UP (ref 1.8–7.4)
NEUTROPHILS NFR BLD AUTO: 36.8 % — LOW (ref 43–77)
NRBC # FLD: 0.24 K/UL — LOW (ref 25–125)
NRBC FLD-RTO: 2.9 — SIGNIFICANT CHANGE UP
PLATELET # BLD AUTO: 478 K/UL — HIGH (ref 150–400)
PLATELET COUNT - ESTIMATE: SIGNIFICANT CHANGE UP
PMV BLD: 10.3 FL — SIGNIFICANT CHANGE UP (ref 7–13)
POIKILOCYTOSIS BLD QL AUTO: SIGNIFICANT CHANGE UP
POTASSIUM SERPL-MCNC: 4.5 MMOL/L — SIGNIFICANT CHANGE UP (ref 3.5–5.3)
POTASSIUM SERPL-SCNC: 4.5 MMOL/L — SIGNIFICANT CHANGE UP (ref 3.5–5.3)
PROT SERPL-MCNC: 7.9 G/DL — SIGNIFICANT CHANGE UP (ref 6–8.3)
RBC # BLD: 2.42 M/UL — LOW (ref 4.2–5.8)
RBC # FLD: 24.1 % — HIGH (ref 10.3–14.5)
RETICS #: 227 K/UL — HIGH (ref 25–125)
RETICS/RBC NFR: 9.4 % — HIGH (ref 0.5–2.5)
SICKLE CELLS BLD QL SMEAR: SIGNIFICANT CHANGE UP
SODIUM SERPL-SCNC: 140 MMOL/L — SIGNIFICANT CHANGE UP (ref 135–145)
TARGETS BLD QL SMEAR: SLIGHT — SIGNIFICANT CHANGE UP
WBC # BLD: 8.39 K/UL — SIGNIFICANT CHANGE UP (ref 3.8–10.5)
WBC # FLD AUTO: 8.39 K/UL — SIGNIFICANT CHANGE UP (ref 3.8–10.5)

## 2019-03-21 PROCEDURE — 99213 OFFICE O/P EST LOW 20 MIN: CPT

## 2019-03-25 DIAGNOSIS — D57.1 SICKLE-CELL DISEASE WITHOUT CRISIS: ICD-10-CM

## 2019-04-08 NOTE — PATIENT PROFILE ADULT. - HEALTHCARE QUESTIONS, PROFILE
- Admit to ICU for further monitoring  - Hold anti-hypertensives and flomax  - Can give trial of fluids  - Start midodrine for SBP <110  - Start Zosyn   - Frequent evals  Discussed with bedside PA
none stated at this time

## 2019-06-24 ENCOUNTER — APPOINTMENT (OUTPATIENT)
Dept: INTERNAL MEDICINE | Facility: CLINIC | Age: 24
End: 2019-06-24
Payer: COMMERCIAL

## 2019-06-24 ENCOUNTER — LABORATORY RESULT (OUTPATIENT)
Age: 24
End: 2019-06-24

## 2019-06-24 ENCOUNTER — OUTPATIENT (OUTPATIENT)
Dept: OUTPATIENT SERVICES | Facility: HOSPITAL | Age: 24
LOS: 1 days | End: 2019-06-24

## 2019-06-24 VITALS — WEIGHT: 189 LBS

## 2019-06-24 LAB
ALBUMIN SERPL ELPH-MCNC: 4.7 G/DL — SIGNIFICANT CHANGE UP (ref 3.3–5)
ALP SERPL-CCNC: 73 U/L — SIGNIFICANT CHANGE UP (ref 40–120)
ALT FLD-CCNC: 13 U/L — SIGNIFICANT CHANGE UP (ref 4–41)
ANION GAP SERPL CALC-SCNC: 10 MMO/L — SIGNIFICANT CHANGE UP (ref 7–14)
ANISOCYTOSIS BLD QL: SIGNIFICANT CHANGE UP
AST SERPL-CCNC: 50 U/L — HIGH (ref 4–40)
BASOPHILS # BLD AUTO: 0.06 K/UL — SIGNIFICANT CHANGE UP (ref 0–0.2)
BASOPHILS NFR BLD AUTO: 0.7 % — SIGNIFICANT CHANGE UP (ref 0–2)
BASOPHILS NFR SPEC: 0 % — SIGNIFICANT CHANGE UP (ref 0–2)
BILIRUB SERPL-MCNC: 2.5 MG/DL — HIGH (ref 0.2–1.2)
BLASTS # FLD: 0 % — SIGNIFICANT CHANGE UP (ref 0–0)
BUN SERPL-MCNC: 6 MG/DL — LOW (ref 7–23)
CALCIUM SERPL-MCNC: 10.1 MG/DL — SIGNIFICANT CHANGE UP (ref 8.4–10.5)
CHLORIDE SERPL-SCNC: 105 MMOL/L — SIGNIFICANT CHANGE UP (ref 98–107)
CO2 SERPL-SCNC: 27 MMOL/L — SIGNIFICANT CHANGE UP (ref 22–31)
CREAT SERPL-MCNC: 0.6 MG/DL — SIGNIFICANT CHANGE UP (ref 0.5–1.3)
ELLIPTOCYTES BLD QL SMEAR: SIGNIFICANT CHANGE UP
EOSINOPHIL # BLD AUTO: 0.23 K/UL — SIGNIFICANT CHANGE UP (ref 0–0.5)
EOSINOPHIL NFR BLD AUTO: 2.5 % — SIGNIFICANT CHANGE UP (ref 0–6)
EOSINOPHIL NFR FLD: 1.7 % — SIGNIFICANT CHANGE UP (ref 0–6)
GLUCOSE SERPL-MCNC: 89 MG/DL — SIGNIFICANT CHANGE UP (ref 70–99)
HCT VFR BLD CALC: 20.6 % — CRITICAL LOW (ref 39–50)
HGB BLD-MCNC: 7.2 G/DL — LOW (ref 13–17)
HYPOCHROMIA BLD QL: SLIGHT — SIGNIFICANT CHANGE UP
IMM GRANULOCYTES NFR BLD AUTO: 0.4 % — SIGNIFICANT CHANGE UP (ref 0–1.5)
LYMPHOCYTES # BLD AUTO: 2.98 K/UL — SIGNIFICANT CHANGE UP (ref 1–3.3)
LYMPHOCYTES # BLD AUTO: 32.8 % — SIGNIFICANT CHANGE UP (ref 13–44)
LYMPHOCYTES NFR SPEC AUTO: 35.6 % — SIGNIFICANT CHANGE UP (ref 13–44)
MACROCYTES BLD QL: SIGNIFICANT CHANGE UP
MCHC RBC-ENTMCNC: 30.8 PG — SIGNIFICANT CHANGE UP (ref 27–34)
MCHC RBC-ENTMCNC: 35 % — SIGNIFICANT CHANGE UP (ref 32–36)
MCV RBC AUTO: 88 FL — SIGNIFICANT CHANGE UP (ref 80–100)
METAMYELOCYTES # FLD: 0 % — SIGNIFICANT CHANGE UP (ref 0–1)
MICROCYTES BLD QL: SLIGHT — SIGNIFICANT CHANGE UP
MONOCYTES # BLD AUTO: 1.53 K/UL — HIGH (ref 0–0.9)
MONOCYTES NFR BLD AUTO: 16.9 % — HIGH (ref 2–14)
MONOCYTES NFR BLD: 12.7 % — HIGH (ref 2–9)
MYELOCYTES NFR BLD: 0.9 % — HIGH (ref 0–0)
NEUTROPHIL AB SER-ACNC: 44.9 % — SIGNIFICANT CHANGE UP (ref 43–77)
NEUTROPHILS # BLD AUTO: 4.24 K/UL — SIGNIFICANT CHANGE UP (ref 1.8–7.4)
NEUTROPHILS NFR BLD AUTO: 46.7 % — SIGNIFICANT CHANGE UP (ref 43–77)
NEUTS BAND # BLD: 0 % — SIGNIFICANT CHANGE UP (ref 0–6)
NRBC # BLD: 3 /100WBC — SIGNIFICANT CHANGE UP
NRBC # FLD: 0.21 K/UL — SIGNIFICANT CHANGE UP (ref 0–0)
NRBC FLD-RTO: 2.3 — SIGNIFICANT CHANGE UP
OTHER - HEMATOLOGY %: 0 — SIGNIFICANT CHANGE UP
OVALOCYTES BLD QL SMEAR: SLIGHT — SIGNIFICANT CHANGE UP
PLATELET # BLD AUTO: 555 K/UL — HIGH (ref 150–400)
PLATELET COUNT - ESTIMATE: SIGNIFICANT CHANGE UP
PMV BLD: 10.5 FL — SIGNIFICANT CHANGE UP (ref 7–13)
POIKILOCYTOSIS BLD QL AUTO: SIGNIFICANT CHANGE UP
POLYCHROMASIA BLD QL SMEAR: SIGNIFICANT CHANGE UP
POTASSIUM SERPL-MCNC: 4 MMOL/L — SIGNIFICANT CHANGE UP (ref 3.5–5.3)
POTASSIUM SERPL-SCNC: 4 MMOL/L — SIGNIFICANT CHANGE UP (ref 3.5–5.3)
PROMYELOCYTES # FLD: 0 % — SIGNIFICANT CHANGE UP (ref 0–0)
PROT SERPL-MCNC: 8.1 G/DL — SIGNIFICANT CHANGE UP (ref 6–8.3)
RBC # BLD: 2.34 M/UL — LOW (ref 4.2–5.8)
RBC # FLD: 24.6 % — HIGH (ref 10.3–14.5)
RETICS #: 234 K/UL — HIGH (ref 25–125)
RETICS/RBC NFR: 10 % — HIGH (ref 0.5–2.5)
SICKLE CELLS BLD QL SMEAR: SLIGHT — SIGNIFICANT CHANGE UP
SODIUM SERPL-SCNC: 142 MMOL/L — SIGNIFICANT CHANGE UP (ref 135–145)
TARGETS BLD QL SMEAR: SLIGHT — SIGNIFICANT CHANGE UP
VARIANT LYMPHS # BLD: 4.2 % — SIGNIFICANT CHANGE UP
WBC # BLD: 9.08 K/UL — SIGNIFICANT CHANGE UP (ref 3.8–10.5)
WBC # FLD AUTO: 9.08 K/UL — SIGNIFICANT CHANGE UP (ref 3.8–10.5)

## 2019-06-24 PROCEDURE — 99213 OFFICE O/P EST LOW 20 MIN: CPT

## 2019-06-30 NOTE — HISTORY OF PRESENT ILLNESS
[de-identified] : 21 yo male with HGB SS, here for f/u. Taking his HU, 2000 mg QD. No longer missing HU, and is having fewer pain episodes. Never started l-glutamine. Has pain now and then but nothing that can't be handled at home.\par PE: wdwn male in nad\par vss\par anicteric\par lungs- cta\par cor: rrr1/6 jese\par abd- benign\par ext: -c/c/e, no ulcers\par \par A/P- 21 yo male with HGB SS\par 1. continue HU- 2000 mg QD -take in am\par 2.  Oxycodone- 10 mg tab- #30\par ISTOP checked\par 3. routine labs\par 4. f/u three months\par 5. emphasized importance of ophtho f/u- referral made\par Kenia Nguyễn MD

## 2019-07-01 DIAGNOSIS — D57.1 SICKLE-CELL DISEASE WITHOUT CRISIS: ICD-10-CM

## 2019-09-24 ENCOUNTER — APPOINTMENT (OUTPATIENT)
Dept: INTERNAL MEDICINE | Facility: CLINIC | Age: 24
End: 2019-09-24

## 2019-11-18 ENCOUNTER — OUTPATIENT (OUTPATIENT)
Dept: OUTPATIENT SERVICES | Facility: HOSPITAL | Age: 24
LOS: 1 days | End: 2019-11-18

## 2019-11-18 ENCOUNTER — LABORATORY RESULT (OUTPATIENT)
Age: 24
End: 2019-11-18

## 2019-11-18 ENCOUNTER — APPOINTMENT (OUTPATIENT)
Dept: INTERNAL MEDICINE | Facility: CLINIC | Age: 24
End: 2019-11-18
Payer: COMMERCIAL

## 2019-11-18 VITALS
SYSTOLIC BLOOD PRESSURE: 116 MMHG | WEIGHT: 184 LBS | OXYGEN SATURATION: 98 % | HEIGHT: 71 IN | DIASTOLIC BLOOD PRESSURE: 66 MMHG | BODY MASS INDEX: 25.76 KG/M2 | HEART RATE: 89 BPM

## 2019-11-18 DIAGNOSIS — D57.1 SICKLE-CELL DISEASE WITHOUT CRISIS: ICD-10-CM

## 2019-11-18 LAB
ALBUMIN SERPL ELPH-MCNC: 4.6 G/DL — SIGNIFICANT CHANGE UP (ref 3.3–5)
ALP SERPL-CCNC: 76 U/L — SIGNIFICANT CHANGE UP (ref 40–120)
ALT FLD-CCNC: 18 U/L — SIGNIFICANT CHANGE UP (ref 4–41)
ANION GAP SERPL CALC-SCNC: 12 MMO/L — SIGNIFICANT CHANGE UP (ref 7–14)
ANISOCYTOSIS BLD QL: SIGNIFICANT CHANGE UP
AST SERPL-CCNC: 61 U/L — HIGH (ref 4–40)
BASOPHILS # BLD AUTO: 0.07 K/UL — SIGNIFICANT CHANGE UP (ref 0–0.2)
BASOPHILS NFR BLD AUTO: 1 % — SIGNIFICANT CHANGE UP (ref 0–2)
BASOPHILS NFR SPEC: 0.9 % — SIGNIFICANT CHANGE UP (ref 0–2)
BILIRUB SERPL-MCNC: 2.4 MG/DL — HIGH (ref 0.2–1.2)
BLASTS # FLD: 0 % — SIGNIFICANT CHANGE UP (ref 0–0)
BUN SERPL-MCNC: 6 MG/DL — LOW (ref 7–23)
CALCIUM SERPL-MCNC: 9.6 MG/DL — SIGNIFICANT CHANGE UP (ref 8.4–10.5)
CHLORIDE SERPL-SCNC: 103 MMOL/L — SIGNIFICANT CHANGE UP (ref 98–107)
CO2 SERPL-SCNC: 26 MMOL/L — SIGNIFICANT CHANGE UP (ref 22–31)
CREAT SERPL-MCNC: 0.6 MG/DL — SIGNIFICANT CHANGE UP (ref 0.5–1.3)
EOSINOPHIL # BLD AUTO: 0.23 K/UL — SIGNIFICANT CHANGE UP (ref 0–0.5)
EOSINOPHIL NFR BLD AUTO: 3.2 % — SIGNIFICANT CHANGE UP (ref 0–6)
EOSINOPHIL NFR FLD: 0.9 % — SIGNIFICANT CHANGE UP (ref 0–6)
FERRITIN SERPL-MCNC: 405.9 NG/ML — HIGH (ref 30–400)
GLUCOSE SERPL-MCNC: 107 MG/DL — HIGH (ref 70–99)
HCT VFR BLD CALC: 21.7 % — LOW (ref 39–50)
HGB BLD-MCNC: 7.8 G/DL — LOW (ref 13–17)
HOWELL-JOLLY BOD BLD QL SMEAR: PRESENT — SIGNIFICANT CHANGE UP
IMM GRANULOCYTES NFR BLD AUTO: 0.4 % — SIGNIFICANT CHANGE UP (ref 0–1.5)
LYMPHOCYTES # BLD AUTO: 1.7 K/UL — SIGNIFICANT CHANGE UP (ref 1–3.3)
LYMPHOCYTES # BLD AUTO: 23.4 % — SIGNIFICANT CHANGE UP (ref 13–44)
LYMPHOCYTES NFR SPEC AUTO: 32.4 % — SIGNIFICANT CHANGE UP (ref 13–44)
MACROCYTES BLD QL: SIGNIFICANT CHANGE UP
MCHC RBC-ENTMCNC: 35.9 % — SIGNIFICANT CHANGE UP (ref 32–36)
MCHC RBC-ENTMCNC: 36.4 PG — HIGH (ref 27–34)
MCV RBC AUTO: 101.4 FL — HIGH (ref 80–100)
METAMYELOCYTES # FLD: 0 % — SIGNIFICANT CHANGE UP (ref 0–1)
MICROCYTES BLD QL: SLIGHT — SIGNIFICANT CHANGE UP
MONOCYTES # BLD AUTO: 0.81 K/UL — SIGNIFICANT CHANGE UP (ref 0–0.9)
MONOCYTES NFR BLD AUTO: 11.2 % — SIGNIFICANT CHANGE UP (ref 2–14)
MONOCYTES NFR BLD: 7.9 % — SIGNIFICANT CHANGE UP (ref 2–9)
MYELOCYTES NFR BLD: 0 % — SIGNIFICANT CHANGE UP (ref 0–0)
NEUTROPHIL AB SER-ACNC: 54.4 % — SIGNIFICANT CHANGE UP (ref 43–77)
NEUTROPHILS # BLD AUTO: 4.42 K/UL — SIGNIFICANT CHANGE UP (ref 1.8–7.4)
NEUTROPHILS NFR BLD AUTO: 60.8 % — SIGNIFICANT CHANGE UP (ref 43–77)
NEUTS BAND # BLD: 0 % — SIGNIFICANT CHANGE UP (ref 0–6)
NRBC # BLD: 11 /100WBC — SIGNIFICANT CHANGE UP
NRBC # FLD: 0.58 K/UL — SIGNIFICANT CHANGE UP (ref 0–0)
NRBC FLD-RTO: 8 — SIGNIFICANT CHANGE UP
OTHER - HEMATOLOGY %: 0 — SIGNIFICANT CHANGE UP
PLATELET # BLD AUTO: 387 K/UL — SIGNIFICANT CHANGE UP (ref 150–400)
PLATELET COUNT - ESTIMATE: NORMAL — SIGNIFICANT CHANGE UP
PMV BLD: 9.9 FL — SIGNIFICANT CHANGE UP (ref 7–13)
POIKILOCYTOSIS BLD QL AUTO: SIGNIFICANT CHANGE UP
POLYCHROMASIA BLD QL SMEAR: SIGNIFICANT CHANGE UP
POTASSIUM SERPL-MCNC: 4 MMOL/L — SIGNIFICANT CHANGE UP (ref 3.5–5.3)
POTASSIUM SERPL-SCNC: 4 MMOL/L — SIGNIFICANT CHANGE UP (ref 3.5–5.3)
PROMYELOCYTES # FLD: 0 % — SIGNIFICANT CHANGE UP (ref 0–0)
PROT SERPL-MCNC: 8.2 G/DL — SIGNIFICANT CHANGE UP (ref 6–8.3)
RBC # BLD: 2.14 M/UL — LOW (ref 4.2–5.8)
RBC # FLD: 25.3 % — HIGH (ref 10.3–14.5)
RETICS #: 137 K/UL — HIGH (ref 25–125)
RETICS/RBC NFR: 6.4 % — HIGH (ref 0.5–2.5)
SICKLE CELLS BLD QL SMEAR: SIGNIFICANT CHANGE UP
SODIUM SERPL-SCNC: 141 MMOL/L — SIGNIFICANT CHANGE UP (ref 135–145)
TARGETS BLD QL SMEAR: SLIGHT — SIGNIFICANT CHANGE UP
VARIANT LYMPHS # BLD: 3.5 % — SIGNIFICANT CHANGE UP
WBC # BLD: 7.26 K/UL — SIGNIFICANT CHANGE UP (ref 3.8–10.5)
WBC # FLD AUTO: 7.26 K/UL — SIGNIFICANT CHANGE UP (ref 3.8–10.5)

## 2019-11-18 PROCEDURE — 99213 OFFICE O/P EST LOW 20 MIN: CPT

## 2019-11-18 NOTE — HISTORY OF PRESENT ILLNESS
[de-identified] : 23 yo male with HGB SS, here for f/u. Taking his HU, 2000 mg QD. No longer missing HU, and is having fewer pain episodes. Never started l-glutamine. never went to ophtho. Has pain now and then but nothing that can't be handled at home.\par PE: wdwn male in nad\par vss\par anicteric\par lungs- cta\par cor: rrr1/6 jese\par abd- benign\par ext: -c/c/e, no ulcers\par \par A/P- 23 yo male with HGB SS\par 1. continue HU- 2000 mg QD -take in am\par 2.  Oxycodone- 10 mg tab- #30\par ISTOP checked\par 3. routine labs\par 4. f/u three months\par 5. emphasized importance of ophtho f/u- referral made\par Kenia Nguyễn MD

## 2020-02-20 ENCOUNTER — OUTPATIENT (OUTPATIENT)
Dept: OUTPATIENT SERVICES | Facility: HOSPITAL | Age: 25
LOS: 1 days | End: 2020-02-20

## 2020-02-20 ENCOUNTER — LABORATORY RESULT (OUTPATIENT)
Age: 25
End: 2020-02-20

## 2020-02-20 ENCOUNTER — APPOINTMENT (OUTPATIENT)
Dept: INTERNAL MEDICINE | Facility: CLINIC | Age: 25
End: 2020-02-20
Payer: COMMERCIAL

## 2020-02-20 VITALS
DIASTOLIC BLOOD PRESSURE: 80 MMHG | SYSTOLIC BLOOD PRESSURE: 110 MMHG | HEART RATE: 60 BPM | BODY MASS INDEX: 26.88 KG/M2 | HEIGHT: 71 IN | OXYGEN SATURATION: 94 % | WEIGHT: 192 LBS

## 2020-02-20 LAB
ALBUMIN SERPL ELPH-MCNC: 4.7 G/DL — SIGNIFICANT CHANGE UP (ref 3.3–5)
ALP SERPL-CCNC: 78 U/L — SIGNIFICANT CHANGE UP (ref 40–120)
ALT FLD-CCNC: 22 U/L — SIGNIFICANT CHANGE UP (ref 4–41)
ANION GAP SERPL CALC-SCNC: 10 MMO/L — SIGNIFICANT CHANGE UP (ref 7–14)
AST SERPL-CCNC: 71 U/L — HIGH (ref 4–40)
BASOPHILS # BLD AUTO: 0.08 K/UL — SIGNIFICANT CHANGE UP (ref 0–0.2)
BASOPHILS NFR BLD AUTO: 0.9 % — SIGNIFICANT CHANGE UP (ref 0–2)
BILIRUB SERPL-MCNC: 2.9 MG/DL — HIGH (ref 0.2–1.2)
BUN SERPL-MCNC: 6 MG/DL — LOW (ref 7–23)
CALCIUM SERPL-MCNC: 9.8 MG/DL — SIGNIFICANT CHANGE UP (ref 8.4–10.5)
CHLORIDE SERPL-SCNC: 100 MMOL/L — SIGNIFICANT CHANGE UP (ref 98–107)
CO2 SERPL-SCNC: 26 MMOL/L — SIGNIFICANT CHANGE UP (ref 22–31)
CREAT SERPL-MCNC: 0.64 MG/DL — SIGNIFICANT CHANGE UP (ref 0.5–1.3)
EOSINOPHIL # BLD AUTO: 0.3 K/UL — SIGNIFICANT CHANGE UP (ref 0–0.5)
EOSINOPHIL NFR BLD AUTO: 3.5 % — SIGNIFICANT CHANGE UP (ref 0–6)
FERRITIN SERPL-MCNC: 486.5 NG/ML — HIGH (ref 30–400)
GLUCOSE SERPL-MCNC: 85 MG/DL — SIGNIFICANT CHANGE UP (ref 70–99)
HCT VFR BLD CALC: 22.9 % — LOW (ref 39–50)
HGB BLD-MCNC: 8 G/DL — LOW (ref 13–17)
IMM GRANULOCYTES NFR BLD AUTO: 0.5 % — SIGNIFICANT CHANGE UP (ref 0–1.5)
LYMPHOCYTES # BLD AUTO: 3.24 K/UL — SIGNIFICANT CHANGE UP (ref 1–3.3)
LYMPHOCYTES # BLD AUTO: 37.6 % — SIGNIFICANT CHANGE UP (ref 13–44)
MCHC RBC-ENTMCNC: 30.5 PG — SIGNIFICANT CHANGE UP (ref 27–34)
MCHC RBC-ENTMCNC: 34.9 % — SIGNIFICANT CHANGE UP (ref 32–36)
MCV RBC AUTO: 87.4 FL — SIGNIFICANT CHANGE UP (ref 80–100)
MONOCYTES # BLD AUTO: 1.32 K/UL — HIGH (ref 0–0.9)
MONOCYTES NFR BLD AUTO: 15.3 % — HIGH (ref 2–14)
NEUTROPHILS # BLD AUTO: 3.63 K/UL — SIGNIFICANT CHANGE UP (ref 1.8–7.4)
NEUTROPHILS NFR BLD AUTO: 42.2 % — LOW (ref 43–77)
NRBC # FLD: 0.14 K/UL — SIGNIFICANT CHANGE UP (ref 0–0)
NRBC FLD-RTO: 1.6 — SIGNIFICANT CHANGE UP
PLATELET # BLD AUTO: 521 K/UL — HIGH (ref 150–400)
PMV BLD: 10.4 FL — SIGNIFICANT CHANGE UP (ref 7–13)
POTASSIUM SERPL-MCNC: 4.7 MMOL/L — SIGNIFICANT CHANGE UP (ref 3.5–5.3)
POTASSIUM SERPL-SCNC: 4.7 MMOL/L — SIGNIFICANT CHANGE UP (ref 3.5–5.3)
PROT SERPL-MCNC: 8.3 G/DL — SIGNIFICANT CHANGE UP (ref 6–8.3)
RBC # BLD: 2.62 M/UL — LOW (ref 4.2–5.8)
RBC # FLD: 24.8 % — HIGH (ref 10.3–14.5)
RETICS #: 213 K/UL — HIGH (ref 25–125)
RETICS/RBC NFR: 8.1 % — HIGH (ref 0.5–2.5)
SODIUM SERPL-SCNC: 136 MMOL/L — SIGNIFICANT CHANGE UP (ref 135–145)
WBC # BLD: 8.61 K/UL — SIGNIFICANT CHANGE UP (ref 3.8–10.5)
WBC # FLD AUTO: 8.61 K/UL — SIGNIFICANT CHANGE UP (ref 3.8–10.5)

## 2020-02-20 PROCEDURE — 99213 OFFICE O/P EST LOW 20 MIN: CPT

## 2020-02-20 RX ORDER — HYDROCORTISONE 25 MG/G
2.5 CREAM TOPICAL 3 TIMES DAILY
Qty: 1 | Refills: 3 | Status: ACTIVE | COMMUNITY
Start: 2020-02-20 | End: 1900-01-01

## 2020-02-21 DIAGNOSIS — D57.1 SICKLE-CELL DISEASE WITHOUT CRISIS: ICD-10-CM

## 2020-02-21 LAB — HBA1C BLD-MCNC: 4.5 % — SIGNIFICANT CHANGE UP (ref 4–5.6)

## 2020-02-21 NOTE — HISTORY OF PRESENT ILLNESS
[de-identified] : 23 yo male with HGB SS, here for f/u. Taking his HU, 2000 mg QD. No longer missing HU, and is having fewer pain episodes. Never started l-glutamine.  Has pain now and then but nothing that can't be handled at home.Ophtho is scheduled for next month.\par The patient has no complaints today. He is working two jobs, and going to school. \par PE: wdwn male in nad\par vss\par anicteric\par lungs- cta\par cor: rrr1/6 jese\par abd- benign\par ext: -c/c/e, no ulcers\par \par A/P- 23 yo male with HGB SS\par 1. continue HU- 2000 mg QD -take in am\par 2.  Oxycodone- 10 mg tab- #30\par ISTOP checked\par 3. routine labs\par 4. f/u three months\par 5. emphasized importance of keeping  ophtho appointment\par Kenia Nguyễn MD

## 2020-04-06 ENCOUNTER — APPOINTMENT (OUTPATIENT)
Dept: OPHTHALMOLOGY | Facility: CLINIC | Age: 25
End: 2020-04-06

## 2020-05-21 ENCOUNTER — APPOINTMENT (OUTPATIENT)
Dept: INTERNAL MEDICINE | Facility: CLINIC | Age: 25
End: 2020-05-21

## 2020-05-21 ENCOUNTER — OUTPATIENT (OUTPATIENT)
Dept: OUTPATIENT SERVICES | Facility: HOSPITAL | Age: 25
LOS: 1 days | End: 2020-05-21

## 2020-05-21 NOTE — HISTORY OF PRESENT ILLNESS
[Home] : at home, [unfilled] , at the time of the visit. [Other Location: e.g. Home (Enter Location, City,State)___] : at [unfilled] [Verbal consent obtained from patient] : the patient, [unfilled] [FreeTextEntry1] : 23 yo male with HGB SS, for f/u. [de-identified] : 23 yo male with HGB SS, for f/u. No complaints today.\par He has been compliant with his medication, HU.\par He has been working at [employer], going to school from home. 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 \par ROS- occasional aches and pains, but nothing that requires ED visits.\par otherwise neg\par \par A/P- 23 yo male with HGB SS\par 1. continue HU-2000 mg QD\par routine labs\par 2. occasional pain- Oxycodne- 10 mg tab- #30\par ISTOP checked\par 3. covid prevention discussed\par 4. f/u 8/27 @ 12 noon\par Kenia Nguyễn MD

## 2020-05-27 ENCOUNTER — APPOINTMENT (OUTPATIENT)
Dept: INTERNAL MEDICINE | Facility: CLINIC | Age: 25
End: 2020-05-27

## 2020-08-10 NOTE — PROGRESS NOTE ADULT - ASSESSMENT
Contacted patient and informed of A1c results as noted below. Patient verbally stated understanding. Had no questions or concerns at this time. Orders entered for A1c in 6 months.    ----- Message from Latrice Ortega NP sent at 8/8/2020  3:05 PM CDT -----  May notify patient A1c has increased from 6.4 to 6.7 in the past six months, but is still stable. He can continue current medications with carbohydrate controlled diet and repeat A1c in 6 months.         22 yo m with sickle cell crisis w/ h/o acute chest syndrome, iron overload 2/2 transfusions, p/w sickle cell crisis .  Patient now Hypoxic- Respiratory distress with Sat 52 % on RA.  ICU Eval requested.  Pulmonary at bedside seeing patient.

## 2020-08-27 ENCOUNTER — APPOINTMENT (OUTPATIENT)
Dept: INTERNAL MEDICINE | Facility: CLINIC | Age: 25
End: 2020-08-27

## 2020-08-27 RX ORDER — AZITHROMYCIN 250 MG/1
250 TABLET, FILM COATED ORAL
Qty: 6 | Refills: 0 | Status: DISCONTINUED | COMMUNITY
Start: 2018-06-04 | End: 2020-08-27

## 2020-09-01 ENCOUNTER — APPOINTMENT (OUTPATIENT)
Dept: INTERNAL MEDICINE | Facility: CLINIC | Age: 25
End: 2020-09-01

## 2020-09-08 NOTE — HISTORY OF PRESENT ILLNESS
[de-identified] : 23 yo male with HGB SS, here for f/u. Taking his HU, 2000 mg QD. No longer missing HU, and is having fewer pain episodes. Never started l-glutamine. Has pain now and then. He is going to school full time, and working part time. \par PE: wdwn male in nad\par vss\par anicteric\par lungs- cta\par cor: rrr1/6 jese\par abd- benign\par ext: -c/c/e, no ulcers\par \par A/P- 23 yo male with HGB SS\par 1. continue HU- 2000 mg QD -take in am\par 2.  Oxycodone- 10 mg tab- #30\par ISTOP checked\par 3. routine labs\par 4. f/u three months\par 5. emphasized importance of ophtho f/u- referral made\par Kenia Nguyễn MD
unable to perform

## 2020-09-25 DIAGNOSIS — Z23 ENCOUNTER FOR IMMUNIZATION: ICD-10-CM

## 2020-09-30 ENCOUNTER — OUTPATIENT (OUTPATIENT)
Dept: OUTPATIENT SERVICES | Facility: HOSPITAL | Age: 25
LOS: 1 days | End: 2020-09-30

## 2020-09-30 ENCOUNTER — LABORATORY RESULT (OUTPATIENT)
Age: 25
End: 2020-09-30

## 2020-09-30 ENCOUNTER — APPOINTMENT (OUTPATIENT)
Dept: INTERNAL MEDICINE | Facility: CLINIC | Age: 25
End: 2020-09-30

## 2020-09-30 ENCOUNTER — MED ADMIN CHARGE (OUTPATIENT)
Age: 25
End: 2020-09-30

## 2020-09-30 VITALS — TEMPERATURE: 208.76 F

## 2020-09-30 DIAGNOSIS — Z23 ENCOUNTER FOR IMMUNIZATION: ICD-10-CM

## 2020-09-30 LAB
24R-OH-CALCIDIOL SERPL-MCNC: 20.6 NG/ML — LOW (ref 30–80)
ALBUMIN SERPL ELPH-MCNC: 4.5 G/DL — SIGNIFICANT CHANGE UP (ref 3.3–5)
ALP SERPL-CCNC: 70 U/L — SIGNIFICANT CHANGE UP (ref 40–120)
ALT FLD-CCNC: 18 U/L — SIGNIFICANT CHANGE UP (ref 4–41)
ANION GAP SERPL CALC-SCNC: 13 MMO/L — SIGNIFICANT CHANGE UP (ref 7–14)
ANISOCYTOSIS BLD QL: SIGNIFICANT CHANGE UP
AST SERPL-CCNC: 64 U/L — HIGH (ref 4–40)
BASOPHILS # BLD AUTO: 0.08 K/UL — SIGNIFICANT CHANGE UP (ref 0–0.2)
BASOPHILS NFR BLD AUTO: 0.9 % — SIGNIFICANT CHANGE UP (ref 0–2)
BASOPHILS NFR SPEC: 0 % — SIGNIFICANT CHANGE UP (ref 0–2)
BILIRUB SERPL-MCNC: 3.1 MG/DL — HIGH (ref 0.2–1.2)
BLASTS # FLD: 0 % — SIGNIFICANT CHANGE UP (ref 0–0)
BUN SERPL-MCNC: 6 MG/DL — LOW (ref 7–23)
CALCIUM SERPL-MCNC: 9.7 MG/DL — SIGNIFICANT CHANGE UP (ref 8.4–10.5)
CHLORIDE SERPL-SCNC: 103 MMOL/L — SIGNIFICANT CHANGE UP (ref 98–107)
CO2 SERPL-SCNC: 25 MMOL/L — SIGNIFICANT CHANGE UP (ref 22–31)
CREAT SERPL-MCNC: 0.59 MG/DL — SIGNIFICANT CHANGE UP (ref 0.5–1.3)
EOSINOPHIL # BLD AUTO: 0.47 K/UL — SIGNIFICANT CHANGE UP (ref 0–0.5)
EOSINOPHIL NFR BLD AUTO: 5 % — SIGNIFICANT CHANGE UP (ref 0–6)
EOSINOPHIL NFR FLD: 1.7 % — SIGNIFICANT CHANGE UP (ref 0–6)
FERRITIN SERPL-MCNC: 158.2 NG/ML — SIGNIFICANT CHANGE UP (ref 30–400)
GIANT PLATELETS BLD QL SMEAR: PRESENT — SIGNIFICANT CHANGE UP
GLUCOSE SERPL-MCNC: 93 MG/DL — SIGNIFICANT CHANGE UP (ref 70–99)
HCT VFR BLD CALC: 21.8 % — LOW (ref 39–50)
HGB BLD-MCNC: 7.4 G/DL — LOW (ref 13–17)
IMM GRANULOCYTES NFR BLD AUTO: 0.3 % — SIGNIFICANT CHANGE UP (ref 0–1.5)
LYMPHOCYTES # BLD AUTO: 3.31 K/UL — HIGH (ref 1–3.3)
LYMPHOCYTES # BLD AUTO: 35.4 % — SIGNIFICANT CHANGE UP (ref 13–44)
LYMPHOCYTES NFR SPEC AUTO: 33.9 % — SIGNIFICANT CHANGE UP (ref 13–44)
MACROCYTES BLD QL: SLIGHT — SIGNIFICANT CHANGE UP
MCHC RBC-ENTMCNC: 27.6 PG — SIGNIFICANT CHANGE UP (ref 27–34)
MCHC RBC-ENTMCNC: 33.9 % — SIGNIFICANT CHANGE UP (ref 32–36)
MCV RBC AUTO: 81.3 FL — SIGNIFICANT CHANGE UP (ref 80–100)
METAMYELOCYTES # FLD: 0 % — SIGNIFICANT CHANGE UP (ref 0–1)
MICROCYTES BLD QL: SIGNIFICANT CHANGE UP
MONOCYTES # BLD AUTO: 1.47 K/UL — HIGH (ref 0–0.9)
MONOCYTES NFR BLD AUTO: 15.7 % — HIGH (ref 2–14)
MONOCYTES NFR BLD: 8.5 % — SIGNIFICANT CHANGE UP (ref 2–9)
MYELOCYTES NFR BLD: 0 % — SIGNIFICANT CHANGE UP (ref 0–0)
NEUTROPHIL AB SER-ACNC: 49.1 % — SIGNIFICANT CHANGE UP (ref 43–77)
NEUTROPHILS # BLD AUTO: 3.99 K/UL — SIGNIFICANT CHANGE UP (ref 1.8–7.4)
NEUTROPHILS NFR BLD AUTO: 42.7 % — LOW (ref 43–77)
NEUTS BAND # BLD: 0 % — SIGNIFICANT CHANGE UP (ref 0–6)
NRBC # BLD: 1 /100WBC — SIGNIFICANT CHANGE UP
NRBC # FLD: 0.12 K/UL — SIGNIFICANT CHANGE UP (ref 0–0)
NRBC FLD-RTO: 1.3 — SIGNIFICANT CHANGE UP
OTHER - HEMATOLOGY %: 0 — SIGNIFICANT CHANGE UP
PLATELET # BLD AUTO: 555 K/UL — HIGH (ref 150–400)
PLATELET COUNT - ESTIMATE: NORMAL — SIGNIFICANT CHANGE UP
PMV BLD: 10 FL — SIGNIFICANT CHANGE UP (ref 7–13)
POIKILOCYTOSIS BLD QL AUTO: SIGNIFICANT CHANGE UP
POLYCHROMASIA BLD QL SMEAR: SIGNIFICANT CHANGE UP
POTASSIUM SERPL-MCNC: 4.2 MMOL/L — SIGNIFICANT CHANGE UP (ref 3.5–5.3)
POTASSIUM SERPL-SCNC: 4.2 MMOL/L — SIGNIFICANT CHANGE UP (ref 3.5–5.3)
PROMYELOCYTES # FLD: 0 % — SIGNIFICANT CHANGE UP (ref 0–0)
PROT SERPL-MCNC: 7.6 G/DL — SIGNIFICANT CHANGE UP (ref 6–8.3)
RBC # BLD: 2.68 M/UL — LOW (ref 4.2–5.8)
RBC # FLD: 24.9 % — HIGH (ref 10.3–14.5)
RETICS #: 190 K/UL — HIGH (ref 25–125)
RETICS/RBC NFR: 7.1 % — HIGH (ref 0.5–2.5)
SICKLE CELLS BLD QL SMEAR: SIGNIFICANT CHANGE UP
SODIUM SERPL-SCNC: 141 MMOL/L — SIGNIFICANT CHANGE UP (ref 135–145)
TARGETS BLD QL SMEAR: SIGNIFICANT CHANGE UP
VARIANT LYMPHS # BLD: 6.8 % — SIGNIFICANT CHANGE UP
WBC # BLD: 9.35 K/UL — SIGNIFICANT CHANGE UP (ref 3.8–10.5)
WBC # FLD AUTO: 9.35 K/UL — SIGNIFICANT CHANGE UP (ref 3.8–10.5)

## 2020-10-01 LAB
GAMMA INTERFERON BACKGROUND BLD IA-ACNC: 0.03 IU/ML — SIGNIFICANT CHANGE UP
HGB A MFR BLD: 0 % — LOW
HGB A2 MFR BLD: 4.2 % — HIGH (ref 2.4–3.5)
HGB F MFR BLD: 1.3 % — SIGNIFICANT CHANGE UP (ref 0–1.5)
HGB S MFR BLD: 94.5 % — HIGH (ref 0–0)
M TB IFN-G BLD-IMP: NEGATIVE — SIGNIFICANT CHANGE UP
M TB IFN-G CD4+ BCKGRND COR BLD-ACNC: 0 IU/ML — SIGNIFICANT CHANGE UP
M TB IFN-G CD4+CD8+ BCKGRND COR BLD-ACNC: 0 IU/ML — SIGNIFICANT CHANGE UP
MEV IGG SER-ACNC: >300 AU/ML — SIGNIFICANT CHANGE UP
MEV IGG+IGM SER-IMP: POSITIVE — SIGNIFICANT CHANGE UP
MUV AB SER-ACNC: POSITIVE — SIGNIFICANT CHANGE UP
MUV IGG FLD-ACNC: 96.9 AU/ML — SIGNIFICANT CHANGE UP
QUANT TB PLUS MITOGEN MINUS NIL: 2.23 IU/ML — SIGNIFICANT CHANGE UP
RUBV IGG SER-ACNC: 3.6 INDEX — SIGNIFICANT CHANGE UP
RUBV IGG SER-IMP: POSITIVE — SIGNIFICANT CHANGE UP
VZV IGG FLD QL IA: 2639 INDEX — SIGNIFICANT CHANGE UP
VZV IGG FLD QL IA: POSITIVE — SIGNIFICANT CHANGE UP

## 2020-10-02 LAB
HBV SURFACE AB SER-ACNC: 3.4 MLU/ML — LOW
HBV SURFACE AG SER-ACNC: NONREACTIVE — SIGNIFICANT CHANGE UP
HCV AB S/CO SERPL IA: 0.14 S/CO — SIGNIFICANT CHANGE UP (ref 0–0.99)
HCV AB SERPL-IMP: SIGNIFICANT CHANGE UP

## 2020-11-08 NOTE — PROVIDER CONTACT NOTE (CRITICAL VALUE NOTIFICATION) - NAME OF MD/NP/PA/DO NOTIFIED:
flank pain
MENDEZ Amado
Truman SMYTH NP
YEISON German
YEISON Laughlin
YEISON Lugo

## 2020-11-19 ENCOUNTER — APPOINTMENT (OUTPATIENT)
Dept: INTERNAL MEDICINE | Facility: CLINIC | Age: 25
End: 2020-11-19

## 2020-11-19 ENCOUNTER — NON-APPOINTMENT (OUTPATIENT)
Age: 25
End: 2020-11-19

## 2020-11-19 ENCOUNTER — OUTPATIENT (OUTPATIENT)
Dept: OUTPATIENT SERVICES | Facility: HOSPITAL | Age: 25
LOS: 1 days | End: 2020-11-19

## 2020-11-19 NOTE — HISTORY OF PRESENT ILLNESS
[Home] : at home, [unfilled] , at the time of the visit. [Other Location: e.g. Home (Enter Location, City,State)___] : at [unfilled] [Verbal consent obtained from patient] : the patient, [unfilled] [FreeTextEntry1] : 24 yo male with hgb ss for f/u. [de-identified] : 26 yo male with HGB SS for f/u. The patient understands the pros/cons/risks of telehealth.\par He has no complaints. 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 SCD- continue HU\par 2. refer ophtho\par 3. f/u 2/8 @ 12:30\par Kenia Nguyễn MD

## 2020-11-20 ENCOUNTER — OUTPATIENT (OUTPATIENT)
Dept: OUTPATIENT SERVICES | Facility: HOSPITAL | Age: 25
LOS: 1 days | End: 2020-11-20

## 2020-11-20 ENCOUNTER — APPOINTMENT (OUTPATIENT)
Dept: INTERNAL MEDICINE | Facility: CLINIC | Age: 25
End: 2020-11-20

## 2020-11-20 VITALS — TEMPERATURE: 98.4 F

## 2020-11-23 NOTE — PATIENT PROFILE ADULT. - ANESTHESIA, PREVIOUS REACTION, PROFILE
Debridement Wound Care        Problem List Items Addressed This Visit     None          Procedure Note  Indications:  Based on my examination of this patient's wound(s)/ulcer(s) today, debridement is required to promote healing and evaluate the wound base. Performed by: Karen Ellison MD    Consent obtained: Yes    Time out taken: Yes    Debridement: Excisional    Using curette the wound(s)/ulcer(s) was/were sharply debrided down through and including the removal of    subcutaneous tissue    Devitalized Tissue Debrided: slough and granulation tissue gricel the subcutanoue layer    Pre Debridement Measurements:  Are located in the Wound/Ulcer Documentation Flow Sheet    Wound/Ulcer #: 1    Post Debridement Measurements:  Wound/Ulcer Descriptions are Pre Debridement except measurements:Other: After debridement depth increased by 0.1 cm.     Wound Back (Active)   Number of days: 451       Wound Leg lower Right # 1 (Active)   Wound Image   11/23/20 1001   Dressing Status Reinforced dressing 11/23/20 1001   Cleansed Cleansed with saline 11/23/20 1104   Dressing/Treatment Xeroform;Gauze dressing/dressing sponge;ABD pad;Roll gauze;Tape/Soft cloth adhesive tape 11/23/20 1104   Wound Length (cm) 15.5 cm 11/23/20 1001   Wound Width (cm) 5.8 cm 11/23/20 1001   Wound Depth (cm) 0.3 cm 11/23/20 1001   Wound Surface Area (cm^2) 89.9 cm^2 11/23/20 1001   Change in Wound Size % 55.21 11/23/20 1001   Wound Volume (cm^3) 26.97 cm^3 11/23/20 1001   Wound Healing % 33 11/23/20 1001   Post-Procedure Length (cm) 15.6 cm 09/24/20 1116   Post-Procedure Width (cm) 535 cm 09/24/20 1116   Post-Procedure Depth (cm) 0.4 cm 09/24/20 1116   Post-Procedure Surface Area (cm^2) 8346 cm^2 09/24/20 1116   Post-Procedure Volume (cm^3) 3338.4 cm^3 09/24/20 1116   Drainage Amount Moderate 11/23/20 1001   Drainage Description Serosanguinous 11/23/20 1001   Wound Odor None 11/23/20 1001   Chana-Wound/Incision Assessment Intact 11/23/20 1001   Edges Epibole (rolled edges) 11/23/20 1001   Wound Thickness Description Full thickness 11/23/20 1001   Read-Only, Retired. Condition of Base Pink;Slough 10/29/20 0948   Read-Only, Retired. Condition of Edges Open 10/29/20 0948   Read-Only, Retired. Cleansing and Cleansing Agents Normal saline 10/29/20 0948   Read-Only, Retired. Dressing Type Applied Xeroform;ABD pad;Gauze wrap (ant); Special tape (comment) 10/29/20 1004   Read-Only, Retired. Wound Procedure Type Debridement- Surgical 09/24/20 1116   Read-Only, Retired. Procedure Time Out 1006 09/24/20 1116   Read-Only, Retired. Consent Obtained Yes 09/24/20 1116   Read-Only, Retired. Procedure Bleeding Moderate 09/24/20 1116   Read-Only, Retired. Procedure Hemostasis Silver Nitrate 09/24/20 1116   Read-Only, Retired. Type of Tissue Removed Devitalized 09/24/20 1116   Read-Only, Retired. Procedure Instrument Curette 09/24/20 1116   Read-Only, Retired. Procedure Pain Scale Numeric 3/10 09/24/20 1116   Read-Only, Retired. Debridement Procedure Performed by Dr. Vanna Valiente 09/24/20 1116   Read-Only, Retired. Post Procedure Pain Scale Numeric 0/10 09/24/20 1116   Read-Only, Retired.  Procedure Tolerated Well 09/24/20 1116   Number of days: 200       Wound Groin Right (Active)   Number of days: 178        Percent of Wound(s)/Ulcer(s) Debrided: 100%    Total Surface Area Debrided:  38 sq cm     Diabetic/Pressure/Non Pressure Ulcers only:  Ulcer:     Estimated Blood Loss:  Minimal     Hemostasis Achieved: Pressure    Procedural Pain: 1 / 10     Post Procedural Pain: 0 / 10     Response to treatment: Well tolerated by patient none

## 2020-12-10 ENCOUNTER — NON-APPOINTMENT (OUTPATIENT)
Age: 25
End: 2020-12-10

## 2020-12-17 NOTE — ED PROVIDER NOTE - NSCAREINITIATED _GEN_ER
Addended by: CARLOTA DURAN on: 12/17/2020 04:49 PM     Modules accepted: Orders     George Bullard(Resident)

## 2021-01-27 NOTE — PROVIDER CONTACT NOTE (OTHER) - REASON
Pt. hypoxic and tachypneic off of 100% non rebreather Paramedian Forehead Flap Text: A decision was made to reconstruct the defect utilizing an interpolation axial flap and a staged reconstruction.  A telfa template was made of the defect.  This telfa template was then used to outline the paramedian forehead pedicle flap.  The donor area for the pedicle flap was then injected with anesthesia.  The flap was excised through the skin and subcutaneous tissue down to the layer of the underlying musculature.  The pedicle flap was carefully excised within this deep plane to maintain its blood supply.  The edges of the donor site were undermined.   The donor site was closed in a primary fashion.  The pedicle was then rotated into position and sutured.  Once the tube was sutured into place, adequate blood supply was confirmed with blanching and refill.  The pedicle was then wrapped with xeroform gauze and dressed appropriately with a telfa and gauze bandage to ensure continued blood supply and protect the attached pedicle.

## 2021-02-08 ENCOUNTER — APPOINTMENT (OUTPATIENT)
Dept: INTERNAL MEDICINE | Facility: CLINIC | Age: 26
End: 2021-02-08

## 2021-02-08 ENCOUNTER — OUTPATIENT (OUTPATIENT)
Dept: OUTPATIENT SERVICES | Facility: HOSPITAL | Age: 26
LOS: 1 days | End: 2021-02-08

## 2021-02-08 RX ORDER — AMOXICILLIN 500 MG/1
500 TABLET, FILM COATED ORAL
Qty: 20 | Refills: 3 | Status: ACTIVE | COMMUNITY
Start: 2017-12-28 | End: 1900-01-01

## 2021-02-08 NOTE — HISTORY OF PRESENT ILLNESS
[Home] : at home, [unfilled] , at the time of the visit. [Other Location: e.g. Home (Enter Location, City,State)___] : at [unfilled] [Verbal consent obtained from patient] : the patient, [unfilled] [FreeTextEntry1] : 26 yo male with hgb ss for f/u. [de-identified] : 24 yo male with HGB SS for f/u. The patient understands the pros/cons/risks of telehealth.\par He has no complaints. 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 SCD- continue HU\par 2. refer ophtho\par 3. routine labs\par 4. f/u 4/12  @ 4:30\par 5. give website to schedule covid vaccine\par Kenia Nguyễn MD

## 2021-02-10 DIAGNOSIS — D57.1 SICKLE-CELL DISEASE WITHOUT CRISIS: ICD-10-CM

## 2021-02-10 DIAGNOSIS — E55.9 VITAMIN D DEFICIENCY, UNSPECIFIED: ICD-10-CM

## 2021-02-11 ENCOUNTER — RESULT REVIEW (OUTPATIENT)
Age: 26
End: 2021-02-11

## 2021-02-11 ENCOUNTER — APPOINTMENT (OUTPATIENT)
Dept: INTERNAL MEDICINE | Facility: CLINIC | Age: 26
End: 2021-02-11

## 2021-02-11 ENCOUNTER — OUTPATIENT (OUTPATIENT)
Dept: OUTPATIENT SERVICES | Facility: HOSPITAL | Age: 26
LOS: 1 days | End: 2021-02-11

## 2021-02-11 VITALS — TEMPERATURE: 97.6 F

## 2021-02-12 DIAGNOSIS — D57.1 SICKLE-CELL DISEASE WITHOUT CRISIS: ICD-10-CM

## 2021-02-12 LAB
24R-OH-CALCIDIOL SERPL-MCNC: 20.5 NG/ML — LOW (ref 30–80)
ALBUMIN SERPL ELPH-MCNC: 4.6 G/DL — SIGNIFICANT CHANGE UP (ref 3.3–5)
ALP SERPL-CCNC: 82 U/L — SIGNIFICANT CHANGE UP (ref 40–120)
ALT FLD-CCNC: 20 U/L — SIGNIFICANT CHANGE UP (ref 4–41)
ANION GAP SERPL CALC-SCNC: 12 MMOL/L — SIGNIFICANT CHANGE UP (ref 7–14)
ANISOCYTOSIS BLD QL: SIGNIFICANT CHANGE UP
AST SERPL-CCNC: 69 U/L — HIGH (ref 4–40)
BASOPHILS # BLD AUTO: 0.1 K/UL — SIGNIFICANT CHANGE UP (ref 0–0.2)
BASOPHILS NFR BLD AUTO: 0.9 % — SIGNIFICANT CHANGE UP (ref 0–2)
BILIRUB SERPL-MCNC: 2.6 MG/DL — HIGH (ref 0.2–1.2)
BUN SERPL-MCNC: 7 MG/DL — SIGNIFICANT CHANGE UP (ref 7–23)
CALCIUM SERPL-MCNC: 9.6 MG/DL — SIGNIFICANT CHANGE UP (ref 8.4–10.5)
CHLORIDE SERPL-SCNC: 102 MMOL/L — SIGNIFICANT CHANGE UP (ref 98–107)
CO2 SERPL-SCNC: 25 MMOL/L — SIGNIFICANT CHANGE UP (ref 22–31)
CREAT SERPL-MCNC: 0.7 MG/DL — SIGNIFICANT CHANGE UP (ref 0.5–1.3)
ELLIPTOCYTES BLD QL SMEAR: SLIGHT — SIGNIFICANT CHANGE UP
EOSINOPHIL # BLD AUTO: 0.39 K/UL — SIGNIFICANT CHANGE UP (ref 0–0.5)
EOSINOPHIL NFR BLD AUTO: 3.6 % — SIGNIFICANT CHANGE UP (ref 0–6)
FERRITIN SERPL-MCNC: 134 NG/ML — SIGNIFICANT CHANGE UP (ref 30–400)
GIANT PLATELETS BLD QL SMEAR: PRESENT — SIGNIFICANT CHANGE UP
GLUCOSE SERPL-MCNC: 105 MG/DL — HIGH (ref 70–99)
HCT VFR BLD CALC: 21.9 % — LOW (ref 39–50)
HEMOGLOBIN INTERPRETATION: SIGNIFICANT CHANGE UP
HGB A MFR BLD: 0 % — LOW
HGB A2 MFR BLD: 4.5 % — HIGH (ref 2.4–3.5)
HGB BLD-MCNC: 7.5 G/DL — LOW (ref 13–17)
HGB F MFR BLD: 1.1 % — SIGNIFICANT CHANGE UP (ref 0–1.5)
HGB S MFR BLD: 94.4 % — HIGH
HYPOCHROMIA BLD QL: SLIGHT — SIGNIFICANT CHANGE UP
IANC: 5.61 K/UL — SIGNIFICANT CHANGE UP (ref 1.5–8.5)
LYMPHOCYTES # BLD AUTO: 1.97 K/UL — SIGNIFICANT CHANGE UP (ref 1–3.3)
LYMPHOCYTES # BLD AUTO: 18 % — SIGNIFICANT CHANGE UP (ref 13–44)
MANUAL SMEAR VERIFICATION: SIGNIFICANT CHANGE UP
MCHC RBC-ENTMCNC: 27.7 PG — SIGNIFICANT CHANGE UP (ref 27–34)
MCHC RBC-ENTMCNC: 34.2 GM/DL — SIGNIFICANT CHANGE UP (ref 32–36)
MCV RBC AUTO: 80.8 FL — SIGNIFICANT CHANGE UP (ref 80–100)
MICROCYTES BLD QL: SLIGHT — SIGNIFICANT CHANGE UP
MONOCYTES # BLD AUTO: 1.09 K/UL — HIGH (ref 0–0.9)
MONOCYTES NFR BLD AUTO: 9.9 % — SIGNIFICANT CHANGE UP (ref 2–14)
NEUTROPHILS # BLD AUTO: 6.03 K/UL — SIGNIFICANT CHANGE UP (ref 1.8–7.4)
NEUTROPHILS NFR BLD AUTO: 55 % — SIGNIFICANT CHANGE UP (ref 43–77)
NRBC # BLD: 2 /100 — HIGH (ref 0–0)
OVALOCYTES BLD QL SMEAR: SLIGHT — SIGNIFICANT CHANGE UP
PLAT MORPH BLD: NORMAL — SIGNIFICANT CHANGE UP
PLATELET # BLD AUTO: 599 K/UL — HIGH (ref 150–400)
PLATELET COUNT - ESTIMATE: ABNORMAL
POIKILOCYTOSIS BLD QL AUTO: SIGNIFICANT CHANGE UP
POLYCHROMASIA BLD QL SMEAR: SIGNIFICANT CHANGE UP
POTASSIUM SERPL-MCNC: 4.5 MMOL/L — SIGNIFICANT CHANGE UP (ref 3.5–5.3)
POTASSIUM SERPL-SCNC: 4.5 MMOL/L — SIGNIFICANT CHANGE UP (ref 3.5–5.3)
PROT SERPL-MCNC: 8.4 G/DL — HIGH (ref 6–8.3)
RBC # BLD: 2.71 M/UL — LOW (ref 4.2–5.8)
RBC # BLD: 2.71 M/UL — LOW (ref 4.2–5.8)
RBC # FLD: 25.6 % — HIGH (ref 10.3–14.5)
RBC BLD AUTO: ABNORMAL
RETICS #: 226 K/UL — HIGH (ref 25–125)
RETICS/RBC NFR: 8.3 % — HIGH (ref 0.5–2.5)
SICKLE CELLS BLD QL SMEAR: SIGNIFICANT CHANGE UP
SMUDGE CELLS # BLD: PRESENT — SIGNIFICANT CHANGE UP
SODIUM SERPL-SCNC: 139 MMOL/L — SIGNIFICANT CHANGE UP (ref 135–145)
TARGETS BLD QL SMEAR: SLIGHT — SIGNIFICANT CHANGE UP
VARIANT LYMPHS # BLD: 12.6 % — HIGH (ref 0–6)
WBC # BLD: 10.97 K/UL — HIGH (ref 3.8–10.5)
WBC # FLD AUTO: 10.97 K/UL — HIGH (ref 3.8–10.5)

## 2021-03-22 ENCOUNTER — NON-APPOINTMENT (OUTPATIENT)
Age: 26
End: 2021-03-22

## 2021-04-12 ENCOUNTER — RESULT REVIEW (OUTPATIENT)
Age: 26
End: 2021-04-12

## 2021-04-12 ENCOUNTER — APPOINTMENT (OUTPATIENT)
Dept: INTERNAL MEDICINE | Facility: CLINIC | Age: 26
End: 2021-04-12
Payer: COMMERCIAL

## 2021-04-12 ENCOUNTER — OUTPATIENT (OUTPATIENT)
Dept: OUTPATIENT SERVICES | Facility: HOSPITAL | Age: 26
LOS: 1 days | End: 2021-04-12

## 2021-04-12 VITALS
HEART RATE: 80 BPM | DIASTOLIC BLOOD PRESSURE: 70 MMHG | HEIGHT: 71 IN | WEIGHT: 190 LBS | BODY MASS INDEX: 26.6 KG/M2 | OXYGEN SATURATION: 92 % | SYSTOLIC BLOOD PRESSURE: 130 MMHG

## 2021-04-12 VITALS — TEMPERATURE: 98.7 F

## 2021-04-12 DIAGNOSIS — E83.19 OTHER DISORDERS OF IRON METABOLISM: ICD-10-CM

## 2021-04-12 DIAGNOSIS — R11.0 NAUSEA: ICD-10-CM

## 2021-04-12 LAB
ALBUMIN SERPL ELPH-MCNC: 4.6 G/DL — SIGNIFICANT CHANGE UP (ref 3.3–5)
ALP SERPL-CCNC: 109 U/L — SIGNIFICANT CHANGE UP (ref 40–120)
ALT FLD-CCNC: 32 U/L — SIGNIFICANT CHANGE UP (ref 4–41)
ANION GAP SERPL CALC-SCNC: 13 MMOL/L — SIGNIFICANT CHANGE UP (ref 7–14)
AST SERPL-CCNC: 76 U/L — HIGH (ref 4–40)
BASOPHILS # BLD AUTO: 0.08 K/UL — SIGNIFICANT CHANGE UP (ref 0–0.2)
BASOPHILS NFR BLD AUTO: 0.8 % — SIGNIFICANT CHANGE UP (ref 0–2)
BILIRUB SERPL-MCNC: 2.5 MG/DL — HIGH (ref 0.2–1.2)
BUN SERPL-MCNC: 9 MG/DL — SIGNIFICANT CHANGE UP (ref 7–23)
CALCIUM SERPL-MCNC: 9.3 MG/DL — SIGNIFICANT CHANGE UP (ref 8.4–10.5)
CHLORIDE SERPL-SCNC: 100 MMOL/L — SIGNIFICANT CHANGE UP (ref 98–107)
CO2 SERPL-SCNC: 24 MMOL/L — SIGNIFICANT CHANGE UP (ref 22–31)
CREAT SERPL-MCNC: 0.7 MG/DL — SIGNIFICANT CHANGE UP (ref 0.5–1.3)
EOSINOPHIL # BLD AUTO: 0.27 K/UL — SIGNIFICANT CHANGE UP (ref 0–0.5)
EOSINOPHIL NFR BLD AUTO: 2.8 % — SIGNIFICANT CHANGE UP (ref 0–6)
FERRITIN SERPL-MCNC: 158 NG/ML — SIGNIFICANT CHANGE UP (ref 30–400)
GLUCOSE SERPL-MCNC: 87 MG/DL — SIGNIFICANT CHANGE UP (ref 70–99)
HCT VFR BLD CALC: 22.6 % — LOW (ref 39–50)
HGB BLD-MCNC: 7.7 G/DL — LOW (ref 13–17)
IANC: 4.38 K/UL — SIGNIFICANT CHANGE UP (ref 1.5–8.5)
IMM GRANULOCYTES NFR BLD AUTO: 0.3 % — SIGNIFICANT CHANGE UP (ref 0–1.5)
LYMPHOCYTES # BLD AUTO: 3.34 K/UL — HIGH (ref 1–3.3)
LYMPHOCYTES # BLD AUTO: 34.2 % — SIGNIFICANT CHANGE UP (ref 13–44)
MCHC RBC-ENTMCNC: 28.9 PG — SIGNIFICANT CHANGE UP (ref 27–34)
MCHC RBC-ENTMCNC: 34.1 GM/DL — SIGNIFICANT CHANGE UP (ref 32–36)
MCV RBC AUTO: 85 FL — SIGNIFICANT CHANGE UP (ref 80–100)
MONOCYTES # BLD AUTO: 1.66 K/UL — HIGH (ref 0–0.9)
MONOCYTES NFR BLD AUTO: 17 % — HIGH (ref 2–14)
NEUTROPHILS # BLD AUTO: 4.38 K/UL — SIGNIFICANT CHANGE UP (ref 1.8–7.4)
NEUTROPHILS NFR BLD AUTO: 44.9 % — SIGNIFICANT CHANGE UP (ref 43–77)
NRBC # BLD: 1 /100 WBCS — SIGNIFICANT CHANGE UP
NRBC # FLD: 0.1 K/UL — HIGH
PLATELET # BLD AUTO: 794 K/UL — HIGH (ref 150–400)
POTASSIUM SERPL-MCNC: 4.7 MMOL/L — SIGNIFICANT CHANGE UP (ref 3.5–5.3)
POTASSIUM SERPL-SCNC: 4.7 MMOL/L — SIGNIFICANT CHANGE UP (ref 3.5–5.3)
PROT SERPL-MCNC: 7.9 G/DL — SIGNIFICANT CHANGE UP (ref 6–8.3)
RBC # BLD: 2.66 M/UL — LOW (ref 4.2–5.8)
RBC # BLD: 2.66 M/UL — LOW (ref 4.2–5.8)
RBC # FLD: 24.8 % — HIGH (ref 10.3–14.5)
RETICS #: 147.9 K/UL — HIGH (ref 25–125)
RETICS/RBC NFR: 5.6 % — HIGH (ref 0.5–2.5)
SODIUM SERPL-SCNC: 137 MMOL/L — SIGNIFICANT CHANGE UP (ref 135–145)
WBC # BLD: 9.76 K/UL — SIGNIFICANT CHANGE UP (ref 3.8–10.5)
WBC # FLD AUTO: 9.76 K/UL — SIGNIFICANT CHANGE UP (ref 3.8–10.5)

## 2021-04-12 PROCEDURE — 99213 OFFICE O/P EST LOW 20 MIN: CPT

## 2021-04-12 RX ORDER — ONDANSETRON 4 MG/1
4 TABLET, ORALLY DISINTEGRATING ORAL EVERY 8 HOURS
Qty: 30 | Refills: 6 | Status: ACTIVE | COMMUNITY
Start: 2021-04-12 | End: 1900-01-01

## 2021-04-13 ENCOUNTER — NON-APPOINTMENT (OUTPATIENT)
Age: 26
End: 2021-04-13

## 2021-04-13 DIAGNOSIS — D57.1 SICKLE-CELL DISEASE WITHOUT CRISIS: ICD-10-CM

## 2021-04-13 DIAGNOSIS — R11.0 NAUSEA: ICD-10-CM

## 2021-04-13 DIAGNOSIS — E83.19 OTHER DISORDERS OF IRON METABOLISM: ICD-10-CM

## 2021-04-13 DIAGNOSIS — E55.9 VITAMIN D DEFICIENCY, UNSPECIFIED: ICD-10-CM

## 2021-04-13 LAB — 24R-OH-CALCIDIOL SERPL-MCNC: 19 NG/ML — LOW (ref 30–80)

## 2021-04-13 RX ORDER — VOXELOTOR 500 MG/1
500 TABLET, FILM COATED ORAL
Qty: 90 | Refills: 11 | Status: ACTIVE | COMMUNITY
Start: 2021-04-13 | End: 1900-01-01

## 2021-04-13 NOTE — HISTORY OF PRESENT ILLNESS
[Home] : at home, [unfilled] , at the time of the visit. [Other Location: e.g. Home (Enter Location, City,State)___] : at [unfilled] [Verbal consent obtained from patient] : the patient, [unfilled] [FreeTextEntry1] : 26 yo male with hgb ss for f/u. [de-identified] : 24 yo male with HGB SS for f/u. \par He has no complaints. He is still in radiation tech school\par He has not had recent pain.\par He is taking his hydroxyurea reliably. He is interested in Oxbryta as well.\par Practicing covid prevention measures, is interested in COVID vaccination, but has not yet gotten it.\par Did not get to ophtho-\par + vomiting 2 weeks ago, asst'd with fever, covid neg X 2\par \par PE: gen- wdwn male in nad\par vss\par anicteric\par lungs- cta\par cor: rrr+1/6 jese\par abd- benign\par ext:-c/c/e, no ulcers\par \par HGB 7.7\par \par a/P- 24 yo male with hGB SS\par 1. SCD- continue HU 2000 mg QD\par 2. refer ophtho\par 3. routine labs- consider Oxbryta if HGB is less than 8.5\par 4. f/u 7/19  @ 4:30\par 5. gave website/phone number to schedule covid vaccine\par Kenia Nguyễn MD

## 2021-04-14 LAB
HEMOGLOBIN INTERPRETATION: SIGNIFICANT CHANGE UP
HGB A MFR BLD: 0 % — LOW
HGB A2 MFR BLD: 4.5 % — HIGH (ref 2.4–3.5)
HGB F MFR BLD: 2.3 % — HIGH (ref 0–1.5)
HGB S MFR BLD: 93.2 % — HIGH

## 2021-04-21 NOTE — PROVIDER CONTACT NOTE (CRITICAL VALUE NOTIFICATION) - DATE AND TIME:
07-Feb-2017 17:08 07-Feb-2017 17:15 [FreeTextEntry3] : I was present with the NP during the key portions of the history and exam and performed an examination as well. I agree with the findings and plan as documented unless otherwise documented below.\par \par Overall Juan is doing very well since his recent subtotal colectomy with end ileostomy.  He has had very minimal pain in his back to school.  His stoma output has been around half a liter a day and he has not shown any signs of dehydration.  He is not on any Imodium.  He has had small amounts of bloody mucus per rectum which is expected.  I discussed the possibility of worsening proctitis which would require treatment with suppositories.  He is reporting having some increased gas and I recommended starting simethicone.  He is having no issues with the stoma but is not happy with the new supplier of wafers and we are working to get new supplies.  I began a discussion of the reconstruction which we will plan this for the summer.  I will discuss with Dr. Garcia further details.  I did review the pathology with Juan and his mother.  I would like to see Juan again in 2 months but I am of course happy to see him sooner if there are any issues or concerns.  I answered all of his and his mother's questions.

## 2021-05-10 NOTE — DISCHARGE NOTE ADULT - NS AS DC AMI YN
"Chief Complaint  Hypertension (6 month follow up), Hypothyroidism, Hyperlipidemia, Insomnia, Depression, Sinus Problem, and Hand Pain    Subjective          Kristie Franz presents to [facility] for   Hypertension  This is a chronic problem. The current episode started more than 1 year ago. The problem is unchanged. The problem is controlled.   Hypothyroidism  This is a chronic problem. The current episode started more than 1 year ago. The problem occurs constantly. The problem has been unchanged.   Hyperlipidemia  This is a chronic problem. The current episode started more than 1 year ago. The problem is controlled. Recent lipid tests were reviewed and are normal. Exacerbating diseases include hypothyroidism.   Insomnia  This is a recurrent problem. The current episode started more than 1 year ago. The problem occurs intermittently. The problem has been waxing and waning.   Depression  Visit Type: follow-up  Patient presents with the following symptoms: depressed mood and insomnia.  Patient is not experiencing: excessive worry, nervousness/anxiety, suicidal planning and thoughts of death.    Sinus Problem  This is a chronic problem. The current episode started more than 1 year ago. The problem is unchanged. There has been no fever. She is experiencing no pain.   Hand Pain   The incident occurred more than 1 week ago. There was no injury mechanism. The pain is present in the left hand and right hand. The quality of the pain is described as aching. The pain does not radiate.    The pandemic caused depression, breanna b/c she stopped citalopram.  She is better since restarting citalopram.    Review of Systems   Psychiatric/Behavioral: The patient has insomnia. The patient is not nervous/anxious.         Objective   Vital Signs:   /72 (BP Location: Left arm, Patient Position: Sitting, Cuff Size: Adult)   Pulse 68   Temp 97.7 °F (36.5 °C)   Ht 154.9 cm (61\")   Wt 63 kg (139 lb)   " SpO2 99%   BMI 26.26 kg/m²     Physical Exam  Vitals and nursing note reviewed.   Constitutional:       General: She is not in acute distress.     Appearance: She is well-developed.   HENT:      Head: Normocephalic.      Right Ear: External ear normal. Tympanic membrane is bulging. Tympanic membrane is not erythematous.      Left Ear: External ear normal. Tympanic membrane is bulging. Tympanic membrane is not erythematous.      Nose:      Right Sinus: No frontal sinus tenderness.      Left Sinus: No frontal sinus tenderness.      Mouth/Throat:      Pharynx: No oropharyngeal exudate.   Cardiovascular:      Rate and Rhythm: Normal rate and regular rhythm.      Heart sounds: Normal heart sounds.   Pulmonary:      Effort: Pulmonary effort is normal. No respiratory distress.      Breath sounds: Normal breath sounds. No wheezing or rales.   Chest:      Chest wall: No tenderness.   Musculoskeletal:         General: Deformity (enlarged joints of hands) present.      Cervical back: Normal range of motion and neck supple.   Lymphadenopathy:      Cervical: No cervical adenopathy.   Neurological:      Cranial Nerves: No cranial nerve deficit.   Psychiatric:         Behavior: Behavior normal.        Result Review :                 Assessment and Plan    Problem List Items Addressed This Visit        Unprioritized    Hypertension - Primary    Current Assessment & Plan     Hypertension is unchanged.  Continue current treatment regimen.  Dietary sodium restriction.  Weight loss.  Regular aerobic exercise.  Blood pressure will be reassessed at the next regular appointment.         Hyperlipidemia    Current Assessment & Plan     Lipid abnormalities are unchanged.  Nutritional counseling was provided.  Lipids will be reassessed in 6 months.         Mood disorder (CMS/HCC)    Current Assessment & Plan     Psychological condition is improving with treatment.  Continue current treatment regimen.  Regular aerobic exercise.  Referral to  "psychological counseling.  Psychological condition  will be reassessed at the next regular appointment.    I gave her info on \"the couch\" which she is considering.         Allergic rhinitis    Current Assessment & Plan     Sinus congestion - need mucinex 600-1200mg bid & daily allegra - call if sx persist         Acquired hypothyroidism    Current Assessment & Plan     Continue synthroid 50 mcg daily         Pain in both hands    Current Assessment & Plan     DJD of bilat hands is worsening - refer to hand surgeon         Relevant Orders    Ambulatory Referral to Hand Surgery      Other Visit Diagnoses     Menopause        Relevant Orders    DEXA Bone Density Axial    Encounter for screening mammogram for breast cancer        Relevant Orders    Mammo Screening Bilateral With CAD          Follow Up   Return in about 6 months (around 11/10/2021) for Medicare Wellness.  Patient was given instructions and counseling regarding her condition or for health maintenance advice. Please see specific information pulled into the AVS if appropriate.       " no

## 2021-05-16 NOTE — CONSULT NOTE ADULT - ATTENDING COMMENTS
I have seen and examined . Massimo Prasad with Dr. Donovan Main. We discussed with the patient and his mother the details of RBC exchange, including risks of transfusion reactions, infectious disease transmission and citrate toxicity (hypocalcemia). We have reviewed the patient's lab results, chest Xray and chest CT.    We will proceed to perform an RBC exchange today. One unit of packed red blood cells will be transfused prior to the RBC exchange.
history of SSDz p/w fever, pain crisis on IVF, pca for pain management and spirometer. He does not have respiratory distress and hypoxemia. His CXR is clear. Will continue to follow up.
02-Jul-2021

## 2021-06-04 ENCOUNTER — NON-APPOINTMENT (OUTPATIENT)
Age: 26
End: 2021-06-04

## 2021-07-19 ENCOUNTER — RESULT REVIEW (OUTPATIENT)
Age: 26
End: 2021-07-19

## 2021-07-19 ENCOUNTER — OUTPATIENT (OUTPATIENT)
Dept: OUTPATIENT SERVICES | Facility: HOSPITAL | Age: 26
LOS: 1 days | End: 2021-07-19

## 2021-07-19 ENCOUNTER — APPOINTMENT (OUTPATIENT)
Dept: INTERNAL MEDICINE | Facility: CLINIC | Age: 26
End: 2021-07-19
Payer: COMMERCIAL

## 2021-07-19 VITALS
OXYGEN SATURATION: 92 % | SYSTOLIC BLOOD PRESSURE: 122 MMHG | BODY MASS INDEX: 26.88 KG/M2 | HEIGHT: 71 IN | WEIGHT: 192 LBS | DIASTOLIC BLOOD PRESSURE: 70 MMHG | HEART RATE: 75 BPM

## 2021-07-19 DIAGNOSIS — D57.1 SICKLE-CELL DISEASE WITHOUT CRISIS: ICD-10-CM

## 2021-07-19 LAB
24R-OH-CALCIDIOL SERPL-MCNC: 28.6 NG/ML — LOW (ref 30–80)
ALBUMIN SERPL ELPH-MCNC: 4.8 G/DL — SIGNIFICANT CHANGE UP (ref 3.3–5)
ALP SERPL-CCNC: 92 U/L — SIGNIFICANT CHANGE UP (ref 40–120)
ALT FLD-CCNC: 31 U/L — SIGNIFICANT CHANGE UP (ref 4–41)
ANION GAP SERPL CALC-SCNC: 14 MMOL/L — SIGNIFICANT CHANGE UP (ref 7–14)
AST SERPL-CCNC: 65 U/L — HIGH (ref 4–40)
BASOPHILS # BLD AUTO: 0.11 K/UL — SIGNIFICANT CHANGE UP (ref 0–0.2)
BASOPHILS NFR BLD AUTO: 1.1 % — SIGNIFICANT CHANGE UP (ref 0–2)
BILIRUB SERPL-MCNC: 2.8 MG/DL — HIGH (ref 0.2–1.2)
BUN SERPL-MCNC: 10 MG/DL — SIGNIFICANT CHANGE UP (ref 7–23)
CALCIUM SERPL-MCNC: 9.6 MG/DL — SIGNIFICANT CHANGE UP (ref 8.4–10.5)
CHLORIDE SERPL-SCNC: 101 MMOL/L — SIGNIFICANT CHANGE UP (ref 98–107)
CO2 SERPL-SCNC: 23 MMOL/L — SIGNIFICANT CHANGE UP (ref 22–31)
CREAT SERPL-MCNC: 0.76 MG/DL — SIGNIFICANT CHANGE UP (ref 0.5–1.3)
EOSINOPHIL # BLD AUTO: 0.24 K/UL — SIGNIFICANT CHANGE UP (ref 0–0.5)
EOSINOPHIL NFR BLD AUTO: 2.4 % — SIGNIFICANT CHANGE UP (ref 0–6)
FERRITIN SERPL-MCNC: 206 NG/ML — SIGNIFICANT CHANGE UP (ref 30–400)
GLUCOSE SERPL-MCNC: 109 MG/DL — HIGH (ref 70–99)
HCT VFR BLD CALC: 24 % — LOW (ref 39–50)
HGB BLD-MCNC: 8.1 G/DL — LOW (ref 13–17)
IANC: 4.4 K/UL — SIGNIFICANT CHANGE UP (ref 1.5–8.5)
IMM GRANULOCYTES NFR BLD AUTO: 0.5 % — SIGNIFICANT CHANGE UP (ref 0–1.5)
LYMPHOCYTES # BLD AUTO: 3.49 K/UL — HIGH (ref 1–3.3)
LYMPHOCYTES # BLD AUTO: 35.5 % — SIGNIFICANT CHANGE UP (ref 13–44)
MCHC RBC-ENTMCNC: 27.2 PG — SIGNIFICANT CHANGE UP (ref 27–34)
MCHC RBC-ENTMCNC: 33.8 GM/DL — SIGNIFICANT CHANGE UP (ref 32–36)
MCV RBC AUTO: 80.5 FL — SIGNIFICANT CHANGE UP (ref 80–100)
MONOCYTES # BLD AUTO: 1.65 K/UL — HIGH (ref 0–0.9)
MONOCYTES NFR BLD AUTO: 16.8 % — HIGH (ref 2–14)
NEUTROPHILS # BLD AUTO: 4.29 K/UL — SIGNIFICANT CHANGE UP (ref 1.8–7.4)
NEUTROPHILS NFR BLD AUTO: 43.7 % — SIGNIFICANT CHANGE UP (ref 43–77)
NRBC # BLD: 0 /100 WBCS — SIGNIFICANT CHANGE UP
NRBC # FLD: 0.08 K/UL — HIGH
PLATELET # BLD AUTO: 553 K/UL — HIGH (ref 150–400)
POTASSIUM SERPL-MCNC: 4.4 MMOL/L — SIGNIFICANT CHANGE UP (ref 3.5–5.3)
POTASSIUM SERPL-SCNC: 4.4 MMOL/L — SIGNIFICANT CHANGE UP (ref 3.5–5.3)
PROT SERPL-MCNC: 8.7 G/DL — HIGH (ref 6–8.3)
RBC # BLD: 2.98 M/UL — LOW (ref 4.2–5.8)
RBC # BLD: 2.98 M/UL — LOW (ref 4.2–5.8)
RBC # FLD: 24.4 % — HIGH (ref 10.3–14.5)
RETICS #: 210.7 K/UL — HIGH (ref 25–125)
RETICS/RBC NFR: 6.9 % — HIGH (ref 0.5–2.5)
SODIUM SERPL-SCNC: 138 MMOL/L — SIGNIFICANT CHANGE UP (ref 135–145)
WBC # BLD: 9.83 K/UL — SIGNIFICANT CHANGE UP (ref 3.8–10.5)
WBC # FLD AUTO: 9.83 K/UL — SIGNIFICANT CHANGE UP (ref 3.8–10.5)

## 2021-07-19 PROCEDURE — 99213 OFFICE O/P EST LOW 20 MIN: CPT

## 2021-07-19 NOTE — HISTORY OF PRESENT ILLNESS
[Home] : at home, [unfilled] , at the time of the visit. [Other Location: e.g. Home (Enter Location, City,State)___] : at [unfilled] [Verbal consent obtained from patient] : the patient, [unfilled] [FreeTextEntry1] : 24 yo male with hgb ss for f/u. [de-identified] : 26 yo male with HGB SS for f/u. \par Feeling weak and nauseated yesterday, three days post-covid vax. \par Occasional sickle cell pain. \par Also has been having weakness and nausea on and off for a year. Worse with physical activity. He has not been compliant with his HU, missing as many as 4 days per week.\par No CP no dyspnea, going to the gym less frequently. \par He not gone to ophtho\par He has not had recent pain.\par \par PE: gen- wdwn male in nad\par vss- O2 sat 92\par anicteric\par lungs- cta\par cor: rrr+1/6 jese\par abd- benign\par ext:-c/c/e, no ulcers\par \par HGB 7.7\par \par a/P- 26 yo male with hGB SS\par 1. SCD- continue HU 2000 mg QD\par 2. refer ophtho\par 3. routine labs-\par 4. echo\par 5. patient to receive second covid vax\par 6. f/u 3 months\par Kenia Nguyễn MD

## 2021-07-20 LAB
HEMOGLOBIN INTERPRETATION: SIGNIFICANT CHANGE UP
HGB A MFR BLD: 0 % — LOW
HGB A2 MFR BLD: 4.5 % — HIGH (ref 2.4–3.5)
HGB F MFR BLD: 2 % — HIGH (ref 0–1.5)
HGB S MFR BLD: 93.5 % — HIGH

## 2021-09-23 ENCOUNTER — NON-APPOINTMENT (OUTPATIENT)
Age: 26
End: 2021-09-23

## 2021-10-18 ENCOUNTER — APPOINTMENT (OUTPATIENT)
Dept: INTERNAL MEDICINE | Facility: CLINIC | Age: 26
End: 2021-10-18
Payer: COMMERCIAL

## 2021-10-18 ENCOUNTER — OUTPATIENT (OUTPATIENT)
Dept: OUTPATIENT SERVICES | Facility: HOSPITAL | Age: 26
LOS: 1 days | End: 2021-10-18

## 2021-10-18 ENCOUNTER — RESULT REVIEW (OUTPATIENT)
Age: 26
End: 2021-10-18

## 2021-10-18 VITALS — TEMPERATURE: 97.3 F

## 2021-10-18 VITALS
BODY MASS INDEX: 27.16 KG/M2 | SYSTOLIC BLOOD PRESSURE: 119 MMHG | OXYGEN SATURATION: 96 % | WEIGHT: 194 LBS | HEIGHT: 71 IN | DIASTOLIC BLOOD PRESSURE: 70 MMHG | HEART RATE: 78 BPM

## 2021-10-18 VITALS — DIASTOLIC BLOOD PRESSURE: 70 MMHG | OXYGEN SATURATION: 97 % | SYSTOLIC BLOOD PRESSURE: 120 MMHG

## 2021-10-18 DIAGNOSIS — Z91.19 PATIENT'S NONCOMPLIANCE WITH OTHER MEDICAL TREATMENT AND REGIMEN: ICD-10-CM

## 2021-10-18 LAB
ALBUMIN SERPL ELPH-MCNC: 4.7 G/DL — SIGNIFICANT CHANGE UP (ref 3.3–5)
ALP SERPL-CCNC: 85 U/L — SIGNIFICANT CHANGE UP (ref 40–120)
ALT FLD-CCNC: 15 U/L — SIGNIFICANT CHANGE UP (ref 4–41)
ANION GAP SERPL CALC-SCNC: 12 MMOL/L — SIGNIFICANT CHANGE UP (ref 7–14)
ANISOCYTOSIS BLD QL: SIGNIFICANT CHANGE UP
AST SERPL-CCNC: 44 U/L — HIGH (ref 4–40)
BASOPHILS # BLD AUTO: 0.03 K/UL — SIGNIFICANT CHANGE UP (ref 0–0.2)
BASOPHILS NFR BLD AUTO: 0.8 % — SIGNIFICANT CHANGE UP (ref 0–2)
BILIRUB SERPL-MCNC: 2.1 MG/DL — HIGH (ref 0.2–1.2)
BUN SERPL-MCNC: 7 MG/DL — SIGNIFICANT CHANGE UP (ref 7–23)
CALCIUM SERPL-MCNC: 9.2 MG/DL — SIGNIFICANT CHANGE UP (ref 8.4–10.5)
CHLORIDE SERPL-SCNC: 103 MMOL/L — SIGNIFICANT CHANGE UP (ref 98–107)
CO2 SERPL-SCNC: 24 MMOL/L — SIGNIFICANT CHANGE UP (ref 22–31)
CREAT SERPL-MCNC: 0.61 MG/DL — SIGNIFICANT CHANGE UP (ref 0.5–1.3)
EOSINOPHIL # BLD AUTO: 0.05 K/UL — SIGNIFICANT CHANGE UP (ref 0–0.5)
EOSINOPHIL NFR BLD AUTO: 1.4 % — SIGNIFICANT CHANGE UP (ref 0–6)
FERRITIN SERPL-MCNC: 84 NG/ML — SIGNIFICANT CHANGE UP (ref 30–400)
GLUCOSE SERPL-MCNC: 88 MG/DL — SIGNIFICANT CHANGE UP (ref 70–99)
HCT VFR BLD CALC: 22.1 % — LOW (ref 39–50)
HGB BLD-MCNC: 8.1 G/DL — LOW (ref 13–17)
IANC: 0.75 K/UL — LOW (ref 1.5–8.5)
IMM GRANULOCYTES NFR BLD AUTO: 0.3 % — SIGNIFICANT CHANGE UP (ref 0–1.5)
LYMPHOCYTES # BLD AUTO: 2.15 K/UL — SIGNIFICANT CHANGE UP (ref 1–3.3)
LYMPHOCYTES # BLD AUTO: 58.4 % — HIGH (ref 13–44)
MACROCYTES BLD QL: SIGNIFICANT CHANGE UP
MANUAL SMEAR VERIFICATION: SIGNIFICANT CHANGE UP
MCHC RBC-ENTMCNC: 36.3 PG — HIGH (ref 27–34)
MCHC RBC-ENTMCNC: 36.7 GM/DL — HIGH (ref 32–36)
MCV RBC AUTO: 99.1 FL — SIGNIFICANT CHANGE UP (ref 80–100)
MICROCYTES BLD QL: SLIGHT — SIGNIFICANT CHANGE UP
MONOCYTES # BLD AUTO: 0.69 K/UL — SIGNIFICANT CHANGE UP (ref 0–0.9)
MONOCYTES NFR BLD AUTO: 18.8 % — HIGH (ref 2–14)
NEUTROPHILS # BLD AUTO: 0.75 K/UL — LOW (ref 1.8–7.4)
NEUTROPHILS NFR BLD AUTO: 20.3 % — LOW (ref 43–77)
NRBC # BLD: 6 /100 WBCS — SIGNIFICANT CHANGE UP
NRBC # FLD: 0.24 K/UL — HIGH
PLAT MORPH BLD: NORMAL — SIGNIFICANT CHANGE UP
PLATELET # BLD AUTO: 536 K/UL — HIGH (ref 150–400)
PLATELET COUNT - ESTIMATE: ABNORMAL
POIKILOCYTOSIS BLD QL AUTO: SIGNIFICANT CHANGE UP
POLYCHROMASIA BLD QL SMEAR: SLIGHT — SIGNIFICANT CHANGE UP
POTASSIUM SERPL-MCNC: 4.5 MMOL/L — SIGNIFICANT CHANGE UP (ref 3.5–5.3)
POTASSIUM SERPL-SCNC: 4.5 MMOL/L — SIGNIFICANT CHANGE UP (ref 3.5–5.3)
PROT SERPL-MCNC: 8.5 G/DL — HIGH (ref 6–8.3)
RBC # BLD: 2.23 M/UL — LOW (ref 4.2–5.8)
RBC # BLD: 2.23 M/UL — LOW (ref 4.2–5.8)
RBC # FLD: 22.4 % — HIGH (ref 10.3–14.5)
RBC BLD AUTO: ABNORMAL
RETICS #: 54.6 K/UL — SIGNIFICANT CHANGE UP (ref 25–125)
RETICS/RBC NFR: 2.5 % — SIGNIFICANT CHANGE UP (ref 0.5–2.5)
SICKLE CELLS BLD QL SMEAR: SIGNIFICANT CHANGE UP
SODIUM SERPL-SCNC: 139 MMOL/L — SIGNIFICANT CHANGE UP (ref 135–145)
TARGETS BLD QL SMEAR: SLIGHT — SIGNIFICANT CHANGE UP
WBC # BLD: 3.68 K/UL — LOW (ref 3.8–10.5)
WBC # FLD AUTO: 3.68 K/UL — LOW (ref 3.8–10.5)

## 2021-10-18 PROCEDURE — 99213 OFFICE O/P EST LOW 20 MIN: CPT

## 2021-10-19 PROBLEM — Z91.19 NON-COMPLIANCE: Status: ACTIVE | Noted: 2018-09-17

## 2021-10-19 LAB — 24R-OH-CALCIDIOL SERPL-MCNC: 20.5 NG/ML — LOW (ref 30–80)

## 2021-10-21 NOTE — HISTORY OF PRESENT ILLNESS
[Home] : at home, [unfilled] , at the time of the visit. [Other Location: e.g. Home (Enter Location, City,State)___] : at [unfilled] [Verbal consent obtained from patient] : the patient, [unfilled] [FreeTextEntry1] : 27 yo male with hgb ss for f/u. [de-identified] : 25 yo male with HGB SS for f/u. \par He has no complaints. He is taking a year off from school, working in the [employer]\par He has not had recent pain.\par He is taking his hydroxyurea more reliably. His insurance company had denied Oxbryta due to non-complaince with HU.\par Covid vaccinated ( required by [employer])\par Did not get to ophtho-\par \par PE: gen- wdwn male in nad\par vss\par anicteric\par lungs- cta\par cor: rrr+1/6 jese\par abd- benign\par ext:-c/c/e, no ulcers\par \par HGB 7.6\par \par a/P- 24 yo male with hGB SS\par 1. SCD- continue HU 2000 mg QD\par 2. refer ophtho\par 3. routine labs- \par 4. f/u 3 months\par Kenia Nguyễn MD\par \par addendum- \par Will decrease dose HU to 1500 mg QD\par f/u one month for repeat bloodwork.\par

## 2021-10-26 DIAGNOSIS — D57.1 SICKLE-CELL DISEASE WITHOUT CRISIS: ICD-10-CM

## 2021-10-26 DIAGNOSIS — Z91.19 PATIENT'S NONCOMPLIANCE WITH OTHER MEDICAL TREATMENT AND REGIMEN: ICD-10-CM

## 2021-11-09 ENCOUNTER — OUTPATIENT (OUTPATIENT)
Dept: OUTPATIENT SERVICES | Facility: HOSPITAL | Age: 26
LOS: 1 days | End: 2021-11-09

## 2021-11-09 ENCOUNTER — RESULT REVIEW (OUTPATIENT)
Age: 26
End: 2021-11-09

## 2021-11-09 ENCOUNTER — APPOINTMENT (OUTPATIENT)
Dept: INTERNAL MEDICINE | Facility: CLINIC | Age: 26
End: 2021-11-09

## 2021-11-09 VITALS — TEMPERATURE: 97.2 F

## 2021-11-09 DIAGNOSIS — D57.20 SICKLE-CELL/HB-C DISEASE WITHOUT CRISIS: ICD-10-CM

## 2021-11-09 LAB
ANISOCYTOSIS BLD QL: SIGNIFICANT CHANGE UP
BASOPHILS # BLD AUTO: 0.04 K/UL — SIGNIFICANT CHANGE UP (ref 0–0.2)
BASOPHILS NFR BLD AUTO: 0.9 % — SIGNIFICANT CHANGE UP (ref 0–2)
ELLIPTOCYTES BLD QL SMEAR: SIGNIFICANT CHANGE UP
EOSINOPHIL # BLD AUTO: 0.15 K/UL — SIGNIFICANT CHANGE UP (ref 0–0.5)
EOSINOPHIL NFR BLD AUTO: 3.4 % — SIGNIFICANT CHANGE UP (ref 0–6)
GIANT PLATELETS BLD QL SMEAR: PRESENT — SIGNIFICANT CHANGE UP
HCT VFR BLD CALC: 25.4 % — LOW (ref 39–50)
HGB BLD-MCNC: 9 G/DL — LOW (ref 13–17)
HYPOCHROMIA BLD QL: SLIGHT — SIGNIFICANT CHANGE UP
IANC: 1.49 K/UL — LOW (ref 1.5–8.5)
LYMPHOCYTES # BLD AUTO: 2.02 K/UL — SIGNIFICANT CHANGE UP (ref 1–3.3)
LYMPHOCYTES # BLD AUTO: 45.3 % — HIGH (ref 13–44)
MACROCYTES BLD QL: SIGNIFICANT CHANGE UP
MCHC RBC-ENTMCNC: 35.4 GM/DL — SIGNIFICANT CHANGE UP (ref 32–36)
MCHC RBC-ENTMCNC: 38.5 PG — HIGH (ref 27–34)
MCV RBC AUTO: 108.5 FL — HIGH (ref 80–100)
MONOCYTES # BLD AUTO: 0.61 K/UL — SIGNIFICANT CHANGE UP (ref 0–0.9)
MONOCYTES NFR BLD AUTO: 13.7 % — SIGNIFICANT CHANGE UP (ref 2–14)
NEUTROPHILS # BLD AUTO: 1.41 K/UL — LOW (ref 1.8–7.4)
NEUTROPHILS NFR BLD AUTO: 31.6 % — LOW (ref 43–77)
NRBC # BLD: 2 /100 — HIGH (ref 0–0)
OVALOCYTES BLD QL SMEAR: SLIGHT — SIGNIFICANT CHANGE UP
PLAT MORPH BLD: NORMAL — SIGNIFICANT CHANGE UP
PLATELET # BLD AUTO: 923 K/UL — HIGH (ref 150–400)
PLATELET COUNT - ESTIMATE: ABNORMAL
POIKILOCYTOSIS BLD QL AUTO: SIGNIFICANT CHANGE UP
POLYCHROMASIA BLD QL SMEAR: SIGNIFICANT CHANGE UP
RBC # BLD: 2.34 M/UL — LOW (ref 4.2–5.8)
RBC # BLD: 2.34 M/UL — LOW (ref 4.2–5.8)
RBC # FLD: 23 % — HIGH (ref 10.3–14.5)
RBC BLD AUTO: ABNORMAL
RETICS #: 73.9 K/UL — SIGNIFICANT CHANGE UP (ref 25–125)
RETICS/RBC NFR: 3.2 % — HIGH (ref 0.5–2.5)
SCHISTOCYTES BLD QL AUTO: SLIGHT — SIGNIFICANT CHANGE UP
SICKLE CELLS BLD QL SMEAR: SIGNIFICANT CHANGE UP
VARIANT LYMPHS # BLD: 5.1 % — SIGNIFICANT CHANGE UP (ref 0–6)
WBC # BLD: 4.47 K/UL — SIGNIFICANT CHANGE UP (ref 3.8–10.5)
WBC # FLD AUTO: 4.47 K/UL — SIGNIFICANT CHANGE UP (ref 3.8–10.5)

## 2022-01-03 ENCOUNTER — OUTPATIENT (OUTPATIENT)
Dept: OUTPATIENT SERVICES | Facility: HOSPITAL | Age: 27
LOS: 1 days | End: 2022-01-03

## 2022-01-03 ENCOUNTER — APPOINTMENT (OUTPATIENT)
Dept: INTERNAL MEDICINE | Facility: CLINIC | Age: 27
End: 2022-01-03
Payer: COMMERCIAL

## 2022-01-03 ENCOUNTER — APPOINTMENT (OUTPATIENT)
Dept: INTERNAL MEDICINE | Facility: CLINIC | Age: 27
End: 2022-01-03

## 2022-01-03 DIAGNOSIS — U07.1 COVID-19: ICD-10-CM

## 2022-01-03 DIAGNOSIS — D57.1 SICKLE-CELL DISEASE WITHOUT CRISIS: ICD-10-CM

## 2022-01-03 PROCEDURE — 99213 OFFICE O/P EST LOW 20 MIN: CPT | Mod: 95

## 2022-01-03 NOTE — HISTORY OF PRESENT ILLNESS
[Home] : at home, [unfilled] , at the time of the visit. [Other Location: e.g. Home (Enter Location, City,State)___] : at [unfilled] [Verbal consent obtained from patient] : the patient, [unfilled] [FreeTextEntry1] : 27 yo male with hgb ss for f/u. [de-identified] : 25 yo male with HGB SS for f/u. \par He has no complaints. He is taking a year off from school, working in the [employer] ( ).\par He has been covid pos for the last 4 days, is feeling well now. No cough, no fever. no current crisis pain, though he had pain this am.\par He is taking HU, 1500 mg QD, reliably\par Covid vaccinated ( required by [employer])\par Did not get to ophtho-\par last ANC- november- 1.41\par \par a/P- 24 yo male with hGB SS\par 1. SCD- continue HU 1500/1000 mg QD\par 2. routine labs- to be scheduled in one month\par 4. f/u 3/28 @ 4pm\par 5. pain- oxycodone- 10 mg tab- #30\par ISTOP checked\par 6. covid booster recommended- due february\par Kenia Nguyễn MD\par \par \par

## 2022-02-01 ENCOUNTER — RESULT REVIEW (OUTPATIENT)
Age: 27
End: 2022-02-01

## 2022-02-01 ENCOUNTER — OUTPATIENT (OUTPATIENT)
Dept: OUTPATIENT SERVICES | Facility: HOSPITAL | Age: 27
LOS: 1 days | End: 2022-02-01

## 2022-02-01 ENCOUNTER — APPOINTMENT (OUTPATIENT)
Dept: INTERNAL MEDICINE | Facility: CLINIC | Age: 27
End: 2022-02-01

## 2022-02-01 DIAGNOSIS — U07.1 COVID-19: ICD-10-CM

## 2022-02-01 DIAGNOSIS — D57.1 SICKLE-CELL DISEASE WITHOUT CRISIS: ICD-10-CM

## 2022-02-01 LAB
ALBUMIN SERPL ELPH-MCNC: 4.4 G/DL — SIGNIFICANT CHANGE UP (ref 3.3–5)
ALP SERPL-CCNC: 98 U/L — SIGNIFICANT CHANGE UP (ref 40–120)
ALT FLD-CCNC: 14 U/L — SIGNIFICANT CHANGE UP (ref 4–41)
ANION GAP SERPL CALC-SCNC: 11 MMOL/L — SIGNIFICANT CHANGE UP (ref 7–14)
AST SERPL-CCNC: 48 U/L — HIGH (ref 4–40)
BASOPHILS # BLD AUTO: 0.07 K/UL — SIGNIFICANT CHANGE UP (ref 0–0.2)
BASOPHILS NFR BLD AUTO: 0.9 % — SIGNIFICANT CHANGE UP (ref 0–2)
BILIRUB SERPL-MCNC: 2 MG/DL — HIGH (ref 0.2–1.2)
BUN SERPL-MCNC: 6 MG/DL — LOW (ref 7–23)
CALCIUM SERPL-MCNC: 9.5 MG/DL — SIGNIFICANT CHANGE UP (ref 8.4–10.5)
CHLORIDE SERPL-SCNC: 102 MMOL/L — SIGNIFICANT CHANGE UP (ref 98–107)
CO2 SERPL-SCNC: 24 MMOL/L — SIGNIFICANT CHANGE UP (ref 22–31)
CREAT SERPL-MCNC: 0.7 MG/DL — SIGNIFICANT CHANGE UP (ref 0.5–1.3)
EOSINOPHIL # BLD AUTO: 0.23 K/UL — SIGNIFICANT CHANGE UP (ref 0–0.5)
EOSINOPHIL NFR BLD AUTO: 2.9 % — SIGNIFICANT CHANGE UP (ref 0–6)
FERRITIN SERPL-MCNC: 133 NG/ML — SIGNIFICANT CHANGE UP (ref 30–400)
GLUCOSE SERPL-MCNC: 105 MG/DL — HIGH (ref 70–99)
HCT VFR BLD CALC: 23.2 % — LOW (ref 39–50)
HGB BLD-MCNC: 8.3 G/DL — LOW (ref 13–17)
IANC: 3.8 K/UL — SIGNIFICANT CHANGE UP (ref 1.5–8.5)
IMM GRANULOCYTES NFR BLD AUTO: 0.5 % — SIGNIFICANT CHANGE UP (ref 0–1.5)
LYMPHOCYTES # BLD AUTO: 3.01 K/UL — SIGNIFICANT CHANGE UP (ref 1–3.3)
LYMPHOCYTES # BLD AUTO: 38.1 % — SIGNIFICANT CHANGE UP (ref 13–44)
MCHC RBC-ENTMCNC: 33.1 PG — SIGNIFICANT CHANGE UP (ref 27–34)
MCHC RBC-ENTMCNC: 35.8 GM/DL — SIGNIFICANT CHANGE UP (ref 32–36)
MCV RBC AUTO: 92.4 FL — SIGNIFICANT CHANGE UP (ref 80–100)
MONOCYTES # BLD AUTO: 0.76 K/UL — SIGNIFICANT CHANGE UP (ref 0–0.9)
MONOCYTES NFR BLD AUTO: 9.6 % — SIGNIFICANT CHANGE UP (ref 2–14)
NEUTROPHILS # BLD AUTO: 3.8 K/UL — SIGNIFICANT CHANGE UP (ref 1.8–7.4)
NEUTROPHILS NFR BLD AUTO: 48 % — SIGNIFICANT CHANGE UP (ref 43–77)
NRBC # BLD: 4 /100 WBCS — SIGNIFICANT CHANGE UP
NRBC # FLD: 0.34 K/UL — HIGH
PLATELET # BLD AUTO: 540 K/UL — HIGH (ref 150–400)
POTASSIUM SERPL-MCNC: 4.3 MMOL/L — SIGNIFICANT CHANGE UP (ref 3.5–5.3)
POTASSIUM SERPL-SCNC: 4.3 MMOL/L — SIGNIFICANT CHANGE UP (ref 3.5–5.3)
PROT SERPL-MCNC: 7.7 G/DL — SIGNIFICANT CHANGE UP (ref 6–8.3)
RBC # BLD: 2.51 M/UL — LOW (ref 4.2–5.8)
RBC # BLD: 2.51 M/UL — LOW (ref 4.2–5.8)
RBC # FLD: 25.8 % — HIGH (ref 10.3–14.5)
RETICS #: 157.4 K/UL — HIGH (ref 25–125)
RETICS/RBC NFR: 6.3 % — HIGH (ref 0.5–2.5)
SODIUM SERPL-SCNC: 137 MMOL/L — SIGNIFICANT CHANGE UP (ref 135–145)
WBC # BLD: 7.91 K/UL — SIGNIFICANT CHANGE UP (ref 3.8–10.5)
WBC # FLD AUTO: 7.91 K/UL — SIGNIFICANT CHANGE UP (ref 3.8–10.5)

## 2022-02-02 LAB
24R-OH-CALCIDIOL SERPL-MCNC: 22.6 NG/ML — LOW (ref 30–80)
HEMOGLOBIN INTERPRETATION: SIGNIFICANT CHANGE UP
HGB A MFR BLD: 0 % — LOW
HGB A2 MFR BLD: 4 % — HIGH (ref 2.4–3.5)
HGB F MFR BLD: 6.4 % — HIGH (ref 0–1.5)
HGB S MFR BLD: 89.6 % — HIGH

## 2022-03-28 ENCOUNTER — APPOINTMENT (OUTPATIENT)
Dept: INTERNAL MEDICINE | Facility: CLINIC | Age: 27
End: 2022-03-28
Payer: COMMERCIAL

## 2022-03-28 ENCOUNTER — RESULT REVIEW (OUTPATIENT)
Age: 27
End: 2022-03-28

## 2022-03-28 ENCOUNTER — OUTPATIENT (OUTPATIENT)
Dept: OUTPATIENT SERVICES | Facility: HOSPITAL | Age: 27
LOS: 1 days | End: 2022-03-28

## 2022-03-28 ENCOUNTER — NON-APPOINTMENT (OUTPATIENT)
Age: 27
End: 2022-03-28

## 2022-03-28 VITALS
DIASTOLIC BLOOD PRESSURE: 60 MMHG | OXYGEN SATURATION: 99 % | TEMPERATURE: 96.9 F | WEIGHT: 198 LBS | HEART RATE: 106 BPM | HEIGHT: 71 IN | SYSTOLIC BLOOD PRESSURE: 106 MMHG | BODY MASS INDEX: 27.72 KG/M2

## 2022-03-28 LAB
24R-OH-CALCIDIOL SERPL-MCNC: 22 NG/ML — LOW (ref 30–80)
ALBUMIN SERPL ELPH-MCNC: 4.7 G/DL — SIGNIFICANT CHANGE UP (ref 3.3–5)
ALP SERPL-CCNC: 93 U/L — SIGNIFICANT CHANGE UP (ref 40–120)
ALT FLD-CCNC: 20 U/L — SIGNIFICANT CHANGE UP (ref 4–41)
ANION GAP SERPL CALC-SCNC: 13 MMOL/L — SIGNIFICANT CHANGE UP (ref 7–14)
AST SERPL-CCNC: 55 U/L — HIGH (ref 4–40)
BASOPHILS # BLD AUTO: 0.06 K/UL — SIGNIFICANT CHANGE UP (ref 0–0.2)
BASOPHILS NFR BLD AUTO: 0.6 % — SIGNIFICANT CHANGE UP (ref 0–2)
BILIRUB SERPL-MCNC: 2 MG/DL — HIGH (ref 0.2–1.2)
BUN SERPL-MCNC: 8 MG/DL — SIGNIFICANT CHANGE UP (ref 7–23)
CALCIUM SERPL-MCNC: 9.3 MG/DL — SIGNIFICANT CHANGE UP (ref 8.4–10.5)
CHLORIDE SERPL-SCNC: 100 MMOL/L — SIGNIFICANT CHANGE UP (ref 98–107)
CO2 SERPL-SCNC: 24 MMOL/L — SIGNIFICANT CHANGE UP (ref 22–31)
CREAT SERPL-MCNC: 0.68 MG/DL — SIGNIFICANT CHANGE UP (ref 0.5–1.3)
EGFR: 131 ML/MIN/1.73M2 — SIGNIFICANT CHANGE UP
EOSINOPHIL # BLD AUTO: 0.1 K/UL — SIGNIFICANT CHANGE UP (ref 0–0.5)
EOSINOPHIL NFR BLD AUTO: 1 % — SIGNIFICANT CHANGE UP (ref 0–6)
FERRITIN SERPL-MCNC: 96 NG/ML — SIGNIFICANT CHANGE UP (ref 30–400)
GLUCOSE SERPL-MCNC: 156 MG/DL — HIGH (ref 70–99)
HCT VFR BLD CALC: 25.2 % — LOW (ref 39–50)
HGB BLD-MCNC: 8.8 G/DL — LOW (ref 13–17)
IANC: 6.86 K/UL — SIGNIFICANT CHANGE UP (ref 1.8–7.4)
IMM GRANULOCYTES NFR BLD AUTO: 0.4 % — SIGNIFICANT CHANGE UP (ref 0–1.5)
LYMPHOCYTES # BLD AUTO: 2.1 K/UL — SIGNIFICANT CHANGE UP (ref 1–3.3)
LYMPHOCYTES # BLD AUTO: 21.4 % — SIGNIFICANT CHANGE UP (ref 13–44)
MCHC RBC-ENTMCNC: 32.2 PG — SIGNIFICANT CHANGE UP (ref 27–34)
MCHC RBC-ENTMCNC: 34.9 GM/DL — SIGNIFICANT CHANGE UP (ref 32–36)
MCV RBC AUTO: 92.3 FL — SIGNIFICANT CHANGE UP (ref 80–100)
MONOCYTES # BLD AUTO: 0.65 K/UL — SIGNIFICANT CHANGE UP (ref 0–0.9)
MONOCYTES NFR BLD AUTO: 6.6 % — SIGNIFICANT CHANGE UP (ref 2–14)
NEUTROPHILS # BLD AUTO: 6.86 K/UL — SIGNIFICANT CHANGE UP (ref 1.8–7.4)
NEUTROPHILS NFR BLD AUTO: 70 % — SIGNIFICANT CHANGE UP (ref 43–77)
NRBC # BLD: 2 /100 WBCS — SIGNIFICANT CHANGE UP
NRBC # FLD: 0.24 K/UL — HIGH
PLATELET # BLD AUTO: 546 K/UL — HIGH (ref 150–400)
POTASSIUM SERPL-MCNC: 4.1 MMOL/L — SIGNIFICANT CHANGE UP (ref 3.5–5.3)
POTASSIUM SERPL-SCNC: 4.1 MMOL/L — SIGNIFICANT CHANGE UP (ref 3.5–5.3)
PROT SERPL-MCNC: 8.6 G/DL — HIGH (ref 6–8.3)
RBC # BLD: 2.73 M/UL — LOW (ref 4.2–5.8)
RBC # BLD: 2.73 M/UL — LOW (ref 4.2–5.8)
RBC # FLD: 24.6 % — HIGH (ref 10.3–14.5)
RETICS #: 180.5 K/UL — HIGH (ref 25–125)
RETICS/RBC NFR: 6.6 % — HIGH (ref 0.5–2.5)
SODIUM SERPL-SCNC: 137 MMOL/L — SIGNIFICANT CHANGE UP (ref 135–145)
WBC # BLD: 9.81 K/UL — SIGNIFICANT CHANGE UP (ref 3.8–10.5)
WBC # FLD AUTO: 9.81 K/UL — SIGNIFICANT CHANGE UP (ref 3.8–10.5)

## 2022-03-28 PROCEDURE — 99213 OFFICE O/P EST LOW 20 MIN: CPT

## 2022-03-28 NOTE — HISTORY OF PRESENT ILLNESS
[FreeTextEntry1] : 25 yo male with hgb ss for f/u. [de-identified] : 25 yo male with HGB SS for f/u. \par He has no SCD complaints today. He has infrequent, but manageable SCD pain. He is planning on returning to school this Fall.\par He has some wrist pain, made by working at [employer]. \par He is taking HU, 1500 mg QD, reliably\par Covid vaccinated ( required by [employer]), not boostered, had covid this past January.\par Did not get to ophtho-\par \par PE: gen- wdwn male in nad\par vss\par anicteric\par lungs- cta\par cor: rrr-m\par abd- benign \par ext:-c/c/e, no ulcers\par \par a/P- 25 yo male with hGB SS\par 1. SCD- continue HU 1500 mg QD\par 2. routine labs- to be scheduled in one month\par 3. ophtho referral\par 4. f/u 3 months\par 5. pain- oxycodone- 10 mg tab- #30\par ISTOP checked\par \par Kenia Nguyễn MD\par \par \par

## 2022-03-29 DIAGNOSIS — D57.1 SICKLE-CELL DISEASE WITHOUT CRISIS: ICD-10-CM

## 2022-03-29 LAB
HEMOGLOBIN INTERPRETATION: SIGNIFICANT CHANGE UP
HGB A MFR BLD: 0 % — LOW
HGB A2 MFR BLD: 4 % — HIGH (ref 2.4–3.5)
HGB F MFR BLD: 3.3 % — HIGH (ref 0–1.5)
HGB S MFR BLD: 92.7 % — HIGH

## 2022-05-06 NOTE — DISCHARGE NOTE ADULT - FUNCTIONAL SCREEN CURRENT LEVEL: DRESSING, MLM
(0) independent Rifampin Pregnancy And Lactation Text: This medication is Pregnancy Category C and it isn't know if it is safe during pregnancy. It is also excreted in breast milk and should not be used if you are breast feeding.

## 2022-06-27 ENCOUNTER — APPOINTMENT (OUTPATIENT)
Dept: INTERNAL MEDICINE | Facility: CLINIC | Age: 27
End: 2022-06-27

## 2022-07-01 RX ORDER — NALOXONE HYDROCHLORIDE 4 MG/.1ML
4 SPRAY NASAL
Qty: 1 | Refills: 3 | Status: ACTIVE | COMMUNITY
Start: 2022-07-01 | End: 1900-01-01

## 2022-07-18 NOTE — DISCHARGE NOTE ADULT - USE THE 5 A'S (ASK, ADVISE, ASSESS, ASSIST, ARRANGE)
Please return to the ED with new or worsening symptoms. Follow up with PCP for recheck. Statement Selected

## 2022-08-22 NOTE — PATIENT PROFILE ADULT. - AS SC BRADEN ACTIVITY
How Severe Are Your Spot(S)?: mild What Is The Reason For Today's Visit?: Full Body Skin Examination What Is The Reason For Today's Visit? (Being Monitored For X): the development of new lesions (3) walks occasionally

## 2022-08-29 ENCOUNTER — OUTPATIENT (OUTPATIENT)
Dept: OUTPATIENT SERVICES | Facility: HOSPITAL | Age: 27
LOS: 1 days | End: 2022-08-29

## 2022-08-29 ENCOUNTER — LABORATORY RESULT (OUTPATIENT)
Age: 27
End: 2022-08-29

## 2022-08-29 ENCOUNTER — APPOINTMENT (OUTPATIENT)
Dept: INTERNAL MEDICINE | Facility: CLINIC | Age: 27
End: 2022-08-29

## 2022-08-29 VITALS
WEIGHT: 198.25 LBS | DIASTOLIC BLOOD PRESSURE: 70 MMHG | HEIGHT: 71 IN | SYSTOLIC BLOOD PRESSURE: 138 MMHG | TEMPERATURE: 97.9 F | BODY MASS INDEX: 27.75 KG/M2 | OXYGEN SATURATION: 95 % | RESPIRATION RATE: 16 BRPM | HEART RATE: 79 BPM

## 2022-08-29 PROCEDURE — 99214 OFFICE O/P EST MOD 30 MIN: CPT

## 2022-08-29 PROCEDURE — 83020 HEMOGLOBIN ELECTROPHORESIS: CPT | Mod: 26

## 2022-08-30 LAB
25(OH)D3 SERPL-MCNC: 21.8 NG/ML
ALBUMIN SERPL ELPH-MCNC: 5 G/DL
ALP BLD-CCNC: 92 U/L
ALT SERPL-CCNC: 16 U/L
ANION GAP SERPL CALC-SCNC: 12 MMOL/L
APPEARANCE: CLEAR
AST SERPL-CCNC: 51 U/L
BASOPHILS # BLD AUTO: 0 K/UL
BASOPHILS NFR BLD AUTO: 0 %
BILIRUB SERPL-MCNC: 2.4 MG/DL
BILIRUBIN URINE: NEGATIVE
BLOOD URINE: NORMAL
BUN SERPL-MCNC: 8 MG/DL
CALCIUM SERPL-MCNC: 10.2 MG/DL
CHLORIDE SERPL-SCNC: 103 MMOL/L
CO2 SERPL-SCNC: 26 MMOL/L
COLOR: YELLOW
CREAT SERPL-MCNC: 0.8 MG/DL
EGFR: 124 ML/MIN/1.73M2
EOSINOPHIL # BLD AUTO: 0.08 K/UL
EOSINOPHIL NFR BLD AUTO: 0.9 %
FERRITIN SERPL-MCNC: 86 NG/ML
GLUCOSE QUALITATIVE U: NEGATIVE
GLUCOSE SERPL-MCNC: 96 MG/DL
HBV SURFACE AB SER QL: NONREACTIVE
HCT VFR BLD CALC: 24.9 %
HGB BLD-MCNC: 8.1 G/DL
KETONES URINE: NEGATIVE
LEUKOCYTE ESTERASE URINE: NEGATIVE
LYMPHOCYTES # BLD AUTO: 3.21 K/UL
LYMPHOCYTES NFR BLD AUTO: 36.8 %
MAN DIFF?: NORMAL
MCHC RBC-ENTMCNC: 26.6 PG
MCHC RBC-ENTMCNC: 32.5 GM/DL
MCV RBC AUTO: 81.9 FL
MONOCYTES # BLD AUTO: 1.54 K/UL
MONOCYTES NFR BLD AUTO: 17.6 %
NEUTROPHILS # BLD AUTO: 3.68 K/UL
NEUTROPHILS NFR BLD AUTO: 42.1 %
NITRITE URINE: NEGATIVE
PH URINE: 7
PLATELET # BLD AUTO: 805 K/UL
POTASSIUM SERPL-SCNC: 4.5 MMOL/L
PROT SERPL-MCNC: 8.7 G/DL
PROTEIN URINE: ABNORMAL
RBC # BLD: 3.04 M/UL
RBC # BLD: 3.04 M/UL
RBC # FLD: 23.8 %
RETICS # AUTO: 4.9 %
RETICS AGGREG/RBC NFR: 147.3 K/UL
SODIUM SERPL-SCNC: 141 MMOL/L
SPECIFIC GRAVITY URINE: 1.01
UROBILINOGEN URINE: ABNORMAL
WBC # FLD AUTO: 8.73 K/UL

## 2022-08-30 NOTE — HISTORY OF PRESENT ILLNESS
[FreeTextEntry1] : 28 yo male with hgb ss for f/u. [de-identified] : 28 yo male with HGB SS for f/u. \par He has no SCD complaints today. He has infrequent, but manageable SCD pain. He is planning on returning to school tomorrow, studying medical informatics.\par C/O vertigo, approx 1x per month, lasting 10 minutes. he drinks water, and sits with resolution.Feels like he might pass out, sometimes nauseated. + room spinning, no ringing in ears,no hearing loss.\par He is taking HU, 1500 mg QD, reliably\par Covid vaccinated ( required by [employer]), not boostered, had covid this past January.\par Did not get to ophtho-\par \par PE: gen- wdwn male in nad\par vss\par anicteric\par lungs- cta\par cor: rrr-m\par abd- benign \par ext:-c/c/e, no ulcers\par neuro- A and O x 3\par CN intact\par motor 5/5 upper and lower\par sensory intact\par balance WNL, CB WNL\par \par a/P- 28 yo male with hGB SS\par 1. SCD- continue HU 1500 mg QD\par 2. routine labs- to be scheduled in one month\par 3. ophtho referral\par 4. vertigo- likely benign, however in SCD, need to r/o cardiac/CNS abnormalities\par refer for echo, CT angio brain ( patient is claustrophobic, ad even with sedation did not tolerate MRI in past)\par 5. pain- oxycodone- 10 mg tab- #30\par ISTOP checked\par 6. f/u post studies and at 3 months\par \par Kenia Nguyễn MD\par \par \par

## 2022-08-31 DIAGNOSIS — D57.1 SICKLE-CELL DISEASE WITHOUT CRISIS: ICD-10-CM

## 2022-08-31 DIAGNOSIS — R42 DIZZINESS AND GIDDINESS: ICD-10-CM

## 2022-09-01 LAB
HGB A MFR BLD: 0 %
HGB A2 MFR BLD: 3.5 %
HGB F MFR BLD: 2.5 %
HGB FRACT BLD-IMP: NORMAL
HGB S MFR BLD: 94 %

## 2022-09-02 LAB
M TB IFN-G BLD-IMP: NEGATIVE
QUANTIFERON TB PLUS MITOGEN MINUS NIL: 1.28 IU/ML
QUANTIFERON TB PLUS NIL: 0.02 IU/ML
QUANTIFERON TB PLUS TB1 MINUS NIL: 0 IU/ML
QUANTIFERON TB PLUS TB2 MINUS NIL: 0 IU/ML

## 2022-09-04 NOTE — ED ADULT NURSE NOTE - NS ED NURSE RECORD ANOTHER HT AND WT
Anesthesia Pre Eval Note    Anesthesia ROS/Med Hx    Overall Review:  EKG was reviewed and Echo was reviewed       Pulmonary Review:    Positive for sleep apnea   Positive for asthma  The patient is a current smoker.     Neuro/Psych Review:    Positive for neuromuscular disease - back pain, cervical disease and pre existing numbness/weakness  Positive for CVA  Positive for psychiatric history - Anxiety and Depression    Cardiovascular Review:  Exercise tolerance: good (>4 METS)  Positive for hypertension  Positive for hyperlipidemia    GI/HEPATIC/RENAL Review:   Positive for liver disease and Cirrhosis     End/Other Review:    Positive for anemia - Chronic  Positive for arthritis  Positive for chronic pain      Relevant Problems   Anesthesia Problems   (+) BEAN (obstructive sleep apnea)       Physical Exam     Airway     TM Distance: >3 FB  Patient has airway adjunct and oral ET tube    Cardiovascular  Cardiovascular exam normal  Cardio Rhythm: Regular  Cardio Rate: Normal    Head Assessment  Head assessment: Normocephalic    Abdominal Exam    Patient Demonstrates:  Distended      Anesthesia Plan:  No phone call attempted due to:  Inpatient    ASA Status: 6  Anesthesia Type: General    Induction: Inhalational  Preferred Airway Type: ETTPatient does not have a difficult airway or is not at risk of aspiration.   Maintenance: Inhalational  Premedication: None  Patient does not have an implantable electronic device requiring post procedure programming.     Postoperative analgesia plan does NOT include opiods    Checklist  Reviewed: Lab Results, EKG, Medications, Patient Summary, Allergies, NPO Status, Care Everywhere, Past Med History and Problem list    Blood Products: Not Anticipated     No

## 2022-09-15 DIAGNOSIS — R42 DIZZINESS AND GIDDINESS: ICD-10-CM

## 2022-10-19 NOTE — DISCHARGE NOTE ADULT - PLAN OF CARE
clothing Control of chronic state You were admitted for sickle cell crisis. During admission you developed fever and needed antibiotics. Your oxygen level was low and you were on supplemental oxygen. Your pain was controlled with IV pain pump. Please follow up with Dr. Nguyễn within one week. Notify Dr. Nguyễn if you have recurrence of pain, shortness of breath or fever. Use oxycodone or ibuprofen as needed for pain. Stay well hydrated. Continued treatment Please follow up with your mail order company to have refill Jadenu delivered  to your home as soon as possible. Follow up with Dr. Nguyễn within one week. Notify Dr. Nguyễn if you have worsening jaundice (yellow skin or eyes)

## 2022-11-14 ENCOUNTER — INPATIENT (INPATIENT)
Facility: HOSPITAL | Age: 27
LOS: 2 days | Discharge: HOME | End: 2022-11-17
Attending: STUDENT IN AN ORGANIZED HEALTH CARE EDUCATION/TRAINING PROGRAM | Admitting: STUDENT IN AN ORGANIZED HEALTH CARE EDUCATION/TRAINING PROGRAM
Payer: COMMERCIAL

## 2022-11-14 VITALS
DIASTOLIC BLOOD PRESSURE: 52 MMHG | SYSTOLIC BLOOD PRESSURE: 130 MMHG | TEMPERATURE: 98 F | OXYGEN SATURATION: 98 % | RESPIRATION RATE: 18 BRPM | HEART RATE: 61 BPM

## 2022-11-14 DIAGNOSIS — D72.829 ELEVATED WHITE BLOOD CELL COUNT, UNSPECIFIED: ICD-10-CM

## 2022-11-14 DIAGNOSIS — R42 DIZZINESS AND GIDDINESS: ICD-10-CM

## 2022-11-14 DIAGNOSIS — D57.1 SICKLE-CELL DISEASE WITHOUT CRISIS: ICD-10-CM

## 2022-11-14 DIAGNOSIS — Z29.9 ENCOUNTER FOR PROPHYLACTIC MEASURES, UNSPECIFIED: ICD-10-CM

## 2022-11-14 DIAGNOSIS — R74.01 ELEVATION OF LEVELS OF LIVER TRANSAMINASE LEVELS: ICD-10-CM

## 2022-11-14 LAB
ALBUMIN SERPL ELPH-MCNC: 4.6 G/DL — SIGNIFICANT CHANGE UP (ref 3.3–5)
ALP SERPL-CCNC: 86 U/L — SIGNIFICANT CHANGE UP (ref 40–120)
ALT FLD-CCNC: 67 U/L — HIGH (ref 4–41)
ANION GAP SERPL CALC-SCNC: 15 MMOL/L — HIGH (ref 7–14)
ANISOCYTOSIS BLD QL: SIGNIFICANT CHANGE UP
AST SERPL-CCNC: 173 U/L — HIGH (ref 4–40)
B PERT DNA SPEC QL NAA+PROBE: SIGNIFICANT CHANGE UP
B PERT+PARAPERT DNA PNL SPEC NAA+PROBE: SIGNIFICANT CHANGE UP
BASOPHILS # BLD AUTO: 0.51 K/UL — HIGH (ref 0–0.2)
BASOPHILS NFR BLD AUTO: 2.7 % — HIGH (ref 0–2)
BILIRUB SERPL-MCNC: 2.2 MG/DL — HIGH (ref 0.2–1.2)
BORDETELLA PARAPERTUSSIS (RAPRVP): SIGNIFICANT CHANGE UP
BUN SERPL-MCNC: 10 MG/DL — SIGNIFICANT CHANGE UP (ref 7–23)
C PNEUM DNA SPEC QL NAA+PROBE: SIGNIFICANT CHANGE UP
CALCIUM SERPL-MCNC: 9.7 MG/DL — SIGNIFICANT CHANGE UP (ref 8.4–10.5)
CHLORIDE SERPL-SCNC: 99 MMOL/L — SIGNIFICANT CHANGE UP (ref 98–107)
CO2 SERPL-SCNC: 20 MMOL/L — LOW (ref 22–31)
CREAT SERPL-MCNC: 0.61 MG/DL — SIGNIFICANT CHANGE UP (ref 0.5–1.3)
EGFR: 134 ML/MIN/1.73M2 — SIGNIFICANT CHANGE UP
ELLIPTOCYTES BLD QL SMEAR: SIGNIFICANT CHANGE UP
EOSINOPHIL # BLD AUTO: 0 K/UL — SIGNIFICANT CHANGE UP (ref 0–0.5)
EOSINOPHIL NFR BLD AUTO: 0 % — SIGNIFICANT CHANGE UP (ref 0–6)
FLUAV SUBTYP SPEC NAA+PROBE: SIGNIFICANT CHANGE UP
FLUBV RNA SPEC QL NAA+PROBE: SIGNIFICANT CHANGE UP
GIANT PLATELETS BLD QL SMEAR: PRESENT — SIGNIFICANT CHANGE UP
GLUCOSE SERPL-MCNC: 127 MG/DL — HIGH (ref 70–99)
HADV DNA SPEC QL NAA+PROBE: SIGNIFICANT CHANGE UP
HCOV 229E RNA SPEC QL NAA+PROBE: SIGNIFICANT CHANGE UP
HCOV HKU1 RNA SPEC QL NAA+PROBE: SIGNIFICANT CHANGE UP
HCOV NL63 RNA SPEC QL NAA+PROBE: SIGNIFICANT CHANGE UP
HCOV OC43 RNA SPEC QL NAA+PROBE: SIGNIFICANT CHANGE UP
HCT VFR BLD CALC: 22.6 % — LOW (ref 39–50)
HCT VFR BLD CALC: 25 % — LOW (ref 39–50)
HGB BLD-MCNC: 7.7 G/DL — LOW (ref 13–17)
HGB BLD-MCNC: 8.5 G/DL — LOW (ref 13–17)
HMPV RNA SPEC QL NAA+PROBE: SIGNIFICANT CHANGE UP
HPIV1 RNA SPEC QL NAA+PROBE: SIGNIFICANT CHANGE UP
HPIV2 RNA SPEC QL NAA+PROBE: SIGNIFICANT CHANGE UP
HPIV3 RNA SPEC QL NAA+PROBE: SIGNIFICANT CHANGE UP
HPIV4 RNA SPEC QL NAA+PROBE: SIGNIFICANT CHANGE UP
HYPOCHROMIA BLD QL: SLIGHT — SIGNIFICANT CHANGE UP
IANC: 13.8 K/UL — HIGH (ref 1.8–7.4)
LYMPHOCYTES # BLD AUTO: 18.9 % — SIGNIFICANT CHANGE UP (ref 13–44)
LYMPHOCYTES # BLD AUTO: 3.59 K/UL — HIGH (ref 1–3.3)
M PNEUMO DNA SPEC QL NAA+PROBE: SIGNIFICANT CHANGE UP
MACROCYTES BLD QL: SLIGHT — SIGNIFICANT CHANGE UP
MANUAL SMEAR VERIFICATION: SIGNIFICANT CHANGE UP
MCHC RBC-ENTMCNC: 25.8 PG — LOW (ref 27–34)
MCHC RBC-ENTMCNC: 25.8 PG — LOW (ref 27–34)
MCHC RBC-ENTMCNC: 34 GM/DL — SIGNIFICANT CHANGE UP (ref 32–36)
MCHC RBC-ENTMCNC: 34.1 GM/DL — SIGNIFICANT CHANGE UP (ref 32–36)
MCV RBC AUTO: 75.6 FL — LOW (ref 80–100)
MCV RBC AUTO: 75.8 FL — LOW (ref 80–100)
MICROCYTES BLD QL: SIGNIFICANT CHANGE UP
MONOCYTES # BLD AUTO: 1.37 K/UL — HIGH (ref 0–0.9)
MONOCYTES NFR BLD AUTO: 7.2 % — SIGNIFICANT CHANGE UP (ref 2–14)
NEUTROPHILS # BLD AUTO: 13.52 K/UL — HIGH (ref 1.8–7.4)
NEUTROPHILS NFR BLD AUTO: 71.2 % — SIGNIFICANT CHANGE UP (ref 43–77)
NRBC # BLD: 0 /100 WBCS — SIGNIFICANT CHANGE UP (ref 0–0)
NRBC # BLD: 1 /100 — HIGH (ref 0–0)
NRBC # FLD: 0.05 K/UL — HIGH (ref 0–0)
OVALOCYTES BLD QL SMEAR: SLIGHT — SIGNIFICANT CHANGE UP
PLAT MORPH BLD: NORMAL — SIGNIFICANT CHANGE UP
PLATELET # BLD AUTO: 602 K/UL — HIGH (ref 150–400)
PLATELET # BLD AUTO: 635 K/UL — HIGH (ref 150–400)
PLATELET COUNT - ESTIMATE: ABNORMAL
POIKILOCYTOSIS BLD QL AUTO: SIGNIFICANT CHANGE UP
POLYCHROMASIA BLD QL SMEAR: SLIGHT — SIGNIFICANT CHANGE UP
POTASSIUM SERPL-MCNC: 4 MMOL/L — SIGNIFICANT CHANGE UP (ref 3.5–5.3)
POTASSIUM SERPL-SCNC: 4 MMOL/L — SIGNIFICANT CHANGE UP (ref 3.5–5.3)
PROT SERPL-MCNC: 8.5 G/DL — HIGH (ref 6–8.3)
RAPID RVP RESULT: SIGNIFICANT CHANGE UP
RBC # BLD: 2.99 M/UL — LOW (ref 4.2–5.8)
RBC # BLD: 3.3 M/UL — LOW (ref 4.2–5.8)
RBC # BLD: 3.3 M/UL — LOW (ref 4.2–5.8)
RBC # FLD: 24.4 % — HIGH (ref 10.3–14.5)
RBC # FLD: 25.7 % — HIGH (ref 10.3–14.5)
RBC BLD AUTO: ABNORMAL
RETICS #: 167.6 K/UL — HIGH (ref 25–125)
RETICS/RBC NFR: 5.1 % — HIGH (ref 0.5–2.5)
RSV RNA SPEC QL NAA+PROBE: SIGNIFICANT CHANGE UP
RV+EV RNA SPEC QL NAA+PROBE: SIGNIFICANT CHANGE UP
SARS-COV-2 RNA SPEC QL NAA+PROBE: SIGNIFICANT CHANGE UP
SICKLE CELLS BLD QL SMEAR: SLIGHT — SIGNIFICANT CHANGE UP
SMUDGE CELLS # BLD: PRESENT — SIGNIFICANT CHANGE UP
SODIUM SERPL-SCNC: 134 MMOL/L — LOW (ref 135–145)
TARGETS BLD QL SMEAR: SIGNIFICANT CHANGE UP
WBC # BLD: 15.08 K/UL — HIGH (ref 3.8–10.5)
WBC # BLD: 18.99 K/UL — HIGH (ref 3.8–10.5)
WBC # FLD AUTO: 15.08 K/UL — HIGH (ref 3.8–10.5)
WBC # FLD AUTO: 18.99 K/UL — HIGH (ref 3.8–10.5)

## 2022-11-14 PROCEDURE — 70496 CT ANGIOGRAPHY HEAD: CPT | Mod: 26,MA

## 2022-11-14 PROCEDURE — 70498 CT ANGIOGRAPHY NECK: CPT | Mod: 26,MA

## 2022-11-14 PROCEDURE — 93010 ELECTROCARDIOGRAM REPORT: CPT

## 2022-11-14 PROCEDURE — 71045 X-RAY EXAM CHEST 1 VIEW: CPT | Mod: 26

## 2022-11-14 PROCEDURE — 99222 1ST HOSP IP/OBS MODERATE 55: CPT

## 2022-11-14 PROCEDURE — 99285 EMERGENCY DEPT VISIT HI MDM: CPT

## 2022-11-14 RX ORDER — DIAZEPAM 5 MG
2.5 TABLET ORAL ONCE
Refills: 0 | Status: DISCONTINUED | OUTPATIENT
Start: 2022-11-14 | End: 2022-11-14

## 2022-11-14 RX ORDER — KETOROLAC TROMETHAMINE 30 MG/ML
30 SYRINGE (ML) INJECTION ONCE
Refills: 0 | Status: DISCONTINUED | OUTPATIENT
Start: 2022-11-14 | End: 2022-11-14

## 2022-11-14 RX ORDER — SCOPALAMINE 1 MG/3D
1 PATCH, EXTENDED RELEASE TRANSDERMAL ONCE
Refills: 0 | Status: DISCONTINUED | OUTPATIENT
Start: 2022-11-14 | End: 2022-11-14

## 2022-11-14 RX ORDER — HYDROXYUREA 500 MG/1
1500 CAPSULE ORAL AT BEDTIME
Refills: 0 | Status: DISCONTINUED | OUTPATIENT
Start: 2022-11-14 | End: 2022-11-17

## 2022-11-14 RX ORDER — ENOXAPARIN SODIUM 100 MG/ML
40 INJECTION SUBCUTANEOUS EVERY 24 HOURS
Refills: 0 | Status: DISCONTINUED | OUTPATIENT
Start: 2022-11-14 | End: 2022-11-17

## 2022-11-14 RX ORDER — SODIUM CHLORIDE 9 MG/ML
1000 INJECTION INTRAMUSCULAR; INTRAVENOUS; SUBCUTANEOUS ONCE
Refills: 0 | Status: COMPLETED | OUTPATIENT
Start: 2022-11-14 | End: 2022-11-14

## 2022-11-14 RX ORDER — MECLIZINE HCL 12.5 MG
50 TABLET ORAL ONCE
Refills: 0 | Status: COMPLETED | OUTPATIENT
Start: 2022-11-14 | End: 2022-11-14

## 2022-11-14 RX ORDER — ONDANSETRON 8 MG/1
4 TABLET, FILM COATED ORAL ONCE
Refills: 0 | Status: COMPLETED | OUTPATIENT
Start: 2022-11-14 | End: 2022-11-14

## 2022-11-14 RX ORDER — FOLIC ACID 0.8 MG
1 TABLET ORAL DAILY
Refills: 0 | Status: DISCONTINUED | OUTPATIENT
Start: 2022-11-14 | End: 2022-11-17

## 2022-11-14 RX ORDER — SODIUM CHLORIDE 9 MG/ML
1000 INJECTION, SOLUTION INTRAVENOUS
Refills: 0 | Status: DISCONTINUED | OUTPATIENT
Start: 2022-11-14 | End: 2022-11-17

## 2022-11-14 RX ORDER — IBUPROFEN 200 MG
600 TABLET ORAL EVERY 6 HOURS
Refills: 0 | Status: DISCONTINUED | OUTPATIENT
Start: 2022-11-14 | End: 2022-11-17

## 2022-11-14 RX ORDER — IBUPROFEN 200 MG
1 TABLET ORAL
Qty: 0 | Refills: 0 | DISCHARGE

## 2022-11-14 RX ORDER — MECLIZINE HCL 12.5 MG
25 TABLET ORAL EVERY 8 HOURS
Refills: 0 | Status: DISCONTINUED | OUTPATIENT
Start: 2022-11-14 | End: 2022-11-17

## 2022-11-14 RX ORDER — OXYCODONE HYDROCHLORIDE 5 MG/1
5 TABLET ORAL EVERY 6 HOURS
Refills: 0 | Status: DISCONTINUED | OUTPATIENT
Start: 2022-11-14 | End: 2022-11-17

## 2022-11-14 RX ORDER — METOCLOPRAMIDE HCL 10 MG
10 TABLET ORAL ONCE
Refills: 0 | Status: DISCONTINUED | OUTPATIENT
Start: 2022-11-14 | End: 2022-11-14

## 2022-11-14 RX ADMIN — Medication 50 MILLIGRAM(S): at 07:45

## 2022-11-14 RX ADMIN — Medication 2.5 MILLIGRAM(S): at 01:03

## 2022-11-14 RX ADMIN — ONDANSETRON 4 MILLIGRAM(S): 8 TABLET, FILM COATED ORAL at 01:02

## 2022-11-14 RX ADMIN — Medication 25 MILLIGRAM(S): at 20:53

## 2022-11-14 RX ADMIN — SODIUM CHLORIDE 75 MILLILITER(S): 9 INJECTION, SOLUTION INTRAVENOUS at 15:54

## 2022-11-14 RX ADMIN — Medication 30 MILLIGRAM(S): at 01:03

## 2022-11-14 RX ADMIN — SODIUM CHLORIDE 1000 MILLILITER(S): 9 INJECTION INTRAMUSCULAR; INTRAVENOUS; SUBCUTANEOUS at 01:02

## 2022-11-14 RX ADMIN — SODIUM CHLORIDE 1000 MILLILITER(S): 9 INJECTION INTRAMUSCULAR; INTRAVENOUS; SUBCUTANEOUS at 07:27

## 2022-11-14 RX ADMIN — Medication 2.5 MILLIGRAM(S): at 07:28

## 2022-11-14 RX ADMIN — HYDROXYUREA 1500 MILLIGRAM(S): 500 CAPSULE ORAL at 21:46

## 2022-11-14 RX ADMIN — SODIUM CHLORIDE 75 MILLILITER(S): 9 INJECTION, SOLUTION INTRAVENOUS at 21:46

## 2022-11-14 NOTE — H&P ADULT - ASSESSMENT
26yo M with PMH of sickle cell anemia, acute chest, ACS, iron overload (due to repeated pRBCs transfusions), cholecystectomy and splenectomy presents with severe dizziness, nausea and vomiting since last night admitted to medicine for Acute Vertigo.

## 2022-11-14 NOTE — ED ADULT NURSE REASSESSMENT NOTE - NS ED NURSE REASSESS COMMENT FT1
tried to ambulate patient, c/o dizziness when he gets up and states that it is worsening, did not feel comfortable walking. MD Henry valdes made aware. VS stable. Comfort measures provided.
pt resting in bed, A&Ox4, ambulatory at baseline. Pt states that dizziness has improved at rest and only feels dizziness during activity. pt endorses last episode of emesis was early this morning and is currently denying N/V/D abdominal pain. RR equal and unlabored, +2 radial pulses bilaterally, heart rate and rhythm regular, abdomen soft, non-tender, nondistended. pt VS stables. denies chest pain, dizziness, HA, SOB. Comfort measures provided. call bell in reach.  will reassess.

## 2022-11-14 NOTE — CONSULT NOTE ADULT - ASSESSMENT
A 27 year old male with PMH of sickle cell anemia, ACS, has presented to the ED due to continuous room spinning vertigo. Pt states he started having brief episodes of room spinning vertigo with changes in position about 1-2 years ago, told his hematologist about it, but not has had additional work up for it. States that yesterday, the episode was much worse. Pt was sitting down doing his homework and all of the sudden having some room spinning and then got up, leaned his head back a bit and the vertigo felt worse. PT vomited about 5 times after. Pt states it has been continuous since that time. Adds he felt feverish 2 days ago but had no actual fever by thermometer.  Pt currently has some nausea, but denies any HA, vomiting, numbness, tingling, focal weakness, changes in vision/hearing (including loss of hearing, fullness in ears, tinnitus), fever, head trauma. Pg got valium, reglan and fluids in ED with no significant improvement.       Neuro exam revealed bidirectional nystagmus. HINTS positive for peripheral etiology. [facility] deferred as pt is vertiginous.     Impression: room spinning vertigo likely 2/2 to peripheral etiology such as BPPV but may be important to rule out central etiology, such as posterior circulation stroke, given most recent episode possibly nonpositional and continuous with associated nausea/vomiting, in setting of sickle cell history.     Recommendations:  []MRI brain w and w/o contrast with cuts in IAC to rule out mass lesions and posterior circulation infarcts, central pathology.  [] PRN meclizine 25MG PO Q8HR, reglan and ativan or promethazine.  [] If QTc not prolonged, then can give Zofran PRN for nausea.  []May teach epley manuever to help pt resolve symptoms by self,  if vertigo found to be of peripheral etiology  []Vestibular therapy outpatient, if vertigo found to be of peripheral etiology    To be discussed with neurology attending and will be formally staffed by attending during morning rounds. Recommendations will be finalized once signed by attending.    HPI: Patient SHABBIR LAL is a A 27 year old male with PMH of sickle cell anemia, ACS, has presented to the ED due to continuous room spinning vertigo. Pt states he started having brief episodes of room spinning vertigo with changes in position about 1-2 years ago, told his hematologist about it, but not has had additional work up for it. States that yesterday, the episode was much worse. Pt was sitting down doing his homework and all of the sudden having some room spinning and then got up, leaned his head back a bit and the vertigo felt worse. PT vomited about 5 times after. Pt states it has been continuous since that time. Adds he felt feverish 2 days ago but had no actual fever by thermometer.  Pt currently has some nausea, but denies any HA, vomiting, numbness, tingling, focal weakness, changes in vision/hearing (including loss of hearing, fullness in ears, tinnitus), fever, head trauma. Pg got valium, toradol, meclizine, reglan, zofran, scopalamine and fluids in ED with no significant improvement.     Neuro exam revealed bidirectional nystagmus. HINTS positive for peripheral etiology. [facility] ap pike deferred as pt is vertiginous.     WBC 19 ->15. Hgb 7.7.     Impression: room spinning vertigo likely 2/2 to peripheral etiology such as BPPV but may be important to rule out central etiology, such as posterior circulation stroke, given most recent episode possibly nonpositional and continuous with associated nausea/vomiting and no significant improvement, in setting of sickle cell history.     Recommendations:  []MRI brain w and w/o contrast with cuts in IAC to rule out mass lesions and posterior circulation infarcts, central pathology.  [] PRN meclizine 25MG PO Q8HR, reglan and ativan or promethazine.  [] If QTc not prolonged, then can give Zofran PRN for nausea.  []May teach epley manuever to help pt resolve symptoms by self,  if vertigo found to be of peripheral etiology  []Vestibular therapy outpatient, if vertigo found to be of peripheral etiology    To be discussed with neurology attending and will be formally staffed by attending during morning rounds. Recommendations will be finalized once signed by attending.    HPI: Patient SHABBIR LAL is a A 27 year old male with PMH of sickle cell anemia, ACS, has presented to the ED due to continuous room spinning vertigo. Pt states he started having brief episodes of room spinning vertigo with changes in position about 1-2 years ago, told his hematologist about it, but not has had additional work up for it. States that yesterday, the episode was much worse. Pt was sitting down doing his homework and all of the sudden having some room spinning and then got up, leaned his head back a bit and the vertigo felt worse. PT vomited about 5 times after. Pt states it has been continuous since that time. Adds he felt feverish 2 days ago but had no actual fever by thermometer.  Pt currently has some nausea, but denies any HA, vomiting, numbness, tingling, focal weakness, changes in vision/hearing (including loss of hearing, fullness in ears, tinnitus), fever, head trauma. Pg got valium, toradol, meclizine, reglan, zofran, scopalamine and fluids in ED with no significant improvement.     Neuro exam revealed bidirectional nystagmus. HINTS positive for peripheral etiology. Eva ap pike deferred as pt is vertiginous.     WBC 19 ->15. Hgb 7.7.     CT HEAD: No acute intracranial hemorrhage or mass effect.  CTA NECK: No evidence of hemodynamically significant stenosis using NASCET criteria. Grossly patent vertebral arteries, noting evaluation limited by venous contamination.  CTA BRAIN: No evidence of major vessel occlusion. If the patient remains symptomatic, consider brain MRI imaging follow-up.    Impression: room spinning vertigo likely 2/2 to peripheral etiology such as BPPV but may be important to rule out central etiology, such as posterior circulation stroke, given most recent episode possibly nonpositional and continuous with associated nausea/vomiting and no significant improvement, in setting of sickle cell history.     Recommendations:  []MRI brain w and w/o contrast with cuts in IAC to rule out mass lesions and posterior circulation infarcts, central pathology.  [] PRN meclizine 25MG PO Q8HR, reglan and ativan or promethazine.  [] If QTc not prolonged, then can give Zofran PRN for nausea.  []May teach epley manuever to help pt resolve symptoms by self,  if vertigo found to be of peripheral etiology  []Vestibular therapy outpatient, if vertigo found to be of peripheral etiology    To be discussed with neurology attending and will be formally staffed by attending during morning rounds. Recommendations will be finalized once signed by attending.    HPI: Patient SHABBIR LAL is a A 27 year old male with PMH of sickle cell anemia, ACS, has presented to the ED due to room spinning vertigo. Pt states he started having brief episodes of room spinning vertigo with changes in position about 1-2 years ago, told his hematologist about it, but not has had additional work up for it. States that yesterday, the episode was much worse. Pt was sitting down doing his homework and all of the sudden having some room spinning and then got up, leaned his head back a bit and the vertigo felt worse. PT vomited about 5 times after. Pt states episodes are intermittent since last night, worsened with changes in position. Adds he felt feverish 2 days ago but had no actual fever by thermometer.  Pt currently has some nausea, but denies any HA, vomiting, numbness, tingling, focal weakness, changes in vision/hearing (including loss of hearing, fullness in ears, tinnitus), fever, head trauma. Pg got valium, toradol, meclizine, reglan, zofran, scopalamine and fluids in ED with no significant improvement.   Neuro exam revealed bidirectional nystagmus. HINTS positive for peripheral etiology. Eva ap pike deferred as pt is vertiginous.     WBC 19 ->15. Hgb 7.7.     CT HEAD: No acute intracranial hemorrhage or mass effect.  CTA NECK: No evidence of hemodynamically significant stenosis using NASCET criteria. Grossly patent vertebral arteries, noting evaluation limited by venous contamination.  CTA BRAIN: No evidence of major vessel occlusion. If the patient remains symptomatic, consider brain MRI imaging follow-up.    Neuro exam revealed  nystagmus to the right. HINTS positive for peripheral etiology. x ap pike deferred as pt is vertiginous    Impression: room spinning vertigo likely 2/2 to peripheral etiology such as BPPV vs. other etiology. Lower concern for central etiology as nystagmus is unidirectional and HINTS positive for peripheral etiology and given hx of positional vertigo.     Recommendations:  [] PRN meclizine 25MG PO Q8HR, reglan and ativan or promethazine.  [] If QTc not prolonged, then can give Zofran PRN for nausea.  []May teach epley manuever to help pt resolve symptoms by self,  if vertigo found to be of peripheral etiology  []Vestibular therapy outpatient, if vertigo found to be of peripheral etiology  []MRI brain w and w/o contrast with cuts in IAC can be done outpatient, if pt continues to have symptoms after discharge    Patient can follow up with general neurology at [address] [place] [address] after discharge. Please instruct the patient to call [phone] to schedule this appointment.   Pt may also follow up with ENT    To be discussed with neurology attending and will be formally staffed by attending during morning rounds. Recommendations will be finalized once signed by attending.    HPI: Patient SHABBIR LAL is a A 27 year old male with PMH of sickle cell anemia, ACS, has presented to the ED due to room spinning vertigo. Pt states he started having brief episodes of room spinning vertigo with changes in position about 1-2 years ago, told his hematologist about it, but not has had additional work up for it. States that yesterday, the episode was much worse. Pt was sitting down doing his homework and all of the sudden having some room spinning and then got up, leaned his head back a bit and the vertigo felt worse. PT vomited about 5 times after. Pt states episodes are intermittent since last night, worsened with changes in position. Adds he felt feverish 2 days ago but had no actual fever by thermometer.  Pt currently has some nausea, but denies any HA, vomiting, numbness, tingling, focal weakness, changes in vision/hearing (including loss of hearing, fullness in ears, tinnitus), fever, head trauma. Pg got valium, toradol, meclizine, reglan, zofran, scopalamine and fluids in ED with no significant improvement.        WBC 19 ->15. Hgb 7.7.     CT HEAD: No acute intracranial hemorrhage or mass effect.  CTA NECK: No evidence of hemodynamically significant stenosis using NASCET criteria. Grossly patent vertebral arteries, noting evaluation limited by venous contamination.  CTA BRAIN: No evidence of major vessel occlusion. If the patient remains symptomatic, consider brain MRI imaging follow-up.    Neuro exam revealed  nystagmus to the right. HINTS positive for peripheral etiology. x ap gutierreze deferred as pt is vertiginous    Impression: room spinning vertigo likely 2/2 to peripheral etiology such as BPPV vs. other etiology. Lower concern for central etiology as nystagmus is unidirectional and HINTS positive for peripheral etiology and given hx of positional vertigo.     Recommendations:  [] PRN meclizine 25MG PO Q8HR, reglan and ativan or promethazine.  [] If QTc not prolonged, then can give Zofran PRN for nausea.  []May teach epley manuever to help pt resolve symptoms by self,  if vertigo found to be of peripheral etiology  []Vestibular therapy outpatient, if vertigo found to be of peripheral etiology  []MRI brain w and w/o contrast with cuts in IAC can be done outpatient, if pt continues to have symptoms after discharge    Patient can follow up with general neurology at [address] [place] [address] after discharge. Please instruct the patient to call [phone] to schedule this appointment.   Pt may also follow up with ENT    To be discussed with neurology attending and will be formally staffed by attending during morning rounds. Recommendations will be finalized once signed by attending.    HPI: Patient SHABBIR LAL is a A 27 year old male with PMH of sickle cell anemia, ACS, has presented to the ED due to room spinning vertigo. Pt states he started having brief episodes of room spinning vertigo with changes in position about 1-2 years ago, told his hematologist about it, but not has had additional work up for it. States that yesterday, the episode was much worse. Pt was sitting down doing his homework and all of the sudden having some room spinning and then got up, leaned his head back a bit and the vertigo felt worse. PT vomited about 5 times after. Pt states episodes are intermittent since last night, worsened with changes in position. Adds he felt feverish 2 days ago but had no actual fever by thermometer.  Pt currently has some nausea, but denies any HA, vomiting, numbness, tingling, focal weakness, changes in vision/hearing (including loss of hearing, fullness in ears, tinnitus), fever, head trauma. Pg got valium, toradol, meclizine, reglan, zofran, scopalamine and fluids in ED with no significant improvement.        WBC 19 ->15. Hgb 7.7.     CT HEAD: No acute intracranial hemorrhage or mass effect.  CTA NECK: No evidence of hemodynamically significant stenosis using NASCET criteria. Grossly patent vertebral arteries, noting evaluation limited by venous contamination.  CTA BRAIN: No evidence of major vessel occlusion. If the patient remains symptomatic, consider brain MRI imaging follow-up.    Neuro exam revealed  nystagmus to the right. HINTS positive for peripheral etiology. x ap muñiz deferred as pt is vertiginous    Impression: room spinning vertigo likely 2/2 to peripheral etiology such as BPPV vs. other etiology. HINTS positive for peripheral etiology and given hx of positional vertigo.     Recommendations:  [] PRN meclizine 25MG PO Q8HR, reglan and ativan or promethazine.  [] If QTc not prolonged, then can give Zofran PRN for nausea.  []May teach epley manuever to help pt resolve symptoms by self,  if vertigo found to be of peripheral etiology  []Vestibular therapy outpatient  []MRI brain w and w/o contrast with cuts in IAC can be done outpatient, if pt continues to have symptoms after discharge    Patient can follow up with general neurology at [address] after discharge. Please instruct the patient to call [phone] to schedule this appointment.   Pt may also follow up with ENT    To be discussed with neurology attending and will be formally staffed by attending during morning rounds. Recommendations will be finalized once signed by attending.

## 2022-11-14 NOTE — H&P ADULT - PROBLEM SELECTOR PLAN 3
AST/ALT: 173/67  Bilirubin: 2.2  Can be in a setting of nausea and vomiting. No abd pain.  -s/p IV NS x2L bolus by ED  -continue IV Hydration with IV 0.45%NS @ 75cc/hr  -avoid hepatotoxic meds  monitor LFTs

## 2022-11-14 NOTE — CONSULT NOTE ADULT - SUBJECTIVE AND OBJECTIVE BOX
Neurology - Consult Note    -  Spectra: [phone] ([facility]), [phone] ([facility])  -    HPI: Patient SHABBIR LAL is a A 27 year old male with PMH of sickle cell anemia, ACS, has presented to the ED due to continuous room spinning vertigo. Pt states he started having brief episodes of room spinning vertigo with changes in position about 1-2 years ago, told his hematologist about it, but not has had additional work up for it. States that yesterday, the episode was much worse. Pt was sitting down doing his homework and all of the sudden having some room spinning and then got up, leaned his head back a bit and the vertigo felt worse. PT vomited about 5 times after. Pt states it has been continuous since that time. Adds he felt feverish 2 days ago but had no actual fever by thermometer.  Pt currently has some nausea, but denies any HA, vomiting, numbness, tingling, focal weakness, changes in vision/hearing (including loss of hearing, fullness in ears, tinnitus), fever, head trauma. Pg got valium, reglan and fluids in ED with no significant improvement.         Review of Systems:   CONSTITUTIONAL: No fevers or chills  EYES AND ENT: No visual changes or no throat pain   NECK: No pain or stiffness  RESPIRATORY: No hemoptysis or shortness of breath  CARDIOVASCULAR: No chest pain or palpitations  NEUROLOGICAL: +As stated in HPI above  SKIN: No itching, burning, rashes, or lesions   All other review of systems is negative unless indicated above.    Allergies:  ceftriaxone (Unknown)      PMHx/PSHx/Family Hx: As above, otherwise see below   Sickle Cell Disease    Acute chest syndrome    Sickle cell anemia    Iron overload due to repeated red blood cell transfusions        Social Hx:  No current use of tobacco, alcohol, or illicit drugs  Lives with ***    Medications:  MEDICATIONS  (STANDING):    MEDICATIONS  (PRN):      Vitals:  T(C): 36.6 (11-14-22 @ 08:55), Max: 37 (11-14-22 @ 05:03)  HR: 65 (11-14-22 @ 08:55) (57 - 67)  BP: 119/60 (11-14-22 @ 08:55) (104/61 - 130/52)  RR: 16 (11-14-22 @ 08:55) (16 - 18)  SpO2: 96% (11-14-22 @ 08:55) (96% - 100%)    Physical Examination:  General - NAD  Cardiovascular - Peripheral pulses palpable, no edema  Eyes - Fundoscopy not performed due to safety precautions in the setting of the COVID-19 pandemic    Neurologic Exam:  Mental status - Awake, Alert, Oriented to person, place, and time. Speech fluent, repetition and naming intact. Follows simple and complex commands. Attention/concentration, recent and remote memory (including registration and recall), and fund of knowledge intact    Cranial nerves - PERRLA, VFF, EOMI, bidirectional nystagmus (6 beats both directions), face sensation (V1-V3) intact b/l, facial strength intact without asymmetry b/l, hearing intact b/l, palate with symmetric elevation, trapezius OR sternocleidomastiod 5/5 strength b/l, tongue midline on protrusion with full lateral movement  HINTS positive for peripheral etiology  [place] ap pike deferred as pt is vertiginous     Motor - Normal bulk and tone throughout. No pronator drift.  Strength testing            Deltoid      Biceps      Triceps     Wrist Extension    Wrist Flexion     Interossei         R            5                 5               5                     5                              5                        5                 5  L             5                 5               5                     5                              5                        5                 5              Hip Flexion    Hip Extension    Knee Flexion    Knee Extension    Dorsiflexion    Plantar Flexion  R              5                           5                       5                           5                            5                          5  L              5                           5                        5                           5                            5                          5    Sensation - Light touch/temperature OR pain/vibration intact throughout    DTR's -             Biceps      Triceps     Brachioradialis      Patellar    Ankle    Toes/plantar response  R             2+             2+                  2+                       2+            2+                 Down  L              2+             2+                 2+                        2+           2+                 Down    Coordination - Finger to Nose intact b/l. No tremors appreciated    Gait and station - Normal casual gait. Romberg (-)    Labs:                        7.7    15.08 )-----------( 635      ( 14 Nov 2022 05:40 )             22.6     11-14    134<L>  |  99  |  10  ----------------------------<  127<H>  4.0   |  20<L>  |  0.61    Ca    9.7      14 Nov 2022 01:05    TPro  8.5<H>  /  Alb  4.6  /  TBili  2.2<H>  /  DBili  x   /  AST  173<H>  /  ALT  67<H>  /  AlkPhos  86  11-14    CAPILLARY BLOOD GLUCOSE      POCT Blood Glucose.: 144 mg/dL (14 Nov 2022 00:21)    LIVER FUNCTIONS - ( 14 Nov 2022 01:05 )  Alb: 4.6 g/dL / Pro: 8.5 g/dL / ALK PHOS: 86 U/L / ALT: 67 U/L / AST: 173 U/L / GGT: x               CSF:                  Radiology:     Neurology - Consult Note    -  Spectra: [phone] ([facility]), [phone] ([facility])  -    HPI: Patient SHABBIR LAL is a A 27 year old male with PMH of sickle cell anemia, ACS, has presented to the ED due to continuous room spinning vertigo. Pt states he started having brief episodes of room spinning vertigo with changes in position about 1-2 years ago, told his hematologist about it, but not has had additional work up for it. States that yesterday, the episode was much worse. Pt was sitting down doing his homework and all of the sudden having some room spinning and then got up, leaned his head back a bit and the vertigo felt worse. PT vomited about 5 times after. Pt states it has been continuous since that time. Adds he felt feverish 2 days ago but had no actual fever by thermometer.  Pt currently has some nausea, but denies any HA, vomiting, numbness, tingling, focal weakness, changes in vision/hearing (including loss of hearing, fullness in ears, tinnitus), fever, head trauma. Pg got valium, toradol, meclizine, reglan, zofran, scopalamine and fluids in ED with no significant improvement.         Review of Systems:   CONSTITUTIONAL: No fevers or chills  EYES AND ENT: No visual changes or no throat pain   NECK: No pain or stiffness  RESPIRATORY: No hemoptysis or shortness of breath  CARDIOVASCULAR: No chest pain or palpitations  NEUROLOGICAL: +As stated in HPI above  SKIN: No itching, burning, rashes, or lesions   All other review of systems is negative unless indicated above.    Allergies:  ceftriaxone (Unknown)      PMHx/PSHx/Family Hx: As above, otherwise see below   Sickle Cell Disease    Acute chest syndrome    Sickle cell anemia    Iron overload due to repeated red blood cell transfusions        Social Hx:  No current use of tobacco, alcohol, or illicit drugs  Lives with ***    Medications:  MEDICATIONS  (STANDING):    MEDICATIONS  (PRN):      Vitals:  T(C): 36.6 (11-14-22 @ 08:55), Max: 37 (11-14-22 @ 05:03)  HR: 65 (11-14-22 @ 08:55) (57 - 67)  BP: 119/60 (11-14-22 @ 08:55) (104/61 - 130/52)  RR: 16 (11-14-22 @ 08:55) (16 - 18)  SpO2: 96% (11-14-22 @ 08:55) (96% - 100%)    Physical Examination:  General - NAD  Cardiovascular - Peripheral pulses palpable, no edema  Eyes - Fundoscopy not performed due to safety precautions in the setting of the COVID-19 pandemic    Neurologic Exam:  Mental status - Awake, Alert, Oriented to person, place, and time. Speech fluent, repetition and naming intact. Follows simple and complex commands. Attention/concentration, recent and remote memory (including registration and recall), and fund of knowledge intact    Cranial nerves - PERRLA, VFF, EOMI, bidirectional nystagmus (6 beats both directions), face sensation (V1-V3) intact b/l, facial strength intact without asymmetry b/l, hearing intact b/l, palate with symmetric elevation, trapezius OR sternocleidomastiod 5/5 strength b/l, tongue midline on protrusion with full lateral movement  HINTS positive for peripheral etiology  Eva ap pike deferred as pt is vertiginous     Motor - Normal bulk and tone throughout. No pronator drift.  Strength testing            Deltoid      Biceps      Triceps     Wrist Extension    Wrist Flexion     Interossei         R            5                 5               5                     5                              5                        5                 5  L             5                 5               5                     5                              5                        5                 5              Hip Flexion    Hip Extension    Knee Flexion    Knee Extension    Dorsiflexion    Plantar Flexion  R              5                           5                       5                           5                            5                          5  L              5                           5                        5                           5                            5                          5    Sensation - Light touch/temperature OR pain/vibration intact throughout    DTR's -             Biceps      Triceps     Brachioradialis      Patellar    Ankle    Toes/plantar response  R             2+             2+                  2+                       2+            2+                 Down  L              2+             2+                 2+                        2+           2+                 Down    Coordination - Finger to Nose intact b/l. No tremors appreciated    Gait and station - Normal casual gait. Romberg (-)    Labs:                        7.7    15.08 )-----------( 635      ( 14 Nov 2022 05:40 )             22.6     11-14    134<L>  |  99  |  10  ----------------------------<  127<H>  4.0   |  20<L>  |  0.61    Ca    9.7      14 Nov 2022 01:05    TPro  8.5<H>  /  Alb  4.6  /  TBili  2.2<H>  /  DBili  x   /  AST  173<H>  /  ALT  67<H>  /  AlkPhos  86  11-14    CAPILLARY BLOOD GLUCOSE      POCT Blood Glucose.: 144 mg/dL (14 Nov 2022 00:21)    LIVER FUNCTIONS - ( 14 Nov 2022 01:05 )  Alb: 4.6 g/dL / Pro: 8.5 g/dL / ALK PHOS: 86 U/L / ALT: 67 U/L / AST: 173 U/L / GGT: x               CSF:                  Radiology:     Neurology - Consult Note    -  Spectra: [phone] ([facility]), [phone] ([facility]  -    HPI: Patient SHABBIR LAL is a A 27 year old male with PMH of sickle cell anemia, ACS, has presented to the ED due to continuous room spinning vertigo. Pt states he started having brief episodes of room spinning vertigo with changes in position about 1-2 years ago, told his hematologist about it, but not has had additional work up for it. States that yesterday, the episode was much worse. Pt was sitting down doing his homework and all of the sudden having some room spinning and then got up, leaned his head back a bit and the vertigo felt worse. PT vomited about 5 times after. Pt states it has been continuous since that time. Adds he felt feverish 2 days ago but had no actual fever by thermometer.  Pt currently has some nausea, but denies any HA, vomiting, numbness, tingling, focal weakness, changes in vision/hearing (including loss of hearing, fullness in ears, tinnitus), fever, head trauma. Pg got valium, toradol, meclizine, reglan, zofran, scopalamine and fluids in ED with no significant improvement.         Review of Systems:   CONSTITUTIONAL: No fevers or chills  EYES AND ENT: No visual changes or no throat pain   NECK: No pain or stiffness  RESPIRATORY: No hemoptysis or shortness of breath  CARDIOVASCULAR: No chest pain or palpitations  NEUROLOGICAL: +As stated in HPI above  SKIN: No itching, burning, rashes, or lesions   All other review of systems is negative unless indicated above.    Allergies:  ceftriaxone (Unknown)      PMHx/PSHx/Family Hx: As above, otherwise see below   Sickle Cell Disease    Acute chest syndrome    Sickle cell anemia    Iron overload due to repeated red blood cell transfusions        Social Hx:  No current use of tobacco, alcohol, or illicit drugs  Lives with ***    Medications:  MEDICATIONS  (STANDING):    MEDICATIONS  (PRN):      Vitals:  T(C): 36.6 (11-14-22 @ 08:55), Max: 37 (11-14-22 @ 05:03)  HR: 65 (11-14-22 @ 08:55) (57 - 67)  BP: 119/60 (11-14-22 @ 08:55) (104/61 - 130/52)  RR: 16 (11-14-22 @ 08:55) (16 - 18)  SpO2: 96% (11-14-22 @ 08:55) (96% - 100%)    Physical Examination:  General - NAD  Cardiovascular - Peripheral pulses palpable, no edema  Eyes - Fundoscopy not performed due to safety precautions in the setting of the COVID-19 pandemic    Neurologic Exam:  Mental status - Awake, Alert, Oriented to person, place, and time. Speech fluent, repetition and naming intact. Follows simple and complex commands. Attention/concentration, recent and remote memory (including registration and recall), and fund of knowledge intact    Cranial nerves - PERRLA, VFF, EOMI, bidirectional nystagmus (6 beats both directions), face sensation (V1-V3) intact b/l, facial strength intact without asymmetry b/l, hearing intact b/l, palate with symmetric elevation, trapezius OR sternocleidomastiod 5/5 strength b/l, tongue midline on protrusion with full lateral movement  HINTS positive for peripheral etiology  Eva ap pike deferred as pt is vertiginous     Motor - Normal bulk and tone throughout. No pronator drift.  Strength testing            Deltoid      Biceps      Triceps     Wrist Extension    Wrist Flexion     Interossei         R            5                 5               5                     5                              5                        5                 5  L             5                 5               5                     5                              5                        5                 5              Hip Flexion    Hip Extension    Knee Flexion    Knee Extension    Dorsiflexion    Plantar Flexion  R              5                           5                       5                           5                            5                          5  L              5                           5                        5                           5                            5                          5    Sensation - Light touch/temperature OR pain/vibration intact throughout    DTR's -             Biceps      Triceps     Brachioradialis      Patellar    Ankle    Toes/plantar response  R             2+             2+                  2+                       2+            2+                 Down  L              2+             2+                 2+                        2+           2+                 Down    Coordination - Finger to Nose intact b/l. No tremors appreciated    Gait and station - Normal casual gait. Romberg (-)    Labs:                        7.7    15.08 )-----------( 635      ( 14 Nov 2022 05:40 )             22.6     11-14    134<L>  |  99  |  10  ----------------------------<  127<H>  4.0   |  20<L>  |  0.61    Ca    9.7      14 Nov 2022 01:05    TPro  8.5<H>  /  Alb  4.6  /  TBili  2.2<H>  /  DBili  x   /  AST  173<H>  /  ALT  67<H>  /  AlkPhos  86  11-14    CAPILLARY BLOOD GLUCOSE      POCT Blood Glucose.: 144 mg/dL (14 Nov 2022 00:21)    LIVER FUNCTIONS - ( 14 Nov 2022 01:05 )  Alb: 4.6 g/dL / Pro: 8.5 g/dL / ALK PHOS: 86 U/L / ALT: 67 U/L / AST: 173 U/L / GGT: x               CSF:                  Radiology:  CT HEAD: No acute intracranial hemorrhage or mass effect.    CTA NECK: No evidence of hemodynamically significant stenosis using   NASCET criteria. Grossly patent vertebral arteries, noting evaluation   limited by venous contamination.    CTA BRAIN: No evidence of major vessel occlusion.    If the patient remains symptomatic, consider brain MRI imaging follow-up.     Neurology - Consult Note    -  Spectra: [phone] ([facility]), [phone] ([facility])  -    HPI: Patient SHABBIR LAL is a A 27 year old male with PMH of sickle cell anemia, ACS, has presented to the ED due to room spinning vertigo. Pt states he started having brief episodes of room spinning vertigo with changes in position about 1-2 years ago, told his hematologist about it, but not has had additional work up for it. States that yesterday, the episode was much worse. Pt was sitting down doing his homework and all of the sudden having some room spinning and then got up, leaned his head back a bit and the vertigo felt worse. PT vomited about 5 times after. Pt states episodes are intermittent since last night, worsened with changes in position. Adds he felt feverish 2 days ago but had no actual fever by thermometer.  Pt currently has some nausea, but denies any HA, vomiting, numbness, tingling, focal weakness, changes in vision/hearing (including loss of hearing, fullness in ears, tinnitus), fever, head trauma. Pg got valium, toradol, meclizine, reglan, zofran, scopalamine and fluids in ED with no significant improvement.         Review of Systems:   CONSTITUTIONAL: No fevers or chills  EYES AND ENT: No visual changes or no throat pain   NECK: No pain or stiffness  RESPIRATORY: No hemoptysis or shortness of breath  CARDIOVASCULAR: No chest pain or palpitations  NEUROLOGICAL: +As stated in HPI above  SKIN: No itching, burning, rashes, or lesions   All other review of systems is negative unless indicated above.    Allergies:  ceftriaxone (Unknown)      PMHx/PSHx/Family Hx: As above, otherwise see below   Sickle Cell Disease    Acute chest syndrome    Sickle cell anemia    Iron overload due to repeated red blood cell transfusions        Social Hx:  No current use of tobacco, alcohol, or illicit drugs  Lives with ***    Medications:  MEDICATIONS  (STANDING):    MEDICATIONS  (PRN):      Vitals:  T(C): 36.6 (11-14-22 @ 08:55), Max: 37 (11-14-22 @ 05:03)  HR: 65 (11-14-22 @ 08:55) (57 - 67)  BP: 119/60 (11-14-22 @ 08:55) (104/61 - 130/52)  RR: 16 (11-14-22 @ 08:55) (16 - 18)  SpO2: 96% (11-14-22 @ 08:55) (96% - 100%)    Physical Examination:  General - NAD  Cardiovascular - Peripheral pulses palpable, no edema  Eyes - Fundoscopy not performed due to safety precautions in the setting of the COVID-19 pandemic    Neurologic Exam:  Mental status - Awake, Alert, Oriented to person, place, and time. Speech fluent, repetition and naming intact. Follows simple and complex commands. Attention/concentration, recent and remote memory (including registration and recall), and fund of knowledge intact    Cranial nerves - PERRLA, VFF, EOMI, nystagmus to the right), face sensation (V1-V3) intact b/l, facial strength intact without asymmetry b/l, hearing intact b/l, palate with symmetric elevation, trapezius OR sternocleidomastiod 5/5 strength b/l, tongue midline on protrusion with full lateral movement  HINTS positive for peripheral etiology  [place] ap pike deferred as pt is vertiginous     Motor - Normal bulk and tone throughout. No pronator drift.  Strength testing            Deltoid      Biceps      Triceps     Wrist Extension    Wrist Flexion     Interossei         R            5                 5               5                     5                              5                        5                 5  L             5                 5               5                     5                              5                        5                 5              Hip Flexion    Hip Extension    Knee Flexion    Knee Extension    Dorsiflexion    Plantar Flexion  R              5                           5                       5               5                            5                          5  L              5                           5                        5              5                            5                          5    Sensation - Light touch intact in all extremities.     DTR's -             Biceps      Triceps     Brachioradialis      Patellar    Ankle    Toes/plantar response  R             2+             2+                  2+                       2+            2+                 Down  L              2+             2+                 2+                        2+           2+                 Down    Coordination - Finger to Nose and heel to shin intact bilaterally. No dysdiadokinesia. No tremors appreciated    Gait and station - deferred 2/2 fall risk    Labs:                        7.7    15.08 )-----------( 635      ( 14 Nov 2022 05:40 )             22.6     11-14    134<L>  |  99  |  10  ----------------------------<  127<H>  4.0   |  20<L>  |  0.61    Ca    9.7      14 Nov 2022 01:05    TPro  8.5<H>  /  Alb  4.6  /  TBili  2.2<H>  /  DBili  x   /  AST  173<H>  /  ALT  67<H>  /  AlkPhos  86  11-14    CAPILLARY BLOOD GLUCOSE      POCT Blood Glucose.: 144 mg/dL (14 Nov 2022 00:21)    LIVER FUNCTIONS - ( 14 Nov 2022 01:05 )  Alb: 4.6 g/dL / Pro: 8.5 g/dL / ALK PHOS: 86 U/L / ALT: 67 U/L / AST: 173 U/L / GGT: x               CSF:                  Radiology:  CT HEAD: No acute intracranial hemorrhage or mass effect.    CTA NECK: No evidence of hemodynamically significant stenosis using   NASCET criteria. Grossly patent vertebral arteries, noting evaluation   limited by venous contamination.    CTA BRAIN: No evidence of major vessel occlusion.    If the patient remains symptomatic, consider brain MRI imaging follow-up.     Neurology - Consult Note    -  Spectra: [phone] ([facility]), [phone] ([facility])  -    HPI: Patient SHABBIR LAL is a A 27 year old male with PMH of sickle cell anemia, ACS, has presented to the ED due to room spinning vertigo. Pt states he started having brief episodes of room spinning vertigo with changes in position about 1-2 years ago, told his hematologist about it, but not has had additional work up for it. States that yesterday, the episode was much worse. Pt was sitting down doing his homework and all of the sudden having some room spinning and then got up, leaned his head back a bit and the vertigo felt worse. PT vomited about 5 times after. Pt states episodes are intermittent since last night, worsened with changes in position. Adds he felt feverish 2 days ago but had no actual fever by thermometer.  Pt currently has some nausea, but denies any HA, vomiting, numbness, tingling, focal weakness, changes in vision/hearing (including loss of hearing, fullness in ears, tinnitus), fever, head trauma. Pg got valium, toradol, meclizine, reglan, zofran, scopalamine and fluids in ED with no significant improvement.         Review of Systems:   CONSTITUTIONAL: No fevers or chills  EYES AND ENT: No visual changes or no throat pain   NECK: No pain or stiffness  RESPIRATORY: No hemoptysis or shortness of breath  CARDIOVASCULAR: No chest pain or palpitations  NEUROLOGICAL: +As stated in HPI above  SKIN: No itching, burning, rashes, or lesions   All other review of systems is negative unless indicated above.    Allergies:  ceftriaxone (Unknown)      PMHx/PSHx/Family Hx: As above, otherwise see below   Sickle Cell Disease    Acute chest syndrome    Sickle cell anemia    Iron overload due to repeated red blood cell transfusions        Social Hx:  No current use of tobacco, alcohol, or illicit drugs  Lives with ***    Medications:  MEDICATIONS  (STANDING):    MEDICATIONS  (PRN):      Vitals:  T(C): 36.6 (11-14-22 @ 08:55), Max: 37 (11-14-22 @ 05:03)  HR: 65 (11-14-22 @ 08:55) (57 - 67)  BP: 119/60 (11-14-22 @ 08:55) (104/61 - 130/52)  RR: 16 (11-14-22 @ 08:55) (16 - 18)  SpO2: 96% (11-14-22 @ 08:55) (96% - 100%)    Physical Examination:  General - NAD  Cardiovascular - Peripheral pulses palpable, no edema  Eyes - Fundoscopy not performed due to safety precautions in the setting of the COVID-19 pandemic    Neurologic Exam:  Mental status - Awake, Alert, Oriented to person, place, and time. Speech fluent, repetition and naming intact. Follows simple and complex commands. Attention/concentration, recent and remote memory (including registration and recall), and fund of knowledge intact    Cranial nerves - PERRLA, VFF, EOMI, nystagmus to the right), face sensation (V1-V3) intact b/l, facial strength intact without asymmetry b/l, hearing intact b/l, palate with symmetric elevation, trapezius OR sternocleidomastiod 5/5 strength b/l, tongue midline on protrusion with full lateral movement  HINTS positive for peripheral etiology  [place] hallpike deferred as pt is vertiginous     Motor - Normal bulk and tone throughout. No pronator drift.  Strength testing            Deltoid      Biceps      Triceps     Wrist Extension    Wrist Flexion     Interossei         R            5                 5               5                     5                              5                        5                 5  L             5                 5               5                     5                              5                        5                 5              Hip Flexion    Hip Extension    Knee Flexion    Knee Extension    Dorsiflexion    Plantar Flexion  R              5                           5                       5               5                            5                          5  L              5                           5                        5              5                            5                          5    Sensation - Light touch intact in all extremities.     DTR's -             Biceps      Triceps     Brachioradialis      Patellar    Ankle    Toes/plantar response  R             2+             2+                  2+                       2+            2+                 Down  L              2+             2+                 2+                        2+           2+                 Down    Coordination - Finger to Nose and heel to shin intact bilaterally. No dysdiadokinesia. No tremors appreciated    Gait and station - deferred 2/2 fall risk    Labs:                        7.7    15.08 )-----------( 635      ( 14 Nov 2022 05:40 )             22.6     11-14    134<L>  |  99  |  10  ----------------------------<  127<H>  4.0   |  20<L>  |  0.61    Ca    9.7      14 Nov 2022 01:05    TPro  8.5<H>  /  Alb  4.6  /  TBili  2.2<H>  /  DBili  x   /  AST  173<H>  /  ALT  67<H>  /  AlkPhos  86  11-14    CAPILLARY BLOOD GLUCOSE      POCT Blood Glucose.: 144 mg/dL (14 Nov 2022 00:21)    LIVER FUNCTIONS - ( 14 Nov 2022 01:05 )  Alb: 4.6 g/dL / Pro: 8.5 g/dL / ALK PHOS: 86 U/L / ALT: 67 U/L / AST: 173 U/L / GGT: x               CSF:                  Radiology:  CT HEAD: No acute intracranial hemorrhage or mass effect.    CTA NECK: No evidence of hemodynamically significant stenosis using   NASCET criteria. Grossly patent vertebral arteries, noting evaluation   limited by venous contamination.    CTA BRAIN: No evidence of major vessel occlusion.    If the patient remains symptomatic, consider brain MRI imaging follow-up.

## 2022-11-14 NOTE — ED ADULT NURSE NOTE - OBJECTIVE STATEMENT
Patient received to room 5, A/Ox4, ambulatory, p/w multiple complaints. Hx. sickle cell. Patient states he has had lower back pain, N/V, dizziness and lightheadedness x 1 hour. Endorses a room-spinning sensation. Denies fever, chills, chest pain and SOB. 22G IV placed to right wrist. Medicated per order. Safety measures maintained.

## 2022-11-14 NOTE — ED PROVIDER NOTE - ATTENDING CONTRIBUTION TO CARE
Afebrile. Awake and Alert. Lungs CTA. Heart RRR. Abdomen soft NTND. CN II-XII grossly intact. Moves all extremities without lateralization.    Vertigo with nystagmus  Non-focal neuro exam Afebrile. Awake and Alert. Lungs CTA. Heart RRR. Abdomen soft NTND. CN II-XII grossly intact. Moves all extremities without lateralization.    Vertigo with horizontal nystagmus  Non-focal neuro exam  Supportive care with serial neuro exam

## 2022-11-14 NOTE — H&P ADULT - NSHPLABSRESULTS_GEN_ALL_CORE
RVP/COVID: neg  CT HEAD: No acute intracranial hemorrhage or mass effect.  CTA NECK: No evidence of hemodynamically significant stenosis using NASCET criteria. Grossly patent vertebral arteries, noting evaluation limited by venous contamination.  CTA BRAIN: No evidence of major vessel occlusion.  CXR: Cardiomegaly with clear lungs.  EKG: NSR @ 60 bpm QTC 432ms                          7.7    15.08 )-----------( 635      ( 14 Nov 2022 05:40 )             22.6     11-14    134<L>  |  99  |  10  ----------------------------<  127<H>  4.0   |  20<L>  |  0.61    Ca    9.7      14 Nov 2022 01:05    TPro  8.5<H>  /  Alb  4.6  /  TBili  2.2<H>  /  DBili  x   /  AST  173<H>  /  ALT  67<H>  /  AlkPhos  86  11-14          LIVER FUNCTIONS - ( 14 Nov 2022 01:05 )  Alb: 4.6 g/dL / Pro: 8.5 g/dL / ALK PHOS: 86 U/L / ALT: 67 U/L / AST: 173 U/L / GGT: x           CAPILLARY BLOOD GLUCOSE      POCT Blood Glucose.: 144 mg/dL (14 Nov 2022 00:21)

## 2022-11-14 NOTE — H&P ADULT - NS ATTEND AMEND GEN_ALL_CORE FT
Seen and examined by me this afternoon in ER, feeling better but still having some dizziness/vertigo.    27 year old male with PMHx of sickle cell anemia, acute chest syndrome, ACS, iron overload (due to repeated pRBCs transfusions), s/p cholecystectomy/splenectomy who is admitted with c/o severe vertigo that seems to be secondary to BPPV. No URI symptoms, no sick contacts and RVP is negative, CTA Head/Neck is non-revealing.  Symptoms are somewhat improved although still present, minimal R sided nystagmus  Has vomited once since admission, c/w IVF's for now  Seen by Neurology and apprec recs  C/w meclizine prn, reglan/ativan prn  Pain from sickle cell is at baseline, unchanged, c/w home dose of ibuprofen as needed  Anemia is pretty much at baseline, continue to monitor for now  F/up transaminitis (sample was slightly hemolized)  Trend leukocytosis, already improving, likely reactive  PT evaluation ordered for tomorrow  Will likely benefit from vestibular therapy as outpatient  MRI brain with and w/o contrast with cuts in IAC (as outpatient if pt continues to have symptoms)  Rest as above

## 2022-11-14 NOTE — CONSULT NOTE ADULT - ATTENDING COMMENTS
Isolated, severe, positional vertigo.  Neuro ROS is negative otherwise.  No viral syndrome, no recent illness.     Exam:  Right beat nystagmus on right>up gaze.  No skew.  Head impulse with corrective saccade with head turned to the left.  No limb ataxia.  VFF.     A/P  Mr. Grider is a 26 yo man with benign paroxysmal positional vertigo - left.  I agree with work up and management as above.   Thank you.

## 2022-11-14 NOTE — ED PROVIDER NOTE - OBJECTIVE STATEMENT
26yo M with PMH of sickle cell anemia, acute chest, ACS, iron overload (due to repeated pRBCs transfusions), cholecystectomy and splenectomy presents with lower back pain, nausea, vomiting x4, dizziness, lightheadedness for 1 hour. Patient states he is more here for the dizziness room spinning in nature rather than the pain. Pt states his pain can be tolerated with Toradol, so requests Toradol. Has had similar room spinning sensations in the past. Had head imaging in the past that has been negative per patient. Denies falls or trauma. Compliant with medications.

## 2022-11-14 NOTE — ED ADULT TRIAGE NOTE - CHIEF COMPLAINT QUOTE
r/o sickle cell crisis    pt c/o lower back pain, nausea, vomiting x4, dizziness, lightheadedness for 1 hour.  pmhx- sickle cell, acute chest

## 2022-11-14 NOTE — ED PROVIDER NOTE - NSICDXPASTSURGICALHX_GEN_ALL_CORE_FT
PAST SURGICAL HISTORY:  S/P Laparoscopic Cholecystectomy     S/P Laparotomy for spleen removal     S/P Splenectomy

## 2022-11-14 NOTE — H&P ADULT - PROBLEM SELECTOR PLAN 2
WBC: 18.99-->15.08  Can be reactive in a setting of nausea and vomiting, but r/o infectious process  CXR: Cardiomegaly with clear lungs.  -check UA

## 2022-11-14 NOTE — ED PROVIDER NOTE - CLINICAL SUMMARY MEDICAL DECISION MAKING FREE TEXT BOX
26yo M with PMH of sickle cell anemia, acute chest, ACS, iron overload (due to repeated pRBCs transfusions), cholecystectomy and splenectomy presents with lower back pain, nausea, vomiting x4, dizziness, lightheadedness for 1 hour. C/f vertigo like symptoms compounded by ?crisis. Plan to give analgesics, antiemetics, IVF, obtain blood work, reassess

## 2022-11-14 NOTE — CHART NOTE - NSCHARTNOTEFT_GEN_A_CORE
Patient Name: Massimo Zendejas Date: 1995  Address: [address] [place] [id number]: Male  Rx Written	Rx Dispensed	Drug	Quantity	Days Supply	Prescriber Name	Prescriber Diamond #	Payment Method	Dispenser  08/29/2022	08/31/2022	oxycodone hcl (ir) 10 mg tab	30	8	Kenia Nguyễn	[id number]	[facility] #[id number]  07/01/2022	07/01/2022	oxycodone hcl (ir) 10 mg tab	30	7	Kenia Nguyễn	[id number]	[facility] #[id number]  03/28/2022	03/29/2022	oxycodone hcl (ir) 10 mg tab	30	8	Kenia Nguyễn	[id number]	[facility] #[id number]  01/03/2022	01/04/2022	oxycodone hcl (ir) 10 mg tab	30	8	Kenia Nguyễn	[id number]	[facility] #[id number]  11/15/2021	11/16/2021	oxycodone hcl (ir) 10 mg tab	30	8	Kenia Nguyễn	[id number]	[facility] #[id number]  * - Drugs marked with an asterisk are compound drugs. If the compound drug is made up of more than one controlled substance, then each controlled substance will be a separate row in the

## 2022-11-14 NOTE — ED PROVIDER NOTE - NSICDXPASTMEDICALHX_GEN_ALL_CORE_FT
PAST MEDICAL HISTORY:  Acute chest syndrome     Iron overload due to repeated red blood cell transfusions     Sickle cell anemia     Sickle Cell Disease HbSS

## 2022-11-14 NOTE — ED PROVIDER NOTE - NSICDXFAMILYHX_GEN_ALL_CORE_FT
FAMILY HISTORY:  Sibling  Still living? Unknown  Family history of sickle cell anemia, Age at diagnosis: Age Unknown

## 2022-11-14 NOTE — ED ADULT NURSE NOTE - CCCP TRG CHIEF CMPLNT
Mother replied to the message with an e-advice on 08/16/22.  I replied in that message.  Will close this one as it's a duplicate.   r/o sickle cell crisis

## 2022-11-14 NOTE — ED PROVIDER NOTE - PROGRESS NOTE DETAILS
Patient asleep after medications. FÉLIX Henry Avila, PGY-3- pt received at sign out. Still dizzy and unable to stand. Neuro to evaluate pt for persistent dizziness. pt without vomiting now and received Meclizine. Will reevaluate after Meclizine/neuro exam. Henry Avila, PGY-3- patient seen and evaluated by neurology. Pt still unable to ambulate 2/2 dizziness. Neuro recommending admission for refractory peripheral vertigo. Will admit pt.

## 2022-11-14 NOTE — H&P ADULT - PROBLEM SELECTOR PLAN 4
Currently controlled with home Motrin.  -ordered ibuprofen 600mg Q6h PRN mild and moderate pain  -oxycodone IR 5mg Q6h PRN severe pain  check in AM LDH, Haptoglobin, Ferritin TIBC

## 2022-11-14 NOTE — H&P ADULT - PROBLEM SELECTOR PLAN 1
-s/p IV Valium 2.5mg x2 doses, IV Toradol 30mg x1, Meclizine, IV NS x2L bolus by ED with some improvement of symptoms.   -House Neuro consult appreciated  -continue Meclizine 25MG PO PRN Q8HR. Will order reglan/zofran/ativan/promethazine as needed.  -MRI brain w and w/o contrast with cuts in IAC can be done outpatient-offered to patient, but pt reports severe claustophobia and states he needs to be "put fully out" to get it done.  -instructed pt upon discharge to try getting outpatient Stand UP MRI.  -IV 0.45%NS @ 75cc/hr

## 2022-11-14 NOTE — ED PROVIDER NOTE - PHYSICAL EXAMINATION
G: NAD, cooperative with exam   H: NCAT  E: EOMI   M: Mucous membranes moist   R: CTABL, nWOB  C: RRR  A: Soft, NT/ND, no rebound/guarding   MSK: moving all ext spontaneously   Neuro: 2 beats nystagmus to the Right BL.

## 2022-11-14 NOTE — H&P ADULT - HISTORY OF PRESENT ILLNESS
28yo M with PMH of sickle cell anemia, acute chest, ACS, iron overload (due to repeated pRBCs transfusions), cholecystectomy and splenectomy presents with acute vertigo since last night. 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 Upon getting up from the chair his dizziness became severe that he vomited 5 times food consistency emesis. This morning symptoms persisted and he decided coming to [facility]. Pt reports to lower back pain as his chronic pain from SCD and states he is not in crisis pain and able to manage it with Motrin at  home. He denies recent trauma, fever, chills, chest pain, sob, palpitation, weakness, abdominal pain, diarhea/constipatio recent URIs or sick contacts.

## 2022-11-15 LAB
ALBUMIN SERPL ELPH-MCNC: 4 G/DL — SIGNIFICANT CHANGE UP (ref 3.3–5)
ALP SERPL-CCNC: 76 U/L — SIGNIFICANT CHANGE UP (ref 40–120)
ALT FLD-CCNC: 51 U/L — HIGH (ref 4–41)
ANION GAP SERPL CALC-SCNC: 10 MMOL/L — SIGNIFICANT CHANGE UP (ref 7–14)
APPEARANCE UR: CLEAR — SIGNIFICANT CHANGE UP
AST SERPL-CCNC: 92 U/L — HIGH (ref 4–40)
BILIRUB SERPL-MCNC: 2.4 MG/DL — HIGH (ref 0.2–1.2)
BILIRUB UR-MCNC: NEGATIVE — SIGNIFICANT CHANGE UP
BUN SERPL-MCNC: 6 MG/DL — LOW (ref 7–23)
CALCIUM SERPL-MCNC: 8.7 MG/DL — SIGNIFICANT CHANGE UP (ref 8.4–10.5)
CHLORIDE SERPL-SCNC: 103 MMOL/L — SIGNIFICANT CHANGE UP (ref 98–107)
CO2 SERPL-SCNC: 26 MMOL/L — SIGNIFICANT CHANGE UP (ref 22–31)
COLOR SPEC: YELLOW — SIGNIFICANT CHANGE UP
CREAT SERPL-MCNC: 0.64 MG/DL — SIGNIFICANT CHANGE UP (ref 0.5–1.3)
DIFF PNL FLD: NEGATIVE — SIGNIFICANT CHANGE UP
EGFR: 132 ML/MIN/1.73M2 — SIGNIFICANT CHANGE UP
FERRITIN SERPL-MCNC: 65 NG/ML — SIGNIFICANT CHANGE UP (ref 30–400)
GLUCOSE SERPL-MCNC: 91 MG/DL — SIGNIFICANT CHANGE UP (ref 70–99)
GLUCOSE UR QL: NEGATIVE — SIGNIFICANT CHANGE UP
HAPTOGLOB SERPL-MCNC: <20 MG/DL — LOW (ref 34–200)
HCT VFR BLD CALC: 21.9 % — LOW (ref 39–50)
HGB BLD-MCNC: 7.5 G/DL — LOW (ref 13–17)
IRON SATN MFR SERPL: 18 % — SIGNIFICANT CHANGE UP (ref 14–50)
IRON SATN MFR SERPL: 47 UG/DL — SIGNIFICANT CHANGE UP (ref 45–165)
KETONES UR-MCNC: NEGATIVE — SIGNIFICANT CHANGE UP
LDH SERPL L TO P-CCNC: 625 U/L — HIGH (ref 135–225)
LEUKOCYTE ESTERASE UR-ACNC: NEGATIVE — SIGNIFICANT CHANGE UP
MCHC RBC-ENTMCNC: 25.7 PG — LOW (ref 27–34)
MCHC RBC-ENTMCNC: 34.2 GM/DL — SIGNIFICANT CHANGE UP (ref 32–36)
MCV RBC AUTO: 75 FL — LOW (ref 80–100)
NITRITE UR-MCNC: NEGATIVE — SIGNIFICANT CHANGE UP
NRBC # BLD: 0 /100 WBCS — SIGNIFICANT CHANGE UP (ref 0–0)
NRBC # FLD: 0.05 K/UL — HIGH (ref 0–0)
PH UR: 6.5 — SIGNIFICANT CHANGE UP (ref 5–8)
PLATELET # BLD AUTO: 629 K/UL — HIGH (ref 150–400)
POTASSIUM SERPL-MCNC: 3.8 MMOL/L — SIGNIFICANT CHANGE UP (ref 3.5–5.3)
POTASSIUM SERPL-SCNC: 3.8 MMOL/L — SIGNIFICANT CHANGE UP (ref 3.5–5.3)
PROT SERPL-MCNC: 7.8 G/DL — SIGNIFICANT CHANGE UP (ref 6–8.3)
PROT UR-MCNC: ABNORMAL
RBC # BLD: 2.92 M/UL — LOW (ref 4.2–5.8)
RBC # FLD: 24.7 % — HIGH (ref 10.3–14.5)
SODIUM SERPL-SCNC: 139 MMOL/L — SIGNIFICANT CHANGE UP (ref 135–145)
SP GR SPEC: 1.02 — SIGNIFICANT CHANGE UP (ref 1.01–1.05)
TIBC SERPL-MCNC: 255 UG/DL — SIGNIFICANT CHANGE UP (ref 220–430)
UIBC SERPL-MCNC: 208 UG/DL — SIGNIFICANT CHANGE UP (ref 110–370)
UROBILINOGEN FLD QL: ABNORMAL
WBC # BLD: 9.88 K/UL — SIGNIFICANT CHANGE UP (ref 3.8–10.5)
WBC # FLD AUTO: 9.88 K/UL — SIGNIFICANT CHANGE UP (ref 3.8–10.5)

## 2022-11-15 PROCEDURE — 99233 SBSQ HOSP IP/OBS HIGH 50: CPT

## 2022-11-15 RX ORDER — DIAZEPAM 5 MG
2 TABLET ORAL EVERY 12 HOURS
Refills: 0 | Status: DISCONTINUED | OUTPATIENT
Start: 2022-11-15 | End: 2022-11-17

## 2022-11-15 RX ORDER — METOCLOPRAMIDE HCL 10 MG
10 TABLET ORAL EVERY 6 HOURS
Refills: 0 | Status: DISCONTINUED | OUTPATIENT
Start: 2022-11-15 | End: 2022-11-17

## 2022-11-15 RX ADMIN — Medication 25 MILLIGRAM(S): at 09:13

## 2022-11-15 RX ADMIN — Medication 25 MILLIGRAM(S): at 21:48

## 2022-11-15 RX ADMIN — HYDROXYUREA 1500 MILLIGRAM(S): 500 CAPSULE ORAL at 21:48

## 2022-11-15 RX ADMIN — OXYCODONE HYDROCHLORIDE 5 MILLIGRAM(S): 5 TABLET ORAL at 21:48

## 2022-11-15 RX ADMIN — OXYCODONE HYDROCHLORIDE 5 MILLIGRAM(S): 5 TABLET ORAL at 22:48

## 2022-11-15 RX ADMIN — Medication 1 MILLIGRAM(S): at 11:36

## 2022-11-15 NOTE — PATIENT PROFILE ADULT - NSPROGENPREVTRANSF_GEN_A_NUR
no history of blood product transfusion Patient states he was told blood products need to be warmed./no history of blood product transfusion

## 2022-11-15 NOTE — PATIENT PROFILE ADULT - NSPROPOAURINARYCATHETER_GEN_A_NUR
H&P      Patient here today for medication follow up. TFESI was scheduled but cancelled due to Dilip. Patient then with respiratory admission and follow up today with pulmonology. Patient denies any pulmonary symptoms today, no cough, states SOB with activity which she feels maybe part of her weight.      Patient with good relief with SI steroid injection on the right side. Patient would like this repeated when able. Patient states noticing more pain through the area. Will plan SI injections after TFESI LEFT L5     Patient states lyrica isn't helping much anymore. States doesn't miss a dose. Discussed final increase to 150mg TID. Patient understanding to finish out current Rx.       Medications reviewed. Pain scale with out pain medications or at its worst is 9/10. Pain scale with pain medications or at its best is 6/10. Drug screen reviewed from 10/22/19 and was appropriate        The patientis allergic to ibuprofen.     Past Medical History  Tereza Hutton  has a past medical history of Back pain, CAD (coronary artery disease), Chronic diastolic CHF (congestive heart failure) ([facility] [address] Colitis, Depression, Depression, Gastroesophageal reflux, GERD (gastroesophageal reflux disease), H/O endoscopy, Hyperlipidemia, Hypertension, Hypertension, Hypothyroidism, Incontinence of urine, Lordosis (acquired) (postural), Spondylosis of unspecified site without mention of myelopathy, Thoracic or lumbosacral neuritis or radiculitis, unspecified, Type II or unspecified type diabetes mellitus without mention of complication, not stated as uncontrolled, Unspecified sleep apnea, and Urinary incontinence.     Past Surgical History  The patient  has a past surgical history that includes  section (); Carpal tunnel release (Bilateral, 'S); Abdomen surgery (); Dilation and curettage of uterus; Hysterectomy (); cardiovascular stress test (2015); Colonoscopy (, );  Upper gastrointestinal endoscopy (); Cardiac catheterization (2014); other surgical history (Bilateral, 03/19/2018); pr inj dx/ther agnt paravert facet joint, lumbar/sac, 2nd level (Bilateral, 3/19/2018); Nerve Surgery (Bilateral, 05/08/2018); pr inj dx/ther agnt paravert facet joint, lumbar/sac, 2nd level (Bilateral, 5/8/2018); pr inject rx other periph nerve (Bilateral, 7/2/2018); pr office/outpt visit,procedure only (Right, 10/2/2018); Injection Procedure For Sacroiliac Joint (Right, 12/7/2018); Lumbar spine surgery (Bilateral, 6/27/2019); lumbar nerve block (Bilateral, 8/22/2019); Nerve Surgery (Bilateral, 10/4/2019); and Pain management procedure (Bilateral, 1/20/2020).    Family History  This patient's family history includes Diabetes in her father, mother, and sister; Heart Disease in her father and mother; Stroke in her mother and sister; Thyroid Disease in her brother.  Anuj Roberts  reports that she is a non-smoker but has been exposed to tobacco smoke. She has never used smokeless tobacco. She reports that she does not drink alcohol or use drugs.     Medications  Current Medication     Current Outpatient Medications:     [START ON 8/8/2020] pregabalin (LYRICA) 150 MG capsule, Take 1 capsule by mouth 3 times daily for 30 days. , Disp: 90 capsule, Rfl: 0    insulin glargine (LANTUS SOLOSTAR) 100 UNIT/ML injection pen, INJECT 35 UNITS SUBCUTANEOUSLY IN THE MORNING AND 29 UNITS SUBCUTANEOUSLY IN THE EVENING (Patient taking differently: INJECT 45 UNITS SUBCUTANEOUSLY IN THE MORNING AND 37 UNITS SUBCUTANEOUSLY IN THE EVENING), Disp: 15 mL, Rfl: 0    insulin lispro (HUMALOG) 100 UNIT/ML pen, Inject 17 units before breakfast, 20 units before lunch and 28 units before dinner plus sliding scale.  Patient uses a max of 96 units per day., Disp: 10 pen, Rfl: 5    Liraglutide (VICTOZA) 18 MG/3ML SOPN SC injection, Inject 1.2 mg into the skin daily (Patient taking differently: Inject 1.8 mg into the skin daily ), Disp: 4 pen, Rfl: 5   DULoxetine (CYMBALTA) 30 MG extended release capsule, Take 120 mg by mouth daily , Disp: , Rfl:     acetaminophen (TYLENOL) 325 MG tablet, Take 2 tablets by mouth every 4 hours as needed for Pain, Disp: 120 tablet, Rfl: 3    calcium carbonate (OSCAL) 500 MG TABS tablet, Take 650 mg by mouth daily Indications: 2 chews daily, Disp: , Rfl:     metoprolol succinate (TOPROL XL) 50 MG extended release tablet, Take 50 mg by mouth daily, Disp: , Rfl:     meclizine (ANTIVERT) 25 MG tablet, Take 50 mg by mouth 3 times daily as needed , Disp: , Rfl:     lidocaine (XYLOCAINE) 5 % ointment, Apply topically 3 times daily as needed for Pain Apply topically as needed. , Disp: , Rfl:     spironolactone (ALDACTONE) 25 MG tablet, Take 25 mg by mouth as needed , Disp: , Rfl:     traZODone (DESYREL) 50 MG tablet, Take 50 mg by mouth nightly Indications: 1/2 tab , Disp: , Rfl:     Ascorbic Acid (VITAMIN C) 500 MG tablet, Take 1,000 mg by mouth daily, Disp: , Rfl:     Cyanocobalamin (VITAMIN B 12 PO), Take 500 mg by mouth daily , Disp: , Rfl:     busPIRone (BUSPAR) 30 MG tablet, Take 30 mg by mouth 3 times daily , Disp: , Rfl:     lisinopril (PRINIVIL;ZESTRIL) 5 MG tablet, Take 7.5 mg by mouth daily , Disp: , Rfl:     pantoprazole (PROTONIX) 40 MG tablet, Take 1 tablet by mouth daily, Disp: 30 tablet, Rfl: 12    glucose blood VI test strips (FREESTYLE LITE) strip, Check BS 4 times a day, Disp: 400 each, Rfl: 1    Blood Glucose Monitoring Suppl (FREESTYLE LITE) EMILIE, 1 Device by Does not apply route daily as needed, Disp: 1 Device, Rfl: 0    aspirin 81 MG tablet, Take 81 mg by mouth daily, Disp: , Rfl:     ammonium lactate (AMLACTIN) 12 % cream, Apply topically 2 times daily Apply topically as needed. , Disp: , Rfl:     Multiple Vitamins-Minerals (MULTI FOR HER PO), Take by mouth daily, Disp: , Rfl:     atorvastatin (LIPITOR) 40 MG tablet, Take 40 mg by mouth daily, Disp: , Rfl:     ferrous sulfate 325 (65 FE) MG tablet, Take 325 mg by mouth nightly , Disp: , Rfl:     Levothyroxine Sodium (SYNTHROID PO), Take 150 mcg by mouth 6 days a week Does NOT take on Sundays, Disp: , Rfl:     DOXEPIN HCL PO, Take 200 mg by mouth nightly., Disp: , Rfl:     lamoTRIgine (LAMICTAL) 100 MG tablet, Take 100 mg by mouth daily , Disp: , Rfl:     metFORMIN (GLUCOPHAGE) 1000 MG tablet, Take 1,000 mg by mouth 2 times daily (with meals) Indications: 1 tab if hr is >60 or SBP is >100 , Disp: , Rfl:         Subjective:      Review of Systems   Constitutional: Positive for activity change and fatigue. Negative for chills and fever. Respiratory: Positive for shortness of breath (with activity). Gastrointestinal: Negative for abdominal pain and constipation. Musculoskeletal: Positive for arthralgias, back pain, gait problem and myalgias. Skin: Negative for color change, pallor and rash. Neurological: Negative for numbness. Psychiatric/Behavioral: Positive for sleep disturbance.         Objective:      Vitals       Vitals:     08/03/20 0754   BP: 128/74   Temp: 97 °F (36.1 °C)   Weight: 209 lb (94.8 kg)   Height: 5' (1.524 m)           Physical Exam  Constitutional:       General: She is not in acute distress. Appearance: She is well-developed. She is not diaphoretic. HENT:      Head: Normocephalic and atraumatic. Right Ear: External ear normal.      Left Ear: External ear normal.   Eyes:      General:         Right eye: No discharge. Left eye: No discharge. Neck:      Musculoskeletal: Normal range of motion and neck supple. Pulmonary:      Effort: Pulmonary effort is normal. No respiratory distress. Musculoskeletal:         General: Tenderness present. Lumbar back: She exhibits decreased range of motion, tenderness and pain. Back:         Legs:    Skin:     General: Skin is warm and dry. Findings: No erythema. Neurological:      Mental Status: She is alert and oriented to person, place, and time. no

## 2022-11-15 NOTE — PHYSICAL THERAPY INITIAL EVALUATION ADULT - IMPAIRMENTS CONTRIBUTING TO GAIT DEVIATIONS, PT EVAL
limited by dizziness however states improved comapred to yesterday/impaired balance/decreased strength

## 2022-11-15 NOTE — PATIENT PROFILE ADULT - FALL HARM RISK - HARM RISK INTERVENTIONS
Assistance with ambulation/Assistance OOB with selected safe patient handling equipment/Communicate Risk of Fall with Harm to all staff/Monitor gait and stability/Reinforce activity limits and safety measures with patient and family/Sit up slowly, dangle for a short time, stand at bedside before walking/Tailored Fall Risk Interventions/Visual Cue: Yellow wristband and red socks/Bed in lowest position, wheels locked, appropriate side rails in place/Call bell, personal items and telephone in reach/Instruct patient to call for assistance before getting out of bed or chair/Non-slip footwear when patient is out of bed/[place] to call system/Physically safe environment - no spills, clutter or unnecessary equipment/Purposeful Proactive Rounding/Room/bathroom lighting operational, light cord in reach

## 2022-11-16 ENCOUNTER — TRANSCRIPTION ENCOUNTER (OUTPATIENT)
Age: 27
End: 2022-11-16

## 2022-11-16 LAB
ALBUMIN SERPL ELPH-MCNC: 4.1 G/DL — SIGNIFICANT CHANGE UP (ref 3.3–5)
ALP SERPL-CCNC: 77 U/L — SIGNIFICANT CHANGE UP (ref 40–120)
ALT FLD-CCNC: 40 U/L — SIGNIFICANT CHANGE UP (ref 4–41)
ANION GAP SERPL CALC-SCNC: 13 MMOL/L — SIGNIFICANT CHANGE UP (ref 7–14)
AST SERPL-CCNC: 73 U/L — HIGH (ref 4–40)
BILIRUB SERPL-MCNC: 2.4 MG/DL — HIGH (ref 0.2–1.2)
BUN SERPL-MCNC: 7 MG/DL — SIGNIFICANT CHANGE UP (ref 7–23)
CALCIUM SERPL-MCNC: 9.5 MG/DL — SIGNIFICANT CHANGE UP (ref 8.4–10.5)
CHLORIDE SERPL-SCNC: 102 MMOL/L — SIGNIFICANT CHANGE UP (ref 98–107)
CO2 SERPL-SCNC: 24 MMOL/L — SIGNIFICANT CHANGE UP (ref 22–31)
CREAT SERPL-MCNC: 0.62 MG/DL — SIGNIFICANT CHANGE UP (ref 0.5–1.3)
EGFR: 134 ML/MIN/1.73M2 — SIGNIFICANT CHANGE UP
GLUCOSE SERPL-MCNC: 84 MG/DL — SIGNIFICANT CHANGE UP (ref 70–99)
HCT VFR BLD CALC: 22.3 % — LOW (ref 39–50)
HGB BLD-MCNC: 7.6 G/DL — LOW (ref 13–17)
MAGNESIUM SERPL-MCNC: 1.7 MG/DL — SIGNIFICANT CHANGE UP (ref 1.6–2.6)
MCHC RBC-ENTMCNC: 25.7 PG — LOW (ref 27–34)
MCHC RBC-ENTMCNC: 34.1 GM/DL — SIGNIFICANT CHANGE UP (ref 32–36)
MCV RBC AUTO: 75.3 FL — LOW (ref 80–100)
MRSA PCR RESULT.: DETECTED
NRBC # BLD: 0 /100 WBCS — SIGNIFICANT CHANGE UP (ref 0–0)
NRBC # FLD: 0.09 K/UL — HIGH (ref 0–0)
PHOSPHATE SERPL-MCNC: 4.7 MG/DL — HIGH (ref 2.5–4.5)
PLATELET # BLD AUTO: 643 K/UL — HIGH (ref 150–400)
POTASSIUM SERPL-MCNC: 4 MMOL/L — SIGNIFICANT CHANGE UP (ref 3.5–5.3)
POTASSIUM SERPL-SCNC: 4 MMOL/L — SIGNIFICANT CHANGE UP (ref 3.5–5.3)
PROT SERPL-MCNC: 7.6 G/DL — SIGNIFICANT CHANGE UP (ref 6–8.3)
RBC # BLD: 2.96 M/UL — LOW (ref 4.2–5.8)
RBC # FLD: 25 % — HIGH (ref 10.3–14.5)
S AUREUS DNA NOSE QL NAA+PROBE: DETECTED
SODIUM SERPL-SCNC: 139 MMOL/L — SIGNIFICANT CHANGE UP (ref 135–145)
WBC # BLD: 10.33 K/UL — SIGNIFICANT CHANGE UP (ref 3.8–10.5)
WBC # FLD AUTO: 10.33 K/UL — SIGNIFICANT CHANGE UP (ref 3.8–10.5)

## 2022-11-16 PROCEDURE — 99233 SBSQ HOSP IP/OBS HIGH 50: CPT

## 2022-11-16 RX ORDER — MUPIROCIN 20 MG/G
1 OINTMENT TOPICAL EVERY 12 HOURS
Refills: 0 | Status: DISCONTINUED | OUTPATIENT
Start: 2022-11-16 | End: 2022-11-17

## 2022-11-16 RX ORDER — CHLORHEXIDINE GLUCONATE 213 G/1000ML
1 SOLUTION TOPICAL DAILY
Refills: 0 | Status: DISCONTINUED | OUTPATIENT
Start: 2022-11-16 | End: 2022-11-17

## 2022-11-16 RX ORDER — MUPIROCIN 20 MG/G
1 OINTMENT TOPICAL
Refills: 0 | Status: DISCONTINUED | OUTPATIENT
Start: 2022-11-16 | End: 2022-11-16

## 2022-11-16 RX ADMIN — CHLORHEXIDINE GLUCONATE 1 APPLICATION(S): 213 SOLUTION TOPICAL at 14:49

## 2022-11-16 RX ADMIN — Medication 10 MILLIGRAM(S): at 11:32

## 2022-11-16 RX ADMIN — HYDROXYUREA 1500 MILLIGRAM(S): 500 CAPSULE ORAL at 21:25

## 2022-11-16 RX ADMIN — Medication 1 MILLIGRAM(S): at 11:32

## 2022-11-16 RX ADMIN — Medication 25 MILLIGRAM(S): at 20:12

## 2022-11-16 RX ADMIN — Medication 2 MILLIGRAM(S): at 11:40

## 2022-11-16 RX ADMIN — OXYCODONE HYDROCHLORIDE 5 MILLIGRAM(S): 5 TABLET ORAL at 22:25

## 2022-11-16 RX ADMIN — MUPIROCIN 1 APPLICATION(S): 20 OINTMENT TOPICAL at 21:26

## 2022-11-16 RX ADMIN — OXYCODONE HYDROCHLORIDE 5 MILLIGRAM(S): 5 TABLET ORAL at 21:25

## 2022-11-16 RX ADMIN — SODIUM CHLORIDE 75 MILLILITER(S): 9 INJECTION, SOLUTION INTRAVENOUS at 21:27

## 2022-11-16 NOTE — DISCHARGE NOTE PROVIDER - CARE PROVIDER_API CALL
Kenia Nguyễn)  Internal Medicine  [address] [place] [address]  Phone: [phone]  Fax: [phone]  Follow Up Time:    Kenia Nguyễn)  Internal Medicine  [phone] [place] [address]  Phone: [phone]  Fax: [phone]  Follow Up Time:     General,   general neurology at [address] [place] [address]   Please call [phone] to schedule this appointment.   Pt may also follow up with ENT  Phone: (   )    -  Fax: (   )    -  Follow Up Time: 1 week

## 2022-11-16 NOTE — DISCHARGE NOTE PROVIDER - NSDCMRMEDTOKEN_GEN_ALL_CORE_FT
folic acid 1 mg oral tablet: 1 tab(s) orally once a day  hydroxyurea 500 mg oral capsule: 3 cap(s) orally once a day (at bedtime)  Motrin 800 mg oral tablet: 1 tab(s) orally 3 times a day, As Needed   diazePAM 2 mg oral tablet: 1 tab(s) orally every 12 hours, As needed, vertigo MDD:2 tablets daily  folic acid 1 mg oral tablet: 1 tab(s) orally once a day  hydroxyurea 500 mg oral capsule: 3 cap(s) orally once a day (at bedtime)  meclizine 25 mg oral tablet: 1 tab(s) orally every 8 hours, As needed, Dizziness  metoclopramide 10 mg oral tablet: 1 tab(s) orally every 6 hours, As needed, Nausea/vomiting  Motrin 800 mg oral tablet: 1 tab(s) orally 3 times a day, As Needed  mupirocin 2% topical ointment: Apply topically to affected area 2 times a day for 5 days

## 2022-11-16 NOTE — DISCHARGE NOTE PROVIDER - CARE PROVIDERS DIRECT ADDRESSES
[email or web address],[email or web address]_Unknown

## 2022-11-16 NOTE — DISCHARGE NOTE PROVIDER - NSDCFUSCHEDAPPT_GEN_ALL_CORE_FT
[facility]enia Nguyễn [facility]  INTMED OP [id number] [facility]  Scheduled Appointment: 11/28/2022

## 2022-11-16 NOTE — DISCHARGE NOTE PROVIDER - NSFOLLOWUPCLINICS_GEN_ALL_ED_FT
[facility] - ENT  Otolaryngology (ENT)  [address] [place] [address]  Phone: [phone]  Fax:   Follow Up Time: 1 week

## 2022-11-16 NOTE — DISCHARGE NOTE PROVIDER - PROVIDER TOKENS
PROVIDER:[TOKEN:[[id number]]] PROVIDER:[TOKEN:[[id number]]],FREE:[LAST:[General],PHONE:[(   )    -],FAX:[(   )    -],ADDRESS:[general neurology at [address] [place] [address]   Please call [phone] to schedule this appointment.   Pt may also follow up with ENT],FOLLOWUP:[1 week]]

## 2022-11-16 NOTE — DISCHARGE NOTE PROVIDER - HOSPITAL COURSE
26yo M with PMH of sickle cell anemia, acute chest, ACS, iron overload (due to repeated pRBCs transfusions), cholecystectomy and splenectomy presents with severe dizziness, nausea and vomiting since last night admitted to medicine for Acute Vertigo.    Acute Vertigo.  RVP/COVID: neg  CT HEAD: No acute intracranial hemorrhage or mass effect.  CTA NECK: No evidence of hemodynamically significant stenosis using NASCET criteria. Grossly patent vertebral arteries, noting evaluation limited by venous contamination.  CTA BRAIN: No evidence of major vessel occlusion.  -s/p IV Valium 2.5mg x2 doses, IV Toradol 30mg x1, Meclizine, IV NS x2L bolus by ED with some improvement of symptoms.   -House Neuro consult appreciated  -continue Meclizine 25MG PO PRN Q8HR. Will order reglan/zofran/ativan/promethazine as needed.  -MRI brain w and w/o contrast with cuts in IAC can be done outpatient-offered to patient, but pt reports severe claustrophobia and states he needs to be "put fully out" to get it done.   -instructed pt upon discharge to try getting outpatient Stand UP MRI.  -IV hydration 0.45%NS @ 75cc/hr    Leukocytosis  WBC: 18.99-->15.08  Can be reactive in a setting of nausea and vomiting, but r/o infectious process  CXR: Cardiomegaly with clear lungs.  -UA neg  -Leukocytosis resolved    Acute Transaminitis  AST/ALT: 173/67  Bilirubin: 2.2  s/p IV NS x2L bolus by ED  -IV fluid hydration 0.45%NS @ 75cc/hr    Sickle Cell Anemia  H&H: 7.7/22.6  Retic: 5.1%  Currently controlled with home Motrin.  -ordered ibuprofen 600mg Q6h PRN mild and moderate pain  -oxycodone IR 5mg Q6h PRN severe pain  -IV 0.45%NS @ 75cc/hr   26yo M with PMH of sickle cell anemia, acute chest, ACS, iron overload (due to repeated pRBCs transfusions), cholecystectomy and splenectomy presents with severe dizziness, nausea and vomiting since last night admitted to medicine for Acute Vertigo.    Acute Vertigo.  RVP/COVID: neg  CT HEAD: No acute intracranial hemorrhage or mass effect.  CTA NECK: No evidence of hemodynamically significant stenosis using NASCET criteria. Grossly patent vertebral arteries, noting evaluation limited by venous contamination.  CTA BRAIN: No evidence of major vessel occlusion.  -s/p IV Valium 2.5mg x2 doses, IV Toradol 30mg x1, Meclizine, IV NS x2L bolus by ED with some improvement of symptoms.   -House Neuro consult appreciated  -continue Meclizine 25MG PO PRN Q8HR. Will order reglan/zofran/ativan/promethazine as needed.  -MRI brain w and w/o contrast with cuts in IAC can be done outpatient-offered to patient, but pt reports severe claustrophobia and states he needs to be "put fully out" to get it done.   -instructed pt upon discharge to try getting outpatient Stand UP MRI, and follow up with neurology and ENT  -discharged on meclizine, reglan and valium (3 day supply)    Leukocytosis  WBC: 18.99-->15.08  Can be reactive in a setting of nausea and vomiting, but r/o infectious process  CXR: Cardiomegaly with clear lungs.  -UA neg  -Leukocytosis resolved    Acute Transaminitis  AST/ALT: 173/67  Bilirubin: 2.2  s/p IV NS x2L bolus by ED  -IV fluid hydration 0.45%NS @ 75cc/hr    Sickle Cell Anemia  H&H: 7.7/22.6  Retic: 5.1%  Currently controlled with home Motrin.  -ordered ibuprofen 600mg Q6h PRN mild and moderate pain  -oxycodone IR 5mg Q6h PRN severe pain  -IV 0.45%NS @ 75cc/hr    Case discussed with Dr. Mendiola on 11/17/22 and patient cleared for discharge. Reviewed discharge medications with patient; All new medications requiring new prescription sent to pharmacy of patients choice. Reviewed need for prescription for previous home medications and new prescriptions sent if requested. Patient in agreement and understands.

## 2022-11-16 NOTE — DISCHARGE NOTE PROVIDER - NSDCCPCAREPLAN_GEN_ALL_CORE_FT
PRINCIPAL DISCHARGE DIAGNOSIS  Diagnosis: Acute severe vertigo  Assessment and Plan of Treatment: continue Meclizine as needed      SECONDARY DISCHARGE DIAGNOSES  Diagnosis: Transaminitis  Assessment and Plan of Treatment:     Diagnosis: Leukocytosis  Assessment and Plan of Treatment: Resolved    Diagnosis: Sickle cell anemia  Assessment and Plan of Treatment: Follow up with Hematologist.     PRINCIPAL DISCHARGE DIAGNOSIS  Diagnosis: Acute severe vertigo  Assessment and Plan of Treatment: continue Meclizine, reglan and valium as needed. It is recommended that you get an MRI of your head/neck but you have decided for outpatient. Please follow up with neurology and ENT for outpatient follow up.      SECONDARY DISCHARGE DIAGNOSES  Diagnosis: Sickle cell anemia  Assessment and Plan of Treatment: Stay hydrated with water. Continue medications as prescribed. Follow up with your PCP/oncologist for further evaluation and refills of pain medication. Please call to make an appointment      Diagnosis: Transaminitis  Assessment and Plan of Treatment: Your blood work showed some elevated liver blood levels but they are stable. Please follow up with your PCP for routine blood work and to trend you liver blood work.    Diagnosis: Leukocytosis  Assessment and Plan of Treatment: Your white blood cell count was slightly elevated but it then resolved. Please follow up with your primary care physician regarding your hospitalization

## 2022-11-17 ENCOUNTER — TRANSCRIPTION ENCOUNTER (OUTPATIENT)
Age: 27
End: 2022-11-17

## 2022-11-17 VITALS
TEMPERATURE: 98 F | DIASTOLIC BLOOD PRESSURE: 89 MMHG | OXYGEN SATURATION: 99 % | HEART RATE: 74 BPM | SYSTOLIC BLOOD PRESSURE: 142 MMHG | RESPIRATION RATE: 17 BRPM

## 2022-11-17 LAB
ALBUMIN SERPL ELPH-MCNC: 4 G/DL — SIGNIFICANT CHANGE UP (ref 3.3–5)
ALP SERPL-CCNC: 74 U/L — SIGNIFICANT CHANGE UP (ref 40–120)
ALT FLD-CCNC: 36 U/L — SIGNIFICANT CHANGE UP (ref 4–41)
ANION GAP SERPL CALC-SCNC: 13 MMOL/L — SIGNIFICANT CHANGE UP (ref 7–14)
AST SERPL-CCNC: 55 U/L — HIGH (ref 4–40)
BILIRUB SERPL-MCNC: 2.2 MG/DL — HIGH (ref 0.2–1.2)
BUN SERPL-MCNC: 8 MG/DL — SIGNIFICANT CHANGE UP (ref 7–23)
CALCIUM SERPL-MCNC: 9.2 MG/DL — SIGNIFICANT CHANGE UP (ref 8.4–10.5)
CHLORIDE SERPL-SCNC: 101 MMOL/L — SIGNIFICANT CHANGE UP (ref 98–107)
CO2 SERPL-SCNC: 24 MMOL/L — SIGNIFICANT CHANGE UP (ref 22–31)
CREAT SERPL-MCNC: 0.64 MG/DL — SIGNIFICANT CHANGE UP (ref 0.5–1.3)
EGFR: 132 ML/MIN/1.73M2 — SIGNIFICANT CHANGE UP
GLUCOSE SERPL-MCNC: 85 MG/DL — SIGNIFICANT CHANGE UP (ref 70–99)
HCT VFR BLD CALC: 21.9 % — LOW (ref 39–50)
HGB BLD-MCNC: 7.6 G/DL — LOW (ref 13–17)
MCHC RBC-ENTMCNC: 26.1 PG — LOW (ref 27–34)
MCHC RBC-ENTMCNC: 34.7 GM/DL — SIGNIFICANT CHANGE UP (ref 32–36)
MCV RBC AUTO: 75.3 FL — LOW (ref 80–100)
NRBC # BLD: 2 /100 WBCS — HIGH (ref 0–0)
NRBC # FLD: 0.15 K/UL — HIGH (ref 0–0)
PLATELET # BLD AUTO: 621 K/UL — HIGH (ref 150–400)
POTASSIUM SERPL-MCNC: 3.9 MMOL/L — SIGNIFICANT CHANGE UP (ref 3.5–5.3)
POTASSIUM SERPL-SCNC: 3.9 MMOL/L — SIGNIFICANT CHANGE UP (ref 3.5–5.3)
PROT SERPL-MCNC: 7.5 G/DL — SIGNIFICANT CHANGE UP (ref 6–8.3)
RBC # BLD: 2.91 M/UL — LOW (ref 4.2–5.8)
RBC # FLD: 25 % — HIGH (ref 10.3–14.5)
SODIUM SERPL-SCNC: 138 MMOL/L — SIGNIFICANT CHANGE UP (ref 135–145)
WBC # BLD: 10.25 K/UL — SIGNIFICANT CHANGE UP (ref 3.8–10.5)
WBC # FLD AUTO: 10.25 K/UL — SIGNIFICANT CHANGE UP (ref 3.8–10.5)

## 2022-11-17 PROCEDURE — 99239 HOSP IP/OBS DSCHRG MGMT >30: CPT

## 2022-11-17 RX ORDER — DIAZEPAM 5 MG
2 TABLET ORAL EVERY 12 HOURS
Refills: 0 | Status: DISCONTINUED | OUTPATIENT
Start: 2022-11-17 | End: 2022-11-17

## 2022-11-17 RX ORDER — DIAZEPAM 5 MG
1 TABLET ORAL
Qty: 6 | Refills: 0
Start: 2022-11-17 | End: 2022-11-19

## 2022-11-17 RX ORDER — MECLIZINE HCL 12.5 MG
1 TABLET ORAL
Qty: 90 | Refills: 0
Start: 2022-11-17 | End: 2022-12-16

## 2022-11-17 RX ORDER — OXYCODONE HYDROCHLORIDE 5 MG/1
5 TABLET ORAL EVERY 6 HOURS
Refills: 0 | Status: DISCONTINUED | OUTPATIENT
Start: 2022-11-17 | End: 2022-11-17

## 2022-11-17 RX ORDER — MUPIROCIN 20 MG/G
1 OINTMENT TOPICAL
Qty: 0 | Refills: 0 | DISCHARGE
Start: 2022-11-17

## 2022-11-17 RX ORDER — METOCLOPRAMIDE HCL 10 MG
1 TABLET ORAL
Qty: 120 | Refills: 0
Start: 2022-11-17 | End: 2022-12-16

## 2022-11-17 RX ADMIN — Medication 1 MILLIGRAM(S): at 11:01

## 2022-11-17 RX ADMIN — MUPIROCIN 1 APPLICATION(S): 20 OINTMENT TOPICAL at 11:02

## 2022-11-17 RX ADMIN — CHLORHEXIDINE GLUCONATE 1 APPLICATION(S): 213 SOLUTION TOPICAL at 11:03

## 2022-11-17 RX ADMIN — Medication 25 MILLIGRAM(S): at 09:31

## 2022-11-17 NOTE — DISCHARGE NOTE NURSING/CASE MANAGEMENT/SOCIAL WORK - PATIENT PORTAL LINK FT
You can access the FollowMyHealth Patient Portal offered by [facility] by registering at the following website: [email or web address]. By joining [facility]’s FollowMyHealth portal, you will also be able to view your health information using other applications (apps) compatible with our system.

## 2022-11-17 NOTE — DISCHARGE NOTE NURSING/CASE MANAGEMENT/SOCIAL WORK - NSDCPEFALRISK_GEN_ALL_CORE
For information on Fall & Injury Prevention, visit: [email or web address] OR  [email or web address] OR  https://www.cdc.gov/steadi/patient.html

## 2022-11-17 NOTE — PROGRESS NOTE ADULT - PROBLEM SELECTOR PLAN 2
Can be reactive in a setting of nausea and vomiting, but r/o infectious process  CXR: Cardiomegaly with clear lungs.

## 2022-11-17 NOTE — PROGRESS NOTE ADULT - SUBJECTIVE AND OBJECTIVE BOX
Patient is a 27y old  Male who presents with a chief complaint of vertigo (15 Nov 2022 14:09)      SUBJECTIVE / OVERNIGHT EVENTS: No acute events overnight. Pt reports some improvement in vertigo. He was able to ambulate around his room with PT    ADDITIONAL REVIEW OF SYSTEMS:    MEDICATIONS  (STANDING):  chlorhexidine 2% Cloths 1 Application(s) Topical daily  enoxaparin Injectable 40 milliGRAM(s) SubCutaneous every 24 hours  folic acid 1 milliGRAM(s) Oral daily  hydroxyurea 1500 milliGRAM(s) Oral at bedtime  sodium chloride 0.45%. 1000 milliLiter(s) (75 mL/Hr) IV Continuous <Continuous>    MEDICATIONS  (PRN):  diazepam    Tablet 2 milliGRAM(s) Oral every 12 hours PRN vertigo  ibuprofen  Tablet. 600 milliGRAM(s) Oral every 6 hours PRN Mild Pain (1 - 3), Moderate Pain (4 - 6)  meclizine 25 milliGRAM(s) Oral every 8 hours PRN Dizziness  metoclopramide 10 milliGRAM(s) Oral every 6 hours PRN Nausea/vomiting  oxyCODONE    IR 5 milliGRAM(s) Oral every 6 hours PRN Severe Pain (7 - 10)      CAPILLARY BLOOD GLUCOSE        I&O's Summary      PHYSICAL EXAM:  Vital Signs Last 24 Hrs  T(C): 37.1 (2022 12:17), Max: 37.1 (2022 12:17)  T(F): 98.7 (2022 12:17), Max: 98.7 (2022 12:17)  HR: 90 (2022 12:17) (60 - 90)  BP: 133/51 (2022 12:17) (112/52 - 133/51)  BP(mean): --  RR: 17 (2022 12:17) (16 - 18)  SpO2: 100% (2022 12:17) (95% - 100%)    Parameters below as of 2022 12:17  Patient On (Oxygen Delivery Method): room air      CONSTITUTIONAL: NAD  RESPIRATORY: Normal respiratory effort; lungs are clear to auscultation bilaterally  CARDIOVASCULAR: Regular rate and rhythm, normal S1 and S2, no murmur/rub/gallop; No lower extremity edema; Peripheral pulses are 2+ bilaterally  ABDOMEN: Nontender to palpation, normoactive bowel sounds, no rebound/guarding; No hepatosplenomegaly  MUSCULOSKELETAL:  Normal gait; no clubbing or cyanosis of digits; no joint swelling or tenderness to palpation  PSYCH: A+O to person, place, and time; affect appropriate  NEUROLOGY: CN 2-12 are intact and symmetric; no gross sensory deficits   SKIN: No rashes; no palpable lesions    LABS:                        7.6    10.33 )-----------( 643      ( 2022 06:33 )             22.3         139  |  102  |  7   ----------------------------<  84  4.0   |  24  |  0.62    Ca    9.5      2022 06:33  Phos  4.7       Mg     1.70         TPro  7.6  /  Alb  4.1  /  TBili  2.4<H>  /  DBili  x   /  AST  73<H>  /  ALT  40  /  AlkPhos  77            Urinalysis Basic - ( 15 Nov 2022 00:19 )    Color: Yellow / Appearance: Clear / S.017 / pH: x  Gluc: x / Ketone: Negative  / Bili: Negative / Urobili: 3 mg/dL   Blood: x / Protein: Trace / Nitrite: Negative   Leuk Esterase: Negative / RBC: x / WBC x   Sq Epi: x / Non Sq Epi: x / Bacteria: x          RADIOLOGY & ADDITIONAL TESTS:  Results Reviewed:   Imaging Personally Reviewed:  Electrocardiogram Personally Reviewed:    COORDINATION OF CARE:  Care Discussed with Consultants/Other Providers [Y/N]:  Prior or Outpatient Records Reviewed [Y/N]:  
Patient is a 27y old  Male who presents with a chief complaint of vertigo (16 Nov 2022 16:50)      SUBJECTIVE / OVERNIGHT EVENTS: No acute events overnight. Pt has no new complaints. Dizziness improved. Pt was able to ambulate independently     ADDITIONAL REVIEW OF SYSTEMS:    MEDICATIONS  (STANDING):  chlorhexidine 2% Cloths 1 Application(s) Topical daily  enoxaparin Injectable 40 milliGRAM(s) SubCutaneous every 24 hours  folic acid 1 milliGRAM(s) Oral daily  hydroxyurea 1500 milliGRAM(s) Oral at bedtime  mupirocin 2% Ointment 1 Application(s) Both Nostrils every 12 hours  sodium chloride 0.45%. 1000 milliLiter(s) (75 mL/Hr) IV Continuous <Continuous>    MEDICATIONS  (PRN):  diazepam    Tablet 2 milliGRAM(s) Oral every 12 hours PRN vertigo  ibuprofen  Tablet. 600 milliGRAM(s) Oral every 6 hours PRN Mild Pain (1 - 3), Moderate Pain (4 - 6)  meclizine 25 milliGRAM(s) Oral every 8 hours PRN Dizziness  metoclopramide 10 milliGRAM(s) Oral every 6 hours PRN Nausea/vomiting  oxyCODONE    IR 5 milliGRAM(s) Oral every 6 hours PRN Severe Pain (7 - 10)      CAPILLARY BLOOD GLUCOSE        I&O's Summary      PHYSICAL EXAM:  Vital Signs Last 24 Hrs  T(C): 36.6 (17 Nov 2022 04:45), Max: 37.1 (16 Nov 2022 12:17)  T(F): 97.8 (17 Nov 2022 04:45), Max: 98.7 (16 Nov 2022 12:17)  HR: 60 (17 Nov 2022 04:45) (60 - 90)  BP: 108/59 (17 Nov 2022 04:45) (108/59 - 133/51)  BP(mean): --  RR: 17 (17 Nov 2022 04:45) (16 - 17)  SpO2: 96% (17 Nov 2022 04:45) (96% - 100%)    Parameters below as of 17 Nov 2022 04:45  Patient On (Oxygen Delivery Method): room air      CONSTITUTIONAL: NAD  RESPIRATORY: Normal respiratory effort; lungs are clear to auscultation bilaterally  CARDIOVASCULAR: Regular rate and rhythm, normal S1 and S2, no murmur/rub/gallop; No lower extremity edema; Peripheral pulses are 2+ bilaterally  ABDOMEN: Nontender to palpation, normoactive bowel sounds, no rebound/guarding; No hepatosplenomegaly  MUSCULOSKELETAL:  Normal gait; no clubbing or cyanosis of digits; no joint swelling or tenderness to palpation  PSYCH: A+O to person, place, and time; affect appropriate  NEUROLOGY: CN 2-12 are intact and symmetric; no gross sensory deficits   SKIN: No rashes; no palpable lesions    LABS:                        7.6    10.25 )-----------( 621      ( 17 Nov 2022 05:45 )             21.9     11-17    138  |  101  |  8   ----------------------------<  85  3.9   |  24  |  0.64    Ca    9.2      17 Nov 2022 05:45  Phos  4.7     11-16  Mg     1.70     11-16    TPro  7.5  /  Alb  4.0  /  TBili  2.2<H>  /  DBili  x   /  AST  55<H>  /  ALT  36  /  AlkPhos  74  11-17                RADIOLOGY & ADDITIONAL TESTS:  Results Reviewed:   Imaging Personally Reviewed:  Electrocardiogram Personally Reviewed:    COORDINATION OF CARE:  Care Discussed with Consultants/Other Providers [Y/N]:  Prior or Outpatient Records Reviewed [Y/N]:  
Patient is a 27y old  Male who presents with a chief complaint of vertigo (2022 14:30)      SUBJECTIVE / OVERNIGHT EVENTS:    ADDITIONAL REVIEW OF SYSTEMS:    MEDICATIONS  (STANDING):  enoxaparin Injectable 40 milliGRAM(s) SubCutaneous every 24 hours  folic acid 1 milliGRAM(s) Oral daily  hydroxyurea 1500 milliGRAM(s) Oral at bedtime  sodium chloride 0.45%. 1000 milliLiter(s) (75 mL/Hr) IV Continuous <Continuous>    MEDICATIONS  (PRN):  diazepam    Tablet 2 milliGRAM(s) Oral every 12 hours PRN vertigo  ibuprofen  Tablet. 600 milliGRAM(s) Oral every 6 hours PRN Mild Pain (1 - 3), Moderate Pain (4 - 6)  meclizine 25 milliGRAM(s) Oral every 8 hours PRN Dizziness  metoclopramide 10 milliGRAM(s) Oral every 6 hours PRN Nausea/vomiting  oxyCODONE    IR 5 milliGRAM(s) Oral every 6 hours PRN Severe Pain (7 - 10)      CAPILLARY BLOOD GLUCOSE        I&O's Summary      PHYSICAL EXAM:  Vital Signs Last 24 Hrs  T(C): 36.4 (15 Nov 2022 12:22), Max: 36.8 (2022 14:39)  T(F): 97.5 (15 Nov 2022 12:22), Max: 98.3 (2022 22:05)  HR: 62 (15 Nov 2022 12:22) (60 - 78)  BP: 125/72 (15 Nov 2022 12:22) (110/58 - 125/74)  BP(mean): --  RR: 18 (15 Nov 2022 12:22) (16 - 18)  SpO2: 99% (15 Nov 2022 12:22) (97% - 99%)    Parameters below as of 15 Nov 2022 12:22  Patient On (Oxygen Delivery Method): room air      CONSTITUTIONAL: NAD  EYES: PERRLA; conjunctiva and sclera clear  ENMT: Moist oral mucosa, no pharyngeal injection or exudates; normal dentition  NECK: Supple, no palpable masses; no thyromegaly  RESPIRATORY: Normal respiratory effort; lungs are clear to auscultation bilaterally  CARDIOVASCULAR: Regular rate and rhythm, normal S1 and S2, no murmur/rub/gallop; No lower extremity edema; Peripheral pulses are 2+ bilaterally  ABDOMEN: Nontender to palpation, normoactive bowel sounds, no rebound/guarding; No hepatosplenomegaly  MUSCULOSKELETAL:  Normal gait; no clubbing or cyanosis of digits; no joint swelling or tenderness to palpation  PSYCH: A+O to person, place, and time; affect appropriate  NEUROLOGY: CN 2-12 are intact and symmetric; no gross sensory deficits   SKIN: No rashes; no palpable lesions    LABS:                        7.5    9.88  )-----------( 629      ( 15 Nov 2022 06:19 )             21.9     11-15    139  |  103  |  6<L>  ----------------------------<  91  3.8   |  26  |  0.64    Ca    8.7      15 Nov 2022 06:19    TPro  7.8  /  Alb  4.0  /  TBili  2.4<H>  /  DBili  x   /  AST  92<H>  /  ALT  51<H>  /  AlkPhos  76  11-15          Urinalysis Basic - ( 15 Nov 2022 00:19 )    Color: Yellow / Appearance: Clear / S.017 / pH: x  Gluc: x / Ketone: Negative  / Bili: Negative / Urobili: 3 mg/dL   Blood: x / Protein: Trace / Nitrite: Negative   Leuk Esterase: Negative / RBC: x / WBC x   Sq Epi: x / Non Sq Epi: x / Bacteria: x          RADIOLOGY & ADDITIONAL TESTS:  Results Reviewed:   Imaging Personally Reviewed:  Electrocardiogram Personally Reviewed:    COORDINATION OF CARE:  Care Discussed with Consultants/Other Providers [Y/N]:  Prior or Outpatient Records Reviewed [Y/N]:

## 2022-11-17 NOTE — PROGRESS NOTE ADULT - PROBLEM SELECTOR PLAN 3
Improving  Elevated bili, LDH likely secondary to hemolysis secondary to SCD  -continue IV Hydration with IV 0.45%NS @ 75cc/hr  -avoid hepatotoxic meds  monitor LFTs

## 2022-11-17 NOTE — PROGRESS NOTE ADULT - PROBLEM SELECTOR PLAN 1
-House Neuro consult appreciated  -continue Meclizine 25MG PO PRN Q8HR. Will order reglan/zofran/ativan/promethazine as needed.  -MRI brain w and w/o contrast with cuts in IAC can be done outpatient-offered to patient, but pt reports severe claustophobia and states he needs to be "put fully out" to get it done.  -instructed pt upon discharge to try getting outpatient Stand UP MRI.  -IV 0.45%NS @ 75cc/hr  - Epley maneuver
-s/p IV Valium 2.5mg x2 doses, IV Toradol 30mg x1, Meclizine, IV NS x2L bolus by ED with some improvement of symptoms.   -House Neuro consult appreciated  -continue Meclizine 25MG PO PRN Q8HR. Will order reglan/zofran/ativan/promethazine as needed.  -MRI brain w and w/o contrast with cuts in IAC can be done outpatient-offered to patient, but pt reports severe claustophobia and states he needs to be "put fully out" to get it done.  -instructed pt upon discharge to try getting outpatient Stand UP MRI.  -IV 0.45%NS @ 75cc/hr  - Epley maneuver
-House Neuro consult appreciated  -continue Meclizine 25MG PO PRN Q8HR. Will order reglan/zofran/ativan/promethazine as needed.  -MRI brain w and w/o contrast with cuts in IAC can be done outpatient-offered to patient, but pt reports severe claustophobia and states he needs to be "put fully out" to get it done.  -instructed pt upon discharge to try getting outpatient Stand UP MRI.  -IV 0.45%NS @ 75cc/hr  - Epley maneuver

## 2022-11-17 NOTE — PROGRESS NOTE ADULT - ASSESSMENT
28yo M with PMH of sickle cell anemia, acute chest, ACS, iron overload (due to repeated pRBCs transfusions), cholecystectomy and splenectomy presents with severe dizziness, nausea and vomiting since last night admitted to medicine for Acute Vertigo.
    26yo M with PMH of sickle cell anemia, acute chest, ACS, iron overload (due to repeated pRBCs transfusions), cholecystectomy and splenectomy presents with severe dizziness, nausea and vomiting since last night admitted to medicine for Acute Vertigo.
    26yo M with PMH of sickle cell anemia, acute chest, ACS, iron overload (due to repeated pRBCs transfusions), cholecystectomy and splenectomy presents with severe dizziness, nausea and vomiting since last night admitted to medicine for Acute Vertigo.

## 2022-11-17 NOTE — PROGRESS NOTE ADULT - PROBLEM SELECTOR PLAN 4
Currently controlled with home Motrin.  -ordered ibuprofen 600mg Q6h PRN mild and moderate pain  -oxycodone IR 5mg Q6h PRN severe pain

## 2022-11-28 ENCOUNTER — OUTPATIENT (OUTPATIENT)
Dept: OUTPATIENT SERVICES | Facility: HOSPITAL | Age: 27
LOS: 1 days | End: 2022-11-28

## 2022-11-28 ENCOUNTER — APPOINTMENT (OUTPATIENT)
Dept: INTERNAL MEDICINE | Facility: CLINIC | Age: 27
End: 2022-11-28

## 2022-11-28 VITALS
DIASTOLIC BLOOD PRESSURE: 80 MMHG | HEART RATE: 76 BPM | OXYGEN SATURATION: 92 % | WEIGHT: 202 LBS | RESPIRATION RATE: 16 BRPM | SYSTOLIC BLOOD PRESSURE: 130 MMHG | HEIGHT: 71 IN | BODY MASS INDEX: 28.28 KG/M2

## 2022-11-28 PROCEDURE — 99214 OFFICE O/P EST MOD 30 MIN: CPT

## 2022-11-28 RX ORDER — METOCLOPRAMIDE 10 MG/1
10 TABLET ORAL
Qty: 120 | Refills: 0 | Status: ACTIVE | COMMUNITY
Start: 2022-11-17

## 2022-11-29 DIAGNOSIS — D57.1 SICKLE-CELL DISEASE WITHOUT CRISIS: ICD-10-CM

## 2022-11-29 DIAGNOSIS — R42 DIZZINESS AND GIDDINESS: ICD-10-CM

## 2022-11-29 NOTE — HISTORY OF PRESENT ILLNESS
[FreeTextEntry1] : 28 yo male with hgb ss for f/u. [de-identified] : 26 yo male with HGB SS for f/u. Went to ED with severe dizziness 8 days ago. Had CT angio of neck and head ( WNL). He refused MRA of the head due to claustrophobia. + vomiting and nauseated, no difficulty with speech or weakness, + fullness in ear. The vertigo had been present at the last visit 3 months ago. Echo and CT head were ordered, but not obtained. \par He has no SCD complaints today. He has infrequent, but manageable SCD pain. He is back at school studying medical [employer].\par \par He is taking HU, 1500 mg QD, reliably\par Covid vaccinated ( required by [employer]), not boostered, had covid this past January.\par Did not get to ophtho-\par \par PE: gen- wdwn male in nad\par vss\par left tm- + obstructed by cerumen\par anicteric\par lungs- cta\par cor: rrr-m\par abd- benign \par ext:-c/c/e, no ulcers\par neuro- A and o x 3\par CN intact\par motor strength 5/5 U and L\par sensory intact\par CB intact\par when patient walked he swayed minimally to the right\par \par labs: 11/17/22\par HGB 7.9\par CMP- WNL\par urine with trace protein\par \par a/P- 26 yo male with hGB SS, longstanding BPV\par 1. SCD- continue HU 1500 mg QD\par 2. vertigo- likely BPV- Eva Hallpike maneuver performed x 2\par patient referred for standing MRA\par referral for vestibular PT given, patient is not interested in going to neurologist at this time\par 3. ophtho referral\par 4. pain- oxycodone- 10 mg tab- #30\par ISTOP checked\par 5. f/u three months\par \par \par Kenia Nguyễn MD\par \par \par

## 2022-12-13 NOTE — PROVIDER CONTACT NOTE (CRITICAL VALUE NOTIFICATION) - DATE AND TIME:
31-Jan-2017 08:35 Closure 2 Information: This tab is for additional flaps and grafts, including complex repair and grafts and complex repair and flaps. You can also specify a different location for the additional defect, if the location is the same you do not need to select a new one. We will insert the automated text for the repair you select below just as we do for solitary flaps and grafts. Please note that at this time if you select a location with a different insurance zone you will need to override the ICD10 and CPT if appropriate.

## 2023-02-21 NOTE — PROVIDER CONTACT NOTE (CRITICAL VALUE NOTIFICATION) - NS PROVIDER READ BACK TO LAB
yes Staging Info: By selecting yes to the question above you will include information on AJCC 8 tumor staging in your Mohs note. Information on tumor staging will be automatically added for SCCs on the head and neck. AJCC 8 includes tumor size, tumor depth, perineural involvement and bone invasion.

## 2023-02-27 ENCOUNTER — OUTPATIENT (OUTPATIENT)
Dept: OUTPATIENT SERVICES | Facility: HOSPITAL | Age: 28
LOS: 1 days | End: 2023-02-27

## 2023-02-27 ENCOUNTER — LABORATORY RESULT (OUTPATIENT)
Age: 28
End: 2023-02-27

## 2023-02-27 ENCOUNTER — APPOINTMENT (OUTPATIENT)
Dept: INTERNAL MEDICINE | Facility: CLINIC | Age: 28
End: 2023-02-27
Payer: COMMERCIAL

## 2023-02-27 DIAGNOSIS — Z87.898 PERSONAL HISTORY OF OTHER SPECIFIED CONDITIONS: ICD-10-CM

## 2023-02-27 PROCEDURE — 99214 OFFICE O/P EST MOD 30 MIN: CPT

## 2023-02-28 LAB
25(OH)D3 SERPL-MCNC: 20.1 NG/ML
ALBUMIN SERPL ELPH-MCNC: 4.6 G/DL
ALP BLD-CCNC: 97 U/L
ALT SERPL-CCNC: 40 U/L
ANION GAP SERPL CALC-SCNC: 13 MMOL/L
AST SERPL-CCNC: 135 U/L
BILIRUB SERPL-MCNC: 2.4 MG/DL
BUN SERPL-MCNC: 6 MG/DL
CALCIUM SERPL-MCNC: 9.6 MG/DL
CHLORIDE SERPL-SCNC: 100 MMOL/L
CO2 SERPL-SCNC: 24 MMOL/L
CREAT SERPL-MCNC: 0.74 MG/DL
EGFR: 127 ML/MIN/1.73M2
FERRITIN SERPL-MCNC: 111 NG/ML
GLUCOSE SERPL-MCNC: 114 MG/DL
POTASSIUM SERPL-SCNC: 4.8 MMOL/L
PROT SERPL-MCNC: 7.8 G/DL
SODIUM SERPL-SCNC: 137 MMOL/L

## 2023-03-01 DIAGNOSIS — E55.9 VITAMIN D DEFICIENCY, UNSPECIFIED: ICD-10-CM

## 2023-03-01 DIAGNOSIS — D57.1 SICKLE-CELL DISEASE WITHOUT CRISIS: ICD-10-CM

## 2023-03-01 DIAGNOSIS — Z87.898 PERSONAL HISTORY OF OTHER SPECIFIED CONDITIONS: ICD-10-CM

## 2023-03-01 LAB
BASOPHILS # BLD AUTO: 0.1 K/UL
BASOPHILS NFR BLD AUTO: 0.9 %
EOSINOPHIL # BLD AUTO: 0.21 K/UL
EOSINOPHIL NFR BLD AUTO: 1.8 %
HCT VFR BLD CALC: 22.9 %
HGB BLD-MCNC: 7.7 G/DL
IMM GRANULOCYTES NFR BLD AUTO: 0.3 %
LYMPHOCYTES # BLD AUTO: 4.15 K/UL
LYMPHOCYTES NFR BLD AUTO: 35.4 %
MAN DIFF?: NORMAL
MCHC RBC-ENTMCNC: 26 PG
MCHC RBC-ENTMCNC: 33.6 GM/DL
MCV RBC AUTO: 77.4 FL
MONOCYTES # BLD AUTO: 1.98 K/UL
MONOCYTES NFR BLD AUTO: 16.9 %
NEUTROPHILS # BLD AUTO: 5.25 K/UL
NEUTROPHILS NFR BLD AUTO: 44.7 %
PLATELET # BLD AUTO: 490 K/UL
RBC # BLD: 2.96 M/UL
RBC # BLD: 2.96 M/UL
RBC # FLD: 26.8 %
RETICS # AUTO: 6.4 %
RETICS AGGREG/RBC NFR: 186.5 K/UL
WBC # FLD AUTO: 11.73 K/UL

## 2023-03-01 NOTE — HISTORY OF PRESENT ILLNESS
[FreeTextEntry1] : 28 yo male with hgb ss for f/u. [de-identified] : 26 yo male with HGB SS for f/u. At last visit, the patient had severe vertigo. This has resolved with time and PT.\par Today, he is feeling well, is without complaint. \par He has no SCD complaints today. He has infrequent, but manageable SCD pain. He is back at school studying medical informatics.\par \par He is taking HU, 2000/1500 mg QD, reliably\par Covid vaccinated ( required by [employer]), not boostered, had covid this past January.\par Did not get to ophtho-\par \par PE: gen- wdwn male in nad\par vss\par anicteric\par oropharynx- good dentition\par lungs- cta\par cor: rrr-m\par abd- benign \par ext:-c/c/e, no ulcers\par neuro exam- grossly WNL, no swaying with ambulation ( resolved from previous visit)\par \par labs: 2/27/23\par HGB 7.7\par CMP- WNL\par ANC-5.25, plts 490\par vit D- 20.1\par \par a/P- 26 yo male with hGB SS, s/p episode of vertigo\par 1. SCD- continue HU 2000/1500 mg QD\par 2. vertigo- resolved\par 3. ophtho referral\par 4. pain- oxycodone- 10 mg tab- #30\par ISTOP checked\par 5. f/u three months\par 6. routine labs\par 7. vit D deficiency- encourage Vit D supp- 50,000 iu Q week\par \par \par Kenia Nguyễn MD\par \par \par

## 2023-03-02 LAB
HGB A MFR BLD: 0 %
HGB A2 MFR BLD: 3.5 %
HGB F MFR BLD: 2 %
HGB FRACT BLD-IMP: NORMAL
HGB S MFR BLD: 94.5 %

## 2023-06-12 ENCOUNTER — APPOINTMENT (OUTPATIENT)
Dept: INTERNAL MEDICINE | Facility: CLINIC | Age: 28
End: 2023-06-12
Payer: COMMERCIAL

## 2023-06-12 ENCOUNTER — OUTPATIENT (OUTPATIENT)
Dept: OUTPATIENT SERVICES | Facility: HOSPITAL | Age: 28
LOS: 1 days | End: 2023-06-12

## 2023-06-12 ENCOUNTER — LABORATORY RESULT (OUTPATIENT)
Age: 28
End: 2023-06-12

## 2023-06-12 VITALS — SYSTOLIC BLOOD PRESSURE: 120 MMHG | DIASTOLIC BLOOD PRESSURE: 80 MMHG | OXYGEN SATURATION: 95 %

## 2023-06-12 PROCEDURE — 99214 OFFICE O/P EST MOD 30 MIN: CPT

## 2023-06-12 RX ORDER — ERGOCALCIFEROL 1.25 MG/1
1.25 MG CAPSULE, LIQUID FILLED ORAL
Qty: 4 | Refills: 6 | Status: ACTIVE | COMMUNITY
Start: 2020-10-01 | End: 1900-01-01

## 2023-06-12 RX ORDER — MECLIZINE HYDROCHLORIDE 25 MG/1
25 TABLET ORAL
Qty: 90 | Refills: 0 | Status: COMPLETED | COMMUNITY
Start: 2022-11-17 | End: 2023-06-12

## 2023-06-15 DIAGNOSIS — E55.9 VITAMIN D DEFICIENCY, UNSPECIFIED: ICD-10-CM

## 2023-06-15 DIAGNOSIS — D57.1 SICKLE-CELL DISEASE WITHOUT CRISIS: ICD-10-CM

## 2023-06-15 LAB
25(OH)D3 SERPL-MCNC: 23.5 NG/ML
ALBUMIN SERPL ELPH-MCNC: 4.6 G/DL
ALP BLD-CCNC: 91 U/L
ALT SERPL-CCNC: 22 U/L
ANION GAP SERPL CALC-SCNC: 13 MMOL/L
AST SERPL-CCNC: 55 U/L
BILIRUB SERPL-MCNC: 2.9 MG/DL
BUN SERPL-MCNC: 8 MG/DL
CALCIUM SERPL-MCNC: 9.6 MG/DL
CHLORIDE SERPL-SCNC: 105 MMOL/L
CO2 SERPL-SCNC: 25 MMOL/L
CREAT SERPL-MCNC: 0.72 MG/DL
EGFR: 128 ML/MIN/1.73M2
FERRITIN SERPL-MCNC: 182 NG/ML
GLUCOSE SERPL-MCNC: 109 MG/DL
POTASSIUM SERPL-SCNC: 4.5 MMOL/L
PROT SERPL-MCNC: 8.1 G/DL
RBC # BLD: 2.93 M/UL
RETICS # AUTO: 5.6 %
RETICS AGGREG/RBC NFR: 163.2 K/UL
SODIUM SERPL-SCNC: 143 MMOL/L

## 2023-06-15 NOTE — HISTORY OF PRESENT ILLNESS
[FreeTextEntry1] : 26 yo male with hgb ss for f/u. [de-identified] : 26 yo male with HGB SS for f/u. \par Having pain since Friday. Had URI, took mucinex. \par Today, he is still having mild URIsx as well as manageable SCD pain. Taking ibuprofen 800mg prn, +oxycodone 10 mg prn severe pain.\par He has no SCD complaints today. He has infrequent, but manageable SCD pain. \par He is back at school studying medical informatics.\par \par He is taking HU, 2000/1500 mg QD, reliably\par Covid vaccinated ( required by [employer]), not boostered, had covid this past January.\par Did not get to ophtho-\par \par PE: gen- wdwn male in nad\par vss\par anicteric\par oropharynx- good dentition\par lungs- cta\par cor: rrr-m\par abd- benign \par ext:-c/c/e, no ulcers\par \par labs: 6/12/23\par HGB 7.7\par CMP- WNL\par ANC-5.25, plts 490\par vit D- 23.5\par ferritin 182\par \par a/P- 26 yo male with hGB SS, having mild crisis pain today\par 1. SCD- continue HU 2000/1500 mg QD\par 2. ophtho referral\par 3. pain- oxycodone- 10 mg tab- #30\par ISTOP checked\par 4. f/u three months\par 5. routine labs\par 6. vit D deficiency- encourage Vit D supp- 50,000 iu Q week\par \par Kenia Nguyễn MD\par \par \par

## 2023-06-20 LAB
HGB A MFR BLD: 0 %
HGB A2 MFR BLD: 3.6 %
HGB F MFR BLD: 1.2 %
HGB FRACT BLD-IMP: NORMAL
HGB S MFR BLD: 95.2 %

## 2023-08-10 NOTE — PROGRESS NOTE ADULT - PROBLEM SELECTOR PLAN 2
-unclear infectious source at this time  -blood cultures and urine cx NGTD  -check RVP - negative  -CT chest - await final read, prelim negative  -on Aztreonam and Azythro 06-Aug-2023

## 2023-08-30 NOTE — H&P ADULT - NSHPPOADEEPVENOUSTHROMB_GEN_A_CORE
Received shift report and assumed care of patient.  Patient awake, calm and stable, currently positioned in bed for comfort and safety; call light within reach.  Pt complains of chronic pain at this time.  Will continue to monitor.    no

## 2023-09-14 ENCOUNTER — OUTPATIENT (OUTPATIENT)
Dept: OUTPATIENT SERVICES | Facility: HOSPITAL | Age: 28
LOS: 1 days | End: 2023-09-14

## 2023-09-14 ENCOUNTER — APPOINTMENT (OUTPATIENT)
Dept: INTERNAL MEDICINE | Facility: CLINIC | Age: 28
End: 2023-09-14
Payer: COMMERCIAL

## 2023-09-14 ENCOUNTER — LABORATORY RESULT (OUTPATIENT)
Age: 28
End: 2023-09-14

## 2023-09-14 VITALS
WEIGHT: 195 LBS | BODY MASS INDEX: 27.92 KG/M2 | DIASTOLIC BLOOD PRESSURE: 76 MMHG | OXYGEN SATURATION: 95 % | HEART RATE: 70 BPM | HEIGHT: 70 IN | SYSTOLIC BLOOD PRESSURE: 114 MMHG | RESPIRATION RATE: 16 BRPM

## 2023-09-14 DIAGNOSIS — D57.1 SICKLE-CELL DISEASE W/OUT CRISIS: ICD-10-CM

## 2023-09-14 DIAGNOSIS — E55.9 VITAMIN D DEFICIENCY, UNSPECIFIED: ICD-10-CM

## 2023-09-14 PROCEDURE — 99213 OFFICE O/P EST LOW 20 MIN: CPT

## 2023-09-15 LAB
25(OH)D3 SERPL-MCNC: 15.4 NG/ML
ALBUMIN SERPL ELPH-MCNC: 4.4 G/DL
ALP BLD-CCNC: 72 U/L
ALT SERPL-CCNC: 16 U/L
ANION GAP SERPL CALC-SCNC: 11 MMOL/L
AST SERPL-CCNC: 45 U/L
BASOPHILS # BLD AUTO: 0.08 K/UL
BASOPHILS NFR BLD AUTO: 0.9 %
BILIRUB SERPL-MCNC: 2.1 MG/DL
BUN SERPL-MCNC: 6 MG/DL
CALCIUM SERPL-MCNC: 9.4 MG/DL
CHLORIDE SERPL-SCNC: 103 MMOL/L
CO2 SERPL-SCNC: 25 MMOL/L
CREAT SERPL-MCNC: 0.66 MG/DL
EGFR: 130 ML/MIN/1.73M2
EOSINOPHIL # BLD AUTO: 0.23 K/UL
EOSINOPHIL NFR BLD AUTO: 2.7 %
FERRITIN SERPL-MCNC: 54 NG/ML
GLUCOSE SERPL-MCNC: 87 MG/DL
HCT VFR BLD CALC: 21.9 %
HGB BLD-MCNC: 7.4 G/DL
LYMPHOCYTES # BLD AUTO: 4.43 K/UL
LYMPHOCYTES NFR BLD AUTO: 51.8 %
MAN DIFF?: NORMAL
MCHC RBC-ENTMCNC: 25.3 PG
MCHC RBC-ENTMCNC: 33.8 GM/DL
MCV RBC AUTO: 74.7 FL
MONOCYTES # BLD AUTO: 0.62 K/UL
MONOCYTES NFR BLD AUTO: 7.3 %
NEUTROPHILS # BLD AUTO: 3.19 K/UL
NEUTROPHILS NFR BLD AUTO: 37.3 %
PLATELET # BLD AUTO: 574 K/UL
POTASSIUM SERPL-SCNC: 4.5 MMOL/L
PROT SERPL-MCNC: 7.9 G/DL
RBC # BLD: 2.93 M/UL
RBC # BLD: 2.93 M/UL
RBC # FLD: 25 %
RETICS # AUTO: 5 %
RETICS AGGREG/RBC NFR: 149.6 K/UL
SODIUM SERPL-SCNC: 139 MMOL/L
WBC # FLD AUTO: 8.56 K/UL

## 2023-09-18 PROBLEM — E55.9 VITAMIN D DEFICIENCY: Status: ACTIVE | Noted: 2020-10-01

## 2023-09-18 LAB
HGB A MFR BLD: 0 %
HGB A2 MFR BLD: 3.7 %
HGB F MFR BLD: 1.5 %
HGB FRACT BLD-IMP: NORMAL
HGB S MFR BLD: 94.8 %

## 2023-09-20 DIAGNOSIS — E55.9 VITAMIN D DEFICIENCY, UNSPECIFIED: ICD-10-CM

## 2023-09-20 DIAGNOSIS — D57.1 SICKLE-CELL DISEASE WITHOUT CRISIS: ICD-10-CM

## 2023-10-11 ENCOUNTER — NON-APPOINTMENT (OUTPATIENT)
Age: 28
End: 2023-10-11

## 2023-11-03 ENCOUNTER — NON-APPOINTMENT (OUTPATIENT)
Age: 28
End: 2023-11-03

## 2023-11-07 ENCOUNTER — NON-APPOINTMENT (OUTPATIENT)
Age: 28
End: 2023-11-07

## 2023-11-16 NOTE — PROGRESS NOTE ADULT - PROBLEM SELECTOR PLAN 4
Tried to call patient x3, pt voicemail box is full.  
- lovenox for now (re-evaluate when patient fully ambulatory)  - HOB elevation
lovenox for dvt ppx
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   - follow trend in AM  - close evaluate for any new signs/symptoms of infection

## 2023-12-12 ENCOUNTER — APPOINTMENT (OUTPATIENT)
Dept: PEDIATRIC HEMATOLOGY/ONCOLOGY | Facility: CLINIC | Age: 28
End: 2023-12-12

## 2023-12-12 ENCOUNTER — OUTPATIENT (OUTPATIENT)
Dept: OUTPATIENT SERVICES | Age: 28
LOS: 1 days | Discharge: HOME | End: 2023-12-12

## 2023-12-13 ENCOUNTER — NON-APPOINTMENT (OUTPATIENT)
Age: 28
End: 2023-12-13

## 2023-12-13 DIAGNOSIS — D57.1 SICKLE-CELL DISEASE WITHOUT CRISIS: ICD-10-CM

## 2023-12-13 DIAGNOSIS — E55.9 VITAMIN D DEFICIENCY, UNSPECIFIED: ICD-10-CM

## 2023-12-13 DIAGNOSIS — I42.9 CARDIOMYOPATHY, UNSPECIFIED: ICD-10-CM

## 2023-12-13 DIAGNOSIS — Z92.89 PERSONAL HISTORY OF OTHER MEDICAL TREATMENT: ICD-10-CM

## 2023-12-15 NOTE — DISCHARGE NOTE ADULT - NS AS DC PROVIDER CONTACT Y/N MULTI
December 19, 2023    Kayleemagalis Frye  [address] [facility] [id number]          Dear ,    We are writing to inform you of your test results.  normal result       Resulted Orders   Influenza A/B antigen   Result Value Ref Range    Influenza A antigen Negative Negative    Influenza B antigen Negative Negative    Narrative    Test results must be correlated with clinical data. If necessary, results should be confirmed by a molecular assay or viral culture.   Streptococcus A Rapid Screen w/Reflex to PCR - Clinic Collect   Result Value Ref Range    Group A Strep antigen Negative Negative   Symptomatic COVID-19 Virus (Coronavirus) by PCR Nasopharyngeal   Result Value Ref Range    SARS CoV2 PCR Negative Negative      Comment:      NEGATIVE: SARS-CoV-2 (COVID-19) RNA not detected, presumed negative.    Narrative    Testing was performed using the [employer] SARS-CoV-2 Assay on the  [employer] Instrument System. Additional information about this  Emergency Use Authorization (EUA) assay can be found via the Lab  Guide. This test should be ordered for the detection of SARS-CoV-2 in  individuals who meet SARS-CoV-2 clinical and/or epidemiological  criteria. Test performance is unknown in asymptomatic patients. This  test is for in vitro diagnostic use under the FDA EUA for  laboratories certified under CLIA to perform high complexity testing.  This test has not been FDA cleared or approved. A negative result  does not rule out the presence of PCR inhibitors in the specimen or  target RNA in concentration below the limit of detection for the  assay. The possibility of a false negative should be considered if  the patient's recent exposure or clinical presentation suggests  COVID-19. This test was validated by the [facility]. This laboratory is certified under  the Clinical Laboratory Improvement Amendments of 1988 (CLIA-88) as  qualified to perform high  complexity laboratory testing.   Group A Streptococcus PCR Throat Swab   Result Value Ref Range    Group A strep by PCR Not Detected Not Detected    Narrative    The Xpert Xpress Strep A test, performed on the [employer], is a rapid, qualitative in vitro diagnostic test for the detection of Streptococcus pyogenes (Group A ß-hemolytic Streptococcus, Strep A) in throat swab specimens from patients with signs and symptoms of pharyngitis. The Xpert Xpress Strep A test can be used as an aid in the diagnosis of Group A Streptococcal pharyngitis. The assay is not intended to monitor treatment for Group A Streptococcus infections. The Xpert Xpress Strep A test utilizes an automated real-time polymerase chain reaction (PCR) to detect Streptococcus pyogenes DNA.       If you have any questions or concerns, please call the clinic at the number listed above.       Sincerely,      Patrick Ho MD             Yes

## 2023-12-18 ENCOUNTER — APPOINTMENT (OUTPATIENT)
Dept: INTERNAL MEDICINE | Facility: CLINIC | Age: 28
End: 2023-12-18

## 2024-01-12 ENCOUNTER — NON-APPOINTMENT (OUTPATIENT)
Age: 29
End: 2024-01-12

## 2024-01-16 NOTE — ED ADULT NURSE NOTE - HIV OFFER
"  [facility]Hospital: [facility]   [address] [facility] [address] [phone]    Psychiatric Progress Note  MRN#: [id number]  Ayleen Moralez 60 y.o. female    This note was not shared with the patient due to reasonable likelihood of causing patient harm       _________________________________________________________________________________________________________________________________  OFFICE APPOINTMENT   Seen today at [facility] location                                                Patient Ayleen Moralez ,1963   Prescriber/Physician: Esme Medina DO Physician Location:   [facility] OUTPATIENT  [address] [facility] [address]     This service was provided in the office.  Patient is currently located in the [place], where I am  licensed.   Patient gave consent to proceed with encounter; acknowledge understanding of security and privacy of encounter   [facility]  Patient verbalized understanding evaluation only involves Psychiatric diagnosing, prescribing, result monitoring   Patient was informed this is a billable service and legal   ___________________________________________________________________________________________________________________________________     Reason for Visit:                         Chief Complaint   Patient presents with   • Psychosis       Subjective: Ayleen Moralez did not refill Haldol 10mg , hence remained on 5mg tablet , and reported frequency as 3 times daily - although thinks its working. Regearding Benztprion was stopped felt loopy, Also lowered Seroquel to 700mg - devoid of complaints.     ROS:  Milvia-  \"not sure\" , later stated laziness recently, although Ayleen has racing though, then spoke about problematic living situation and increase of rent; hence slight depression  Psychosis  - early " while completing CRISIS plan , ayleen reorted on paranoia and thoughs of people talking about her   Sleep:  improved , less mid night awakenings   Si/Hi- denies  OCD- don't drive around the block to ensure she did not hit anyone    Medication: Ayleen Moralez is did not increase Haldol to 10mg x 2, remained on Haldol 5mg x 3,  Seroquel 700mg, , Lamotrgine 400mg and Luvox 300mg   Med s/e- denies    Tobacco Use: Low Risk  (1/4/2024)    Patient History    • Smoking Tobacco Use: Never    • Smokeless Tobacco Use: Never    • Passive Exposure: Not on file    Alcohol-denies recent drinking   Illicit- denies ; Ayleen Moralez has h/o marijuana abuse not currently    Medical ROS:   Cardiovascular: negative for chest pain, chest pressure/discomfort, and palpitations  Musculoskeletal:  Positive arthritigs  extremite pain   Neurological: negative for tremors and abnormal movements. Ayleen fell in the mornig, trip did not hit head, Denies Headaches, denies visual disturbance    Also other Pertinent items are noted in above, all other symptoms are negative           Medications Trials:  History of ECT x 30 yrs ago- did not work , Effexor, Imprimine, Lithium- can't take due to kidney dz( external records reviewed- CKD stage 4) , Risperidone - not sure as to why , Tofinail, Xanax,  Zoloft- did not work;Buspar - did not work ,  Luvox- not sure why first attempt was stopped, Prozac - was stopped cause of pregnancy, Trazodone- not sure, Aripiprazole 10mg- tiredness      Mental Status Evaluation:  General Appearance:  Ayleen Moralez is a 60 y.o.  female casually dressed, dressed appropriately, looks stated age, Wearing glasses .    Behavior:  energetic, cooperative, intermittent eye contact   Speech:  Pressured, talkative  difficult to redirect, WNL, rhythm, volume, latency, amount.    Mood:  Normal   Affect:  Normal >slight frustration    Thought Process:  Circumstantial to tangential  disorganized, logical to  illogical tangential   Thought Content:  paranoid ideation, negative thinking, ruminations   Perceptual Disturbances: Does not appear responding or preoccupied   Delusions  YES-accusatory    Risk Potential: Suicidal Ideations w/o  Homicidal Ideations w/o  Potential for Aggression w/o   Sensorium:  Oriented to person, place ( [facility]), time/date ( 2024)and situation    Memory:  recent and remote memory grossly intact   Consciousness:  alert and awake   Attention: Impaired concentration   Insight:  limited   Judgment: Limited to fair   Gait/Station: ataxic   Motor Activity: no abnormal movements     There were no vitals filed for this visit.     Medications:   Current Outpatient Medications on File Prior to Visit   Medication Sig Dispense Refill   • acetaminophen (TYLENOL) 650 mg CR tablet Take 1 tablet (650 mg total) by mouth every 8 (eight) hours as needed for mild pain (Patient taking differently: Take 500 mg by mouth every 8 (eight) hours as needed for mild pain) 30 tablet 0   • albuterol (Ventolin HFA) 90 mcg/act inhaler Inhale 2 puffs every 6 (six) hours as needed for wheezing or shortness of breath 8.5 g 3   • alendronate (Fosamax) 70 mg tablet Take 1 tablet (70 mg total) by mouth every 7 days (Patient not taking: Reported on 2023) 4 tablet 5   • AMILoride 5 mg tablet Take 1 tablet (5 mg total) by mouth 2 (two) times a day 180 tablet 3   • amLODIPine (NORVASC) 5 mg tablet Take 1 tablet (5 mg total) by mouth daily 90 tablet 3   • aspirin 81 mg chewable tablet Chew 1 tablet (81 mg total) daily     • benztropine (COGENTIN) 1 mg tablet Benztropine Mesylate 1 m tablet in the morning and 1 tablet at night 60 tablet 2   • budesonide-formoterol (Symbicort) 160-4.5 mcg/act inhaler Inhale 2 puffs 2 (two) times a day Rinse mouth after use. 10.2 g 11   • carvedilol (COREG) 25 mg tablet Take 1 tablet (25 mg total) by mouth 2 (two) times a day with meals 180 tablet 3   • cholecalciferol (VITAMIN D3)  1,000 units tablet Take 5 tablets (5,000 Units total) by mouth daily (Patient not taking: Reported on 2023) 90 tablet 5   • clonazePAM (KlonoPIN) 0.5 mg tablet Take 1 tablet (0.5 mg total) by mouth 3 (three) times a day 270 tablet 0   • Cyanocobalamin (VITAMIN B 12 PO) Take by mouth (Patient not taking: Reported on 2023)     • fluvoxaMINE (LUVOX) 100 mg tablet Fluvoxamine 100m tablet in the morning ; 2 tablets at night 270 tablet 1   • haloperidol (HALDOL) 10 mg tablet Haldoperidol 10m tablet in the morning and 1 tablet at night 30 tablet 1   • haloperidol (HALDOL) 2 mg tablet Haloperidol 2m tablet po daily in the afternoon, 1 tablet PRN 60 tablet 1   • lamoTRIgine (LaMICtal) 200 MG tablet Lamotrigine 200m tablet in the morning and 1 tablet at night 180 tablet 0   • omeprazole (PriLOSEC) 40 MG capsule Take 1 capsule (40 mg total) by mouth daily before breakfast 90 capsule 1   • ondansetron (ZOFRAN) 4 mg tablet Take 1 tablet (4 mg total) by mouth every 8 (eight) hours as needed for nausea or vomiting 20 tablet 0   • QUEtiapine (SEROquel) 300 mg tablet Quetiapine 300m tablet po  in morning 90 tablet 0   • QUEtiapine (SEROquel) 400 MG tablet Quetiapine Fumarate 400m tablet at night 90 tablet 0   • rosuvastatin (CRESTOR) 20 MG tablet Take 1 tablet (20 mg total) by mouth every evening 90 tablet 3   • traMADol (Ultram) 50 mg tablet Take 1 tablet (50 mg total) by mouth every 12 (twelve) hours as needed for moderate pain 60 tablet 0     No current facility-administered medications on file prior to visit.        Labs: I have personally reviewed all pertinent laboratory/tests results.   Most Recent Labs:     No visits with results within 1 Month(s) from this visit.   Latest known visit with results is:   Admission on 11/10/2023, Discharged on 2023   Component Date Value Ref Range Status   • ABO Grouping 11/10/2023 B   Final   • Rh Factor 11/10/2023 Positive   Final   • Antibody Screen  11/10/2023 Negative   Final   • Specimen Expiration Date 11/10/2023 [id number]   Final   • WBC 11/10/2023 7.90  4.31 - 10.16 Thousand/uL Final   • RBC 11/10/2023 3.86  3.81 - 5.12 Million/uL Final   • Hemoglobin 11/10/2023 12.0  11.5 - 15.4 g/dL Final   • Hematocrit 11/10/2023 38.5  34.8 - 46.1 % Final   • MCV 11/10/2023 100 (H)  82 - 98 fL Final   • MCH 11/10/2023 31.1  26.8 - 34.3 pg Final   • MCHC 11/10/2023 31.2 (L)  31.4 - 37.4 g/dL Final   • RDW 11/10/2023 13.3  11.6 - 15.1 % Final   • MPV 11/10/2023 10.3  8.9 - 12.7 fL Final   • Platelets 11/10/2023 219  149 - 390 Thousands/uL Final   • nRBC 11/10/2023 0  /100 WBCs Final   • Neutrophils Relative 11/10/2023 77 (H)  43 - 75 % Final   • Immat GRANS % 11/10/2023 1  0 - 2 % Final   • Lymphocytes Relative 11/10/2023 15  14 - 44 % Final   • Monocytes Relative 11/10/2023 7  4 - 12 % Final   • Eosinophils Relative 11/10/2023 0  0 - 6 % Final   • Basophils Relative 11/10/2023 0  0 - 1 % Final   • Neutrophils Absolute 11/10/2023 6.06  1.85 - 7.62 Thousands/µL Final   • Immature Grans Absolute 11/10/2023 0.04  0.00 - 0.20 Thousand/uL Final   • Lymphocytes Absolute 11/10/2023 1.22  0.60 - 4.47 Thousands/µL Final   • Monocytes Absolute 11/10/2023 0.55  0.17 - 1.22 Thousand/µL Final   • Eosinophils Absolute 11/10/2023 0.00  0.00 - 0.61 Thousand/µL Final   • Basophils Absolute 11/10/2023 0.03  0.00 - 0.10 Thousands/µL Final   • Sodium 11/10/2023 140  135 - 147 mmol/L Final   • Potassium 11/10/2023 4.8  3.5 - 5.3 mmol/L Final   • Chloride 11/10/2023 107  96 - 108 mmol/L Final   • CO2 11/10/2023 25  21 - 32 mmol/L Final   • ANION GAP 11/10/2023 8  mmol/L Final   • BUN 11/10/2023 22  5 - 25 mg/dL Final   • Creatinine 11/10/2023 2.60 (H)  0.60 - 1.30 mg/dL Final    Standardized to IDMS reference method   • Glucose 11/10/2023 108  65 - 140 mg/dL Final    If the patient is fasting, the ADA then defines impaired fasting glucose as > 100 mg/dL and diabetes as > or equal to 123 mg/dL.    • Calcium 11/10/2023 9.9  8.4 - 10.2 mg/dL Final   • AST 11/10/2023 24  13 - 39 U/L Final   • ALT 11/10/2023 21  7 - 52 U/L Final    Specimen collection should occur prior to Sulfasalazine administration due to the potential for falsely depressed results.    • Alkaline Phosphatase 11/10/2023 191 (H)  34 - 104 U/L Final   • Total Protein 11/10/2023 7.4  6.4 - 8.4 g/dL Final   • Albumin 11/10/2023 4.4  3.5 - 5.0 g/dL Final   • Total Bilirubin 11/10/2023 0.37  0.20 - 1.00 mg/dL Final    Use of this assay is not recommended for patients undergoing treatment with eltrombopag due to the potential for falsely elevated results.  N-acetyl-p-benzoquinone imine (metabolite of Acetaminophen) will generate erroneously low results in samples for patients that have taken an overdose of Acetaminophen.   • eGFR 11/10/2023 19  ml/min/1.73sq m Final   • Ventricular Rate 11/10/2023 77  BPM Final   • Atrial Rate 11/10/2023 77  BPM Final   • VA Interval 11/10/2023 142  ms Final   • QRSD Interval 11/10/2023 136  ms Final   • QT Interval 11/10/2023 426  ms Final   • QTC Interval 11/10/2023 482  ms Final   • P Axis 11/10/2023 62  degrees Final   • QRS Axis 11/10/2023 112  degrees Final   • T Wave Axis 11/10/2023 41  degrees Final   • Sodium 11/11/2023 139  135 - 147 mmol/L Final   • Potassium 11/11/2023 4.5  3.5 - 5.3 mmol/L Final   • Chloride 11/11/2023 109 (H)  96 - 108 mmol/L Final   • CO2 11/11/2023 22  21 - 32 mmol/L Final   • ANION GAP 11/11/2023 8  mmol/L Final   • BUN 11/11/2023 19  5 - 25 mg/dL Final   • Creatinine 11/11/2023 2.64 (H)  0.60 - 1.30 mg/dL Final    Standardized to IDMS reference method   • Glucose 11/11/2023 96  65 - 140 mg/dL Final    If the patient is fasting, the ADA then defines impaired fasting glucose as > 100 mg/dL and diabetes as > or equal to 123 mg/dL.   • Calcium 11/11/2023 9.3  8.4 - 10.2 mg/dL Final   • eGFR 11/11/2023 18  ml/min/1.73sq m Final   • WBC 11/11/2023 5.72  4.31 - 10.16 Thousand/uL Final   •  RBC 11/11/2023 3.66 (L)  3.81 - 5.12 Million/uL Final   • Hemoglobin 11/11/2023 11.4 (L)  11.5 - 15.4 g/dL Final   • Hematocrit 11/11/2023 36.8  34.8 - 46.1 % Final   • MCV 11/11/2023 101 (H)  82 - 98 fL Final   • MCH 11/11/2023 31.1  26.8 - 34.3 pg Final   • MCHC 11/11/2023 31.0 (L)  31.4 - 37.4 g/dL Final   • RDW 11/11/2023 13.3  11.6 - 15.1 % Final   • MPV 11/11/2023 10.1  8.9 - 12.7 fL Final   • Platelets 11/11/2023 183  149 - 390 Thousands/uL Final   • nRBC 11/11/2023 0  /100 WBCs Final   • Neutrophils Relative 11/11/2023 57  43 - 75 % Final   • Immat GRANS % 11/11/2023 0  0 - 2 % Final   • Lymphocytes Relative 11/11/2023 29  14 - 44 % Final   • Monocytes Relative 11/11/2023 13 (H)  4 - 12 % Final   • Eosinophils Relative 11/11/2023 0  0 - 6 % Final   • Basophils Relative 11/11/2023 1  0 - 1 % Final   • Neutrophils Absolute 11/11/2023 3.30  1.85 - 7.62 Thousands/µL Final   • Immature Grans Absolute 11/11/2023 0.02  0.00 - 0.20 Thousand/uL Final   • Lymphocytes Absolute 11/11/2023 1.63  0.60 - 4.47 Thousands/µL Final   • Monocytes Absolute 11/11/2023 0.73  0.17 - 1.22 Thousand/µL Final   • Eosinophils Absolute 11/11/2023 0.01  0.00 - 0.61 Thousand/µL Final   • Basophils Absolute 11/11/2023 0.03  0.00 - 0.10 Thousands/µL Final   • Sodium 11/12/2023 138  135 - 147 mmol/L Final   • Potassium 11/12/2023 4.7  3.5 - 5.3 mmol/L Final   • Chloride 11/12/2023 110 (H)  96 - 108 mmol/L Final   • CO2 11/12/2023 22  21 - 32 mmol/L Final   • ANION GAP 11/12/2023 6  mmol/L Final   • BUN 11/12/2023 26 (H)  5 - 25 mg/dL Final   • Creatinine 11/12/2023 2.82 (H)  0.60 - 1.30 mg/dL Final    Standardized to IDMS reference method   • Glucose 11/12/2023 92  65 - 140 mg/dL Final    If the patient is fasting, the ADA then defines impaired fasting glucose as > 100 mg/dL and diabetes as > or equal to 123 mg/dL.   • Calcium 11/12/2023 9.1  8.4 - 10.2 mg/dL Final   • eGFR 11/12/2023 17  ml/min/1.73sq m Final   • WBC 11/12/2023 5.34  4.31 -  10.16 Thousand/uL Final   • RBC 11/12/2023 3.37 (L)  3.81 - 5.12 Million/uL Final   • Hemoglobin 11/12/2023 10.4 (L)  11.5 - 15.4 g/dL Final   • Hematocrit 11/12/2023 34.2 (L)  34.8 - 46.1 % Final   • MCV 11/12/2023 102 (H)  82 - 98 fL Final   • MCH 11/12/2023 30.9  26.8 - 34.3 pg Final   • MCHC 11/12/2023 30.4 (L)  31.4 - 37.4 g/dL Final   • RDW 11/12/2023 13.3  11.6 - 15.1 % Final   • MPV 11/12/2023 10.4  8.9 - 12.7 fL Final   • Platelets 11/12/2023 192  149 - 390 Thousands/uL Final   • nRBC 11/12/2023 0  /100 WBCs Final   • Neutrophils Relative 11/12/2023 60  43 - 75 % Final   • Immat GRANS % 11/12/2023 0  0 - 2 % Final   • Lymphocytes Relative 11/12/2023 29  14 - 44 % Final   • Monocytes Relative 11/12/2023 10  4 - 12 % Final   • Eosinophils Relative 11/12/2023 0  0 - 6 % Final   • Basophils Relative 11/12/2023 1  0 - 1 % Final   • Neutrophils Absolute 11/12/2023 3.21  1.85 - 7.62 Thousands/µL Final   • Immature Grans Absolute 11/12/2023 0.02  0.00 - 0.20 Thousand/uL Final   • Lymphocytes Absolute 11/12/2023 1.53  0.60 - 4.47 Thousands/µL Final   • Monocytes Absolute 11/12/2023 0.55  0.17 - 1.22 Thousand/µL Final   • Eosinophils Absolute 11/12/2023 0.00  0.00 - 0.61 Thousand/µL Final   • Basophils Absolute 11/12/2023 0.03  0.00 - 0.10 Thousands/µL Final   • Sodium 11/13/2023 140  135 - 147 mmol/L Final   • Potassium 11/13/2023 4.5  3.5 - 5.3 mmol/L Final   • Chloride 11/13/2023 111 (H)  96 - 108 mmol/L Final   • CO2 11/13/2023 23  21 - 32 mmol/L Final   • ANION GAP 11/13/2023 6  mmol/L Final   • BUN 11/13/2023 27 (H)  5 - 25 mg/dL Final   • Creatinine 11/13/2023 2.76 (H)  0.60 - 1.30 mg/dL Final    Standardized to IDMS reference method   • Glucose 11/13/2023 98  65 - 140 mg/dL Final    If the patient is fasting, the ADA then defines impaired fasting glucose as > 100 mg/dL and diabetes as > or equal to 123 mg/dL.   • Calcium 11/13/2023 9.3  8.4 - 10.2 mg/dL Final   • eGFR 11/13/2023 17  ml/min/1.73sq m Final   •  WBC 11/13/2023 5.85  4.31 - 10.16 Thousand/uL Final   • RBC 11/13/2023 3.42 (L)  3.81 - 5.12 Million/uL Final   • Hemoglobin 11/13/2023 10.6 (L)  11.5 - 15.4 g/dL Final   • Hematocrit 11/13/2023 34.9  34.8 - 46.1 % Final   • MCV 11/13/2023 102 (H)  82 - 98 fL Final   • MCH 11/13/2023 31.0  26.8 - 34.3 pg Final   • MCHC 11/13/2023 30.4 (L)  31.4 - 37.4 g/dL Final   • RDW 11/13/2023 13.5  11.6 - 15.1 % Final   • Platelets 11/13/2023 193  149 - 390 Thousands/uL Final   • MPV 11/13/2023 10.4  8.9 - 12.7 fL Final   • Sodium 11/14/2023 141  135 - 147 mmol/L Final   • Potassium 11/14/2023 4.6  3.5 - 5.3 mmol/L Final   • Chloride 11/14/2023 110 (H)  96 - 108 mmol/L Final   • CO2 11/14/2023 25  21 - 32 mmol/L Final   • ANION GAP 11/14/2023 6  mmol/L Final   • BUN 11/14/2023 27 (H)  5 - 25 mg/dL Final   • Creatinine 11/14/2023 2.53 (H)  0.60 - 1.30 mg/dL Final    Standardized to IDMS reference method   • Glucose 11/14/2023 95  65 - 140 mg/dL Final    If the patient is fasting, the ADA then defines impaired fasting glucose as > 100 mg/dL and diabetes as > or equal to 123 mg/dL.   • Calcium 11/14/2023 9.4  8.4 - 10.2 mg/dL Final   • eGFR 11/14/2023 19  ml/min/1.73sq m Final   • WBC 11/14/2023 4.04 (L)  4.31 - 10.16 Thousand/uL Final   • RBC 11/14/2023 3.32 (L)  3.81 - 5.12 Million/uL Final   • Hemoglobin 11/14/2023 10.2 (L)  11.5 - 15.4 g/dL Final   • Hematocrit 11/14/2023 33.5 (L)  34.8 - 46.1 % Final   • MCV 11/14/2023 101 (H)  82 - 98 fL Final   • MCH 11/14/2023 30.7  26.8 - 34.3 pg Final   • MCHC 11/14/2023 30.4 (L)  31.4 - 37.4 g/dL Final   • RDW 11/14/2023 13.4  11.6 - 15.1 % Final   • MPV 11/14/2023 10.2  8.9 - 12.7 fL Final   • Platelets 11/14/2023 183  149 - 390 Thousands/uL Final   • nRBC 11/14/2023 0  /100 WBCs Final   • Neutrophils Relative 11/14/2023 53  43 - 75 % Final   • Immat GRANS % 11/14/2023 0  0 - 2 % Final   • Lymphocytes Relative 11/14/2023 34  14 - 44 % Final   • Monocytes Relative 11/14/2023 12  4 - 12  "% Final   • Eosinophils Relative 11/14/2023 0  0 - 6 % Final   • Basophils Relative 11/14/2023 1  0 - 1 % Final   • Neutrophils Absolute 11/14/2023 2.15  1.85 - 7.62 Thousands/µL Final   • Immature Grans Absolute 11/14/2023 0.01  0.00 - 0.20 Thousand/uL Final   • Lymphocytes Absolute 11/14/2023 1.37  0.60 - 4.47 Thousands/µL Final   • Monocytes Absolute 11/14/2023 0.49  0.17 - 1.22 Thousand/µL Final   • Eosinophils Absolute 11/14/2023 0.00  0.00 - 0.61 Thousand/µL Final   • Basophils Absolute 11/14/2023 0.02  0.00 - 0.10 Thousands/µL Final     Lipids abnormal high   08/2022 09/08/23 ECHO ( EF 65%).       LABS 11/13/23 CMP WNL except high BUN/CR - h/o kidney dz             11/14/23 CBC diff WNL , milding low WBC, RBC, H/H  EKG 11/10/23, NSR, RBBB ,   ________________________________________________________________________    A/P  Ayleen Moralez,60 y.o.  female, history of  Polysubstance abuse ( cocaine, marijuana), multiple hospitalizations, several suicide attempts, anorexia, OCD, bipolar disorder, presented w/ generalized anxiety , several neurovegetative symptoms. Interim events of percustory delusions, D/C Aripiprazole - s/e \"spacy\". There's history of head trauma and cognitive complaints, findings later of OCD . Was started on Venlafaxine. Recent findings of hanh , likely antidepressant precipitated. Increased Haldol and D/C Venlafaxine . Interim events of hanh - persisting ,recently with similar symptoms in the setting of Ayleen Moralez non-adherence to higher dose Haldol        DSM5  1. Bipolar I disorder, current or most recent episode manic, severe with mood-incongruent psychotic features (HCC)    2. Generalized anxiety disorder    3. Obsessive-compulsive disorder, unspecified type          PLAN:    History and external records reviewed.  Discussed clinical findings and  diagnostic impression regarding acute hanh w/ psychosis  Ayleen Moralez was encouraged to bring " medication bottles to next [facility] appointment as she was unsure  Did not change other medications  Pt Ayleen Moralez completed CRISIS plan , was encouraged to sign form      Medications List:  -     haloperidol (HALDOL) 10 mg tablet; Haldoperidol 10m tablet in the morning and 1 tablet at night  -     haloperidol (HALDOL) 2 mg tablet; Haloperidol 2m tablet po daily in the afternoon, 1 tablet PRN  -     benztropine (COGENTIN) 1 mg tablet; Benztropine Mesylate 1 m tablet in the morning and 1 tablet at night  -     QUEtiapine (SEROquel) 400 MG tablet; Quetiapine Fumarate 400m tablet at night        -     QUEtiapine (SEROquel) 300 mg tablet; Quetiapine 300m tablet po  in morning        -     fluvoxaMINE (LUVOX) 100 mg tablet; Fluvoxamine 100m tablet in the morning ; 2 tablets at night        -     lamoTRIgine (LaMICtal) 200 MG tablet; Lamotrigine 200m tablet in the morning and 1 tablet at night         Ayleen Moralez - Pharmacies: [facility] ;   Lamotrigine , Quetiapine-                                                              [facility] Haldol , Fluvoxamine , Venlafaxine -      Treatement: Risks, benefits, and possible side effects of medications explained to patient and patient verbalizes understanding.      Next [facility]: Appointment    ____________________________________________________________________________________________    Patient Encounter: 9:59- 10:40 Documentation Time: 2:01 - 2:11    Encounter Duration: Time Spent 41 mins with Patient.Greater than 50% of total time was spent with the patient       MDM  Number of Diagnoses or Management Options  Diagnosis management comments: 3       Amount and/or Complexity of Data Reviewed  Review and summarize past medical records: yes    Risk of Complications, Morbidity, and/or Mortality  Presenting problems: moderate  Diagnostic procedures: moderate  Management options: moderate        This note may have been written with the assistance of  dictation software. Please excuse any grammatical  errors, misspellings,  and abnormal spacing of letters , sentences or paragraphs . For accurate interpretation read note horizontally        Unable to answer due to medical condition/unresponsive/etc...

## 2024-02-21 ENCOUNTER — NON-APPOINTMENT (OUTPATIENT)
Age: 29
End: 2024-02-21

## 2024-03-12 ENCOUNTER — NON-APPOINTMENT (OUTPATIENT)
Age: 29
End: 2024-03-12

## 2024-04-17 ENCOUNTER — NON-APPOINTMENT (OUTPATIENT)
Age: 29
End: 2024-04-17

## 2024-05-10 NOTE — PATIENT PROFILE ADULT. - FUNCTIONAL SCREEN CURRENT LEVEL: DRESSING, MLM
RT Inhaler-Nebulizer Bronchodilator Protocol Note    There is a bronchodilator order in the chart from a provider indicating to follow the RT Bronchodilator Protocol and there is an “Initiate RT Inhaler-Nebulizer Bronchodilator Protocol” order as well (see protocol at bottom of note).    CXR Findings:  No results found.    The findings from the last RT Protocol Assessment were as follows:   History Pulmonary Disease: Chronic pulmonary disease  Respiratory Pattern: Dyspnea on exertion or RR 21-25 bpm  Breath Sounds: Slightly diminished and/or crackles  Cough: Strong, spontaneous, non-productive  Indication for Bronchodilator Therapy: Decreased or absent breath sounds  Bronchodilator Assessment Score: 6    Aerosolized bronchodilator medication orders have been revised according to the RT Inhaler-Nebulizer Bronchodilator Protocol below.    Respiratory Therapist to perform RT Therapy Protocol Assessment initially then follow the protocol.  Repeat RT Therapy Protocol Assessment PRN for score 0-3 or on second treatment, BID, and PRN for scores above 3.    No Indications - adjust the frequency to every 6 hours PRN wheezing or bronchospasm, if no treatments needed after 48 hours then discontinue using Per Protocol order mode.     If indication present, adjust the RT bronchodilator orders based on the Bronchodilator Assessment Score as indicated below.  Use Inhaler orders unless patient has one or more of the following: on home nebulizer, not able to hold breath for 10 seconds, is not alert and oriented, cannot activate and use MDI correctly, or respiratory rate 25 breaths per minute or more, then use the equivalent nebulizer order(s) with same Frequency and PRN reasons based on the score.  If a patient is on this medication at home then do not decrease Frequency below that used at home.    0-3 - enter or revise RT bronchodilator order(s) to equivalent RT Bronchodilator order with Frequency of every 4 hours PRN for wheezing  (0) independent

## 2024-05-11 NOTE — PATIENT PROFILE ADULT - FUNCTIONAL ASSESSMENT - DAILY ACTIVITY 5.
"[id number]    /72 (BP Location: Left arm)   Pulse 114   Temp 98.1  F (36.7  C) (Axillary)   Resp 18   Ht 1.626 m (5' 4\")   Wt 69.5 kg (153 lb 3.5 oz)   SpO2 93%   BMI 26.30 kg/m      Around 1330, patient was noted to be super diaphoretic, flushed (though she has been intermittently exhibiting this). However, HR was tachy in the teens and O2 was noted to be hovering around 92-94% on room air. Kamilah has been having a freq congested non-productive cough, Robitussin x1. PA made aware. Suctioning Q2H with repos, thick white phlegm. Later paged [facility] about aspiration concern - plan TBD.     EEG placed this afternoon for 3 hours. MRI checklist sent down, MRI team is aware that the plan is to have the scan tomorrow (MRI cannot guarantee it will be in the AM, but will complete it at some point).    OVSS. Afebrile. Remains non-verbal with intermittent grunts. Left upper extremity has some muscle tone with flexion, but otherwise hand remain flaccid. Right upper and BLE are flaccid, but patient was noted to wiggle her toes often. ROM completed with turns. Q2H repos w/ brief checks. No BM this shift. Roy patent w/ adequate output. NJ to TF. Sitter at bedside to watch for any line pulling, no arm jerking noted this shift. Continue with POC.      " 4 = No assist / stand by assistance

## 2024-05-15 ENCOUNTER — NON-APPOINTMENT (OUTPATIENT)
Age: 29
End: 2024-05-15

## 2024-06-17 ENCOUNTER — NON-APPOINTMENT (OUTPATIENT)
Age: 29
End: 2024-06-17

## 2024-08-08 NOTE — DISCHARGE NOTE ADULT - WITHDRAWAL SYMPTOMS INCLUDE; NEGATIVE MOOD, URGES TO SMOKE, AND DIFFICULTY CONCENTRATING.
----- Message from Lacie Magana MD sent at 8/8/2024 12:55 PM CDT -----  Work up for other causes of fatty liver was normal/neg. Can keep f/u as planned   Statement Selected

## 2024-10-11 NOTE — PATIENT PROFILE ADULT. - MEDICATION ADMINISTRATION INFO, PROFILE
[Time Spent: ___ minutes] : I have spent [unfilled] minutes of time on the encounter which excludes teaching and separately reported services.
no concerns

## 2024-11-18 ENCOUNTER — INPATIENT (INPATIENT)
Facility: HOSPITAL | Age: 29
LOS: 9 days | Discharge: HOME | End: 2024-11-28
Attending: INTERNAL MEDICINE | Admitting: INTERNAL MEDICINE
Payer: COMMERCIAL

## 2024-11-18 VITALS
WEIGHT: 179.9 LBS | RESPIRATION RATE: 16 BRPM | DIASTOLIC BLOOD PRESSURE: 61 MMHG | TEMPERATURE: 98 F | HEART RATE: 101 BPM | SYSTOLIC BLOOD PRESSURE: 155 MMHG | OXYGEN SATURATION: 99 %

## 2024-11-18 DIAGNOSIS — D57.00 HB-SS DISEASE WITH CRISIS, UNSPECIFIED: ICD-10-CM

## 2024-11-18 LAB
ALBUMIN SERPL ELPH-MCNC: 4.5 G/DL — SIGNIFICANT CHANGE UP (ref 3.3–5)
ALP SERPL-CCNC: 131 U/L — HIGH (ref 40–120)
ALT FLD-CCNC: 21 U/L — SIGNIFICANT CHANGE UP (ref 4–41)
ANION GAP SERPL CALC-SCNC: 16 MMOL/L — HIGH (ref 7–14)
ANISOCYTOSIS BLD QL: SIGNIFICANT CHANGE UP
AST SERPL-CCNC: 65 U/L — HIGH (ref 4–40)
BASOPHILS # BLD AUTO: 0.14 K/UL — SIGNIFICANT CHANGE UP (ref 0–0.2)
BASOPHILS NFR BLD AUTO: 0.8 % — SIGNIFICANT CHANGE UP (ref 0–2)
BILIRUB SERPL-MCNC: 3.2 MG/DL — HIGH (ref 0.2–1.2)
BLD GP AB SCN SERPL QL: NEGATIVE — SIGNIFICANT CHANGE UP
BUN SERPL-MCNC: 6 MG/DL — LOW (ref 7–23)
CALCIUM SERPL-MCNC: 9.2 MG/DL — SIGNIFICANT CHANGE UP (ref 8.4–10.5)
CHLORIDE SERPL-SCNC: 99 MMOL/L — SIGNIFICANT CHANGE UP (ref 98–107)
CO2 SERPL-SCNC: 22 MMOL/L — SIGNIFICANT CHANGE UP (ref 22–31)
CREAT SERPL-MCNC: 0.75 MG/DL — SIGNIFICANT CHANGE UP (ref 0.5–1.3)
DACRYOCYTES BLD QL SMEAR: SLIGHT — SIGNIFICANT CHANGE UP
EGFR: 124 ML/MIN/1.73M2 — SIGNIFICANT CHANGE UP
ELLIPTOCYTES BLD QL SMEAR: SIGNIFICANT CHANGE UP
EOSINOPHIL # BLD AUTO: 0 K/UL — SIGNIFICANT CHANGE UP (ref 0–0.5)
EOSINOPHIL NFR BLD AUTO: 0 % — SIGNIFICANT CHANGE UP (ref 0–6)
GIANT PLATELETS BLD QL SMEAR: PRESENT — SIGNIFICANT CHANGE UP
GLUCOSE SERPL-MCNC: 79 MG/DL — SIGNIFICANT CHANGE UP (ref 70–99)
HCT VFR BLD CALC: 23.1 % — LOW (ref 39–50)
HGB BLD-MCNC: 7.8 G/DL — LOW (ref 13–17)
IANC: 9.81 K/UL — HIGH (ref 1.8–7.4)
LDH SERPL L TO P-CCNC: 1151 U/L — HIGH (ref 135–225)
LYMPHOCYTES # BLD AUTO: 20.5 % — SIGNIFICANT CHANGE UP (ref 13–44)
LYMPHOCYTES # BLD AUTO: 3.47 K/UL — HIGH (ref 1–3.3)
MACROCYTES BLD QL: SLIGHT — SIGNIFICANT CHANGE UP
MCHC RBC-ENTMCNC: 26.3 PG — LOW (ref 27–34)
MCHC RBC-ENTMCNC: 33.8 G/DL — SIGNIFICANT CHANGE UP (ref 32–36)
MCV RBC AUTO: 77.8 FL — LOW (ref 80–100)
MICROCYTES BLD QL: SIGNIFICANT CHANGE UP
MONOCYTES # BLD AUTO: 3.34 K/UL — HIGH (ref 0–0.9)
MONOCYTES NFR BLD AUTO: 19.7 % — HIGH (ref 2–14)
NEUTROPHILS # BLD AUTO: 9.42 K/UL — HIGH (ref 1.8–7.4)
NEUTROPHILS NFR BLD AUTO: 55.6 % — SIGNIFICANT CHANGE UP (ref 43–77)
NRBC # BLD: 8 /100 WBCS — HIGH (ref 0–0)
OVALOCYTES BLD QL SMEAR: SLIGHT — SIGNIFICANT CHANGE UP
PLAT MORPH BLD: NORMAL — SIGNIFICANT CHANGE UP
PLATELET # BLD AUTO: 684 K/UL — HIGH (ref 150–400)
PLATELET COUNT - ESTIMATE: ABNORMAL
POIKILOCYTOSIS BLD QL AUTO: SIGNIFICANT CHANGE UP
POLYCHROMASIA BLD QL SMEAR: SLIGHT — SIGNIFICANT CHANGE UP
POTASSIUM SERPL-MCNC: 4.1 MMOL/L — SIGNIFICANT CHANGE UP (ref 3.5–5.3)
POTASSIUM SERPL-SCNC: 4.1 MMOL/L — SIGNIFICANT CHANGE UP (ref 3.5–5.3)
PROT SERPL-MCNC: 8.9 G/DL — HIGH (ref 6–8.3)
RBC # BLD: 2.97 M/UL — LOW (ref 4.2–5.8)
RBC # BLD: 2.97 M/UL — LOW (ref 4.2–5.8)
RBC # FLD: 28.5 % — HIGH (ref 10.3–14.5)
RBC BLD AUTO: ABNORMAL
RETICS #: 169.9 K/UL — HIGH (ref 25–125)
RETICS/RBC NFR: 5.8 % — HIGH (ref 0.5–2.5)
RH IG SCN BLD-IMP: POSITIVE — SIGNIFICANT CHANGE UP
SICKLE CELLS BLD QL SMEAR: SIGNIFICANT CHANGE UP
SODIUM SERPL-SCNC: 137 MMOL/L — SIGNIFICANT CHANGE UP (ref 135–145)
TARGETS BLD QL SMEAR: SIGNIFICANT CHANGE UP
TROPONIN T, HIGH SENSITIVITY RESULT: <6 NG/L — SIGNIFICANT CHANGE UP
VARIANT LYMPHS # BLD: 3.4 % — SIGNIFICANT CHANGE UP (ref 0–6)
WBC # BLD: 16.94 K/UL — HIGH (ref 3.8–10.5)
WBC # FLD AUTO: 16.94 K/UL — HIGH (ref 3.8–10.5)

## 2024-11-18 PROCEDURE — 71046 X-RAY EXAM CHEST 2 VIEWS: CPT | Mod: 26

## 2024-11-18 PROCEDURE — 99285 EMERGENCY DEPT VISIT HI MDM: CPT

## 2024-11-18 RX ORDER — ACETAMINOPHEN 500MG 500 MG/1
1000 TABLET, COATED ORAL ONCE
Refills: 0 | Status: COMPLETED | OUTPATIENT
Start: 2024-11-18 | End: 2024-11-18

## 2024-11-18 RX ORDER — SODIUM CHLORIDE 9 MG/ML
1000 INJECTION, SOLUTION INTRAMUSCULAR; INTRAVENOUS; SUBCUTANEOUS ONCE
Refills: 0 | Status: COMPLETED | OUTPATIENT
Start: 2024-11-18 | End: 2024-11-18

## 2024-11-18 RX ORDER — HYDROMORPHONE HYDROCHLORIDE 2 MG/1
1 TABLET ORAL ONCE
Refills: 0 | Status: DISCONTINUED | OUTPATIENT
Start: 2024-11-18 | End: 2024-11-18

## 2024-11-18 RX ORDER — KETOROLAC TROMETHAMINE 30 MG/ML
15 INJECTION INTRAMUSCULAR; INTRAVENOUS ONCE
Refills: 0 | Status: DISCONTINUED | OUTPATIENT
Start: 2024-11-18 | End: 2024-11-18

## 2024-11-18 RX ORDER — LIDOCAINE 40 MG/G
1 CREAM TOPICAL ONCE
Refills: 0 | Status: COMPLETED | OUTPATIENT
Start: 2024-11-18 | End: 2024-11-18

## 2024-11-18 RX ORDER — HYDROMORPHONE HYDROCHLORIDE 2 MG/1
2 TABLET ORAL ONCE
Refills: 0 | Status: DISCONTINUED | OUTPATIENT
Start: 2024-11-18 | End: 2024-11-18

## 2024-11-18 RX ORDER — ONDANSETRON HYDROCHLORIDE 4 MG/1
4 TABLET, FILM COATED ORAL ONCE
Refills: 0 | Status: COMPLETED | OUTPATIENT
Start: 2024-11-18 | End: 2024-11-18

## 2024-11-18 RX ADMIN — KETOROLAC TROMETHAMINE 15 MILLIGRAM(S): 30 INJECTION INTRAMUSCULAR; INTRAVENOUS at 17:16

## 2024-11-18 RX ADMIN — HYDROMORPHONE HYDROCHLORIDE 1 MILLIGRAM(S): 2 TABLET ORAL at 23:54

## 2024-11-18 RX ADMIN — ONDANSETRON HYDROCHLORIDE 4 MILLIGRAM(S): 4 TABLET, FILM COATED ORAL at 22:54

## 2024-11-18 RX ADMIN — SODIUM CHLORIDE 1000 MILLILITER(S): 9 INJECTION, SOLUTION INTRAMUSCULAR; INTRAVENOUS; SUBCUTANEOUS at 17:49

## 2024-11-18 RX ADMIN — ACETAMINOPHEN 500MG 400 MILLIGRAM(S): 500 TABLET, COATED ORAL at 19:13

## 2024-11-18 RX ADMIN — HYDROMORPHONE HYDROCHLORIDE 1 MILLIGRAM(S): 2 TABLET ORAL at 17:17

## 2024-11-18 RX ADMIN — HYDROMORPHONE HYDROCHLORIDE 1 MILLIGRAM(S): 2 TABLET ORAL at 17:49

## 2024-11-18 RX ADMIN — HYDROMORPHONE HYDROCHLORIDE 2 MILLIGRAM(S): 2 TABLET ORAL at 19:13

## 2024-11-18 NOTE — ED ADULT NURSE NOTE - NSFALLUNIVINTERV_ED_ALL_ED
Bed/Stretcher in lowest position, wheels locked, appropriate side rails in place/Call bell, personal items and telephone in reach/Instruct patient to call for assistance before getting out of bed/chair/stretcher/Non-slip footwear applied when patient is off stretcher/[place] to call system/Physically safe environment - no spills, clutter or unnecessary equipment/Purposeful proactive rounding/Room/bathroom lighting operational, light cord in reach

## 2024-11-18 NOTE — ED ADULT NURSE REASSESSMENT NOTE - NS ED NURSE REASSESS COMMENT FT1
report received from TREY Franklin. patient laying in semi fowlers position on the stretcher. patient alert and oriented times four. patient denies shortness of breath, chest pain, nausea, vomiting, chill, fever. Patient normal sinus on the monitor. Respirations equal and adequate. Patients IV patent, no signs of infiltration. Safety measures in place, call bell within reach. Patient stable upon assessment.

## 2024-11-18 NOTE — ED PROVIDER NOTE - CLINICAL SUMMARY MEDICAL DECISION MAKING FREE TEXT BOX
29yM PMH SCD hgss, acute chest, vertigo presenting with a sickle cell crisis. pain started yesterday and is not controlled with home meds. no triggers such as dehydration, recent illness. no trauma or falls. pain likely d/t sickle cell crisis. will obtain cbc, cmp, retic count, ldh. will r/o acute chest with cxr and cardiac enzymes. EKG shows no signs of ischemia. pain control with 1mg Dilaudid, Toradol and lidocaine patches. will reassess.

## 2024-11-18 NOTE — ED ADULT TRIAGE NOTE - CHIEF COMPLAINT QUOTE
Pt c/o chest pain and pain around waist x 2 days reports feels like his sickle cell crisis. denies sob, fever, chills. pmhx: acute chest

## 2024-11-18 NOTE — ED PROVIDER NOTE - OBJECTIVE STATEMENT
29-year-old male history of sickle cell disease type SS followed by Dr. Carmela Nguyễn, acute chest, and vertigo coming in with sickle cell crisis.  Pain started yesterday along the waist and back as well as the middle chest.  Takes 800 ibuprofen and 10 oxycodone at home.  Took both yesterday and this morning around 8am without relief.  Has not had a sickle cell crisis in a few years but he states the pain feels similar to his previous crisis.  No longer taking hydroxyurea and had splenectomy as well as cholecystectomy.  No recent illness, fevers/chills, rhinorrhea, nasal congestion, sore throat, abdominal pain, dysuria, diarrhea.  Some nausea but no vomiting. no recent trauma or falls. 29-year-old male history of sickle cell disease type SS followed by Dr. Kenia Nguyễn, acute chest, and vertigo coming in with sickle cell crisis.  Pain started yesterday along the waist and back as well as the middle chest.  Takes 800 ibuprofen and 10 oxycodone at home.  Took both yesterday and this morning around 8am without relief.  Has not had a sickle cell crisis in a few years but he states the pain feels similar to his previous crisis.  No longer taking hydroxyurea and had splenectomy as well as cholecystectomy.  No recent illness, fevers/chills, rhinorrhea, nasal congestion, sore throat, abdominal pain, dysuria, diarrhea.  Some nausea but no vomiting. no recent trauma or falls.

## 2024-11-18 NOTE — ED PROVIDER NOTE - ATTENDING CONTRIBUTION TO CARE
I personally made the management plan, and take responsibility for the patient's management. I have discussed with and reviewed the Resident's note and agree with the History, ROS, Physical Exam and MDM unless otherwise indicated. A brief summary of my personal evaluation and impression can be found below.    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 took motrin 800, 10 oxy, no relief, no recent crises, no known triggers. Also experiencing fatigue. He has a hx of acute chest and has required exchange transfusion before.     General: well-appearing, no acute distress  Head: Atraumatic, normocephalic  Eyes: EOM grossly in tact, no discharge  ENT: moist mucous membranes  Neurology: A&Ox 3  Respiratory: CTAB, no wheezing, normal respiratory effort  CV: RRR, good s1/s2, no S3, Extremities warm and well perfused  Abdominal: Soft, non-distended  Extremities: no deformities  Skin: warm and dry. No rashes    Differential diagnosis includes but is not limited to pain crisis, acute chest, viral infection, ACS unlikely given age however will get troponin. will tx his pain, get labs, cxr to eval for consolidation. This patient has not had a pain crisis in a long time, given severity of pain patient may require admission.  According to mom at the bedside patient has not had acute chest in some time will get chest x-ray to eval, type and screen.  If any findings on x-ray would consult hematology oncology.    EKG normal sinus rhythm rate 93, , , QTc 445

## 2024-11-18 NOTE — ED PROVIDER NOTE - PROGRESS NOTE DETAILS
Ashok PARRA), PGY-2: Received patient in signout, informed of plan to reassess pain control, patient just received second dosage of Dilaudid at approximately 715, patient with history of acute chest, coming in now with chest pain and back pain, states pain feels similar to previous sickle cell crises, chest x-ray nonconcerning for acute chest or pneumonia, patient afebrile, vital stable, nontachycardic or hypoxic, white blood cells elevated likely inflammatory, hemoglobin at patient's baseline, bilirubin and reticulocytes and LDH elevated, consistent with sickle cell crisis, patient resting comfortably at the bedside watching sports on phone, notes improvement in pain following recent medication however not totally resolved, requesting hot packs, will reassess again in an hour or so regarding dispo. Ashok PARRA), PGY-2: pt to be admitted for continued pain control

## 2024-11-18 NOTE — ED ADULT NURSE NOTE - OBJECTIVE STATEMENT
Break RN: Pt is a 28 y/o Male, A&Ox4, ambulatory with a PHx of sickle cell disease and acute chest. Pt presents to the ED with c/o chest pain, b/l LE pain and pain around waist x 2 days. Pt states pain is similar to past sickle cell crisis pain. Neuro/sensory intact. Respirations even and unlabored, chest rise equal b/l. NSR noted on cardiac monitor. VS as noted in flow sheets. Pt denies SOB, fever, cough, chills, abdominal pain, N/V/D, h/a, dizziness, numbness/tingling or any urinary symptoms at this time. 20g IVL placed in LAC. Labs collected and sent. Medications administered as ordered, see MAR. No acute distress noted. Safety maintained throughout.

## 2024-11-19 DIAGNOSIS — R65.10 SYSTEMIC INFLAMMATORY RESPONSE SYNDROME (SIRS) OF NON-INFECTIOUS ORIGIN WITHOUT ACUTE ORGAN DYSFUNCTION: ICD-10-CM

## 2024-11-19 DIAGNOSIS — Z79.899 OTHER LONG TERM (CURRENT) DRUG THERAPY: ICD-10-CM

## 2024-11-19 DIAGNOSIS — D57.00 HB-SS DISEASE WITH CRISIS, UNSPECIFIED: ICD-10-CM

## 2024-11-19 DIAGNOSIS — Z29.9 ENCOUNTER FOR PROPHYLACTIC MEASURES, UNSPECIFIED: ICD-10-CM

## 2024-11-19 LAB
ALBUMIN SERPL ELPH-MCNC: 3.6 G/DL — SIGNIFICANT CHANGE UP (ref 3.3–5)
ALP SERPL-CCNC: 111 U/L — SIGNIFICANT CHANGE UP (ref 40–120)
ALT FLD-CCNC: 17 U/L — SIGNIFICANT CHANGE UP (ref 4–41)
ANION GAP SERPL CALC-SCNC: 10 MMOL/L — SIGNIFICANT CHANGE UP (ref 7–14)
APPEARANCE UR: ABNORMAL
AST SERPL-CCNC: 51 U/L — HIGH (ref 4–40)
BASOPHILS # BLD AUTO: 0.08 K/UL — SIGNIFICANT CHANGE UP (ref 0–0.2)
BASOPHILS NFR BLD AUTO: 0.6 % — SIGNIFICANT CHANGE UP (ref 0–2)
BILIRUB SERPL-MCNC: 3.1 MG/DL — HIGH (ref 0.2–1.2)
BILIRUB UR-MCNC: ABNORMAL
BUN SERPL-MCNC: 5 MG/DL — LOW (ref 7–23)
CALCIUM SERPL-MCNC: 8.7 MG/DL — SIGNIFICANT CHANGE UP (ref 8.4–10.5)
CHLORIDE SERPL-SCNC: 102 MMOL/L — SIGNIFICANT CHANGE UP (ref 98–107)
CO2 SERPL-SCNC: 24 MMOL/L — SIGNIFICANT CHANGE UP (ref 22–31)
COLOR SPEC: ABNORMAL
CREAT SERPL-MCNC: 0.6 MG/DL — SIGNIFICANT CHANGE UP (ref 0.5–1.3)
DIFF PNL FLD: ABNORMAL
EGFR: 133 ML/MIN/1.73M2 — SIGNIFICANT CHANGE UP
EOSINOPHIL # BLD AUTO: 0.37 K/UL — SIGNIFICANT CHANGE UP (ref 0–0.5)
EOSINOPHIL NFR BLD AUTO: 2.7 % — SIGNIFICANT CHANGE UP (ref 0–6)
FLUAV AG NPH QL: SIGNIFICANT CHANGE UP
FLUBV AG NPH QL: SIGNIFICANT CHANGE UP
GLUCOSE BLDC GLUCOMTR-MCNC: 125 MG/DL — HIGH (ref 70–99)
GLUCOSE SERPL-MCNC: 88 MG/DL — SIGNIFICANT CHANGE UP (ref 70–99)
GLUCOSE UR QL: NEGATIVE MG/DL — SIGNIFICANT CHANGE UP
HCT VFR BLD CALC: 18.9 % — CRITICAL LOW (ref 39–50)
HGB BLD-MCNC: 6.7 G/DL — CRITICAL LOW (ref 13–17)
IANC: 5.25 K/UL — SIGNIFICANT CHANGE UP (ref 1.8–7.4)
IMM GRANULOCYTES NFR BLD AUTO: 0.8 % — SIGNIFICANT CHANGE UP (ref 0–0.9)
KETONES UR-MCNC: NEGATIVE MG/DL — SIGNIFICANT CHANGE UP
LDH SERPL L TO P-CCNC: 851 U/L — HIGH (ref 135–225)
LDH SERPL L TO P-CCNC: 928 U/L — HIGH (ref 135–225)
LEUKOCYTE ESTERASE UR-ACNC: ABNORMAL
LYMPHOCYTES # BLD AUTO: 37.2 % — SIGNIFICANT CHANGE UP (ref 13–44)
LYMPHOCYTES # BLD AUTO: 5.05 K/UL — HIGH (ref 1–3.3)
MAGNESIUM SERPL-MCNC: 2 MG/DL — SIGNIFICANT CHANGE UP (ref 1.6–2.6)
MCHC RBC-ENTMCNC: 26.9 PG — LOW (ref 27–34)
MCHC RBC-ENTMCNC: 35.4 G/DL — SIGNIFICANT CHANGE UP (ref 32–36)
MCV RBC AUTO: 75.9 FL — LOW (ref 80–100)
MONOCYTES # BLD AUTO: 2.72 K/UL — HIGH (ref 0–0.9)
MONOCYTES NFR BLD AUTO: 20 % — HIGH (ref 2–14)
NEUTROPHILS # BLD AUTO: 5.25 K/UL — SIGNIFICANT CHANGE UP (ref 1.8–7.4)
NEUTROPHILS NFR BLD AUTO: 38.7 % — LOW (ref 43–77)
NITRITE UR-MCNC: NEGATIVE — SIGNIFICANT CHANGE UP
NRBC # BLD: 3 /100 WBCS — HIGH (ref 0–0)
NRBC # FLD: 0.38 K/UL — HIGH (ref 0–0)
PH UR: 5.5 — SIGNIFICANT CHANGE UP (ref 5–8)
PHOSPHATE SERPL-MCNC: 4.5 MG/DL — SIGNIFICANT CHANGE UP (ref 2.5–4.5)
PLATELET # BLD AUTO: 559 K/UL — HIGH (ref 150–400)
POTASSIUM SERPL-MCNC: 4.5 MMOL/L — SIGNIFICANT CHANGE UP (ref 3.5–5.3)
POTASSIUM SERPL-SCNC: 4.5 MMOL/L — SIGNIFICANT CHANGE UP (ref 3.5–5.3)
PROT SERPL-MCNC: 7.5 G/DL — SIGNIFICANT CHANGE UP (ref 6–8.3)
PROT UR-MCNC: 100 MG/DL
RBC # BLD: 2.49 M/UL — LOW (ref 4.2–5.8)
RBC # BLD: 2.49 M/UL — LOW (ref 4.2–5.8)
RBC # FLD: 27.3 % — HIGH (ref 10.3–14.5)
RETICS #: 220.8 K/UL — HIGH (ref 25–125)
RETICS/RBC NFR: 9.1 % — HIGH (ref 0.5–2.5)
RSV RNA NPH QL NAA+NON-PROBE: SIGNIFICANT CHANGE UP
SARS-COV-2 RNA SPEC QL NAA+PROBE: SIGNIFICANT CHANGE UP
SODIUM SERPL-SCNC: 136 MMOL/L — SIGNIFICANT CHANGE UP (ref 135–145)
SP GR SPEC: 1.02 — SIGNIFICANT CHANGE UP (ref 1–1.03)
TROPONIN T, HIGH SENSITIVITY RESULT: 8 NG/L — SIGNIFICANT CHANGE UP
UROBILINOGEN FLD QL: 1 MG/DL — SIGNIFICANT CHANGE UP (ref 0.2–1)
WBC # BLD: 13.58 K/UL — HIGH (ref 3.8–10.5)
WBC # FLD AUTO: 13.58 K/UL — HIGH (ref 3.8–10.5)

## 2024-11-19 PROCEDURE — 99223 1ST HOSP IP/OBS HIGH 75: CPT

## 2024-11-19 RX ORDER — NALOXONE HCL 0.4 MG/ML
0.1 AMPUL (ML) INJECTION
Refills: 0 | Status: DISCONTINUED | OUTPATIENT
Start: 2024-11-19 | End: 2024-11-28

## 2024-11-19 RX ORDER — GUAIFENESIN 400 MG
200 TABLET ORAL EVERY 6 HOURS
Refills: 0 | Status: DISCONTINUED | OUTPATIENT
Start: 2024-11-19 | End: 2024-11-28

## 2024-11-19 RX ORDER — ENOXAPARIN SODIUM 30 MG/.3ML
40 INJECTION SUBCUTANEOUS EVERY 24 HOURS
Refills: 0 | Status: DISCONTINUED | OUTPATIENT
Start: 2024-11-19 | End: 2024-11-28

## 2024-11-19 RX ORDER — DIPHENHYDRAMINE HCL 25 MG
25 CAPSULE ORAL EVERY 4 HOURS
Refills: 0 | Status: DISCONTINUED | OUTPATIENT
Start: 2024-11-19 | End: 2024-11-22

## 2024-11-19 RX ORDER — 0.9 % SODIUM CHLORIDE 0.9 %
1000 INTRAVENOUS SOLUTION INTRAVENOUS
Refills: 0 | Status: DISCONTINUED | OUTPATIENT
Start: 2024-11-19 | End: 2024-11-27

## 2024-11-19 RX ORDER — KETOROLAC TROMETHAMINE 30 MG/ML
15 INJECTION INTRAMUSCULAR; INTRAVENOUS EVERY 8 HOURS
Refills: 0 | Status: DISCONTINUED | OUTPATIENT
Start: 2024-11-19 | End: 2024-11-22

## 2024-11-19 RX ORDER — HYDROMORPHONE HYDROCHLORIDE 2 MG/1
30 TABLET ORAL
Refills: 0 | Status: DISCONTINUED | OUTPATIENT
Start: 2024-11-19 | End: 2024-11-22

## 2024-11-19 RX ORDER — MECLIZINE HCL 12.5 MG
25 TABLET ORAL EVERY 8 HOURS
Refills: 0 | Status: DISCONTINUED | OUTPATIENT
Start: 2024-11-19 | End: 2024-11-28

## 2024-11-19 RX ORDER — GLUCOSAMINE SULFATE DIPOT CHLR 500 MG
1 CAPSULE ORAL DAILY
Refills: 0 | Status: DISCONTINUED | OUTPATIENT
Start: 2024-11-19 | End: 2024-11-28

## 2024-11-19 RX ORDER — HYDROMORPHONE HYDROCHLORIDE 2 MG/1
30 TABLET ORAL
Refills: 0 | Status: DISCONTINUED | OUTPATIENT
Start: 2024-11-19 | End: 2024-11-19

## 2024-11-19 RX ORDER — ONDANSETRON HYDROCHLORIDE 4 MG/1
4 TABLET, FILM COATED ORAL EVERY 6 HOURS
Refills: 0 | Status: DISCONTINUED | OUTPATIENT
Start: 2024-11-19 | End: 2024-11-28

## 2024-11-19 RX ADMIN — Medication 1 MILLIGRAM(S): at 12:39

## 2024-11-19 RX ADMIN — HYDROMORPHONE HYDROCHLORIDE 30 MILLILITER(S): 2 TABLET ORAL at 20:27

## 2024-11-19 RX ADMIN — KETOROLAC TROMETHAMINE 15 MILLIGRAM(S): 30 INJECTION INTRAMUSCULAR; INTRAVENOUS at 22:22

## 2024-11-19 RX ADMIN — Medication 75 MILLILITER(S): at 14:26

## 2024-11-19 RX ADMIN — HYDROMORPHONE HYDROCHLORIDE 30 MILLILITER(S): 2 TABLET ORAL at 08:42

## 2024-11-19 RX ADMIN — KETOROLAC TROMETHAMINE 15 MILLIGRAM(S): 30 INJECTION INTRAMUSCULAR; INTRAVENOUS at 12:57

## 2024-11-19 RX ADMIN — HYDROMORPHONE HYDROCHLORIDE 30 MILLILITER(S): 2 TABLET ORAL at 01:06

## 2024-11-19 RX ADMIN — Medication 200 MILLIGRAM(S): at 05:16

## 2024-11-19 RX ADMIN — KETOROLAC TROMETHAMINE 15 MILLIGRAM(S): 30 INJECTION INTRAMUSCULAR; INTRAVENOUS at 22:52

## 2024-11-19 RX ADMIN — HYDROMORPHONE HYDROCHLORIDE 30 MILLILITER(S): 2 TABLET ORAL at 12:55

## 2024-11-19 RX ADMIN — HYDROMORPHONE HYDROCHLORIDE 1 MILLIGRAM(S): 2 TABLET ORAL at 01:00

## 2024-11-19 RX ADMIN — Medication 75 MILLILITER(S): at 07:27

## 2024-11-19 RX ADMIN — KETOROLAC TROMETHAMINE 15 MILLIGRAM(S): 30 INJECTION INTRAMUSCULAR; INTRAVENOUS at 13:27

## 2024-11-19 RX ADMIN — Medication 75 MILLILITER(S): at 01:07

## 2024-11-19 RX ADMIN — HYDROMORPHONE HYDROCHLORIDE 30 MILLILITER(S): 2 TABLET ORAL at 05:51

## 2024-11-19 NOTE — H&P ADULT - PROBLEM SELECTOR PLAN 4
DVT ppx - Lovenox  Diet - Regular  Activity - OOB with assistance, increase as tolerated    Fall and aspiration precautions

## 2024-11-19 NOTE — H&P ADULT - ASSESSMENT
This is a 29M with history of Sickle cell disease, Hx of ACS in past requiring exchange transfusion (years ago), and Vertigo who presents to the hospital with complaints of sickle cell pain crisis.

## 2024-11-19 NOTE — H&P ADULT - PROBLEM SELECTOR PLAN 1
- Patient with sickle cell pain crisis given his chest and lower back pain  - Chest pain seems atypical for cardiac pain (no exertional component, not pressure like in quality, EKG and troponin neg)  - Lower back pain without red flag symptoms (no saddle anesthesia, no bowel/bladder dysfunction)  - States these pain are typical of his sickle cell pain crisis  - New onset cough but no SOB, CXR with clear lungs, on 2L face mask but seemingly for comfort as his O2 sats were 94% or higher without supplemental O2    -> Will c/w dilaudid PCA  -> c/w supplemental O2, monitor his O2 sats and downtitrate O2 therapy as tolerated  -> Trend LDH, TBili, Retic counts  -> c/w folic acid  -> c/w IVF for now, encourage incentive spirometer use  -> Initial troponin negative, EKG non-ischemic -> low concern for cardiac chest pain, repeat troponin with AM labs, hold off on telemetry monitoring  -> Not on HU as per patient, clarify in AM (Med Hx Pharmacist emailed for his med rec)

## 2024-11-19 NOTE — H&P ADULT - NSHPPHYSICALEXAM_GEN_ALL_CORE
Vital Signs Last 24 Hrs  T(C): 37.1 (19 Nov 2024 05:00), Max: 37.1 (18 Nov 2024 19:22)  T(F): 98.8 (19 Nov 2024 05:00), Max: 98.8 (19 Nov 2024 05:00)  HR: 62 (19 Nov 2024 05:00) (62 - 101)  BP: 113/68 (19 Nov 2024 05:00) (112/62 - 155/61)  BP(mean): 76 (18 Nov 2024 19:22) (76 - 97)  RR: 16 (19 Nov 2024 05:00) (16 - 23)  SpO2: 98% (19 Nov 2024 05:00) (94% - 99%)    Parameters below as of 19 Nov 2024 05:00  Patient On (Oxygen Delivery Method): mask, simple face  O2 Flow (L/min): 2    GENERAL: No acute distress, well-developed  EYES/ENT: EOMI, PERRL, conjunctiva and sclera clear, Neck supple, No JVD, moist mucosa  CHEST/LUNG: Clear to auscultation bilaterally; No wheeze, equal breath sounds bilaterally, no TTP on palpation of the chest  BACK: No spinal tenderness  HEART: Regular rate and rhythm; No murmurs, rubs, or gallops  ABDOMEN: Soft, Nontender, Nondistended; Bowel sounds present  EXTREMITIES: +DP/PT/Radial pulses b/l, No clubbing, cyanosis, or edema  PSYCH: Nl behavior, nl affect  NEUROLOGY: AAOx3, non-focal  SKIN: Normal color, No rashes or lesions

## 2024-11-19 NOTE — PATIENT PROFILE ADULT - MST SCORE
0

## 2024-11-19 NOTE — H&P ADULT - HISTORY OF PRESENT ILLNESS
This is a 29M with history of Sickle cell disease, Hx of ACS in past requiring exchange transfusion (years ago), and Vertigo who presents to the hospital with complaints of sickle cell pain crisis. States that for the past 2 days he has been having L sided chest pain (constant, no changes with exertion/respiration, no pressure like component, with radiation to his R chest) and lower back pain (denies associated saddle anesthesia, denies bowel/bladder incontinence/retention, pain radiates to his legs b/l). States that this pain is usual for his sickle cell pain crisis. Took ibuprofen and oxycodone at home without improvement in his symptoms and therefore came to the hospital for evaluation. Currently states that he has 5/10 pain, states an acceptable pain level for him is 3-4/10. He denies SOB but states he started to have a dry cough while being in the hospital. No other acute complaints.     On arrival to the ED his vitals were T 98.4, P 101, /61, RR 16, O2 sat 99% RA. His lab work showed leukocytosis, anemia (chronic), thrombocytosis, reticulocytosis, mild transaminitis with elevated TBili. His CXR showed clear lungs. He was given several rounds of pain medications (dilausis 1mg IVP x3 + 2mg IVP x1, torodol 15mg x1, ofirmev 1g x1, lido patch x1), and zofran 4mg IVP x1. He was then admitted to medicine.

## 2024-11-19 NOTE — H&P ADULT - NSHPLABSRESULTS_GEN_ALL_CORE
LABS and ADDITIONAL STUDIES:                        7.8    16.94 )-----------( 684      ( 18 Nov 2024 17:19 )             23.1   Reticulocyte Count (11.18.24 @ 17:19)   RBC Count: 2.97 M/uL  Reticulocyte Percent: 5.8 %  Absolute Reticulocytes: 169.9 K/uL    137  |  99  |  6[L]  ----------------------------<  79     11-18  4.1   |  22  |  0.75    Ca    9.2      18 Nov 2024 17:19    TPro  8.9[H]  /  Alb  4.5  /  TBili  3.2[H]  /  DBili  x   /  AST  65[H]  /  ALT  21  /  AlkPhos  131[H]  11-18    Lactate Dehydrogenase, Serum: 1151: SPECIMEN MILDLY HEMOLYZED U/L (11.18.24 @ 17:19)     COVID19/Flu/RSV - neg    hs Troponin, T - <6 ng/L (11-18-24 @ 17:19)    < from: Xray Chest 2 Views PA/Lat (11.18.24 @ 17:36) >    FINDINGS:    The heart is normal in size.  The lungs are clear.  There is no pneumothorax or pleural effusion.  No acute osseous abnormalities    IMPRESSION:  Clear lungs.    --- Endof Report ---    < end of copied text >    EKG - NSR at 93, QTc 445, no significant ST-T wave changes

## 2024-11-19 NOTE — H&P ADULT - PROBLEM SELECTOR PLAN 2
- Meets SIRS criteria with leukocytosis and tachycardia (which has since improved)  - New onset cough while in the hospital but COVID/Flu/RSV neg, CXR also shows clear lungs  - Denies other infectious complaints    -> Likely has SIRS 2/2 acute sickle cell pain crisis  -> Trend leukocytosis  -> check UA  -> Monitor cough, if worsens or if he develops new infectious symptoms then consider re-imaging his lungs  -> Holding off on abx as low concern for infection

## 2024-11-19 NOTE — CHART NOTE - NSCHARTNOTEFT_GEN_A_CORE
pt seen and examined by me  H&P reviewed  pt reports improvement in pain from presentation to ED  currently feels PCA is not providing relief at current settings  d/w pt drop in hg and feels like a transfusion would help him feel better; states he hasn't had a transfusion in years  reports h/o iron overload but is no longer on chelating agents  d/w pt will d/w Dr Nguyễn about poss PRBC  in the meantime will adjust PCA and add toradol  VSS  CHEST good AE b/l  EXT no cce  a/w VOC  meds adjusted  monitor for pain control  await d/w Dr Nguyễn regarding PRBC, currently no urgency

## 2024-11-19 NOTE — H&P ADULT - NSHPREVIEWOFSYSTEMS_GEN_ALL_CORE
REVIEW OF SYSTEMS:    CONSTITUTIONAL: No weakness, fevers or chills  EYES: No visual changes or eye discharge  ENT: No rhinorrhea or sore throat  NECK: No pain or stiffness  RESPIRATORY: +Dry cough, Denies wheezing, denies hemoptysis; No shortness of breath  CARDIOVASCULAR: +chest pain, Denies palpitations; No lower extremity edema  GASTROINTESTINAL: No abdominal or epigastric pain. No nausea, vomiting, or hematemesis; No diarrhea or constipation. No melena or hematochezia.  MSK: +lower back pain  GENITOURINARY: No dysuria, frequency or hematuria  NEUROLOGICAL: No numbness or weakness  SKIN: No itching, burning, rashes, or lesions

## 2024-11-19 NOTE — PHARMACOTHERAPY INTERVENTION NOTE - COMMENTS
Medication history is incomplete. Unable to verify patient's medication list with two sources. Discrepancies noted in provider handoff. Please call spectra [phone] if you have any questions.

## 2024-11-19 NOTE — PATIENT PROFILE ADULT - FALL HARM RISK - HARM RISK INTERVENTIONS

## 2024-11-20 LAB
ALBUMIN SERPL ELPH-MCNC: 3.7 G/DL — SIGNIFICANT CHANGE UP (ref 3.3–5)
ALP SERPL-CCNC: 121 U/L — HIGH (ref 40–120)
ALT FLD-CCNC: 15 U/L — SIGNIFICANT CHANGE UP (ref 4–41)
ANION GAP SERPL CALC-SCNC: 10 MMOL/L — SIGNIFICANT CHANGE UP (ref 7–14)
AST SERPL-CCNC: 54 U/L — HIGH (ref 4–40)
BASOPHILS # BLD AUTO: 0.09 K/UL — SIGNIFICANT CHANGE UP (ref 0–0.2)
BASOPHILS NFR BLD AUTO: 0.8 % — SIGNIFICANT CHANGE UP (ref 0–2)
BILIRUB SERPL-MCNC: 2.3 MG/DL — HIGH (ref 0.2–1.2)
BUN SERPL-MCNC: 6 MG/DL — LOW (ref 7–23)
CALCIUM SERPL-MCNC: 8.7 MG/DL — SIGNIFICANT CHANGE UP (ref 8.4–10.5)
CHLORIDE SERPL-SCNC: 103 MMOL/L — SIGNIFICANT CHANGE UP (ref 98–107)
CO2 SERPL-SCNC: 24 MMOL/L — SIGNIFICANT CHANGE UP (ref 22–31)
CREAT SERPL-MCNC: 0.61 MG/DL — SIGNIFICANT CHANGE UP (ref 0.5–1.3)
EGFR: 133 ML/MIN/1.73M2 — SIGNIFICANT CHANGE UP
EOSINOPHIL # BLD AUTO: 0.45 K/UL — SIGNIFICANT CHANGE UP (ref 0–0.5)
EOSINOPHIL NFR BLD AUTO: 3.8 % — SIGNIFICANT CHANGE UP (ref 0–6)
GLUCOSE SERPL-MCNC: 96 MG/DL — SIGNIFICANT CHANGE UP (ref 70–99)
HCT VFR BLD CALC: 20.4 % — CRITICAL LOW (ref 39–50)
HGB BLD-MCNC: 7.2 G/DL — LOW (ref 13–17)
IANC: 4.89 K/UL — SIGNIFICANT CHANGE UP (ref 1.8–7.4)
IMM GRANULOCYTES NFR BLD AUTO: 0.6 % — SIGNIFICANT CHANGE UP (ref 0–0.9)
LYMPHOCYTES # BLD AUTO: 37.8 % — SIGNIFICANT CHANGE UP (ref 13–44)
LYMPHOCYTES # BLD AUTO: 4.49 K/UL — HIGH (ref 1–3.3)
MAGNESIUM SERPL-MCNC: 2.1 MG/DL — SIGNIFICANT CHANGE UP (ref 1.6–2.6)
MCHC RBC-ENTMCNC: 27.4 PG — SIGNIFICANT CHANGE UP (ref 27–34)
MCHC RBC-ENTMCNC: 35.3 G/DL — SIGNIFICANT CHANGE UP (ref 32–36)
MCV RBC AUTO: 77.6 FL — LOW (ref 80–100)
MONOCYTES # BLD AUTO: 1.88 K/UL — HIGH (ref 0–0.9)
MONOCYTES NFR BLD AUTO: 15.8 % — HIGH (ref 2–14)
MRSA PCR RESULT.: SIGNIFICANT CHANGE UP
NEUTROPHILS # BLD AUTO: 4.89 K/UL — SIGNIFICANT CHANGE UP (ref 1.8–7.4)
NEUTROPHILS NFR BLD AUTO: 41.2 % — LOW (ref 43–77)
NRBC # BLD: 3 /100 WBCS — HIGH (ref 0–0)
NRBC # FLD: 0.35 K/UL — HIGH (ref 0–0)
PHOSPHATE SERPL-MCNC: 5 MG/DL — HIGH (ref 2.5–4.5)
PLATELET # BLD AUTO: 629 K/UL — HIGH (ref 150–400)
POTASSIUM SERPL-MCNC: 4.9 MMOL/L — SIGNIFICANT CHANGE UP (ref 3.5–5.3)
POTASSIUM SERPL-SCNC: 4.9 MMOL/L — SIGNIFICANT CHANGE UP (ref 3.5–5.3)
PROT SERPL-MCNC: 7.6 G/DL — SIGNIFICANT CHANGE UP (ref 6–8.3)
RBC # BLD: 2.63 M/UL — LOW (ref 4.2–5.8)
RBC # BLD: 2.63 M/UL — LOW (ref 4.2–5.8)
RBC # FLD: 27.2 % — HIGH (ref 10.3–14.5)
RETICS #: 230.7 K/UL — HIGH (ref 25–125)
RETICS/RBC NFR: 8.8 % — HIGH (ref 0.5–2.5)
S AUREUS DNA NOSE QL NAA+PROBE: SIGNIFICANT CHANGE UP
SODIUM SERPL-SCNC: 137 MMOL/L — SIGNIFICANT CHANGE UP (ref 135–145)
WBC # BLD: 11.87 K/UL — HIGH (ref 3.8–10.5)
WBC # FLD AUTO: 11.87 K/UL — HIGH (ref 3.8–10.5)

## 2024-11-20 PROCEDURE — 99232 SBSQ HOSP IP/OBS MODERATE 35: CPT

## 2024-11-20 RX ORDER — CHLORHEXIDINE GLUCONATE 1.2 MG/ML
1 RINSE ORAL DAILY
Refills: 0 | Status: DISCONTINUED | OUTPATIENT
Start: 2024-11-20 | End: 2024-11-28

## 2024-11-20 RX ADMIN — ONDANSETRON HYDROCHLORIDE 4 MILLIGRAM(S): 4 TABLET, FILM COATED ORAL at 20:25

## 2024-11-20 RX ADMIN — HYDROMORPHONE HYDROCHLORIDE 30 MILLILITER(S): 2 TABLET ORAL at 08:43

## 2024-11-20 RX ADMIN — KETOROLAC TROMETHAMINE 15 MILLIGRAM(S): 30 INJECTION INTRAMUSCULAR; INTRAVENOUS at 22:53

## 2024-11-20 RX ADMIN — Medication 1 MILLIGRAM(S): at 11:53

## 2024-11-20 RX ADMIN — Medication 25 MILLIGRAM(S): at 07:29

## 2024-11-20 RX ADMIN — KETOROLAC TROMETHAMINE 15 MILLIGRAM(S): 30 INJECTION INTRAMUSCULAR; INTRAVENOUS at 13:24

## 2024-11-20 RX ADMIN — KETOROLAC TROMETHAMINE 15 MILLIGRAM(S): 30 INJECTION INTRAMUSCULAR; INTRAVENOUS at 22:33

## 2024-11-20 RX ADMIN — KETOROLAC TROMETHAMINE 15 MILLIGRAM(S): 30 INJECTION INTRAMUSCULAR; INTRAVENOUS at 07:59

## 2024-11-20 RX ADMIN — Medication 75 MILLILITER(S): at 19:09

## 2024-11-20 RX ADMIN — KETOROLAC TROMETHAMINE 15 MILLIGRAM(S): 30 INJECTION INTRAMUSCULAR; INTRAVENOUS at 07:29

## 2024-11-20 RX ADMIN — HYDROMORPHONE HYDROCHLORIDE 30 MILLILITER(S): 2 TABLET ORAL at 20:26

## 2024-11-20 RX ADMIN — HYDROMORPHONE HYDROCHLORIDE 30 MILLILITER(S): 2 TABLET ORAL at 05:26

## 2024-11-20 RX ADMIN — CHLORHEXIDINE GLUCONATE 1 APPLICATION(S): 1.2 RINSE ORAL at 11:52

## 2024-11-20 RX ADMIN — ONDANSETRON HYDROCHLORIDE 4 MILLIGRAM(S): 4 TABLET, FILM COATED ORAL at 09:38

## 2024-11-21 ENCOUNTER — TRANSCRIPTION ENCOUNTER (OUTPATIENT)
Age: 29
End: 2024-11-21

## 2024-11-21 LAB
ADD ON TEST-SPECIMEN IN LAB: SIGNIFICANT CHANGE UP
ADD ON TEST-SPECIMEN IN LAB: SIGNIFICANT CHANGE UP
ALBUMIN SERPL ELPH-MCNC: 3.7 G/DL — SIGNIFICANT CHANGE UP (ref 3.3–5)
ALP SERPL-CCNC: 135 U/L — HIGH (ref 40–120)
ALT FLD-CCNC: 14 U/L — SIGNIFICANT CHANGE UP (ref 4–41)
ANION GAP SERPL CALC-SCNC: 10 MMOL/L — SIGNIFICANT CHANGE UP (ref 7–14)
ANISOCYTOSIS BLD QL: SIGNIFICANT CHANGE UP
APPEARANCE UR: CLEAR — SIGNIFICANT CHANGE UP
AST SERPL-CCNC: 48 U/L — HIGH (ref 4–40)
B PERT DNA SPEC QL NAA+PROBE: SIGNIFICANT CHANGE UP
B PERT+PARAPERT DNA PNL SPEC NAA+PROBE: SIGNIFICANT CHANGE UP
BACTERIA # UR AUTO: NEGATIVE /HPF — SIGNIFICANT CHANGE UP
BASOPHILS # BLD AUTO: 0.11 K/UL — SIGNIFICANT CHANGE UP (ref 0–0.2)
BASOPHILS NFR BLD AUTO: 0.9 % — SIGNIFICANT CHANGE UP (ref 0–2)
BILIRUB SERPL-MCNC: 2.2 MG/DL — HIGH (ref 0.2–1.2)
BILIRUB UR-MCNC: NEGATIVE — SIGNIFICANT CHANGE UP
BLD GP AB SCN SERPL QL: NEGATIVE — SIGNIFICANT CHANGE UP
BUN SERPL-MCNC: 6 MG/DL — LOW (ref 7–23)
C PNEUM DNA SPEC QL NAA+PROBE: SIGNIFICANT CHANGE UP
CALCIUM SERPL-MCNC: 8.5 MG/DL — SIGNIFICANT CHANGE UP (ref 8.4–10.5)
CAST: 0 /LPF — SIGNIFICANT CHANGE UP (ref 0–4)
CHLORIDE SERPL-SCNC: 100 MMOL/L — SIGNIFICANT CHANGE UP (ref 98–107)
CO2 SERPL-SCNC: 26 MMOL/L — SIGNIFICANT CHANGE UP (ref 22–31)
COLOR SPEC: YELLOW — SIGNIFICANT CHANGE UP
CREAT SERPL-MCNC: 0.76 MG/DL — SIGNIFICANT CHANGE UP (ref 0.5–1.3)
DACRYOCYTES BLD QL SMEAR: SLIGHT — SIGNIFICANT CHANGE UP
DIFF PNL FLD: ABNORMAL
EGFR: 124 ML/MIN/1.73M2 — SIGNIFICANT CHANGE UP
EOSINOPHIL # BLD AUTO: 0.56 K/UL — HIGH (ref 0–0.5)
EOSINOPHIL NFR BLD AUTO: 4.6 % — SIGNIFICANT CHANGE UP (ref 0–6)
FLUAV AG NPH QL: SIGNIFICANT CHANGE UP
FLUAV SUBTYP SPEC NAA+PROBE: SIGNIFICANT CHANGE UP
FLUBV AG NPH QL: SIGNIFICANT CHANGE UP
FLUBV RNA SPEC QL NAA+PROBE: SIGNIFICANT CHANGE UP
GIANT PLATELETS BLD QL SMEAR: PRESENT — SIGNIFICANT CHANGE UP
GLUCOSE SERPL-MCNC: 104 MG/DL — HIGH (ref 70–99)
GLUCOSE UR QL: NEGATIVE MG/DL — SIGNIFICANT CHANGE UP
HADV DNA SPEC QL NAA+PROBE: SIGNIFICANT CHANGE UP
HCOV 229E RNA SPEC QL NAA+PROBE: SIGNIFICANT CHANGE UP
HCOV HKU1 RNA SPEC QL NAA+PROBE: SIGNIFICANT CHANGE UP
HCOV NL63 RNA SPEC QL NAA+PROBE: SIGNIFICANT CHANGE UP
HCOV OC43 RNA SPEC QL NAA+PROBE: SIGNIFICANT CHANGE UP
HCT VFR BLD CALC: 17.7 % — CRITICAL LOW (ref 39–50)
HGB BLD-MCNC: 6.4 G/DL — CRITICAL LOW (ref 13–17)
HMPV RNA SPEC QL NAA+PROBE: SIGNIFICANT CHANGE UP
HPIV1 RNA SPEC QL NAA+PROBE: SIGNIFICANT CHANGE UP
HPIV2 RNA SPEC QL NAA+PROBE: SIGNIFICANT CHANGE UP
HPIV3 RNA SPEC QL NAA+PROBE: SIGNIFICANT CHANGE UP
HPIV4 RNA SPEC QL NAA+PROBE: SIGNIFICANT CHANGE UP
HYPOCHROMIA BLD QL: SIGNIFICANT CHANGE UP
IANC: 5.93 K/UL — SIGNIFICANT CHANGE UP (ref 1.8–7.4)
KETONES UR-MCNC: NEGATIVE MG/DL — SIGNIFICANT CHANGE UP
LACTATE SERPL-SCNC: 0.4 MMOL/L — LOW (ref 0.5–2)
LDH SERPL L TO P-CCNC: 801 U/L — HIGH (ref 135–225)
LEUKOCYTE ESTERASE UR-ACNC: NEGATIVE — SIGNIFICANT CHANGE UP
LYMPHOCYTES # BLD AUTO: 26.9 % — SIGNIFICANT CHANGE UP (ref 13–44)
LYMPHOCYTES # BLD AUTO: 3.29 K/UL — SIGNIFICANT CHANGE UP (ref 1–3.3)
M PNEUMO DNA SPEC QL NAA+PROBE: SIGNIFICANT CHANGE UP
MACROCYTES BLD QL: SLIGHT — SIGNIFICANT CHANGE UP
MANUAL SMEAR VERIFICATION: SIGNIFICANT CHANGE UP
MCHC RBC-ENTMCNC: 26.9 PG — LOW (ref 27–34)
MCHC RBC-ENTMCNC: 36.2 G/DL — HIGH (ref 32–36)
MCV RBC AUTO: 74.4 FL — LOW (ref 80–100)
MICROCYTES BLD QL: SIGNIFICANT CHANGE UP
MONOCYTES # BLD AUTO: 1.59 K/UL — HIGH (ref 0–0.9)
MONOCYTES NFR BLD AUTO: 13 % — SIGNIFICANT CHANGE UP (ref 2–14)
NEUTROPHILS # BLD AUTO: 5.54 K/UL — SIGNIFICANT CHANGE UP (ref 1.8–7.4)
NEUTROPHILS NFR BLD AUTO: 45.3 % — SIGNIFICANT CHANGE UP (ref 43–77)
NITRITE UR-MCNC: NEGATIVE — SIGNIFICANT CHANGE UP
NRBC # BLD: 2 /100 WBCS — HIGH (ref 0–0)
OVALOCYTES BLD QL SMEAR: SLIGHT — SIGNIFICANT CHANGE UP
PH UR: 6 — SIGNIFICANT CHANGE UP (ref 5–8)
PLAT MORPH BLD: NORMAL — SIGNIFICANT CHANGE UP
PLATELET # BLD AUTO: 586 K/UL — HIGH (ref 150–400)
PLATELET COUNT - ESTIMATE: ABNORMAL
POIKILOCYTOSIS BLD QL AUTO: SIGNIFICANT CHANGE UP
POLYCHROMASIA BLD QL SMEAR: SIGNIFICANT CHANGE UP
POTASSIUM SERPL-MCNC: 4.4 MMOL/L — SIGNIFICANT CHANGE UP (ref 3.5–5.3)
POTASSIUM SERPL-SCNC: 4.4 MMOL/L — SIGNIFICANT CHANGE UP (ref 3.5–5.3)
PROCALCITONIN SERPL-MCNC: 0.25 NG/ML — HIGH (ref 0.02–0.1)
PROT SERPL-MCNC: 7.4 G/DL — SIGNIFICANT CHANGE UP (ref 6–8.3)
PROT UR-MCNC: 30 MG/DL
RAPID RVP RESULT: SIGNIFICANT CHANGE UP
RBC # BLD: 2.36 M/UL — LOW (ref 4.2–5.8)
RBC # BLD: 2.38 M/UL — LOW (ref 4.2–5.8)
RBC # FLD: 26.8 % — HIGH (ref 10.3–14.5)
RBC BLD AUTO: ABNORMAL
RBC CASTS # UR COMP ASSIST: 8 /HPF — HIGH (ref 0–4)
RETICS #: 112.8 K/UL — SIGNIFICANT CHANGE UP (ref 25–125)
RETICS/RBC NFR: 4.8 % — HIGH (ref 0.5–2.5)
RH IG SCN BLD-IMP: POSITIVE — SIGNIFICANT CHANGE UP
RSV RNA NPH QL NAA+NON-PROBE: SIGNIFICANT CHANGE UP
RSV RNA SPEC QL NAA+PROBE: SIGNIFICANT CHANGE UP
RV+EV RNA SPEC QL NAA+PROBE: SIGNIFICANT CHANGE UP
SARS-COV-2 RNA SPEC QL NAA+PROBE: SIGNIFICANT CHANGE UP
SARS-COV-2 RNA SPEC QL NAA+PROBE: SIGNIFICANT CHANGE UP
SICKLE CELLS BLD QL SMEAR: SIGNIFICANT CHANGE UP
SMUDGE CELLS # BLD: PRESENT — SIGNIFICANT CHANGE UP
SODIUM SERPL-SCNC: 136 MMOL/L — SIGNIFICANT CHANGE UP (ref 135–145)
SP GR SPEC: 1.01 — SIGNIFICANT CHANGE UP (ref 1–1.03)
SQUAMOUS # UR AUTO: 0 /HPF — SIGNIFICANT CHANGE UP (ref 0–5)
TARGETS BLD QL SMEAR: SIGNIFICANT CHANGE UP
UROBILINOGEN FLD QL: 1 MG/DL — SIGNIFICANT CHANGE UP (ref 0.2–1)
VARIANT LYMPHS # BLD: 9.3 % — HIGH (ref 0–6)
WBC # BLD: 12.24 K/UL — HIGH (ref 3.8–10.5)
WBC # FLD AUTO: 12.24 K/UL — HIGH (ref 3.8–10.5)
WBC UR QL: 0 /HPF — SIGNIFICANT CHANGE UP (ref 0–5)

## 2024-11-21 PROCEDURE — 71045 X-RAY EXAM CHEST 1 VIEW: CPT | Mod: 26

## 2024-11-21 PROCEDURE — 99233 SBSQ HOSP IP/OBS HIGH 50: CPT

## 2024-11-21 RX ORDER — PIPERACILLIN SODIUM AND TAZOBACTAM SODIUM 4; .5 G/20ML; G/20ML
3.38 INJECTION, POWDER, LYOPHILIZED, FOR SOLUTION INTRAVENOUS EVERY 8 HOURS
Refills: 0 | Status: DISCONTINUED | OUTPATIENT
Start: 2024-11-21 | End: 2024-11-23

## 2024-11-21 RX ORDER — ACETAMINOPHEN 500MG 500 MG/1
1000 TABLET, COATED ORAL ONCE
Refills: 0 | Status: COMPLETED | OUTPATIENT
Start: 2024-11-21 | End: 2024-11-21

## 2024-11-21 RX ORDER — SODIUM CHLORIDE 9 MG/ML
500 INJECTION, SOLUTION INTRAMUSCULAR; INTRAVENOUS; SUBCUTANEOUS ONCE
Refills: 0 | Status: COMPLETED | OUTPATIENT
Start: 2024-11-21 | End: 2024-11-21

## 2024-11-21 RX ORDER — PIPERACILLIN SODIUM AND TAZOBACTAM SODIUM 4; .5 G/20ML; G/20ML
3.38 INJECTION, POWDER, LYOPHILIZED, FOR SOLUTION INTRAVENOUS ONCE
Refills: 0 | Status: COMPLETED | OUTPATIENT
Start: 2024-11-21 | End: 2024-11-21

## 2024-11-21 RX ORDER — AZTREONAM 2 G
1000 VIAL (EA) INJECTION ONCE
Refills: 0 | Status: COMPLETED | OUTPATIENT
Start: 2024-11-21 | End: 2024-11-21

## 2024-11-21 RX ORDER — ONDANSETRON HYDROCHLORIDE 4 MG/1
4 TABLET, FILM COATED ORAL ONCE
Refills: 0 | Status: COMPLETED | OUTPATIENT
Start: 2024-11-21 | End: 2024-11-21

## 2024-11-21 RX ADMIN — KETOROLAC TROMETHAMINE 15 MILLIGRAM(S): 30 INJECTION INTRAMUSCULAR; INTRAVENOUS at 13:35

## 2024-11-21 RX ADMIN — KETOROLAC TROMETHAMINE 15 MILLIGRAM(S): 30 INJECTION INTRAMUSCULAR; INTRAVENOUS at 07:13

## 2024-11-21 RX ADMIN — SODIUM CHLORIDE 500 MILLILITER(S): 9 INJECTION, SOLUTION INTRAMUSCULAR; INTRAVENOUS; SUBCUTANEOUS at 04:16

## 2024-11-21 RX ADMIN — CHLORHEXIDINE GLUCONATE 1 APPLICATION(S): 1.2 RINSE ORAL at 12:36

## 2024-11-21 RX ADMIN — ONDANSETRON HYDROCHLORIDE 4 MILLIGRAM(S): 4 TABLET, FILM COATED ORAL at 21:57

## 2024-11-21 RX ADMIN — ONDANSETRON HYDROCHLORIDE 4 MILLIGRAM(S): 4 TABLET, FILM COATED ORAL at 04:10

## 2024-11-21 RX ADMIN — PIPERACILLIN SODIUM AND TAZOBACTAM SODIUM 25 GRAM(S): 4; .5 INJECTION, POWDER, LYOPHILIZED, FOR SOLUTION INTRAVENOUS at 16:58

## 2024-11-21 RX ADMIN — Medication 75 MILLILITER(S): at 06:37

## 2024-11-21 RX ADMIN — HYDROMORPHONE HYDROCHLORIDE 30 MILLILITER(S): 2 TABLET ORAL at 04:31

## 2024-11-21 RX ADMIN — HYDROMORPHONE HYDROCHLORIDE 30 MILLILITER(S): 2 TABLET ORAL at 08:35

## 2024-11-21 RX ADMIN — Medication 1 MILLIGRAM(S): at 12:36

## 2024-11-21 RX ADMIN — KETOROLAC TROMETHAMINE 15 MILLIGRAM(S): 30 INJECTION INTRAMUSCULAR; INTRAVENOUS at 21:57

## 2024-11-21 RX ADMIN — KETOROLAC TROMETHAMINE 15 MILLIGRAM(S): 30 INJECTION INTRAMUSCULAR; INTRAVENOUS at 21:34

## 2024-11-21 RX ADMIN — Medication 75 MILLILITER(S): at 17:49

## 2024-11-21 RX ADMIN — ACETAMINOPHEN 500MG 1000 MILLIGRAM(S): 500 TABLET, COATED ORAL at 03:00

## 2024-11-21 RX ADMIN — ACETAMINOPHEN 500MG 400 MILLIGRAM(S): 500 TABLET, COATED ORAL at 02:15

## 2024-11-21 RX ADMIN — Medication 50 MILLIGRAM(S): at 06:34

## 2024-11-21 RX ADMIN — PIPERACILLIN SODIUM AND TAZOBACTAM SODIUM 200 GRAM(S): 4; .5 INJECTION, POWDER, LYOPHILIZED, FOR SOLUTION INTRAVENOUS at 12:54

## 2024-11-21 RX ADMIN — KETOROLAC TROMETHAMINE 15 MILLIGRAM(S): 30 INJECTION INTRAMUSCULAR; INTRAVENOUS at 06:43

## 2024-11-21 RX ADMIN — HYDROMORPHONE HYDROCHLORIDE 30 MILLILITER(S): 2 TABLET ORAL at 20:36

## 2024-11-21 NOTE — DISCHARGE NOTE PROVIDER - CARE PROVIDER_API CALL
Kenia Nguyễn  Internal Medicine  [id number] [place] [id number]  Phone: [phone]  Fax: [phone]  Follow Up Time: 1 week

## 2024-11-21 NOTE — DISCHARGE NOTE PROVIDER - ATTENDING DISCHARGE PHYSICAL EXAMINATION:
VITAL SIGNS:  T(C): 36.9 (11-28-24 @ 13:18), Max: 36.9 (11-27-24 @ 17:50)  T(F): 98.4 (11-28-24 @ 13:18), Max: 98.5 (11-28-24 @ 07:28)  HR: 70 (11-28-24 @ 13:18) (58 - 74)  BP: 125/62 (11-28-24 @ 13:18) (117/64 - 129/61)  BP(mean): --  RR: 18 (11-28-24 @ 13:18) (16 - 18)  SpO2: 95% (11-28-24 @ 13:18) (92% - 99%)  Wt(kg): --    PHYSICAL EXAM:  Constitutional: resting comfortably in bed; NAD  Head: NC/AT  Eyes: PERRL, EOMI, anicteric sclera  ENT: no nasal discharge; MMM  Neck: supple; no JVD  Respiratory: CTA B/L; no W/R/R  Cardiac: +S1/S2; RRR; no M/R/G  Gastrointestinal: soft, NT/ND; no rebound or guarding; +BSx4  Extremities: WWP, no clubbing or cyanosis; no peripheral edema  Musculoskeletal: NROM x4; no joint swelling, tenderness or erythema  Vascular: 2+ radial, DP/PT pulses B/L  Dermatologic: skin warm, dry and intact; no rashes, wounds, or scars  Neurologic: AAOx3; CNII-XII grossly intact; no focal deficits  Psychiatric: affect and characteristics of appearance, verbalizations, behaviors are appropriate

## 2024-11-21 NOTE — DISCHARGE NOTE PROVIDER - NSDCMRMEDTOKEN_GEN_ALL_CORE_FT
folic acid 1 mg oral tablet: 1 tab(s) orally once a day  meclizine 25 mg oral tablet: 1 tab(s) orally every 8 hours, As needed, Dizziness  Motrin 800 mg oral tablet: 1 tab(s) orally 3 times a day, As Needed  oxyCODONE:    folic acid 1 mg oral tablet: 1 tab(s) orally once a day  meclizine 25 mg oral tablet: 1 tab(s) orally every 8 hours, As needed, Dizziness  Motrin 800 mg oral tablet: 1 tab(s) orally 3 times a day, As Needed  ondansetron 4 mg oral tablet, disintegratin tab(s) orally every 8 hours  oxyCODONE 10 mg oral tablet: 1 tab(s) orally every 6 hours as needed for Severe Pain (7 - 10) MDD: 4 tabs

## 2024-11-21 NOTE — CHART NOTE - NSCHARTNOTEFT_GEN_A_CORE
ACP MEDICINE COVERAGE - Medicine Subsequent Hospital Care Note    CC: Fever   HPI/Subjective: This is a 29-year-old male history of sickle cell disease type SS followed by Dr. Kenia Nguyễn, acute chest, and vertigo coming in with sickle cell crisis. New presenting with Fever.     ROS: Complains of fever/chills and generalized weakness and nausea. Reports history of ACS. Denies diaphoresis, night sweats,  headache, changes in vision, dizziness, cough, sputum production, wheezing, hemoptysis, chest pain, palpitations, shortness of breath, dyspnea on exertion, PND, dysphagia, new abdominal pain, vomiting, diarrhea, constipation, melena, hematochezia, dysuria, increased urinary frequency/urgency, hematuria, nocturia, numbness/weakness/tingling, any other complaints.    Vital Signs Last 24 Hrs  T(C): 39.2 (11-21-24 @ 01:23), Max: 39.2 (11-21-24 @ 01:23)  T(F): 102.5 (11-21-24 @ 01:23), Max: 102.5 (11-21-24 @ 01:23)  HR: 88 (11-21-24 @ 01:23) (63 - 88)  BP: 127/55 (11-21-24 @ 01:23) (112/47 - 127/55)  RR: 20 (11-21-24 @ 01:23) (18 - 20)  SpO2: 95% (11-21-24 @ 01:23) (95% - 100%) on (O2)    PHYSICAL EXAM:  Constitutional: NAD, well-developed, well-nourished  Respiratory: on 2L, speaking in full sentences. Clear to auscultation bilaterally. No wheezes, rales or rhonchi. Normal respiratory effort  Cardiovascular: regular rate and rhythm, S1 and S2, no murmurs, rubs or gallops, no edema, 2+ peripheral pulses  GI: nontender, non-distended   Neurologic: sensation grossly intact, CN grossly intact, non-focal exam  Psychiatric: A&Ox3, appropriate mood, affect    LABS:                        7.2    11.87 )-----------( 629      ( 20 Nov 2024 04:30 )             20.4     11-20    137  |  103  |  6[L]  ----------------------------<  96  4.9   |  24  |  0.61    Ca    8.7      20 Nov 2024 04:30  Phos  5.0     11-20  Mg     2.10     11-20    TPro  7.6  /  Alb  3.7  /  TBili  2.3[H]  /  DBili  x   /  AST  54[H]  /  ALT  15  /  AlkPhos  121[H]  11-20    Urinanalysis Basic (11-21-24 @ 02:47):  Color: -- / Appearance: -- / SG: -- / pH: --  Gluc: 96 / Ketone: -- / Bili: -- / Urobili: --  Blood: -- / Protein: -- / Nitrite: --  Leuk Esterase: -- / RBC: -- / WBC: --  Sq Epi: -- / Non Sq Epi: -- / Bacteria: --      Blood Gas Venous (11-21-24 @ 02:47):      Blood Gas Arterial (11-21-24 @ 02:47):      CAPILLARY BLOOD GLUCOSE:  POCT Blood Glucose: 125 mg/dL (11-19-24 @ 05:57)      MEDICATIONS  (STANDING):  chlorhexidine 2% Cloths 1 Application(s) Topical daily  enoxaparin Injectable 40 milliGRAM(s) SubCutaneous every 24 hours  folic acid 1 milliGRAM(s) Oral daily  HYDROmorphone PCA (1 mG/mL) 30 milliLiter(s) PCA Continuous PCA Continuous  ketorolac   Injectable 15 milliGRAM(s) IV Push every 8 hours  ondansetron Injectable 4 milliGRAM(s) IV Push once  sodium chloride 0.45%. 1000 milliLiter(s) (75 mL/Hr) IV Continuous <Continuous>    MEDICATIONS  (PRN):  diphenhydrAMINE 25 milliGRAM(s) Oral every 4 hours PRN Pruritus  guaiFENesin Oral Liquid (Sugar-Free) 200 milliGRAM(s) Oral every 6 hours PRN Cough  meclizine 25 milliGRAM(s) Oral every 8 hours PRN Dizziness  naloxone Injectable 0.1 milliGRAM(s) IV Push every 3 minutes PRN For ANY of the following changes in patient status:  A. RR LESS THAN 10 breaths per minute, B. Oxygen saturation LESS THAN 90%, C. Sedation score of 6  ondansetron Injectable 4 milliGRAM(s) IV Push every 6 hours PRN Nausea    I&O's Summary    19 Nov 2024 07:01  -  20 Nov 2024 07:00  --------------------------------------------------------  IN: 900 mL / OUT: 1650 mL / NET: -750 mL    20 Nov 2024 07:01  -  21 Nov 2024 02:47  --------------------------------------------------------  IN: 0 mL / OUT: 1400 mL / NET: -1400 mL      Assessment: This is a 29-year-old male history of sickle cell disease type SS followed by Dr. Kenia Nguyễn, acute chest, and vertigo coming in with sickle cell crisis. now presenting with fever.     PLAN:  Fever, r/o ACS   -CXR; called for prelim   -F/u labs: Blood Cx, UA/UC, RVP, CBC w diff, CMP, Lactate, Reticulocyte, LDH   -500cc NS bolus  -Cooling blanket   -Continue supplemental 02; will reach out to Heme/onc if desaturates   -Continue VS Q4  -Continue      Nausea  -Zofran x 1       VICK Delgado-[facility]   [id number]     Low complexity/risk (30min) ACP MEDICINE COVERAGE - Medicine Subsequent Hospital Care Note    CC: Fever   HPI/Subjective: This is a 29-year-old male history of sickle cell disease type SS followed by Dr. Kenia Nguyễn, acute chest, and vertigo coming in with sickle cell crisis. New presenting with Fever.     ROS: Complains of fever/chills and generalized weakness and nausea. Reports history of ACS. Denies diaphoresis, night sweats,  headache, changes in vision, dizziness, cough, sputum production, wheezing, hemoptysis, chest pain, palpitations, shortness of breath, dyspnea on exertion, PND, dysphagia, new abdominal pain, vomiting, diarrhea, constipation, melena, hematochezia, dysuria, increased urinary frequency/urgency, hematuria, nocturia, numbness/weakness/tingling, any other complaints.    Vital Signs Last 24 Hrs  T(C): 39.2 (11-21-24 @ 01:23), Max: 39.2 (11-21-24 @ 01:23)  T(F): 102.5 (11-21-24 @ 01:23), Max: 102.5 (11-21-24 @ 01:23)  HR: 88 (11-21-24 @ 01:23) (63 - 88)  BP: 127/55 (11-21-24 @ 01:23) (112/47 - 127/55)  RR: 20 (11-21-24 @ 01:23) (18 - 20)  SpO2: 95% (11-21-24 @ 01:23) (95% - 100%) on (O2)    PHYSICAL EXAM:  Constitutional: NAD, well-developed, well-nourished  Respiratory: on 2L, speaking in full sentences. Clear to auscultation bilaterally. No wheezes, rales or rhonchi. Normal respiratory effort  Cardiovascular: regular rate and rhythm, S1 and S2, no murmurs, rubs or gallops, no edema, 2+ peripheral pulses  GI: nontender, non-distended   Neurologic: sensation grossly intact, CN grossly intact, non-focal exam  Psychiatric: A&Ox3, appropriate mood, affect    LABS:                        7.2    11.87 )-----------( 629      ( 20 Nov 2024 04:30 )             20.4     11-20    137  |  103  |  6[L]  ----------------------------<  96  4.9   |  24  |  0.61    Ca    8.7      20 Nov 2024 04:30  Phos  5.0     11-20  Mg     2.10     11-20    TPro  7.6  /  Alb  3.7  /  TBili  2.3[H]  /  DBili  x   /  AST  54[H]  /  ALT  15  /  AlkPhos  121[H]  11-20    Urinanalysis Basic (11-21-24 @ 02:47):  Color: -- / Appearance: -- / SG: -- / pH: --  Gluc: 96 / Ketone: -- / Bili: -- / Urobili: --  Blood: -- / Protein: -- / Nitrite: --  Leuk Esterase: -- / RBC: -- / WBC: --  Sq Epi: -- / Non Sq Epi: -- / Bacteria: --      Blood Gas Venous (11-21-24 @ 02:47):      Blood Gas Arterial (11-21-24 @ 02:47):      CAPILLARY BLOOD GLUCOSE:  POCT Blood Glucose: 125 mg/dL (11-19-24 @ 05:57)      MEDICATIONS  (STANDING):  chlorhexidine 2% Cloths 1 Application(s) Topical daily  enoxaparin Injectable 40 milliGRAM(s) SubCutaneous every 24 hours  folic acid 1 milliGRAM(s) Oral daily  HYDROmorphone PCA (1 mG/mL) 30 milliLiter(s) PCA Continuous PCA Continuous  ketorolac   Injectable 15 milliGRAM(s) IV Push every 8 hours  ondansetron Injectable 4 milliGRAM(s) IV Push once  sodium chloride 0.45%. 1000 milliLiter(s) (75 mL/Hr) IV Continuous <Continuous>    MEDICATIONS  (PRN):  diphenhydrAMINE 25 milliGRAM(s) Oral every 4 hours PRN Pruritus  guaiFENesin Oral Liquid (Sugar-Free) 200 milliGRAM(s) Oral every 6 hours PRN Cough  meclizine 25 milliGRAM(s) Oral every 8 hours PRN Dizziness  naloxone Injectable 0.1 milliGRAM(s) IV Push every 3 minutes PRN For ANY of the following changes in patient status:  A. RR LESS THAN 10 breaths per minute, B. Oxygen saturation LESS THAN 90%, C. Sedation score of 6  ondansetron Injectable 4 milliGRAM(s) IV Push every 6 hours PRN Nausea    I&O's Summary    19 Nov 2024 07:01  -  20 Nov 2024 07:00  --------------------------------------------------------  IN: 900 mL / OUT: 1650 mL / NET: -750 mL    20 Nov 2024 07:01  -  21 Nov 2024 02:47  --------------------------------------------------------  IN: 0 mL / OUT: 1400 mL / NET: -1400 mL      Assessment: This is a 29-year-old male history of sickle cell disease type SS followed by Dr. Kenia Nguyễn, acute chest, and vertigo coming in with sickle cell crisis. now presenting with fever.     PLAN:  Fever, r/o ACS   -CXR; called for prelim   -F/u labs: Blood Cx, UA/UC, RVP, CBC w diff, CMP, Lactate, Reticulocyte, LDH   -Maintain active T&S   -500cc NS bolus  -Cooling blanket   -Continue supplemental 02; will reach out to Heme/onc if desaturates   -Continue VS Q4  -Continue      Nausea  -Zofran x 1       VICK Delgado-BC   Medicine [facility]   [id number]     Low complexity/risk (30min) ACP MEDICINE COVERAGE - Medicine Subsequent Hospital Care Note    CC: Fever   HPI/Subjective: This is a 29-year-old male history of sickle cell disease type SS followed by Dr. Kenia Nguyễn, acute chest, and vertigo coming in with sickle cell crisis. New presenting with Fever.     ROS: Complains of fever/chills and generalized weakness and nausea. Reports history of ACS. Denies diaphoresis, night sweats,  headache, changes in vision, dizziness, cough, sputum production, wheezing, hemoptysis, chest pain, palpitations, shortness of breath, dyspnea on exertion, PND, dysphagia, new abdominal pain, vomiting, diarrhea, constipation, melena, hematochezia, dysuria, increased urinary frequency/urgency, hematuria, nocturia, numbness/weakness/tingling, any other complaints.    Vital Signs Last 24 Hrs  T(C): 39.2 (11-21-24 @ 01:23), Max: 39.2 (11-21-24 @ 01:23)  T(F): 102.5 (11-21-24 @ 01:23), Max: 102.5 (11-21-24 @ 01:23)  HR: 88 (11-21-24 @ 01:23) (63 - 88)  BP: 127/55 (11-21-24 @ 01:23) (112/47 - 127/55)  RR: 20 (11-21-24 @ 01:23) (18 - 20)  SpO2: 95% (11-21-24 @ 01:23) (95% - 100%) on (O2)    PHYSICAL EXAM:  Constitutional: NAD, well-developed, well-nourished  Respiratory: on 2L, speaking in full sentences. Clear to auscultation bilaterally. No wheezes, rales or rhonchi. Normal respiratory effort  Cardiovascular: regular rate and rhythm, S1 and S2, no murmurs, rubs or gallops, no edema, 2+ peripheral pulses  GI: nontender, non-distended   Neurologic: sensation grossly intact, CN grossly intact, non-focal exam  Psychiatric: A&Ox3, appropriate mood, affect    LABS:                        7.2    11.87 )-----------( 629      ( 20 Nov 2024 04:30 )             20.4     11-20    137  |  103  |  6[L]  ----------------------------<  96  4.9   |  24  |  0.61    Ca    8.7      20 Nov 2024 04:30  Phos  5.0     11-20  Mg     2.10     11-20    TPro  7.6  /  Alb  3.7  /  TBili  2.3[H]  /  DBili  x   /  AST  54[H]  /  ALT  15  /  AlkPhos  121[H]  11-20    Urinanalysis Basic (11-21-24 @ 02:47):  Color: -- / Appearance: -- / SG: -- / pH: --  Gluc: 96 / Ketone: -- / Bili: -- / Urobili: --  Blood: -- / Protein: -- / Nitrite: --  Leuk Esterase: -- / RBC: -- / WBC: --  Sq Epi: -- / Non Sq Epi: -- / Bacteria: --      Blood Gas Venous (11-21-24 @ 02:47):      Blood Gas Arterial (11-21-24 @ 02:47):      CAPILLARY BLOOD GLUCOSE:  POCT Blood Glucose: 125 mg/dL (11-19-24 @ 05:57)      MEDICATIONS  (STANDING):  chlorhexidine 2% Cloths 1 Application(s) Topical daily  enoxaparin Injectable 40 milliGRAM(s) SubCutaneous every 24 hours  folic acid 1 milliGRAM(s) Oral daily  HYDROmorphone PCA (1 mG/mL) 30 milliLiter(s) PCA Continuous PCA Continuous  ketorolac   Injectable 15 milliGRAM(s) IV Push every 8 hours  ondansetron Injectable 4 milliGRAM(s) IV Push once  sodium chloride 0.45%. 1000 milliLiter(s) (75 mL/Hr) IV Continuous <Continuous>    MEDICATIONS  (PRN):  diphenhydrAMINE 25 milliGRAM(s) Oral every 4 hours PRN Pruritus  guaiFENesin Oral Liquid (Sugar-Free) 200 milliGRAM(s) Oral every 6 hours PRN Cough  meclizine 25 milliGRAM(s) Oral every 8 hours PRN Dizziness  naloxone Injectable 0.1 milliGRAM(s) IV Push every 3 minutes PRN For ANY of the following changes in patient status:  A. RR LESS THAN 10 breaths per minute, B. Oxygen saturation LESS THAN 90%, C. Sedation score of 6  ondansetron Injectable 4 milliGRAM(s) IV Push every 6 hours PRN Nausea    I&O's Summary    19 Nov 2024 07:01  -  20 Nov 2024 07:00  --------------------------------------------------------  IN: 900 mL / OUT: 1650 mL / NET: -750 mL    20 Nov 2024 07:01  -  21 Nov 2024 02:47  --------------------------------------------------------  IN: 0 mL / OUT: 1400 mL / NET: -1400 mL      Assessment: This is a 29-year-old male history of sickle cell disease type SS followed by Dr. Kenia Nguyễn, acute chest, and vertigo coming in with sickle cell crisis. now presenting with fever.     PLAN:  Fever, r/o ACS   -CXR; called for prelim   -F/u labs: Blood Cx, UA/UC, RVP, CBC w diff, CMP, Lactate, Reticulocyte, LDH   -Maintain active T&S   -500cc NS bolus  -Cooling blanket   -Continue supplemental 02; will reach out to Heme/onc if desaturates   -Continue VS Q4  -Continue      Nausea  -Zofran x 1     Addendum 04:25 - Case discussed with [facility], recommends c/s Heme/Onc for need for Blood transfusion; agrees with 500cc NS bolus and maintain current IVF @ 75cc/hr, give 1x 1G Aztreonam IVPB and add on procalcitonin.     VICK Delgado-BC   Medicine ACP   [id number]     Low complexity/risk (30min)

## 2024-11-21 NOTE — DISCHARGE NOTE PROVIDER - HOSPITAL COURSE
Pt is a 30 yo male with ho sickle cell dz here with VOC  has hx of iron overload but currently not on meds  spoke with Dr Nguyễn, pt has not followed up in a while  some concerns of hypoxic during admission, however seems to be probe related (poor waveform) better on ear Pt is a 28 yo M with PMH SCD (hx acute chest) and vertigo p/w L chest pain similar to prior episodes of SCC. Hemodynamically stable on arrival with labs c/w sickle cell crisis. Transitioned from dilaudid PCA pump to PO regimen. Received 1U RBCs with appropriate response 11/22; heme-onc following. Course c/b constipation, on bowel regimen, now having BMs. Stable for DC home with outpt f/u; will f/u with PCP in 3-5d.     Problem/Plan - 1:  ·  Problem: Anemia, sickle cell with crisis.   ·  Plan: - pt p/w L chest pain similar to prior episodes of sickle cell crisis   - hemodynamically stable on arrival  - labs c/w sickle cell crisis  - s/p 1U RBC 11/22 with heme-onc following  - pain improving   - dc'd PCA pump and tolerating PO pain regimen   - bowel regimen   - c/w folic acid daily  - lidocaine patch to low back daily.     Problem/Plan - 2:  ·  Problem: Microscopic hematuria.   ·  Plan: - UA neg but with RBCs  - repeat outpt to ensure resolution  - pt asymptomatic and without infx concern  - suspect in setting of sickle cell crisis.     Problem/Plan - 3:  ·  Problem: Hyperbilirubinemia.   ·  Plan: - T bili 2.6 with indirect predominance  - likely in setting of sickle cell crisis.     Problem/Plan - 4:  ·  Problem: Transaminitis.   ·  Plan: - , AST 98   - suspect in setting of sickle cell crisis  - avoid hepatotoxic agents.     Problem/Plan - 5:  ·  Problem: Vitamin D deficiency.   ·  Plan: - vit D 14.6  - started daily supplementation.     Problem/Plan - 6:  ·  Problem: Constipation.   ·  Plan: - c/w miralax BID, senna qhs  - s/p lactulose 20mg x2  - BM 11/26.

## 2024-11-21 NOTE — DISCHARGE NOTE PROVIDER - NSDCCPCAREPLAN_GEN_ALL_CORE_FT
PRINCIPAL DISCHARGE DIAGNOSIS  Diagnosis: Sickle cell pain crisis  Assessment and Plan of Treatment:       SECONDARY DISCHARGE DIAGNOSES  Diagnosis: Systemic inflammatory response syndrome (SIRS)  Assessment and Plan of Treatment:      PRINCIPAL DISCHARGE DIAGNOSIS  Diagnosis: Sickle cell pain crisis  Assessment and Plan of Treatment: You were treated for a sickle cell crisis with IV dilaudid PCA pump and received 1 transfusion 11/22. Your vitals and bloodwork improved as did your pain. You are now back on your oral pain regimen. Please follow with your primary care doctor within 3-5 days of discharge.      SECONDARY DISCHARGE DIAGNOSES  Diagnosis: Microscopic hematuria  Assessment and Plan of Treatment: You were incidentally found to have a small amount of blood in your urine. This was likely due to your sickle cell crisis. Please repeat a urine test with your primary care doctor.

## 2024-11-22 LAB
24R-OH-CALCIDIOL SERPL-MCNC: 14.6 NG/ML — LOW (ref 30–80)
ADD ON TEST-SPECIMEN IN LAB: SIGNIFICANT CHANGE UP
ALBUMIN SERPL ELPH-MCNC: 3.5 G/DL — SIGNIFICANT CHANGE UP (ref 3.3–5)
ALP SERPL-CCNC: 165 U/L — HIGH (ref 40–120)
ALT FLD-CCNC: 26 U/L — SIGNIFICANT CHANGE UP (ref 4–41)
ANION GAP SERPL CALC-SCNC: 10 MMOL/L — SIGNIFICANT CHANGE UP (ref 7–14)
AST SERPL-CCNC: 68 U/L — HIGH (ref 4–40)
BILIRUB DIRECT SERPL-MCNC: 0.7 MG/DL — HIGH (ref 0–0.3)
BILIRUB INDIRECT FLD-MCNC: 1.7 MG/DL — HIGH (ref 0–1)
BILIRUB SERPL-MCNC: 2.4 MG/DL — HIGH (ref 0.2–1.2)
BUN SERPL-MCNC: 7 MG/DL — SIGNIFICANT CHANGE UP (ref 7–23)
CALCIUM SERPL-MCNC: 8.7 MG/DL — SIGNIFICANT CHANGE UP (ref 8.4–10.5)
CHLORIDE SERPL-SCNC: 98 MMOL/L — SIGNIFICANT CHANGE UP (ref 98–107)
CO2 SERPL-SCNC: 27 MMOL/L — SIGNIFICANT CHANGE UP (ref 22–31)
CREAT SERPL-MCNC: 0.67 MG/DL — SIGNIFICANT CHANGE UP (ref 0.5–1.3)
CULTURE RESULTS: NO GROWTH — SIGNIFICANT CHANGE UP
EGFR: 129 ML/MIN/1.73M2 — SIGNIFICANT CHANGE UP
FERRITIN SERPL-MCNC: 173 NG/ML — SIGNIFICANT CHANGE UP (ref 30–400)
GLUCOSE SERPL-MCNC: 96 MG/DL — SIGNIFICANT CHANGE UP (ref 70–99)
HCT VFR BLD CALC: 17.5 % — CRITICAL LOW (ref 39–50)
HGB BLD-MCNC: 6.4 G/DL — CRITICAL LOW (ref 13–17)
IRON SATN MFR SERPL: 35 % — SIGNIFICANT CHANGE UP (ref 14–50)
IRON SATN MFR SERPL: 68 UG/DL — SIGNIFICANT CHANGE UP (ref 45–165)
LDH SERPL L TO P-CCNC: 832 U/L — HIGH (ref 135–225)
MAGNESIUM SERPL-MCNC: 1.8 MG/DL — SIGNIFICANT CHANGE UP (ref 1.6–2.6)
MCHC RBC-ENTMCNC: 27.2 PG — SIGNIFICANT CHANGE UP (ref 27–34)
MCHC RBC-ENTMCNC: 36.6 G/DL — HIGH (ref 32–36)
MCV RBC AUTO: 74.5 FL — LOW (ref 80–100)
NRBC # BLD: 0 /100 WBCS — SIGNIFICANT CHANGE UP (ref 0–0)
NRBC # FLD: 0.08 K/UL — HIGH (ref 0–0)
PHOSPHATE SERPL-MCNC: 4.4 MG/DL — SIGNIFICANT CHANGE UP (ref 2.5–4.5)
PLATELET # BLD AUTO: 598 K/UL — HIGH (ref 150–400)
POTASSIUM SERPL-MCNC: 4.2 MMOL/L — SIGNIFICANT CHANGE UP (ref 3.5–5.3)
POTASSIUM SERPL-SCNC: 4.2 MMOL/L — SIGNIFICANT CHANGE UP (ref 3.5–5.3)
PROT SERPL-MCNC: 7 G/DL — SIGNIFICANT CHANGE UP (ref 6–8.3)
RBC # BLD: 2.35 M/UL — LOW (ref 4.2–5.8)
RBC # BLD: 2.35 M/UL — LOW (ref 4.2–5.8)
RBC # FLD: 27 % — HIGH (ref 10.3–14.5)
RETICS #: 82.5 K/UL — SIGNIFICANT CHANGE UP (ref 25–125)
RETICS/RBC NFR: 3.5 % — HIGH (ref 0.5–2.5)
SODIUM SERPL-SCNC: 135 MMOL/L — SIGNIFICANT CHANGE UP (ref 135–145)
SPECIMEN SOURCE: SIGNIFICANT CHANGE UP
TIBC SERPL-MCNC: 193 UG/DL — LOW (ref 220–430)
TSH SERPL-MCNC: 1.01 UIU/ML — SIGNIFICANT CHANGE UP (ref 0.27–4.2)
UIBC SERPL-MCNC: 125 UG/DL — SIGNIFICANT CHANGE UP (ref 110–370)
WBC # BLD: 13.88 K/UL — HIGH (ref 3.8–10.5)
WBC # FLD AUTO: 13.88 K/UL — HIGH (ref 3.8–10.5)

## 2024-11-22 PROCEDURE — 71045 X-RAY EXAM CHEST 1 VIEW: CPT | Mod: 26

## 2024-11-22 PROCEDURE — 99222 1ST HOSP IP/OBS MODERATE 55: CPT

## 2024-11-22 PROCEDURE — 99233 SBSQ HOSP IP/OBS HIGH 50: CPT

## 2024-11-22 RX ORDER — HYDROMORPHONE HYDROCHLORIDE 2 MG/1
1 TABLET ORAL ONCE
Refills: 0 | Status: DISCONTINUED | OUTPATIENT
Start: 2024-11-22 | End: 2024-11-22

## 2024-11-22 RX ORDER — KETOROLAC TROMETHAMINE 30 MG/ML
15 INJECTION INTRAMUSCULAR; INTRAVENOUS ONCE
Refills: 0 | Status: DISCONTINUED | OUTPATIENT
Start: 2024-11-22 | End: 2024-11-22

## 2024-11-22 RX ORDER — KETOROLAC TROMETHAMINE 30 MG/ML
15 INJECTION INTRAMUSCULAR; INTRAVENOUS EVERY 8 HOURS
Refills: 0 | Status: DISCONTINUED | OUTPATIENT
Start: 2024-11-22 | End: 2024-11-25

## 2024-11-22 RX ORDER — CHOLECALCIFEROL (VITAMIN D3) 10MCG/0.25
1000 DROPS ORAL DAILY
Refills: 0 | Status: DISCONTINUED | OUTPATIENT
Start: 2024-11-22 | End: 2024-11-28

## 2024-11-22 RX ORDER — HYDROMORPHONE HYDROCHLORIDE 2 MG/1
30 TABLET ORAL
Refills: 0 | Status: DISCONTINUED | OUTPATIENT
Start: 2024-11-22 | End: 2024-11-23

## 2024-11-22 RX ORDER — ACETAMINOPHEN 500MG 500 MG/1
1000 TABLET, COATED ORAL ONCE
Refills: 0 | Status: COMPLETED | OUTPATIENT
Start: 2024-11-22 | End: 2024-11-22

## 2024-11-22 RX ADMIN — ONDANSETRON HYDROCHLORIDE 4 MILLIGRAM(S): 4 TABLET, FILM COATED ORAL at 20:13

## 2024-11-22 RX ADMIN — KETOROLAC TROMETHAMINE 15 MILLIGRAM(S): 30 INJECTION INTRAMUSCULAR; INTRAVENOUS at 06:17

## 2024-11-22 RX ADMIN — ACETAMINOPHEN 500MG 400 MILLIGRAM(S): 500 TABLET, COATED ORAL at 09:32

## 2024-11-22 RX ADMIN — HYDROMORPHONE HYDROCHLORIDE 1 MILLIGRAM(S): 2 TABLET ORAL at 10:25

## 2024-11-22 RX ADMIN — KETOROLAC TROMETHAMINE 15 MILLIGRAM(S): 30 INJECTION INTRAMUSCULAR; INTRAVENOUS at 16:06

## 2024-11-22 RX ADMIN — CHLORHEXIDINE GLUCONATE 1 APPLICATION(S): 1.2 RINSE ORAL at 16:06

## 2024-11-22 RX ADMIN — Medication 1000 UNIT(S): at 18:12

## 2024-11-22 RX ADMIN — HYDROMORPHONE HYDROCHLORIDE 30 MILLILITER(S): 2 TABLET ORAL at 20:51

## 2024-11-22 RX ADMIN — Medication 1 MILLIGRAM(S): at 18:12

## 2024-11-22 RX ADMIN — PIPERACILLIN SODIUM AND TAZOBACTAM SODIUM 25 GRAM(S): 4; .5 INJECTION, POWDER, LYOPHILIZED, FOR SOLUTION INTRAVENOUS at 00:35

## 2024-11-22 RX ADMIN — PIPERACILLIN SODIUM AND TAZOBACTAM SODIUM 25 GRAM(S): 4; .5 INJECTION, POWDER, LYOPHILIZED, FOR SOLUTION INTRAVENOUS at 16:05

## 2024-11-22 RX ADMIN — HYDROMORPHONE HYDROCHLORIDE 30 MILLILITER(S): 2 TABLET ORAL at 01:49

## 2024-11-22 RX ADMIN — HYDROMORPHONE HYDROCHLORIDE 1 MILLIGRAM(S): 2 TABLET ORAL at 10:55

## 2024-11-22 RX ADMIN — HYDROMORPHONE HYDROCHLORIDE 30 MILLILITER(S): 2 TABLET ORAL at 10:50

## 2024-11-22 RX ADMIN — HYDROMORPHONE HYDROCHLORIDE 30 MILLILITER(S): 2 TABLET ORAL at 18:04

## 2024-11-22 RX ADMIN — PIPERACILLIN SODIUM AND TAZOBACTAM SODIUM 25 GRAM(S): 4; .5 INJECTION, POWDER, LYOPHILIZED, FOR SOLUTION INTRAVENOUS at 06:17

## 2024-11-22 RX ADMIN — KETOROLAC TROMETHAMINE 15 MILLIGRAM(S): 30 INJECTION INTRAMUSCULAR; INTRAVENOUS at 16:36

## 2024-11-22 RX ADMIN — ACETAMINOPHEN 500MG 1000 MILLIGRAM(S): 500 TABLET, COATED ORAL at 10:02

## 2024-11-22 RX ADMIN — KETOROLAC TROMETHAMINE 15 MILLIGRAM(S): 30 INJECTION INTRAMUSCULAR; INTRAVENOUS at 06:45

## 2024-11-22 RX ADMIN — HYDROMORPHONE HYDROCHLORIDE 30 MILLILITER(S): 2 TABLET ORAL at 08:27

## 2024-11-22 NOTE — CONSULT NOTE ADULT - SUBJECTIVE AND OBJECTIVE BOX
HPI:  This is a 29M with history of Sickle cell disease, Hx of ACS in past requiring exchange transfusion (years ago), and Vertigo who presents to the hospital with complaints of sickle cell pain crisis. States that for the past 2 days he has been having L sided chest pain (constant, no changes with exertion/respiration, no pressure like component, with radiation to his R chest) and lower back pain (denies associated saddle anesthesia, denies bowel/bladder incontinence/retention, pain radiates to his legs b/l). States that this pain is usual for his sickle cell pain crisis. Took ibuprofen and oxycodone at home without improvement in his symptoms and therefore came to the hospital for evaluation. Currently states that he has 5/10 pain, states an acceptable pain level for him is 3-4/10. He denies SOB but states he started to have a dry cough while being in the hospital. No other acute complaints.     On arrival to the ED his vitals were T 98.4, P 101, /61, RR 16, O2 sat 99% RA. His lab work showed leukocytosis, anemia (chronic), thrombocytosis, reticulocytosis, mild transaminitis with elevated TBili. His CXR showed clear lungs. He was given several rounds of pain medications (dilausis 1mg IVP x3 + 2mg IVP x1, torodol 15mg x1, ofirmev 1g x1, lido patch x1), and zofran 4mg IVP x1. He was then admitted to medicine.  (19 Nov 2024 04:55)      PAST MEDICAL & SURGICAL HISTORY:  Sickle Cell Disease  HbSS      Acute chest syndrome      Sickle cell anemia      Iron overload due to repeated red blood cell transfusions      S/P Laparoscopic Cholecystectomy      S/P Laparotomy for spleen removal      S/P Splenectomy          Allergies    ceftriaxone (Unknown)    Intolerances        MEDICATIONS  (STANDING):  chlorhexidine 2% Cloths 1 Application(s) Topical daily  cholecalciferol 1000 Unit(s) Oral daily  enoxaparin Injectable 40 milliGRAM(s) SubCutaneous every 24 hours  folic acid 1 milliGRAM(s) Oral daily  HYDROmorphone PCA (1 mG/mL) 30 milliLiter(s) PCA Continuous PCA Continuous  ketorolac   Injectable 15 milliGRAM(s) IV Push every 8 hours  piperacillin/tazobactam IVPB.. 3.375 Gram(s) IV Intermittent every 8 hours  sodium chloride 0.45%. 1000 milliLiter(s) (75 mL/Hr) IV Continuous <Continuous>    MEDICATIONS  (PRN):  guaiFENesin Oral Liquid (Sugar-Free) 200 milliGRAM(s) Oral every 6 hours PRN Cough  meclizine 25 milliGRAM(s) Oral every 8 hours PRN Dizziness  naloxone Injectable 0.1 milliGRAM(s) IV Push every 3 minutes PRN For ANY of the following changes in patient status:  A. RR LESS THAN 10 breaths per minute, B. Oxygen saturation LESS THAN 90%, C. Sedation score of 6  ondansetron Injectable 4 milliGRAM(s) IV Push every 6 hours PRN Nausea      FAMILY HISTORY:  Family history of sickle cell anemia (Sibling)        SOCIAL HISTORY: No EtOH, no tobacco    REVIEW OF SYSTEMS:    CONSTITUTIONAL: +ve for fever prior to hospitalization  EYES/ENT: No visual changes;  No vertigo or throat pain   NECK: No pain or stiffness  RESPIRATORY:  dry cough  CARDIOVASCULAR: No chest pain or palpitations  GENITOURINARY: No dysuria, frequency or hematuria  NEUROLOGICAL: No numbness or weakness  SKIN: No itching, burning, rashes, or lesions   All other review of systems is negative unless indicated above.        T(F): 98.1 (11-22-24 @ 10:32), Max: 99.6 (11-21-24 @ 21:34)  HR: 94 (11-22-24 @ 10:32)  BP: 146/62 (11-22-24 @ 10:32)  RR: 17 (11-22-24 @ 10:54)  SpO2: 100% (11-22-24 @ 10:32)  Wt(kg): --    GENERAL: in moderate distress due to pain  HEAD:  Atraumatic, Normocephalic  EYES: EOMI, PERRLA, conjunctiva and sclera clear  NECK: Supple, No JVD  CHEST/LUNG: Clear to auscultation bilaterally; No wheeze  HEART: Regular rate and rhythm; No murmurs, rubs, or gallops  ABDOMEN: Soft, Nontender, Nondistended; Bowel sounds present  EXTREMITIES:  2+ Peripheral Pulses, No clubbing, cyanosis, or edema  NEUROLOGY: non-focal  SKIN: No rashes or lesions                          6.4    13.88 )-----------( 598      ( 22 Nov 2024 07:30 )             17.5       11-22    135  |  98  |  7   ----------------------------<  96  4.2   |  27  |  0.67    Ca    8.7      22 Nov 2024 07:30  Phos  4.4     11-22  Mg     1.80     11-22    TPro  7.0  /  Alb  3.5  /  TBili  2.4[H]  /  DBili  0.7[H]  /  AST  68[H]  /  ALT  26  /  AlkPhos  165[H]  11-22      Phosphorus: 4.4 mg/dL (11-22 @ 07:30)  Magnesium: 1.80 mg/dL (11-22 @ 07:30)  Lactate Dehydrogenase, Serum: 832 U/L (11-22 @ 07:30)          Clean Catch Clean Catch (Midstream)  11-21 @ 03:54   No growth  --  --      .Blood BLOOD  11-21 @ 03:28   No growth at 24 hours  --  --      .Blood BLOOD  11-21 @ 03:20   No growth at 24 hours  --  --

## 2024-11-22 NOTE — CONSULT NOTE ADULT - ASSESSMENT
29M with history of Sickle cell disease (follows Dr. Nguyễn), Hx of ACS in past requiring exchange transfusion (years ago), and Vertigo who presents to the hospital with complaints of sickle cell pain crisis.  Pt was put on O2 for support but had hypoxia to SpO2 of High 80s on room air. Hematology was consulted for acute chest syndrome.      # Sickle Cell pain crisis  # cough  * respiratory viral panel -ve  * CXR clear- no infiltrates  Hgb 7.4-> 6.4, T bili 2.4, indirect bili 1.7,   - obtain daily CBC w diff  - obtain LDH, retic count, haptoglobin  - obtain Hgb Electrophoresis  - incentive spirometry and supportive O2  (even if not hypoxic) to reduce sickling  - give 1 U PRBC today , need all transfusion to be warmed up, communicate with blood bank  - pain control as per primary team        Abdullahi Goff MD  PGY4  Hematology-Oncology Fellow  [facility]

## 2024-11-22 NOTE — CONSULT NOTE ADULT - ATTENDING COMMENTS
This is a 29 year old man with a history OF Sickle cell SS disease HX of ACS.  Patient presented with complaints of sickle cell ischemic pain.  Patient had hypoxia into the 80's.  Chest xray clear without infiltrates. Hg 7.4g/dl.  Patient rules out for current acute chest syndrome by way of clear chest xray right now. Recommend PCA opoid analgesia for pain.  Recommend hypotonic saline D5 1/2NS. Recommend continued supplemental O2.       If hypoxia Persists recommend CT angiogram rule out pulmonary embolism.  Patient should also have a repeat Chest xray on hospital day 2 or 3 of admission, occasional the pulmonary infiltrates for acute chest syndrome manifest later on in the admission and are not present on HD1.  This is not necessary if patient has a CT chest as its a more through imaging.

## 2024-11-23 LAB
ALBUMIN SERPL ELPH-MCNC: 3.6 G/DL — SIGNIFICANT CHANGE UP (ref 3.3–5)
ALP SERPL-CCNC: 152 U/L — HIGH (ref 40–120)
ALT FLD-CCNC: 24 U/L — SIGNIFICANT CHANGE UP (ref 4–41)
ANION GAP SERPL CALC-SCNC: 12 MMOL/L — SIGNIFICANT CHANGE UP (ref 7–14)
AST SERPL-CCNC: 67 U/L — HIGH (ref 4–40)
BILIRUB DIRECT SERPL-MCNC: 0.7 MG/DL — HIGH (ref 0–0.3)
BILIRUB INDIRECT FLD-MCNC: 1.9 MG/DL — HIGH (ref 0–1)
BILIRUB SERPL-MCNC: 2.6 MG/DL — HIGH (ref 0.2–1.2)
BUN SERPL-MCNC: 5 MG/DL — LOW (ref 7–23)
CALCIUM SERPL-MCNC: 8.9 MG/DL — SIGNIFICANT CHANGE UP (ref 8.4–10.5)
CHLORIDE SERPL-SCNC: 99 MMOL/L — SIGNIFICANT CHANGE UP (ref 98–107)
CO2 SERPL-SCNC: 27 MMOL/L — SIGNIFICANT CHANGE UP (ref 22–31)
CREAT SERPL-MCNC: 0.6 MG/DL — SIGNIFICANT CHANGE UP (ref 0.5–1.3)
EGFR: 133 ML/MIN/1.73M2 — SIGNIFICANT CHANGE UP
GLUCOSE SERPL-MCNC: 117 MG/DL — HIGH (ref 70–99)
HAPTOGLOB SERPL-MCNC: <20 MG/DL — LOW (ref 34–200)
HCT VFR BLD CALC: 18.7 % — CRITICAL LOW (ref 39–50)
HCT VFR BLD CALC: 19.9 % — CRITICAL LOW (ref 39–50)
HGB BLD-MCNC: 6.8 G/DL — CRITICAL LOW (ref 13–17)
HGB BLD-MCNC: 7.2 G/DL — LOW (ref 13–17)
LDH SERPL L TO P-CCNC: 919 U/L — HIGH (ref 135–225)
MAGNESIUM SERPL-MCNC: 1.8 MG/DL — SIGNIFICANT CHANGE UP (ref 1.6–2.6)
MCHC RBC-ENTMCNC: 27.2 PG — SIGNIFICANT CHANGE UP (ref 27–34)
MCHC RBC-ENTMCNC: 27.2 PG — SIGNIFICANT CHANGE UP (ref 27–34)
MCHC RBC-ENTMCNC: 36.2 G/DL — HIGH (ref 32–36)
MCHC RBC-ENTMCNC: 36.4 G/DL — HIGH (ref 32–36)
MCV RBC AUTO: 74.8 FL — LOW (ref 80–100)
MCV RBC AUTO: 75.1 FL — LOW (ref 80–100)
NRBC # BLD: 0 /100 WBCS — SIGNIFICANT CHANGE UP (ref 0–0)
NRBC # BLD: 0 /100 WBCS — SIGNIFICANT CHANGE UP (ref 0–0)
NRBC # FLD: 0.09 K/UL — HIGH (ref 0–0)
NRBC # FLD: 0.12 K/UL — HIGH (ref 0–0)
PHOSPHATE SERPL-MCNC: 4.5 MG/DL — SIGNIFICANT CHANGE UP (ref 2.5–4.5)
PLATELET # BLD AUTO: 592 K/UL — HIGH (ref 150–400)
PLATELET # BLD AUTO: 651 K/UL — HIGH (ref 150–400)
POTASSIUM SERPL-MCNC: 4 MMOL/L — SIGNIFICANT CHANGE UP (ref 3.5–5.3)
POTASSIUM SERPL-SCNC: 4 MMOL/L — SIGNIFICANT CHANGE UP (ref 3.5–5.3)
PROT SERPL-MCNC: 7.5 G/DL — SIGNIFICANT CHANGE UP (ref 6–8.3)
RBC # BLD: 2.5 M/UL — LOW (ref 4.2–5.8)
RBC # BLD: 2.5 M/UL — LOW (ref 4.2–5.8)
RBC # BLD: 2.65 M/UL — LOW (ref 4.2–5.8)
RBC # FLD: 25.7 % — HIGH (ref 10.3–14.5)
RBC # FLD: 25.9 % — HIGH (ref 10.3–14.5)
RETICS #: 199.8 K/UL — HIGH (ref 25–125)
RETICS/RBC NFR: 8.1 % — HIGH (ref 0.5–2.5)
SODIUM SERPL-SCNC: 138 MMOL/L — SIGNIFICANT CHANGE UP (ref 135–145)
WBC # BLD: 14 K/UL — HIGH (ref 3.8–10.5)
WBC # BLD: 14.09 K/UL — HIGH (ref 3.8–10.5)
WBC # FLD AUTO: 14 K/UL — HIGH (ref 3.8–10.5)
WBC # FLD AUTO: 14.09 K/UL — HIGH (ref 3.8–10.5)

## 2024-11-23 PROCEDURE — 99233 SBSQ HOSP IP/OBS HIGH 50: CPT

## 2024-11-23 RX ORDER — POLYETHYLENE GLYCOL 3350 17 G/17G
17 POWDER, FOR SOLUTION ORAL
Refills: 0 | Status: DISCONTINUED | OUTPATIENT
Start: 2024-11-23 | End: 2024-11-28

## 2024-11-23 RX ORDER — HYDROMORPHONE HYDROCHLORIDE 2 MG/1
30 TABLET ORAL
Refills: 0 | Status: DISCONTINUED | OUTPATIENT
Start: 2024-11-23 | End: 2024-11-24

## 2024-11-23 RX ORDER — DIPHENHYDRAMINE HCL 25 MG
50 CAPSULE ORAL ONCE
Refills: 0 | Status: COMPLETED | OUTPATIENT
Start: 2024-11-23 | End: 2024-11-23

## 2024-11-23 RX ORDER — SENNOSIDES 8.6 MG
2 TABLET ORAL AT BEDTIME
Refills: 0 | Status: DISCONTINUED | OUTPATIENT
Start: 2024-11-23 | End: 2024-11-28

## 2024-11-23 RX ADMIN — KETOROLAC TROMETHAMINE 15 MILLIGRAM(S): 30 INJECTION INTRAMUSCULAR; INTRAVENOUS at 00:01

## 2024-11-23 RX ADMIN — Medication 1000 UNIT(S): at 13:34

## 2024-11-23 RX ADMIN — HYDROMORPHONE HYDROCHLORIDE 30 MILLILITER(S): 2 TABLET ORAL at 19:56

## 2024-11-23 RX ADMIN — KETOROLAC TROMETHAMINE 15 MILLIGRAM(S): 30 INJECTION INTRAMUSCULAR; INTRAVENOUS at 07:41

## 2024-11-23 RX ADMIN — KETOROLAC TROMETHAMINE 15 MILLIGRAM(S): 30 INJECTION INTRAMUSCULAR; INTRAVENOUS at 13:34

## 2024-11-23 RX ADMIN — KETOROLAC TROMETHAMINE 15 MILLIGRAM(S): 30 INJECTION INTRAMUSCULAR; INTRAVENOUS at 21:53

## 2024-11-23 RX ADMIN — PIPERACILLIN SODIUM AND TAZOBACTAM SODIUM 25 GRAM(S): 4; .5 INJECTION, POWDER, LYOPHILIZED, FOR SOLUTION INTRAVENOUS at 10:10

## 2024-11-23 RX ADMIN — Medication 75 MILLILITER(S): at 01:49

## 2024-11-23 RX ADMIN — CHLORHEXIDINE GLUCONATE 1 APPLICATION(S): 1.2 RINSE ORAL at 13:35

## 2024-11-23 RX ADMIN — KETOROLAC TROMETHAMINE 15 MILLIGRAM(S): 30 INJECTION INTRAMUSCULAR; INTRAVENOUS at 13:51

## 2024-11-23 RX ADMIN — HYDROMORPHONE HYDROCHLORIDE 30 MILLILITER(S): 2 TABLET ORAL at 11:42

## 2024-11-23 RX ADMIN — Medication 2 TABLET(S): at 21:53

## 2024-11-23 RX ADMIN — KETOROLAC TROMETHAMINE 15 MILLIGRAM(S): 30 INJECTION INTRAMUSCULAR; INTRAVENOUS at 01:00

## 2024-11-23 RX ADMIN — Medication 1 MILLIGRAM(S): at 13:34

## 2024-11-23 RX ADMIN — HYDROMORPHONE HYDROCHLORIDE 30 MILLILITER(S): 2 TABLET ORAL at 13:36

## 2024-11-23 RX ADMIN — PIPERACILLIN SODIUM AND TAZOBACTAM SODIUM 25 GRAM(S): 4; .5 INJECTION, POWDER, LYOPHILIZED, FOR SOLUTION INTRAVENOUS at 00:13

## 2024-11-23 RX ADMIN — ONDANSETRON HYDROCHLORIDE 4 MILLIGRAM(S): 4 TABLET, FILM COATED ORAL at 02:21

## 2024-11-23 RX ADMIN — Medication 50 MILLIGRAM(S): at 07:39

## 2024-11-23 RX ADMIN — POLYETHYLENE GLYCOL 3350 17 GRAM(S): 17 POWDER, FOR SOLUTION ORAL at 17:56

## 2024-11-23 RX ADMIN — HYDROMORPHONE HYDROCHLORIDE 30 MILLILITER(S): 2 TABLET ORAL at 08:44

## 2024-11-23 NOTE — PROVIDER CONTACT NOTE (CRITICAL VALUE NOTIFICATION) - RECOMMENDATIONS
MENDEZ Moon stated she would look at the patients chart and talk to the team regarding potential intervention
as per Provider " his hmg was 6.7 yesterday"  no new orders
Continue to monitor.

## 2024-11-23 NOTE — PROVIDER CONTACT NOTE (CRITICAL VALUE NOTIFICATION) - SITUATION
sickle cell patient , lab calling with critical values.
HGB 6.8, Hct 18.7  Blood was drawn at 9 am, while the patient was receiving blood transfusion.
Lab called with critical lab value: Hg 6.4, Hct 17.7
Hematocrit 19.9
30 y/o male admitted on 11/18 for SS disease with crisis has a critical lab result Hemoglobin: 6.7 Hematocrit: 18.9

## 2024-11-23 NOTE — PROVIDER CONTACT NOTE (CRITICAL VALUE NOTIFICATION) - ASSESSMENT
Patient is resting in bed awake alert no edema noted signs of acute distress noted
Patient is currently stating in the 90's with 2L simple face mask on
VSS
patient A&OX4, vital signs stable, no signs of distress noted.
Patient is A&Ox4 and stable.

## 2024-11-23 NOTE — PROVIDER CONTACT NOTE (CRITICAL VALUE NOTIFICATION) - TEST AND RESULT REPORTED:
Hg 6.4, Hct 17.7
hgb 7.2  hct 20  hgb 7.2 hct 20.4
HGB 6.8, Hct 18.7
Hematocrit 19.9
Hemoglobin: 6.7 Hematocrit: 18.9

## 2024-11-23 NOTE — PROVIDER CONTACT NOTE (CRITICAL VALUE NOTIFICATION) - ACTION/TREATMENT ORDERED:
No further action at this time
Awaiting orders
Provider made aware. No transfusion needed at this time.
Provider made aware. Provider will order to repeat CBC at 1 pm.
as per Provider " his hmg was 6.7 yesterday"  no new orders

## 2024-11-23 NOTE — PROVIDER CONTACT NOTE (CRITICAL VALUE NOTIFICATION) - BACKGROUND
Patiwnt admitted for sickle cell crisis.
28 y/o male admitted on 11/18 for SS disease with crisis
29M p/w SCC
29-year-old male history of sickle cell disease type SS followed by Dr. Kenia Nguyễn, acute chest, and vertigo coming in with sickle cell crisis.
sickle cell patient , lab calling with critical values.

## 2024-11-24 LAB
ALBUMIN SERPL ELPH-MCNC: 3.6 G/DL — SIGNIFICANT CHANGE UP (ref 3.3–5)
ALP SERPL-CCNC: 150 U/L — HIGH (ref 40–120)
ALT FLD-CCNC: 21 U/L — SIGNIFICANT CHANGE UP (ref 4–41)
ANION GAP SERPL CALC-SCNC: 13 MMOL/L — SIGNIFICANT CHANGE UP (ref 7–14)
AST SERPL-CCNC: 73 U/L — HIGH (ref 4–40)
BILIRUB DIRECT SERPL-MCNC: 0.6 MG/DL — HIGH (ref 0–0.3)
BILIRUB INDIRECT FLD-MCNC: 2.2 MG/DL — HIGH (ref 0–1)
BILIRUB SERPL-MCNC: 2.8 MG/DL — HIGH (ref 0.2–1.2)
BLD GP AB SCN SERPL QL: NEGATIVE — SIGNIFICANT CHANGE UP
BUN SERPL-MCNC: 5 MG/DL — LOW (ref 7–23)
CALCIUM SERPL-MCNC: 9.4 MG/DL — SIGNIFICANT CHANGE UP (ref 8.4–10.5)
CHLORIDE SERPL-SCNC: 100 MMOL/L — SIGNIFICANT CHANGE UP (ref 98–107)
CO2 SERPL-SCNC: 26 MMOL/L — SIGNIFICANT CHANGE UP (ref 22–31)
CREAT SERPL-MCNC: 0.53 MG/DL — SIGNIFICANT CHANGE UP (ref 0.5–1.3)
EGFR: 138 ML/MIN/1.73M2 — SIGNIFICANT CHANGE UP
GLUCOSE SERPL-MCNC: 83 MG/DL — SIGNIFICANT CHANGE UP (ref 70–99)
HCT VFR BLD CALC: 23.2 % — LOW (ref 39–50)
HGB BLD-MCNC: 8.3 G/DL — LOW (ref 13–17)
LDH SERPL L TO P-CCNC: 1058 U/L — HIGH (ref 135–225)
MAGNESIUM SERPL-MCNC: 1.8 MG/DL — SIGNIFICANT CHANGE UP (ref 1.6–2.6)
MCHC RBC-ENTMCNC: 27.3 PG — SIGNIFICANT CHANGE UP (ref 27–34)
MCHC RBC-ENTMCNC: 35.8 G/DL — SIGNIFICANT CHANGE UP (ref 32–36)
MCV RBC AUTO: 76.3 FL — LOW (ref 80–100)
NRBC # BLD: 1 /100 WBCS — HIGH (ref 0–0)
NRBC # FLD: 0.17 K/UL — HIGH (ref 0–0)
PHOSPHATE SERPL-MCNC: 4.3 MG/DL — SIGNIFICANT CHANGE UP (ref 2.5–4.5)
PLATELET # BLD AUTO: 633 K/UL — HIGH (ref 150–400)
POTASSIUM SERPL-MCNC: 4.1 MMOL/L — SIGNIFICANT CHANGE UP (ref 3.5–5.3)
POTASSIUM SERPL-SCNC: 4.1 MMOL/L — SIGNIFICANT CHANGE UP (ref 3.5–5.3)
PROT SERPL-MCNC: 7.6 G/DL — SIGNIFICANT CHANGE UP (ref 6–8.3)
RBC # BLD: 3.04 M/UL — LOW (ref 4.2–5.8)
RBC # BLD: 3.04 M/UL — LOW (ref 4.2–5.8)
RBC # FLD: 26 % — HIGH (ref 10.3–14.5)
RETICS #: 262.4 K/UL — HIGH (ref 25–125)
RETICS/RBC NFR: 8.6 % — HIGH (ref 0.5–2.5)
RH IG SCN BLD-IMP: POSITIVE — SIGNIFICANT CHANGE UP
SODIUM SERPL-SCNC: 139 MMOL/L — SIGNIFICANT CHANGE UP (ref 135–145)
WBC # BLD: 13.49 K/UL — HIGH (ref 3.8–10.5)
WBC # FLD AUTO: 13.49 K/UL — HIGH (ref 3.8–10.5)

## 2024-11-24 PROCEDURE — 99233 SBSQ HOSP IP/OBS HIGH 50: CPT

## 2024-11-24 RX ORDER — HYDROMORPHONE HYDROCHLORIDE 2 MG/1
30 TABLET ORAL
Refills: 0 | Status: DISCONTINUED | OUTPATIENT
Start: 2024-11-24 | End: 2024-11-24

## 2024-11-24 RX ORDER — HYDROMORPHONE HYDROCHLORIDE 2 MG/1
30 TABLET ORAL
Refills: 0 | Status: DISCONTINUED | OUTPATIENT
Start: 2024-11-24 | End: 2024-11-25

## 2024-11-24 RX ADMIN — POLYETHYLENE GLYCOL 3350 17 GRAM(S): 17 POWDER, FOR SOLUTION ORAL at 17:43

## 2024-11-24 RX ADMIN — HYDROMORPHONE HYDROCHLORIDE 30 MILLILITER(S): 2 TABLET ORAL at 12:53

## 2024-11-24 RX ADMIN — Medication 1 MILLIGRAM(S): at 11:33

## 2024-11-24 RX ADMIN — KETOROLAC TROMETHAMINE 15 MILLIGRAM(S): 30 INJECTION INTRAMUSCULAR; INTRAVENOUS at 22:12

## 2024-11-24 RX ADMIN — CHLORHEXIDINE GLUCONATE 1 APPLICATION(S): 1.2 RINSE ORAL at 11:34

## 2024-11-24 RX ADMIN — HYDROMORPHONE HYDROCHLORIDE 30 MILLILITER(S): 2 TABLET ORAL at 11:40

## 2024-11-24 RX ADMIN — HYDROMORPHONE HYDROCHLORIDE 30 MILLILITER(S): 2 TABLET ORAL at 08:30

## 2024-11-24 RX ADMIN — Medication 1000 UNIT(S): at 11:33

## 2024-11-24 RX ADMIN — KETOROLAC TROMETHAMINE 15 MILLIGRAM(S): 30 INJECTION INTRAMUSCULAR; INTRAVENOUS at 14:44

## 2024-11-24 RX ADMIN — KETOROLAC TROMETHAMINE 15 MILLIGRAM(S): 30 INJECTION INTRAMUSCULAR; INTRAVENOUS at 21:42

## 2024-11-24 RX ADMIN — Medication 75 MILLILITER(S): at 18:25

## 2024-11-24 RX ADMIN — KETOROLAC TROMETHAMINE 15 MILLIGRAM(S): 30 INJECTION INTRAMUSCULAR; INTRAVENOUS at 06:53

## 2024-11-24 RX ADMIN — POLYETHYLENE GLYCOL 3350 17 GRAM(S): 17 POWDER, FOR SOLUTION ORAL at 06:54

## 2024-11-24 RX ADMIN — HYDROMORPHONE HYDROCHLORIDE 30 MILLILITER(S): 2 TABLET ORAL at 20:35

## 2024-11-24 RX ADMIN — Medication 75 MILLILITER(S): at 04:53

## 2024-11-24 RX ADMIN — Medication 2 TABLET(S): at 21:42

## 2024-11-25 DIAGNOSIS — K59.00 CONSTIPATION, UNSPECIFIED: ICD-10-CM

## 2024-11-25 DIAGNOSIS — E55.9 VITAMIN D DEFICIENCY, UNSPECIFIED: ICD-10-CM

## 2024-11-25 DIAGNOSIS — R63.8 OTHER SYMPTOMS AND SIGNS CONCERNING FOOD AND FLUID INTAKE: ICD-10-CM

## 2024-11-25 DIAGNOSIS — E80.6 OTHER DISORDERS OF BILIRUBIN METABOLISM: ICD-10-CM

## 2024-11-25 DIAGNOSIS — R74.01 ELEVATION OF LEVELS OF LIVER TRANSAMINASE LEVELS: ICD-10-CM

## 2024-11-25 DIAGNOSIS — D57.00 HB-SS DISEASE WITH CRISIS, UNSPECIFIED: ICD-10-CM

## 2024-11-25 LAB
ALBUMIN SERPL ELPH-MCNC: 3.6 G/DL — SIGNIFICANT CHANGE UP (ref 3.3–5)
ALP SERPL-CCNC: 168 U/L — HIGH (ref 40–120)
ALT FLD-CCNC: 33 U/L — SIGNIFICANT CHANGE UP (ref 4–41)
ANION GAP SERPL CALC-SCNC: 12 MMOL/L — SIGNIFICANT CHANGE UP (ref 7–14)
AST SERPL-CCNC: 98 U/L — HIGH (ref 4–40)
BILIRUB DIRECT SERPL-MCNC: 0.6 MG/DL — HIGH (ref 0–0.3)
BILIRUB INDIRECT FLD-MCNC: 2 MG/DL — HIGH (ref 0–1)
BILIRUB SERPL-MCNC: 2.6 MG/DL — HIGH (ref 0.2–1.2)
BUN SERPL-MCNC: 8 MG/DL — SIGNIFICANT CHANGE UP (ref 7–23)
CALCIUM SERPL-MCNC: 8.8 MG/DL — SIGNIFICANT CHANGE UP (ref 8.4–10.5)
CHLORIDE SERPL-SCNC: 100 MMOL/L — SIGNIFICANT CHANGE UP (ref 98–107)
CO2 SERPL-SCNC: 26 MMOL/L — SIGNIFICANT CHANGE UP (ref 22–31)
CREAT SERPL-MCNC: 0.62 MG/DL — SIGNIFICANT CHANGE UP (ref 0.5–1.3)
EGFR: 132 ML/MIN/1.73M2 — SIGNIFICANT CHANGE UP
GLUCOSE SERPL-MCNC: 107 MG/DL — HIGH (ref 70–99)
HCT VFR BLD CALC: 21.8 % — LOW (ref 39–50)
HGB BLD-MCNC: 7.7 G/DL — LOW (ref 13–17)
LDH SERPL L TO P-CCNC: 1160 U/L — HIGH (ref 135–225)
MAGNESIUM SERPL-MCNC: 1.9 MG/DL — SIGNIFICANT CHANGE UP (ref 1.6–2.6)
MCHC RBC-ENTMCNC: 27.5 PG — SIGNIFICANT CHANGE UP (ref 27–34)
MCHC RBC-ENTMCNC: 35.3 G/DL — SIGNIFICANT CHANGE UP (ref 32–36)
MCV RBC AUTO: 77.9 FL — LOW (ref 80–100)
NRBC # BLD: 1 /100 WBCS — HIGH (ref 0–0)
NRBC # FLD: 0.15 K/UL — HIGH (ref 0–0)
PHOSPHATE SERPL-MCNC: 5 MG/DL — HIGH (ref 2.5–4.5)
PLATELET # BLD AUTO: 655 K/UL — HIGH (ref 150–400)
POTASSIUM SERPL-MCNC: 4.3 MMOL/L — SIGNIFICANT CHANGE UP (ref 3.5–5.3)
POTASSIUM SERPL-SCNC: 4.3 MMOL/L — SIGNIFICANT CHANGE UP (ref 3.5–5.3)
PROT SERPL-MCNC: 7.4 G/DL — SIGNIFICANT CHANGE UP (ref 6–8.3)
RBC # BLD: 2.8 M/UL — LOW (ref 4.2–5.8)
RBC # BLD: 2.8 M/UL — LOW (ref 4.2–5.8)
RBC # FLD: 26.5 % — HIGH (ref 10.3–14.5)
RETICS #: 243.3 K/UL — HIGH (ref 25–125)
RETICS/RBC NFR: 8.8 % — HIGH (ref 0.5–2.5)
SODIUM SERPL-SCNC: 138 MMOL/L — SIGNIFICANT CHANGE UP (ref 135–145)
WBC # BLD: 13.01 K/UL — HIGH (ref 3.8–10.5)
WBC # FLD AUTO: 13.01 K/UL — HIGH (ref 3.8–10.5)

## 2024-11-25 PROCEDURE — 99232 SBSQ HOSP IP/OBS MODERATE 35: CPT

## 2024-11-25 RX ORDER — LACTULOSE 10 G/15ML
20 SOLUTION ORAL ONCE
Refills: 0 | Status: COMPLETED | OUTPATIENT
Start: 2024-11-25 | End: 2024-11-26

## 2024-11-25 RX ORDER — LIDOCAINE 40 MG/G
1 CREAM TOPICAL DAILY
Refills: 0 | Status: DISCONTINUED | OUTPATIENT
Start: 2024-11-25 | End: 2024-11-28

## 2024-11-25 RX ORDER — HYDROMORPHONE HYDROCHLORIDE 2 MG/1
30 TABLET ORAL
Refills: 0 | Status: DISCONTINUED | OUTPATIENT
Start: 2024-11-25 | End: 2024-11-27

## 2024-11-25 RX ADMIN — ONDANSETRON HYDROCHLORIDE 4 MILLIGRAM(S): 4 TABLET, FILM COATED ORAL at 13:50

## 2024-11-25 RX ADMIN — ONDANSETRON HYDROCHLORIDE 4 MILLIGRAM(S): 4 TABLET, FILM COATED ORAL at 20:04

## 2024-11-25 RX ADMIN — Medication 1 MILLIGRAM(S): at 13:44

## 2024-11-25 RX ADMIN — Medication 75 MILLILITER(S): at 13:51

## 2024-11-25 RX ADMIN — POLYETHYLENE GLYCOL 3350 17 GRAM(S): 17 POWDER, FOR SOLUTION ORAL at 05:14

## 2024-11-25 RX ADMIN — CHLORHEXIDINE GLUCONATE 1 APPLICATION(S): 1.2 RINSE ORAL at 13:44

## 2024-11-25 RX ADMIN — HYDROMORPHONE HYDROCHLORIDE 30 MILLILITER(S): 2 TABLET ORAL at 08:18

## 2024-11-25 RX ADMIN — KETOROLAC TROMETHAMINE 15 MILLIGRAM(S): 30 INJECTION INTRAMUSCULAR; INTRAVENOUS at 05:15

## 2024-11-25 RX ADMIN — KETOROLAC TROMETHAMINE 15 MILLIGRAM(S): 30 INJECTION INTRAMUSCULAR; INTRAVENOUS at 05:45

## 2024-11-25 RX ADMIN — ONDANSETRON HYDROCHLORIDE 4 MILLIGRAM(S): 4 TABLET, FILM COATED ORAL at 06:26

## 2024-11-25 RX ADMIN — HYDROMORPHONE HYDROCHLORIDE 30 MILLILITER(S): 2 TABLET ORAL at 18:54

## 2024-11-25 RX ADMIN — Medication 1000 UNIT(S): at 13:43

## 2024-11-25 RX ADMIN — ONDANSETRON HYDROCHLORIDE 4 MILLIGRAM(S): 4 TABLET, FILM COATED ORAL at 00:02

## 2024-11-25 RX ADMIN — HYDROMORPHONE HYDROCHLORIDE 30 MILLILITER(S): 2 TABLET ORAL at 20:17

## 2024-11-25 RX ADMIN — HYDROMORPHONE HYDROCHLORIDE 30 MILLILITER(S): 2 TABLET ORAL at 14:45

## 2024-11-25 RX ADMIN — Medication 2 TABLET(S): at 22:54

## 2024-11-25 NOTE — DIETITIAN INITIAL EVALUATION ADULT - ORAL NUTRITION SUPPLEMENTS
Continue Ensure Plus High Protein Therapeutic Shake 3x daily (1050kcal, 60g protein) for nutrition optimization.

## 2024-11-25 NOTE — DIETITIAN INITIAL EVALUATION ADULT - PERTINENT MEDS FT
MEDICATIONS  (STANDING):  chlorhexidine 2% Cloths 1 Application(s) Topical daily  cholecalciferol 1000 Unit(s) Oral daily  enoxaparin Injectable 40 milliGRAM(s) SubCutaneous every 24 hours  folic acid 1 milliGRAM(s) Oral daily  HYDROmorphone PCA (1 mG/mL) 30 milliLiter(s) PCA Continuous PCA Continuous  polyethylene glycol 3350 17 Gram(s) Oral two times a day  senna 2 Tablet(s) Oral at bedtime  sodium chloride 0.45%. 1000 milliLiter(s) (75 mL/Hr) IV Continuous <Continuous>    MEDICATIONS  (PRN):  guaiFENesin Oral Liquid (Sugar-Free) 200 milliGRAM(s) Oral every 6 hours PRN Cough  meclizine 25 milliGRAM(s) Oral every 8 hours PRN Dizziness  naloxone Injectable 0.1 milliGRAM(s) IV Push every 3 minutes PRN For ANY of the following changes in patient status:  A. RR LESS THAN 10 breaths per minute, B. Oxygen saturation LESS THAN 90%, C. Sedation score of 6  ondansetron Injectable 4 milliGRAM(s) IV Push every 6 hours PRN Nausea

## 2024-11-25 NOTE — DIETITIAN INITIAL EVALUATION ADULT - NS FNS DIET ORDER
Diet, Regular:   Supplement Feeding Modality:  Oral  Ensure Plus High Protein Cans or Servings Per Day:  3       Frequency:  Daily (11-21-24 @ 19:45)

## 2024-11-25 NOTE — DIETITIAN INITIAL EVALUATION ADULT - OTHER INFO
Nutrition assessment for length of stay.    11/24/24 - 29M with history of Sickle cell disease, Hx of ACS in past requiring exchange transfusion (years ago), and Vertigo who presents to the hospital with complaints of sickle cell pain crisis.     Patient with fair PO intake per RN flowsheets.  No chewing or swallowing difficulties reported. No GI distress reported i.e. nausea, vomiting, diarrhea. No food allergies noted or reported.     Weights per HIE: Weight PTA (11/18) - 81.6kg.  No other weights over the past year recorded.    Admit weight: 82kg (11/21).    Diet being supplemented with Ensure Plus High Protein Therapeutic Shake 3x daily (1050kcal, 60g protein).  Nutrition assessment for length of stay.    11/24/24 - 29M with history of Sickle cell disease, Hx of ACS in past requiring exchange transfusion (years ago), and Vertigo who presents to the hospital with complaints of sickle cell pain crisis.     Patient with fair PO intake per RN flowsheets.  No chewing or swallowing difficulties reported. No GI distress reported i.e. nausea, vomiting, diarrhea. No food allergies noted or reported. Patient endorses appetite is "okay"; further indicated that he is taking Ensure supplements and tolerating well.    Weights per HIE: Weight PTA (11/18) - 81.6kg.  No other weights over the past year recorded.    Admit weight: 82kg (11/21).  Reported usual body weight ~180 - 185 pounds PTA (81.8-84.1kg). Denies recent weight changes.    Diet being supplemented with Ensure Plus High Protein Therapeutic Shake 3x daily (1050kcal, 60g protein).

## 2024-11-25 NOTE — DIETITIAN INITIAL EVALUATION ADULT - PERTINENT LABORATORY DATA
11-25    138  |  100  |  8   ----------------------------<  107[H]  4.3   |  26  |  0.62    Ca    8.8      25 Nov 2024 06:00  Phos  5.0     11-25  Mg     1.90     11-25    TPro  7.4  /  Alb  3.6  /  TBili  2.6[H]  /  DBili  0.6[H]  /  AST  98[H]  /  ALT  33  /  AlkPhos  168[H]  11-25

## 2024-11-26 LAB
ALBUMIN SERPL ELPH-MCNC: 3.7 G/DL — SIGNIFICANT CHANGE UP (ref 3.3–5)
ALP SERPL-CCNC: 171 U/L — HIGH (ref 40–120)
ALT FLD-CCNC: 29 U/L — SIGNIFICANT CHANGE UP (ref 4–41)
ANION GAP SERPL CALC-SCNC: 13 MMOL/L — SIGNIFICANT CHANGE UP (ref 7–14)
AST SERPL-CCNC: 69 U/L — HIGH (ref 4–40)
BILIRUB SERPL-MCNC: 2.3 MG/DL — HIGH (ref 0.2–1.2)
BUN SERPL-MCNC: 8 MG/DL — SIGNIFICANT CHANGE UP (ref 7–23)
CALCIUM SERPL-MCNC: 9.4 MG/DL — SIGNIFICANT CHANGE UP (ref 8.4–10.5)
CHLORIDE SERPL-SCNC: 99 MMOL/L — SIGNIFICANT CHANGE UP (ref 98–107)
CO2 SERPL-SCNC: 26 MMOL/L — SIGNIFICANT CHANGE UP (ref 22–31)
CREAT SERPL-MCNC: 0.57 MG/DL — SIGNIFICANT CHANGE UP (ref 0.5–1.3)
CULTURE RESULTS: SIGNIFICANT CHANGE UP
CULTURE RESULTS: SIGNIFICANT CHANGE UP
EGFR: 135 ML/MIN/1.73M2 — SIGNIFICANT CHANGE UP
GLUCOSE SERPL-MCNC: 89 MG/DL — SIGNIFICANT CHANGE UP (ref 70–99)
HCT VFR BLD CALC: 22.6 % — LOW (ref 39–50)
HEMOGLOBIN INTERPRETATION: SIGNIFICANT CHANGE UP
HGB A MFR BLD: 12.3 % — LOW (ref 95–97.6)
HGB A2 MFR BLD: 3.8 % — HIGH (ref 2.4–3.5)
HGB BLD-MCNC: 7.8 G/DL — LOW (ref 13–17)
HGB F MFR BLD: 1 % — SIGNIFICANT CHANGE UP (ref 0–1.5)
HGB S MFR BLD: 82.9 % — HIGH
LDH SERPL L TO P-CCNC: 947 U/L — HIGH (ref 135–225)
MAGNESIUM SERPL-MCNC: 1.8 MG/DL — SIGNIFICANT CHANGE UP (ref 1.6–2.6)
MCHC RBC-ENTMCNC: 27.4 PG — SIGNIFICANT CHANGE UP (ref 27–34)
MCHC RBC-ENTMCNC: 34.5 G/DL — SIGNIFICANT CHANGE UP (ref 32–36)
MCV RBC AUTO: 79.3 FL — LOW (ref 80–100)
NRBC # BLD: 0 /100 WBCS — SIGNIFICANT CHANGE UP (ref 0–0)
NRBC # FLD: 0.1 K/UL — HIGH (ref 0–0)
PHOSPHATE SERPL-MCNC: 4.1 MG/DL — SIGNIFICANT CHANGE UP (ref 2.5–4.5)
PLATELET # BLD AUTO: 728 K/UL — HIGH (ref 150–400)
POTASSIUM SERPL-MCNC: 4.2 MMOL/L — SIGNIFICANT CHANGE UP (ref 3.5–5.3)
POTASSIUM SERPL-SCNC: 4.2 MMOL/L — SIGNIFICANT CHANGE UP (ref 3.5–5.3)
PROT SERPL-MCNC: 7.6 G/DL — SIGNIFICANT CHANGE UP (ref 6–8.3)
RBC # BLD: 2.85 M/UL — LOW (ref 4.2–5.8)
RBC # BLD: 2.85 M/UL — LOW (ref 4.2–5.8)
RBC # FLD: 26.5 % — HIGH (ref 10.3–14.5)
RETICS #: 206.6 K/UL — HIGH (ref 25–125)
RETICS/RBC NFR: 7.3 % — HIGH (ref 0.5–2.5)
SODIUM SERPL-SCNC: 138 MMOL/L — SIGNIFICANT CHANGE UP (ref 135–145)
SPECIMEN SOURCE: SIGNIFICANT CHANGE UP
SPECIMEN SOURCE: SIGNIFICANT CHANGE UP
WBC # BLD: 13.26 K/UL — HIGH (ref 3.8–10.5)
WBC # FLD AUTO: 13.26 K/UL — HIGH (ref 3.8–10.5)

## 2024-11-26 PROCEDURE — 99232 SBSQ HOSP IP/OBS MODERATE 35: CPT

## 2024-11-26 RX ORDER — LACTULOSE 10 G/15ML
20 SOLUTION ORAL DAILY
Refills: 0 | Status: COMPLETED | OUTPATIENT
Start: 2024-11-27 | End: 2024-11-28

## 2024-11-26 RX ORDER — LACTULOSE 10 G/15ML
20 SOLUTION ORAL DAILY
Refills: 0 | Status: DISCONTINUED | OUTPATIENT
Start: 2024-11-26 | End: 2024-11-26

## 2024-11-26 RX ADMIN — POLYETHYLENE GLYCOL 3350 17 GRAM(S): 17 POWDER, FOR SOLUTION ORAL at 17:23

## 2024-11-26 RX ADMIN — LIDOCAINE 1 PATCH: 40 CREAM TOPICAL at 12:15

## 2024-11-26 RX ADMIN — Medication 1000 UNIT(S): at 12:15

## 2024-11-26 RX ADMIN — Medication 75 MILLILITER(S): at 17:22

## 2024-11-26 RX ADMIN — LIDOCAINE 1 PATCH: 40 CREAM TOPICAL at 18:12

## 2024-11-26 RX ADMIN — CHLORHEXIDINE GLUCONATE 1 APPLICATION(S): 1.2 RINSE ORAL at 12:16

## 2024-11-26 RX ADMIN — LACTULOSE 20 GRAM(S): 10 SOLUTION ORAL at 12:16

## 2024-11-26 RX ADMIN — HYDROMORPHONE HYDROCHLORIDE 30 MILLILITER(S): 2 TABLET ORAL at 08:08

## 2024-11-26 RX ADMIN — Medication 1 MILLIGRAM(S): at 12:14

## 2024-11-26 RX ADMIN — HYDROMORPHONE HYDROCHLORIDE 30 MILLILITER(S): 2 TABLET ORAL at 20:18

## 2024-11-26 RX ADMIN — ONDANSETRON HYDROCHLORIDE 4 MILLIGRAM(S): 4 TABLET, FILM COATED ORAL at 12:14

## 2024-11-26 NOTE — PROVIDER CONTACT NOTE (OTHER) - ASSESSMENT
Patient complaining of chills and feels feverish   Temp 102.5  HR 88  /55  RR 20  O2 95 on 2L NC
Pt A&Ox4, v/s stable. denies chest pain, sob, dizziness, respiratory distress. Pt educated on risk of infections for IV, Pt refusing IV removal. States "if IV works , I rather keep it."
Pt a&ox4, v/s stable. denies chest pain, sob, dizziness. Denies respiratory distress. Asymptomatic.

## 2024-11-26 NOTE — PROVIDER CONTACT NOTE (OTHER) - ACTION/TREATMENT ORDERED:
Provider made aware. IV extended.
Provider made aware.
Provider ordered fever work up, labs ordered, chest Xray, IV tylenol, urinalysis, urine culture, flu/COVID panel, fluid bolus, IV antibiotics. Repeat vitals.

## 2024-11-26 NOTE — PROVIDER CONTACT NOTE (OTHER) - BACKGROUND
Patient presenting with sickle cell crisis
29 year old male, hx of sickle cell disease type SS.
29 yr male admitted for sickle cell crisis.

## 2024-11-27 LAB
ALBUMIN SERPL ELPH-MCNC: 4 G/DL — SIGNIFICANT CHANGE UP (ref 3.3–5)
ALP SERPL-CCNC: 170 U/L — HIGH (ref 40–120)
ALT FLD-CCNC: 28 U/L — SIGNIFICANT CHANGE UP (ref 4–41)
ANION GAP SERPL CALC-SCNC: 10 MMOL/L — SIGNIFICANT CHANGE UP (ref 7–14)
ANISOCYTOSIS BLD QL: SIGNIFICANT CHANGE UP
AST SERPL-CCNC: 68 U/L — HIGH (ref 4–40)
BASOPHILS # BLD AUTO: 0.22 K/UL — HIGH (ref 0–0.2)
BASOPHILS NFR BLD AUTO: 1.7 % — SIGNIFICANT CHANGE UP (ref 0–2)
BILIRUB SERPL-MCNC: 2.4 MG/DL — HIGH (ref 0.2–1.2)
BLASTS # FLD: 0.9 % — HIGH (ref 0–0)
BUN SERPL-MCNC: 7 MG/DL — SIGNIFICANT CHANGE UP (ref 7–23)
CALCIUM SERPL-MCNC: 9.5 MG/DL — SIGNIFICANT CHANGE UP (ref 8.4–10.5)
CHLORIDE SERPL-SCNC: 102 MMOL/L — SIGNIFICANT CHANGE UP (ref 98–107)
CO2 SERPL-SCNC: 29 MMOL/L — SIGNIFICANT CHANGE UP (ref 22–31)
CREAT SERPL-MCNC: 0.58 MG/DL — SIGNIFICANT CHANGE UP (ref 0.5–1.3)
EGFR: 135 ML/MIN/1.73M2 — SIGNIFICANT CHANGE UP
EOSINOPHIL # BLD AUTO: 0.22 K/UL — SIGNIFICANT CHANGE UP (ref 0–0.5)
EOSINOPHIL NFR BLD AUTO: 1.7 % — SIGNIFICANT CHANGE UP (ref 0–6)
GIANT PLATELETS BLD QL SMEAR: PRESENT — SIGNIFICANT CHANGE UP
GLUCOSE SERPL-MCNC: 89 MG/DL — SIGNIFICANT CHANGE UP (ref 70–99)
HCT VFR BLD CALC: 23 % — LOW (ref 39–50)
HGB BLD-MCNC: 8.2 G/DL — LOW (ref 13–17)
HYPOCHROMIA BLD QL: SIGNIFICANT CHANGE UP
IANC: 7.47 K/UL — HIGH (ref 1.8–7.4)
LDH SERPL L TO P-CCNC: 933 U/L — HIGH (ref 135–225)
LYMPHOCYTES # BLD AUTO: 2.96 K/UL — SIGNIFICANT CHANGE UP (ref 1–3.3)
LYMPHOCYTES # BLD AUTO: 23.3 % — SIGNIFICANT CHANGE UP (ref 13–44)
MACROCYTES BLD QL: SLIGHT — SIGNIFICANT CHANGE UP
MAGNESIUM SERPL-MCNC: 1.8 MG/DL — SIGNIFICANT CHANGE UP (ref 1.6–2.6)
MANUAL SMEAR VERIFICATION: SIGNIFICANT CHANGE UP
MCHC RBC-ENTMCNC: 27.7 PG — SIGNIFICANT CHANGE UP (ref 27–34)
MCHC RBC-ENTMCNC: 35.7 G/DL — SIGNIFICANT CHANGE UP (ref 32–36)
MCV RBC AUTO: 77.7 FL — LOW (ref 80–100)
MICROCYTES BLD QL: SLIGHT — SIGNIFICANT CHANGE UP
MONOCYTES # BLD AUTO: 1.64 K/UL — HIGH (ref 0–0.9)
MONOCYTES NFR BLD AUTO: 12.9 % — SIGNIFICANT CHANGE UP (ref 2–14)
NEUTROPHILS # BLD AUTO: 7.22 K/UL — SIGNIFICANT CHANGE UP (ref 1.8–7.4)
NEUTROPHILS NFR BLD AUTO: 56.9 % — SIGNIFICANT CHANGE UP (ref 43–77)
NRBC # BLD: 1 /100 WBCS — HIGH (ref 0–0)
PHOSPHATE SERPL-MCNC: 4.1 MG/DL — SIGNIFICANT CHANGE UP (ref 2.5–4.5)
PLAT MORPH BLD: NORMAL — SIGNIFICANT CHANGE UP
PLATELET # BLD AUTO: 753 K/UL — HIGH (ref 150–400)
PLATELET COUNT - ESTIMATE: ABNORMAL
POIKILOCYTOSIS BLD QL AUTO: SIGNIFICANT CHANGE UP
POLYCHROMASIA BLD QL SMEAR: SIGNIFICANT CHANGE UP
POTASSIUM SERPL-MCNC: 4.2 MMOL/L — SIGNIFICANT CHANGE UP (ref 3.5–5.3)
POTASSIUM SERPL-SCNC: 4.2 MMOL/L — SIGNIFICANT CHANGE UP (ref 3.5–5.3)
PROT SERPL-MCNC: 8.2 G/DL — SIGNIFICANT CHANGE UP (ref 6–8.3)
RBC # BLD: 2.96 M/UL — LOW (ref 4.2–5.8)
RBC # BLD: 2.96 M/UL — LOW (ref 4.2–5.8)
RBC # FLD: 25.6 % — HIGH (ref 10.3–14.5)
RBC BLD AUTO: ABNORMAL
RETICS #: 187.6 K/UL — HIGH (ref 25–125)
RETICS/RBC NFR: 6.4 % — HIGH (ref 0.5–2.5)
SICKLE CELLS BLD QL SMEAR: SIGNIFICANT CHANGE UP
SODIUM SERPL-SCNC: 141 MMOL/L — SIGNIFICANT CHANGE UP (ref 135–145)
SPHEROCYTES BLD QL SMEAR: SIGNIFICANT CHANGE UP
TARGETS BLD QL SMEAR: SIGNIFICANT CHANGE UP
VARIANT LYMPHS # BLD: 2.6 % — SIGNIFICANT CHANGE UP (ref 0–6)
WBC # BLD: 12.69 K/UL — HIGH (ref 3.8–10.5)
WBC # FLD AUTO: 12.69 K/UL — HIGH (ref 3.8–10.5)

## 2024-11-27 PROCEDURE — 99232 SBSQ HOSP IP/OBS MODERATE 35: CPT

## 2024-11-27 RX ORDER — IBUPROFEN 200 MG
600 TABLET ORAL EVERY 6 HOURS
Refills: 0 | Status: DISCONTINUED | OUTPATIENT
Start: 2024-11-27 | End: 2024-11-28

## 2024-11-27 RX ORDER — HYDROMORPHONE HYDROCHLORIDE 2 MG/1
30 TABLET ORAL
Refills: 0 | Status: DISCONTINUED | OUTPATIENT
Start: 2024-11-27 | End: 2024-11-28

## 2024-11-27 RX ORDER — OXYCODONE HYDROCHLORIDE 30 MG/1
10 TABLET ORAL EVERY 6 HOURS
Refills: 0 | Status: DISCONTINUED | OUTPATIENT
Start: 2024-11-27 | End: 2024-11-28

## 2024-11-27 RX ORDER — IBUPROFEN 200 MG
600 TABLET ORAL EVERY 6 HOURS
Refills: 0 | Status: DISCONTINUED | OUTPATIENT
Start: 2024-11-27 | End: 2024-11-27

## 2024-11-27 RX ORDER — 0.9 % SODIUM CHLORIDE 0.9 %
1000 INTRAVENOUS SOLUTION INTRAVENOUS
Refills: 0 | Status: DISCONTINUED | OUTPATIENT
Start: 2024-11-27 | End: 2024-11-28

## 2024-11-27 RX ADMIN — LACTULOSE 20 GRAM(S): 10 SOLUTION ORAL at 12:42

## 2024-11-27 RX ADMIN — Medication 25 GRAM(S): at 12:43

## 2024-11-27 RX ADMIN — Medication 75 MILLILITER(S): at 08:21

## 2024-11-27 RX ADMIN — CHLORHEXIDINE GLUCONATE 1 APPLICATION(S): 1.2 RINSE ORAL at 12:47

## 2024-11-27 RX ADMIN — HYDROMORPHONE HYDROCHLORIDE 30 MILLILITER(S): 2 TABLET ORAL at 20:20

## 2024-11-27 RX ADMIN — HYDROMORPHONE HYDROCHLORIDE 30 MILLILITER(S): 2 TABLET ORAL at 08:20

## 2024-11-27 RX ADMIN — HYDROMORPHONE HYDROCHLORIDE 30 MILLILITER(S): 2 TABLET ORAL at 02:41

## 2024-11-27 RX ADMIN — Medication 2 TABLET(S): at 22:02

## 2024-11-27 RX ADMIN — Medication 30 MILLILITER(S): at 10:46

## 2024-11-27 RX ADMIN — Medication 1 MILLIGRAM(S): at 12:42

## 2024-11-27 RX ADMIN — HYDROMORPHONE HYDROCHLORIDE 30 MILLILITER(S): 2 TABLET ORAL at 10:45

## 2024-11-27 RX ADMIN — Medication 1000 UNIT(S): at 12:42

## 2024-11-27 NOTE — PROGRESS NOTE ADULT - TIME BILLING
Chart review, medication review, patient interview, ordering labs and medications, interpreting labs and imaging results, and discussing plan with patient and healthcare team.
Chart review, medication review, patient interview, ordering labs and medications, interpreting labs and imaging results, and discussing plan with patient and healthcare team.
review of laboratory data, radiology results, consultants' recommendations, documentation in [place], discussion with patient/ACP and interdisciplinary staff (such as , social workers, etc). Interventions were performed as documented above.
Chart review, medication review, patient interview, ordering labs and medications, interpreting labs and imaging results, and discussing plan with patient and healthcare team.
review of laboratory data, radiology results, consultants' recommendations, documentation in [place], discussion with patient/ACP and interdisciplinary staff (such as , social workers, etc). Interventions were performed as documented above.
review of laboratory data, radiology results, consultants' recommendations, documentation in [place], discussion with patient/ACP and interdisciplinary staff (such as , social workers, etc). Interventions were performed as documented above.

## 2024-11-27 NOTE — PROGRESS NOTE ADULT - PROBLEM SELECTOR PLAN 7
- F: mIVF  - E: replete K<4, Mg<2  - N: regular diet  - D: lovenox 40mg q24h  - G: none    code: full  dispo: pending medical optimization, anticipate return to home

## 2024-11-27 NOTE — PROGRESS NOTE ADULT - ASSESSMENT
This is a 29M with history of Sickle cell disease, Hx of ACS in past requiring exchange transfusion (years ago), and Vertigo who presents to the hospital with complaints of sickle cell pain crisis. 
29M with history of Sickle cell disease, Hx of ACS in past requiring exchange transfusion (years ago), and Vertigo who presents to the hospital with complaints of sickle cell pain crisis. 
29M with history of Sickle cell disease, Hx of ACS in past requiring exchange transfusion (years ago), and Vertigo who presents to the hospital with complaints of sickle cell pain crisis. 
Pt is a 30 yo M with PMH SCD (hx acute chest) and vertigo p/w L chest pain similar to prior episodes of SCC. Hemodynamically stable on arrival with labs c/w sickle cell crisis. Currently weaning dilaudid PCA pump. Received 1U RBCs with appropriate response 11/22; heme-onc following. Course c/b constipation, on bowel regimen. 
This is a 29M with history of Sickle cell disease, Hx of ACS in past requiring exchange transfusion (years ago), and Vertigo who presents to the hospital with complaints of sickle cell pain crisis. 
Pt is a 28 yo M with PMH SCD (hx acute chest) and vertigo p/w L chest pain similar to prior episodes of SCC. Hemodynamically stable on arrival with labs c/w sickle cell crisis. Currently weaning dilaudid PCA pump. Received 1U RBCs with appropriate response 11/22; heme-onc following. Course c/b constipation, on bowel regimen. 
This is a 29M with history of Sickle cell disease, Hx of ACS in past requiring exchange transfusion (years ago), and Vertigo who presents to the hospital with complaints of sickle cell pain crisis. 
Pt is a 28 yo M with PMH SCD (hx acute chest) and vertigo p/w L chest pain similar to prior episodes of SCC. Hemodynamically stable on arrival with labs c/w sickle cell crisis. Currently weaning dilaudid PCA pump. Received 1U RBCs with appropriate response 11/22; heme-onc following. Course c/b constipation, on bowel regimen.

## 2024-11-27 NOTE — PROGRESS NOTE ADULT - SUBJECTIVE AND OBJECTIVE BOX
PROGRESS NOTE:   Authored by Inez Lubin Ma, MD  Available on MS Teams; Pager [phone]    Patient is a 29y old  Male who presents with a chief complaint of Sickle cell pain crisis (22 Nov 2024 18:43)      SUBJECTIVE / OVERNIGHT EVENTS: NAEON. Pt reports ongoing uncontrolled pain on current PCA settings. He states the current dose is not helping with his pain but is causing some nausea as well as constipation. No issues with the lockout period. He otherwise denies any f/c, CP, SOB, other acute or new symptoms.    All other review of systems is negative unless indicated above.    MEDICATIONS  (STANDING):  chlorhexidine 2% Cloths 1 Application(s) Topical daily  cholecalciferol 1000 Unit(s) Oral daily  enoxaparin Injectable 40 milliGRAM(s) SubCutaneous every 24 hours  folic acid 1 milliGRAM(s) Oral daily  HYDROmorphone PCA (1 mG/mL) 30 milliLiter(s) PCA Continuous PCA Continuous  ketorolac   Injectable 15 milliGRAM(s) IV Push every 8 hours  piperacillin/tazobactam IVPB.. 3.375 Gram(s) IV Intermittent every 8 hours  polyethylene glycol 3350 17 Gram(s) Oral two times a day  senna 2 Tablet(s) Oral at bedtime  sodium chloride 0.45%. 1000 milliLiter(s) (75 mL/Hr) IV Continuous <Continuous>    MEDICATIONS  (PRN):  guaiFENesin Oral Liquid (Sugar-Free) 200 milliGRAM(s) Oral every 6 hours PRN Cough  meclizine 25 milliGRAM(s) Oral every 8 hours PRN Dizziness  naloxone Injectable 0.1 milliGRAM(s) IV Push every 3 minutes PRN For ANY of the following changes in patient status:  A. RR LESS THAN 10 breaths per minute, B. Oxygen saturation LESS THAN 90%, C. Sedation score of 6  ondansetron Injectable 4 milliGRAM(s) IV Push every 6 hours PRN Nausea      CAPILLARY BLOOD GLUCOSE        I&O's Summary    22 Nov 2024 07:01  -  23 Nov 2024 07:00  --------------------------------------------------------  IN: 650 mL / OUT: 700 mL / NET: -50 mL        PHYSICAL EXAM:  Vital Signs Last 24 Hrs  T(C): 37.2 (23 Nov 2024 13:48), Max: 37.2 (23 Nov 2024 13:48)  T(F): 99 (23 Nov 2024 13:48), Max: 99 (23 Nov 2024 13:48)  HR: 65 (23 Nov 2024 13:48) (56 - 65)  BP: 123/64 (23 Nov 2024 13:48) (113/54 - 123/64)  BP(mean): --  RR: 16 (23 Nov 2024 13:48) (16 - 18)  SpO2: 100% (23 Nov 2024 13:48) (97% - 100%)    Parameters below as of 23 Nov 2024 13:48  Patient On (Oxygen Delivery Method): mask, simple face  O2 Flow (L/min): 2      GENERAL: No acute distress  HEAD:  Normocephalic, Atraumatic  EYES: Conjunctiva and sclera clear  NECK: Supple  CHEST/LUNG: CTAB, No wheezes, rales, or rhonchi  HEART: Regular rate and rhythm  ABDOMEN: Soft, non-tender, non-distended  EXTREMITIES: No clubbing, cyanosis, or edema  NEUROLOGY: A&O x 3, no focal deficits  SKIN: No rashes or lesions    LABS:                        6.8    14.09 )-----------( 592      ( 23 Nov 2024 09:00 )             18.7     11-23    138  |  99  |  5[L]  ----------------------------<  117[H]  4.0   |  27  |  0.60    Ca    8.9      23 Nov 2024 09:00  Phos  4.5     11-23  Mg     1.80     11-23    TPro  7.5  /  Alb  3.6  /  TBili  2.6[H]  /  DBili  0.7[H]  /  AST  67[H]  /  ALT  24  /  AlkPhos  152[H]  11-23          Urinalysis Basic - ( 23 Nov 2024 09:00 )    Color: x / Appearance: x / SG: x / pH: x  Gluc: 117 mg/dL / Ketone: x  / Bili: x / Urobili: x   Blood: x / Protein: x / Nitrite: x   Leuk Esterase: x / RBC: x / WBC x   Sq Epi: x / Non Sq Epi: x / Bacteria: x        Culture - Urine (collected 21 Nov 2024 03:54)  Source: Clean Catch Clean Catch (Midstream)  Final Report (22 Nov 2024 07:39):    No growth    Culture - Blood (collected 21 Nov 2024 03:28)  Source: .Blood BLOOD  Preliminary Report (23 Nov 2024 07:01):    No growth at 48 Hours    Culture - Blood (collected 21 Nov 2024 03:20)  Source: .Blood BLOOD  Preliminary Report (23 Nov 2024 07:01):    No growth at 48 Hours          RADIOLOGY & ADDITIONAL TESTS: Reviewed
[facility] Division of Hospital Medicine  Angeles Palencia MD  Pager [phone]    Patient is a 29y old  Male who presents with a chief complaint of Sickle cell pain crisis      SUBJECTIVE / OVERNIGHT EVENTS: AM fever noted; no real change in pain, no CP or SOB; reports he does get fevers with bad crises; feels like pain is currently tolerable      MEDICATIONS  (STANDING):  chlorhexidine 2% Cloths 1 Application(s) Topical daily  enoxaparin Injectable 40 milliGRAM(s) SubCutaneous every 24 hours  folic acid 1 milliGRAM(s) Oral daily  HYDROmorphone PCA (1 mG/mL) 30 milliLiter(s) PCA Continuous PCA Continuous  ketorolac   Injectable 15 milliGRAM(s) IV Push every 8 hours  piperacillin/tazobactam IVPB. 3.375 Gram(s) IV Intermittent once  piperacillin/tazobactam IVPB.- 3.375 Gram(s) IV Intermittent once  piperacillin/tazobactam IVPB.. 3.375 Gram(s) IV Intermittent every 8 hours  sodium chloride 0.45%. 1000 milliLiter(s) (75 mL/Hr) IV Continuous <Continuous>    MEDICATIONS  (PRN):  diphenhydrAMINE 25 milliGRAM(s) Oral every 4 hours PRN Pruritus  guaiFENesin Oral Liquid (Sugar-Free) 200 milliGRAM(s) Oral every 6 hours PRN Cough  meclizine 25 milliGRAM(s) Oral every 8 hours PRN Dizziness  naloxone Injectable 0.1 milliGRAM(s) IV Push every 3 minutes PRN For ANY of the following changes in patient status:  A. RR LESS THAN 10 breaths per minute, B. Oxygen saturation LESS THAN 90%, C. Sedation score of 6  ondansetron Injectable 4 milliGRAM(s) IV Push every 6 hours PRN Nausea      PHYSICAL EXAM:  Vital Signs Last 24 Hrs  T(F): 98.4 (2024 10:03), Max: 102.5 (2024 01:23)  HR: 67 (2024 10:03) (67 - 93)  BP: 133/65 (2024 10:03) (117/48 - 133/65)  RR: 18 (2024 10:03) (18 - 20)  SpO2: 100% (2024 10:03) (94% - 100%)    Parameters below as of 2024 10:03  Patient On (Oxygen Delivery Method): nasal cannula  O2 Flow (L/min): 2      CONSTITUTIONAL: NAD, appears comfortable  EYES: PERRLA; conjunctiva and sclera clear  ENMT: Moist oral mucosa; normal dentition  RESPIRATORY: Normal respiratory effort; lungs are clear to auscultation bilaterally  CARDIOVASCULAR: Regular rate and rhythm; No lower extremity edema  ABDOMEN: Nontender to palpation, normoactive bowel sounds  MUSCULOSKELETAL:  no clubbing or cyanosis of digits; no joint swelling or tenderness to palpation  PSYCH: A+O to person, place, and time; affect appropriate  NEUROLOGY: CN 2-12 are intact and symmetric; no gross sensory deficits   SKIN: No rashes; no palpable lesions    LABS:                        6.4    12.24 )-----------( 586      ( 2024 03:20 )             17.7     11-    136  |  100  |  6[L]  ----------------------------<  104[H]  4.4   |  26  |  0.76    Ca    8.5      2024 03:20  TPro  7.4  /  Alb  3.7  /  TBili  2.2[H]  /  DBili  x   /  AST  48[H]  /  ALT  14  /  AlkPhos  135[H]  11-21          Urinalysis Basic - ( 2024 03:54 )    Color: Yellow / Appearance: Clear / S.012 / pH: x  Gluc: x / Ketone: Negative mg/dL  / Bili: Negative / Urobili: 1.0 mg/dL   Blood: x / Protein: 30 mg/dL / Nitrite: Negative   Leuk Esterase: Negative / RBC: 8 /HPF / WBC 0 /HPF   Sq Epi: x / Non Sq Epi: 0 /HPF / Bacteria: Negative /HPF        
[facility] Division of Hospital Medicine  Angeles Palencia MD  Pager [phone]    Patient is a 29y old  Male who presents with a chief complaint of Sickle cell pain crisis       SUBJECTIVE / OVERNIGHT EVENTS: pt reports still having pain; asking about blood transfusion; d/w pt that I spoke with Dr Nguyễn who suggested only transfuse if signs of hypoxia, CP, SOB; pt currently stable; spoke with RN to change pulse ox probe as waveform not consistent; O2 sat remained greater 93% on RA      MEDICATIONS  (STANDING):  chlorhexidine 2% Cloths 1 Application(s) Topical daily  enoxaparin Injectable 40 milliGRAM(s) SubCutaneous every 24 hours  folic acid 1 milliGRAM(s) Oral daily  HYDROmorphone PCA (1 mG/mL) 30 milliLiter(s) PCA Continuous PCA Continuous  ketorolac   Injectable 15 milliGRAM(s) IV Push every 8 hours  sodium chloride 0.45%. 1000 milliLiter(s) (75 mL/Hr) IV Continuous <Continuous>    MEDICATIONS  (PRN):  diphenhydrAMINE 25 milliGRAM(s) Oral every 4 hours PRN Pruritus  guaiFENesin Oral Liquid (Sugar-Free) 200 milliGRAM(s) Oral every 6 hours PRN Cough  meclizine 25 milliGRAM(s) Oral every 8 hours PRN Dizziness  naloxone Injectable 0.1 milliGRAM(s) IV Push every 3 minutes PRN For ANY of the following changes in patient status:  A. RR LESS THAN 10 breaths per minute, B. Oxygen saturation LESS THAN 90%, C. Sedation score of 6  ondansetron Injectable 4 milliGRAM(s) IV Push every 6 hours PRN Nausea      PHYSICAL EXAM:  Vital Signs Last 24 Hrs  T(F): 98 (20 Nov 2024 10:38), Max: 98.6 (19 Nov 2024 21:14)  HR: 63 (20 Nov 2024 10:38) (62 - 78)  BP: 122/60 (20 Nov 2024 10:38) (105/50 - 122/60)  RR: 18 (20 Nov 2024 10:38) (18 - 19)  SpO2: 100% (20 Nov 2024 10:38) (92% - 100%)    Parameters below as of 20 Nov 2024 10:38  Patient On (Oxygen Delivery Method): room air        CONSTITUTIONAL: NAD, appears comfortable  EYES: PERRLA; conjunctiva and sclera clear  ENMT: Moist oral mucosa; normal dentition  RESPIRATORY: Normal respiratory effort; lungs are clear to auscultation bilaterally  CARDIOVASCULAR: Regular rate and rhythm; No lower extremity edema  ABDOMEN: Nontender to palpation, normoactive bowel sounds  MUSCULOSKELETAL:  no clubbing or cyanosis of digits; no joint swelling or tenderness to palpation  PSYCH: A+O to person, place, and time; affect appropriate  NEUROLOGY: CN 2-12 are intact and symmetric; no gross sensory deficits   SKIN: No rashes; no palpable lesions    LABS:                        7.2    11.87 )-----------( 629      ( 20 Nov 2024 04:30 )             20.4     11-20    137  |  103  |  6[L]  ----------------------------<  96  4.9   |  24  |  0.61    Ca    8.7      20 Nov 2024 04:30  Phos  5.0     11-20  Mg     2.10     11-20    TPro  7.6  /  Alb  3.7  /  TBili  2.3[H]  /  DBili  x   /  AST  54[H]  /  ALT  15  /  AlkPhos  121[H]  11-20          
PROGRESS NOTE:   Authored by Inez Lubin Ma, MD  Available on MS Teams; Pager [phone]    Patient is a 29y old  Male who presents with a chief complaint of Sickle cell pain crisis (21 Nov 2024 12:33)      SUBJECTIVE / OVERNIGHT EVENTS: NAEON. Pt c/o increased pain in his waist and legs this AM. No relief with Toradol. No other acute symptoms    All other review of systems is negative unless indicated above.    MEDICATIONS  (STANDING):  chlorhexidine 2% Cloths 1 Application(s) Topical daily  enoxaparin Injectable 40 milliGRAM(s) SubCutaneous every 24 hours  folic acid 1 milliGRAM(s) Oral daily  HYDROmorphone PCA (1 mG/mL) 30 milliLiter(s) PCA Continuous PCA Continuous  ketorolac   Injectable 15 milliGRAM(s) IV Push every 8 hours  ketorolac   Injectable 15 milliGRAM(s) IV Push once  piperacillin/tazobactam IVPB.. 3.375 Gram(s) IV Intermittent every 8 hours  sodium chloride 0.45%. 1000 milliLiter(s) (75 mL/Hr) IV Continuous <Continuous>    MEDICATIONS  (PRN):  diphenhydrAMINE 25 milliGRAM(s) Oral every 4 hours PRN Pruritus  guaiFENesin Oral Liquid (Sugar-Free) 200 milliGRAM(s) Oral every 6 hours PRN Cough  meclizine 25 milliGRAM(s) Oral every 8 hours PRN Dizziness  naloxone Injectable 0.1 milliGRAM(s) IV Push every 3 minutes PRN For ANY of the following changes in patient status:  A. RR LESS THAN 10 breaths per minute, B. Oxygen saturation LESS THAN 90%, C. Sedation score of 6  ondansetron Injectable 4 milliGRAM(s) IV Push every 6 hours PRN Nausea      CAPILLARY BLOOD GLUCOSE        I&O's Summary    21 Nov 2024 07:01  -  22 Nov 2024 07:00  --------------------------------------------------------  IN: 2420 mL / OUT: 2150 mL / NET: 270 mL        PHYSICAL EXAM:  Vital Signs Last 24 Hrs  T(C): 36.7 (22 Nov 2024 10:32), Max: 37.6 (21 Nov 2024 21:34)  T(F): 98.1 (22 Nov 2024 10:32), Max: 99.6 (21 Nov 2024 21:34)  HR: 94 (22 Nov 2024 10:32) (64 - 94)  BP: 146/62 (22 Nov 2024 10:32) (119/55 - 146/62)  BP(mean): --  RR: 17 (22 Nov 2024 10:54) (17 - 18)  SpO2: 100% (22 Nov 2024 10:32) (95% - 100%)    Parameters below as of 22 Nov 2024 10:32  Patient On (Oxygen Delivery Method): mask, simple face        GENERAL: No acute distress  HEAD:  Normocephalic, Atraumatic  EYES: EOMI, conjunctiva and sclera clear  NECK: Supple, no JVD  CHEST/LUNG: CTAB, No wheezes, rales, or rhonchi  HEART: Regular rate and rhythm; No murmurs, rubs, or gallops  ABDOMEN: Soft, non-tender, non-distended  EXTREMITIES:  2+ peripheral pulses b/l, No clubbing, cyanosis, or edema.  NEUROLOGY: A&O x 3, no focal deficits  SKIN: No rashes or lesions    LABS:                        6.4    13.88 )-----------( 598      ( 22 Nov 2024 07:30 )             17.5     11-22    135  |  98  |  7   ----------------------------<  96  4.2   |  27  |  0.67    Ca    8.7      22 Nov 2024 07:30  Phos  4.4     11-22  Mg     1.80     11-22    TPro  7.0  /  Alb  3.5  /  TBili  2.4[H]  /  DBili  0.7[H]  /  AST  68[H]  /  ALT  26  /  AlkPhos  165[H]  11-22          Urinalysis Basic - ( 22 Nov 2024 07:30 )    Color: x / Appearance: x / SG: x / pH: x  Gluc: 96 mg/dL / Ketone: x  / Bili: x / Urobili: x   Blood: x / Protein: x / Nitrite: x   Leuk Esterase: x / RBC: x / WBC x   Sq Epi: x / Non Sq Epi: x / Bacteria: x        Culture - Urine (collected 21 Nov 2024 03:54)  Source: Clean Catch Clean Catch (Midstream)  Final Report (22 Nov 2024 07:39):    No growth    Culture - Blood (collected 21 Nov 2024 03:28)  Source: .Blood BLOOD  Preliminary Report (22 Nov 2024 07:01):    No growth at 24 hours    Culture - Blood (collected 21 Nov 2024 03:20)  Source: .Blood BLOOD  Preliminary Report (22 Nov 2024 07:01):    No growth at 24 hours          RADIOLOGY & ADDITIONAL TESTS: Reviewed  
PROGRESS NOTE:   Authored by Inez Lubin Ma, MD  Available on MS Teams; Pager [phone]    Patient is a 29y old  Male who presents with a chief complaint of Sickle cell pain crisis (23 Nov 2024 14:02)    SUBJECTIVE / OVERNIGHT EVENTS: NAEON. Pt reports improving pain overall today, 5/10 in severity. He believes can decrease both dose and frequency of PCA    All other review of systems is negative unless indicated above.    MEDICATIONS  (STANDING):  chlorhexidine 2% Cloths 1 Application(s) Topical daily  cholecalciferol 1000 Unit(s) Oral daily  enoxaparin Injectable 40 milliGRAM(s) SubCutaneous every 24 hours  folic acid 1 milliGRAM(s) Oral daily  HYDROmorphone PCA (1 mG/mL) 30 milliLiter(s) PCA Continuous PCA Continuous  ketorolac   Injectable 15 milliGRAM(s) IV Push every 8 hours  polyethylene glycol 3350 17 Gram(s) Oral two times a day  senna 2 Tablet(s) Oral at bedtime  sodium chloride 0.45%. 1000 milliLiter(s) (75 mL/Hr) IV Continuous <Continuous>    MEDICATIONS  (PRN):  guaiFENesin Oral Liquid (Sugar-Free) 200 milliGRAM(s) Oral every 6 hours PRN Cough  meclizine 25 milliGRAM(s) Oral every 8 hours PRN Dizziness  naloxone Injectable 0.1 milliGRAM(s) IV Push every 3 minutes PRN For ANY of the following changes in patient status:  A. RR LESS THAN 10 breaths per minute, B. Oxygen saturation LESS THAN 90%, C. Sedation score of 6  ondansetron Injectable 4 milliGRAM(s) IV Push every 6 hours PRN Nausea    CAPILLARY BLOOD GLUCOSE    I&O's Summary    23 Nov 2024 07:01  -  24 Nov 2024 07:00  --------------------------------------------------------  IN: 200 mL / OUT: 650 mL / NET: -450 mL    PHYSICAL EXAM:  Vital Signs Last 24 Hrs  T(C): 36.8 (24 Nov 2024 11:01), Max: 37.2 (23 Nov 2024 13:48)  T(F): 98.3 (24 Nov 2024 11:01), Max: 99 (23 Nov 2024 13:48)  HR: 61 (24 Nov 2024 11:01) (57 - 67)  BP: 127/76 (24 Nov 2024 11:01) (115/56 - 134/71)  BP(mean): --  RR: 17 (24 Nov 2024 11:01) (16 - 17)  SpO2: 100% (24 Nov 2024 11:01) (100% - 100%)    Parameters below as of 24 Nov 2024 11:01  Patient On (Oxygen Delivery Method): mask, simple face  O2 Flow (L/min): 2    GENERAL: No acute distress on RA  HEAD:  Normocephalic, Atraumatic  EYES: Conjunctiva and sclera clear  NECK: Supple, no JVD  CHEST/LUNG: CTAB, No wheezes, rales, or rhonchi  HEART: Regular rate and rhythm  ABDOMEN: Soft, non-tender, non-distended  EXTREMITIES: No clubbing, cyanosis, or edema  NEUROLOGY: A&O x 3, no focal deficits  SKIN: No rashes or lesions    LABS:                        8.3    13.49 )-----------( 633      ( 24 Nov 2024 07:11 )             23.2     11-24    139  |  100  |  5[L]  ----------------------------<  83  4.1   |  26  |  0.53    Ca    9.4      24 Nov 2024 07:11  Phos  4.3     11-24  Mg     1.80     11-24    TPro  7.6  /  Alb  3.6  /  TBili  2.8[H]  /  DBili  0.6[H]  /  AST  73[H]  /  ALT  21  /  AlkPhos  150[H]  11-24    Urinalysis Basic - ( 24 Nov 2024 07:11 )    Color: x / Appearance: x / SG: x / pH: x  Gluc: 83 mg/dL / Ketone: x  / Bili: x / Urobili: x   Blood: x / Protein: x / Nitrite: x   Leuk Esterase: x / RBC: x / WBC x   Sq Epi: x / Non Sq Epi: x / Bacteria: x    RADIOLOGY & ADDITIONAL TESTS: Reviewed
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 Says pain is better today. Lidocaine patch helped a bit. Still no BM. Agreeable to decrease pump in AM and start transition to oral pain regimen. No other concerns/complaints.     Vital Signs Last 24 Hrs  T(C): 36.8 (26 Nov 2024 14:00), Max: 36.9 (25 Nov 2024 18:08)  T(F): 98.3 (26 Nov 2024 14:00), Max: 98.5 (25 Nov 2024 18:08)  HR: 59 (26 Nov 2024 14:00) (55 - 62)  BP: 119/55 (26 Nov 2024 14:00) (112/62 - 131/65)  BP(mean): --  RR: 19 (26 Nov 2024 14:00) (18 - 19)  SpO2: 100% (26 Nov 2024 14:00) (99% - 100%)    Parameters below as of 26 Nov 2024 14:00  Patient On (Oxygen Delivery Method): mask, simple face  O2 Flow (L/min): 2    PHYSICAL EXAM:  Constitutional: resting comfortably in bed; NAD  Head: NC/AT  Eyes: PERRL, EOMI, anicteric sclera  ENT: no nasal discharge; MMM  Neck: supple; no JVD  Respiratory: CTA B/L; no W/R/R  Cardiac: +S1/S2; RRR; no M/R/G  Gastrointestinal: soft, NT/ND; no rebound or guarding; +BSx4  Extremities: WWP, no clubbing or cyanosis; no peripheral edema  Musculoskeletal: NROM x4; no joint swelling, tenderness or erythema  Vascular: 2+ radial, DP/PT pulses B/L  Dermatologic: skin warm, dry and intact; no rashes, wounds, or scars  Neurologic: AAOx3; CNII-XII grossly intact; no focal deficits  Psychiatric: affect and characteristics of appearance, verbalizations, behaviors are appropriate    MEDICATIONS  (STANDING):  chlorhexidine 2% Cloths 1 Application(s) Topical daily  cholecalciferol 1000 Unit(s) Oral daily  enoxaparin Injectable 40 milliGRAM(s) SubCutaneous every 24 hours  folic acid 1 milliGRAM(s) Oral daily  HYDROmorphone PCA (1 mG/mL) 30 milliLiter(s) PCA Continuous PCA Continuous  lidocaine   4% Patch 1 Patch Transdermal daily  magnesium sulfate  IVPB 2 Gram(s) IV Intermittent once  polyethylene glycol 3350 17 Gram(s) Oral two times a day  senna 2 Tablet(s) Oral at bedtime  sodium chloride 0.45%. 1000 milliLiter(s) (75 mL/Hr) IV Continuous <Continuous>    MEDICATIONS  (PRN):  guaiFENesin Oral Liquid (Sugar-Free) 200 milliGRAM(s) Oral every 6 hours PRN Cough  meclizine 25 milliGRAM(s) Oral every 8 hours PRN Dizziness  naloxone Injectable 0.1 milliGRAM(s) IV Push every 3 minutes PRN For ANY of the following changes in patient status:  A. RR LESS THAN 10 breaths per minute, B. Oxygen saturation LESS THAN 90%, C. Sedation score of 6  ondansetron Injectable 4 milliGRAM(s) IV Push every 6 hours PRN Nausea    LABS:                        7.8    13.26 )-----------( 728      ( 26 Nov 2024 06:20 )             22.6     11-26    138  |  99  |  8   ----------------------------<  89  4.2   |  26  |  0.57    Ca    9.4      26 Nov 2024 06:20  Phos  4.1     11-26  Mg     1.80     11-26    TPro  7.6  /  Alb  3.7  /  TBili  2.3[H]  /  DBili  x   /  AST  69[H]  /  ALT  29  /  AlkPhos  171[H]  11-26      Urinalysis Basic - ( 26 Nov 2024 06:20 )    Color: x / Appearance: x / SG: x / pH: x  Gluc: 89 mg/dL / Ketone: x  / Bili: x / Urobili: x   Blood: x / Protein: x / Nitrite: x   Leuk Esterase: x / RBC: x / WBC x   Sq Epi: x / Non Sq Epi: x / Bacteria: x      CAPILLARY BLOOD GLUCOSE          RADIOLOGY & ADDITIONAL TESTS: Reviewed.    
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 Says that he is doing better since initial presentation. Discussed decreasing PCA pump - in agreement. Has not yet had a BM but is taking the bowel regimen. Agreeable to dose of lactulose and interested in lidocaine patch for low back pain. No other concerns or complaints.    VITAL SIGNS:  T(C): 36.9 (11-25-24 @ 18:08), Max: 36.9 (11-25-24 @ 18:08)  T(F): 98.5 (11-25-24 @ 18:08), Max: 98.5 (11-25-24 @ 18:08)  HR: 58 (11-25-24 @ 18:08) (54 - 61)  BP: 117/64 (11-25-24 @ 18:08) (113/62 - 127/77)  BP(mean): --  RR: 18 (11-25-24 @ 18:08) (16 - 18)  SpO2: 100% (11-25-24 @ 18:08) (97% - 100%)  Wt(kg): --    PHYSICAL EXAM:  Constitutional: resting comfortably in bed; NAD  Head: NC/AT  Eyes: PERRL, EOMI, anicteric sclera  ENT: no nasal discharge; MMM  Neck: supple; no JVD  Respiratory: CTA B/L; no W/R/R  Cardiac: +S1/S2; RRR; no M/R/G  Gastrointestinal: soft, NT/ND; no rebound or guarding; +BSx4  Extremities: WWP, no clubbing or cyanosis; no peripheral edema  Musculoskeletal: NROM x4; no joint swelling, tenderness or erythema  Vascular: 2+ radial, DP/PT pulses B/L  Dermatologic: skin warm, dry and intact; no rashes, wounds, or scars  Neurologic: AAOx3; CNII-XII grossly intact; no focal deficits  Psychiatric: affect and characteristics of appearance, verbalizations, behaviors are appropriate    MEDICATIONS  (STANDING):  chlorhexidine 2% Cloths 1 Application(s) Topical daily  cholecalciferol 1000 Unit(s) Oral daily  enoxaparin Injectable 40 milliGRAM(s) SubCutaneous every 24 hours  folic acid 1 milliGRAM(s) Oral daily  HYDROmorphone PCA (1 mG/mL) 30 milliLiter(s) PCA Continuous PCA Continuous  lactulose Syrup 20 Gram(s) Oral once  lidocaine   4% Patch 1 Patch Transdermal daily  polyethylene glycol 3350 17 Gram(s) Oral two times a day  senna 2 Tablet(s) Oral at bedtime  sodium chloride 0.45%. 1000 milliLiter(s) (75 mL/Hr) IV Continuous <Continuous>    MEDICATIONS  (PRN):  guaiFENesin Oral Liquid (Sugar-Free) 200 milliGRAM(s) Oral every 6 hours PRN Cough  meclizine 25 milliGRAM(s) Oral every 8 hours PRN Dizziness  naloxone Injectable 0.1 milliGRAM(s) IV Push every 3 minutes PRN For ANY of the following changes in patient status:  A. RR LESS THAN 10 breaths per minute, B. Oxygen saturation LESS THAN 90%, C. Sedation score of 6  ondansetron Injectable 4 milliGRAM(s) IV Push every 6 hours PRN Nausea    LABS:                        7.7    13.01 )-----------( 655      ( 25 Nov 2024 06:00 )             21.8     11-25    138  |  100  |  8   ----------------------------<  107[H]  4.3   |  26  |  0.62    Ca    8.8      25 Nov 2024 06:00  Phos  5.0     11-25  Mg     1.90     11-25    TPro  7.4  /  Alb  3.6  /  TBili  2.6[H]  /  DBili  0.6[H]  /  AST  98[H]  /  ALT  33  /  AlkPhos  168[H]  11-25      Urinalysis Basic - ( 25 Nov 2024 06:00 )    Color: x / Appearance: x / SG: x / pH: x  Gluc: 107 mg/dL / Ketone: x  / Bili: x / Urobili: x   Blood: x / Protein: x / Nitrite: x   Leuk Esterase: x / RBC: x / WBC x   Sq Epi: x / Non Sq Epi: x / Bacteria: x      CAPILLARY BLOOD GLUCOSE          RADIOLOGY & ADDITIONAL TESTS: Reviewed.  
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 Agrees that pain is getting better and okay with decreasing PCA pump. Will start transition to oral meds today. Hopes to go home tomorrow. No other concerns/complaints. Had a BM yesterday.     Vital Signs Last 24 Hrs  T(C): 36.6 (11-27-24 @ 14:49), Max: 36.8 (11-26-24 @ 17:52)  T(F): 97.9 (11-27-24 @ 14:49), Max: 98.3 (11-26-24 @ 17:52)  HR: 65 (11-27-24 @ 14:49) (54 - 65)  BP: 118/66 (11-27-24 @ 14:49) (113/53 - 136/81)  BP(mean): --  RR: 17 (11-27-24 @ 14:49) (17 - 19)  SpO2: 95% (11-27-24 @ 14:49) (95% - 100%)    PHYSICAL EXAM:  Constitutional: resting comfortably in bed; NAD  Head: NC/AT  Eyes: PERRL, EOMI, anicteric sclera  ENT: no nasal discharge; MMM  Neck: supple; no JVD  Respiratory: CTA B/L; no W/R/R  Cardiac: +S1/S2; RRR; no M/R/G  Gastrointestinal: soft, NT/ND; no rebound or guarding; +BSx4  Extremities: WWP, no clubbing or cyanosis; no peripheral edema  Musculoskeletal: NROM x4; no joint swelling, tenderness or erythema  Vascular: 2+ radial, DP/PT pulses B/L  Dermatologic: skin warm, dry and intact; no rashes, wounds, or scars  Neurologic: AAOx3; CNII-XII grossly intact; no focal deficits  Psychiatric: affect and characteristics of appearance, verbalizations, behaviors are appropriate    MEDICATIONS  (STANDING):  chlorhexidine 2% Cloths 1 Application(s) Topical daily  cholecalciferol 1000 Unit(s) Oral daily  enoxaparin Injectable 40 milliGRAM(s) SubCutaneous every 24 hours  folic acid 1 milliGRAM(s) Oral daily  HYDROmorphone PCA (1 mG/mL) 30 milliLiter(s) PCA Continuous PCA Continuous  lactulose Syrup 20 Gram(s) Oral daily  lidocaine   4% Patch 1 Patch Transdermal daily  polyethylene glycol 3350 17 Gram(s) Oral two times a day  senna 2 Tablet(s) Oral at bedtime  sodium chloride 0.45%. 1000 milliLiter(s) (30 mL/Hr) IV Continuous <Continuous>    MEDICATIONS  (PRN):  guaiFENesin Oral Liquid (Sugar-Free) 200 milliGRAM(s) Oral every 6 hours PRN Cough  ibuprofen  Tablet. 600 milliGRAM(s) Oral every 6 hours PRN Moderate Pain (4 - 6)  meclizine 25 milliGRAM(s) Oral every 8 hours PRN Dizziness  naloxone Injectable 0.1 milliGRAM(s) IV Push every 3 minutes PRN For ANY of the following changes in patient status:  A. RR LESS THAN 10 breaths per minute, B. Oxygen saturation LESS THAN 90%, C. Sedation score of 6  ondansetron Injectable 4 milliGRAM(s) IV Push every 6 hours PRN Nausea  oxyCODONE    IR 10 milliGRAM(s) Oral every 6 hours PRN Severe Pain (7 - 10)    LABS:                        8.2    12.69 )-----------( 753      ( 27 Nov 2024 10:47 )             23.0     11-27    141  |  102  |  7   ----------------------------<  89  4.2   |  29  |  0.58    Ca    9.5      27 Nov 2024 10:47  Phos  4.1     11-27  Mg     1.80     11-27    TPro  8.2  /  Alb  4.0  /  TBili  2.4[H]  /  DBili  x   /  AST  68[H]  /  ALT  28  /  AlkPhos  170[H]  11-27      Urinalysis Basic - ( 27 Nov 2024 10:47 )    Color: x / Appearance: x / SG: x / pH: x  Gluc: 89 mg/dL / Ketone: x  / Bili: x / Urobili: x   Blood: x / Protein: x / Nitrite: x   Leuk Esterase: x / RBC: x / WBC x   Sq Epi: x / Non Sq Epi: x / Bacteria: x      CAPILLARY BLOOD GLUCOSE          RADIOLOGY & ADDITIONAL TESTS: Reviewed.

## 2024-11-27 NOTE — PROGRESS NOTE ADULT - PROBLEM SELECTOR PLAN 1
- pt p/w L chest pain similar to prior episodes of sickle cell crisis   - hemodynamically stable on arrival  - labs c/w sickle cell crisis  - s/p 1U RBC 11/22 with heme-onc following  - pain improving   - wean PCA pump -- will decrease further in AM  - transition to PO pain regimen as able  - bowel regimen -- constipation as below   - c/w folic acid daily  - lidocaine patch to low back daily  - daily CBC, LDH, haptoglobin, reticulocyte count
cont PCA  cont FA  cont supplemental O2  cont toradol  hg improved, no transfusion at this time  will cont to monitor O2 sats, if evidence of hypoxia will transfuse
Increased PCA settings for uncontrolled pain today  cont FA  cont supplemental O2, wean as able  Repeat CXR with clear lungs  cont toradol PRN  Hb stable, f/u iron studies, transfuse if no e/o iron overload  will cont to monitor O2 sats  no indication of acute chest at this time
cont PCA  cont FA  cont supplemental O2  cont toradol  hg improved, no transfusion at this time  will cont to monitor O2 sats, if evidence of hypoxia will transfuse  no indication of acute chest at this time  monitor closely
Decreased PCA settings on 11/24- improving pain  cont FA and 1/2 NS  cont toradol PRN  s/p 1U pRBCs   saturating 100% on RA today  Heme following, appreciate recs
- pt p/w L chest pain similar to prior episodes of sickle cell crisis   - hemodynamically stable on arrival  - labs c/w sickle cell crisis  - s/p 1U RBC 11/22 with heme-onc following  - pain improving   - wean PCA pump   - transition to PO pain regimen as able  - bowel regimen -- constipation as below   - c/w folic acid daily  - lidocaine patch to low back daily  - daily CBC, LDH, haptoglobin, reticulocyte count
Increased PCA settings for uncontrolled pain today  cont FA  Repeat CXR on 11/22 with clear lungs  cont toradol PRN  s/p 1U pRBCs this AM  will cont to monitor O2 sats, t/c CTA if still hypoxic  Heme following, appreciate recs
- pt p/w L chest pain similar to prior episodes of sickle cell crisis   - hemodynamically stable on arrival  - labs c/w sickle cell crisis  - s/p 1U RBC 11/22 with heme-onc following  - pain improving   - wean PCA pump -- decreased further today  - start PO regimen  - bowel regimen   - c/w folic acid daily  - lidocaine patch to low back daily

## 2024-11-27 NOTE — PROGRESS NOTE ADULT - PROBLEM SELECTOR PROBLEM 1
Sickle cell pain crisis
Anemia, sickle cell with crisis
Sickle cell pain crisis
Sickle cell pain crisis
Anemia, sickle cell with crisis
Anemia, sickle cell with crisis

## 2024-11-27 NOTE — PROGRESS NOTE ADULT - REASON FOR ADMISSION
Sickle cell pain crisis

## 2024-11-27 NOTE — PROGRESS NOTE ADULT - PROBLEM SELECTOR PLAN 5
- vit D 14.6  - started daily supplementation

## 2024-11-27 NOTE — PROGRESS NOTE ADULT - PROBLEM SELECTOR PLAN 6
- c/w miralax BID, senna qhs  - s/p lactulose 20mg x1 -- will repeat today   - monitor BMs
- c/w miralax BID, senna qhs  - s/p lactulose 20mg x2  -  11/26
- c/w miralax BID, senna qhs  - s/p lactulose 20mg x1  - monitor BMs

## 2024-11-27 NOTE — PROGRESS NOTE ADULT - PROBLEM SELECTOR PLAN 2
- UA neg but with RBCs  - repeat outpt to ensure resolution  - pt asymptomatic and without infx concern  - suspect in setting of sickle cell crisis
elevated WBC  likely crisis related  non toxic
elevated WBC  likely crisis related  non toxic    now febrile  pan cx  full RVP neg  CXR without infiltrate  will start zosyn for short course if no source found
- UA neg but with RBCs  - repeat outpt to ensure resolution  - pt asymptomatic and without infx concern  - suspect in setting of sickle cell crisis
elevated WBC  likely crisis related  non toxic    febrile  BCx NGTD  full RVP neg  UA negative for UTI  CXR without infiltrate  Briefly on zosyn, now discontinued given neg cx
elevated WBC  likely crisis related  non toxic    now febrile  BCx NGTDx 24h  full RVP neg  UA negative for UTI  CXR without infiltrate  Continue zosyn for now, dc if BCx neg x 48h
elevated WBC  likely crisis related  non toxic    now febrile  BCx NGTDx 48h  full RVP neg  UA negative for UTI  CXR without infiltrate  Discontinue zosyn given neg cx
- UA neg but with RBCs  - repeat outpt to ensure resolution  - pt asymptomatic and without infx concern  - suspect in setting of sickle cell crisis

## 2024-11-27 NOTE — PROGRESS NOTE ADULT - PROBLEM SELECTOR PLAN 3
- T bili 2.6 with indirect predominance  - likely in setting of sickle cell crisis
- T bili 2.6 with indirect predominance  - likely in setting of sickle cell crisis  - continue to trend
- T bili 2.6 with indirect predominance  - likely in setting of sickle cell crisis  - continue to trend

## 2024-11-27 NOTE — PROGRESS NOTE ADULT - PROBLEM SELECTOR PROBLEM 2
Systemic inflammatory response syndrome (SIRS)
Microscopic hematuria

## 2024-11-27 NOTE — PROGRESS NOTE ADULT - PROBLEM SELECTOR PLAN 4
- , AST 98   - suspect in setting of sickle cell crisis  - avoid hepatotoxic agents
- , AST 98   - suspect in setting of sickle cell crisis  - continue to trend  - avoid hepatotoxic agents
- , AST 98   - suspect in setting of sickle cell crisis  - continue to trend  - avoid hepatotoxic agents

## 2024-11-28 ENCOUNTER — TRANSCRIPTION ENCOUNTER (OUTPATIENT)
Age: 29
End: 2024-11-28

## 2024-11-28 VITALS
SYSTOLIC BLOOD PRESSURE: 125 MMHG | TEMPERATURE: 98 F | OXYGEN SATURATION: 95 % | DIASTOLIC BLOOD PRESSURE: 62 MMHG | HEART RATE: 70 BPM | RESPIRATION RATE: 18 BRPM

## 2024-11-28 PROCEDURE — 99239 HOSP IP/OBS DSCHRG MGMT >30: CPT

## 2024-11-28 RX ORDER — OXYCODONE HYDROCHLORIDE 30 MG/1
0 TABLET ORAL
Refills: 0 | DISCHARGE

## 2024-11-28 RX ORDER — OXYCODONE HYDROCHLORIDE 30 MG/1
1 TABLET ORAL
Qty: 16 | Refills: 0
Start: 2024-11-28 | End: 2024-12-01

## 2024-11-28 RX ORDER — ONDANSETRON HYDROCHLORIDE 4 MG/1
1 TABLET, FILM COATED ORAL
Qty: 6 | Refills: 0
Start: 2024-11-28 | End: 2024-11-29

## 2024-11-28 RX ADMIN — Medication 1 MILLIGRAM(S): at 12:53

## 2024-11-28 RX ADMIN — ONDANSETRON HYDROCHLORIDE 4 MILLIGRAM(S): 4 TABLET, FILM COATED ORAL at 12:01

## 2024-11-28 RX ADMIN — CHLORHEXIDINE GLUCONATE 1 APPLICATION(S): 1.2 RINSE ORAL at 13:30

## 2024-11-28 RX ADMIN — LACTULOSE 20 GRAM(S): 10 SOLUTION ORAL at 12:54

## 2024-11-28 RX ADMIN — HYDROMORPHONE HYDROCHLORIDE 30 MILLILITER(S): 2 TABLET ORAL at 08:23

## 2024-11-28 RX ADMIN — Medication 1000 UNIT(S): at 12:53

## 2024-11-28 NOTE — DISCHARGE NOTE NURSING/CASE MANAGEMENT/SOCIAL WORK - NSTRANSFERBELONGINGSDISPO_GEN_A_NUR
with patient Cryotherapy Text: The wound bed was treated with cryotherapy after the biopsy was performed.

## 2024-11-28 NOTE — DISCHARGE NOTE NURSING/CASE MANAGEMENT/SOCIAL WORK - FINANCIAL ASSISTANCE
[facility] provides services at a reduced cost to those who are determined to be eligible through [facility]’s financial assistance program. Information regarding [facility]’s financial assistance program can be found by going to [email or web address] or by calling [phone].

## 2024-11-28 NOTE — DISCHARGE NOTE NURSING/CASE MANAGEMENT/SOCIAL WORK - NSDCPEFALRISK_GEN_ALL_CORE
For information on Fall & Injury Prevention, visit: [email or web address] OR  [email or web address] OR  https://www.cdc.gov/steadi/patient.html

## 2024-11-28 NOTE — DISCHARGE NOTE NURSING/CASE MANAGEMENT/SOCIAL WORK - PATIENT PORTAL LINK FT
You can access the FollowMyHealth Patient Portal offered by [facility] by registering at the following website: [email or web address]. By joining [employer]’s FollowMyHealth portal, you will also be able to view your health information using other applications (apps) compatible with our system.

## 2025-01-26 NOTE — ED ADULT NURSE NOTE - NSICDXFAMILYHX_GEN_ALL_CORE_FT
59.4
FAMILY HISTORY:  Sibling  Still living? Unknown  Family history of sickle cell anemia, Age at diagnosis: Age Unknown

## 2025-01-28 NOTE — PROVIDER CONTACT NOTE (OTHER) - REASON
patient c/o chest pain Detail Level: Detailed Quality 226: Preventive Care And Screening: Tobacco Use: Screening And Cessation Intervention: Patient screened for tobacco use and is an ex/non-smoker Quality 47: Advance Care Plan: Advance Care Planning discussed and documented in the medical record; patient did not wish or was not able to name a surrogate decision maker or provide an advance care plan. Quality 130: Documentation Of Current Medications In The Medical Record: Current Medications Documented

## 2025-02-18 NOTE — PROGRESS NOTE ADULT - SUBJECTIVE AND OBJECTIVE BOX
This encounter was opened in error. Please disregard.   CHIEF COMPLAINT:    Interval Events:    REVIEW OF SYSTEMS:  Constitutional:   Eyes:  ENT:  CV:  Resp:  GI:  :  MSK:  Integumentary:  Neurological:  Psychiatric:  Endocrine:  Hematologic/Lymphatic:  Allergic/Immunologic:  [ ] All other systems negative  [ ] Unable to assess ROS because ________    OBJECTIVE:  ICU Vital Signs Last 24 Hrs  T(C): 36.9 (13 Jul 2017 05:47), Max: 37 (12 Jul 2017 18:29)  T(F): 98.4 (13 Jul 2017 05:47), Max: 98.6 (12 Jul 2017 18:29)  HR: 59 (13 Jul 2017 06:58) (59 - 78)  BP: 115/65 (13 Jul 2017 05:47) (115/65 - 137/79)  BP(mean): --  ABP: --  ABP(mean): --  RR: 18 (13 Jul 2017 06:58) (17 - 22)  SpO2: 100% (13 Jul 2017 06:58) (97% - 100%)        07-12 @ 07:01  -  07-13 @ 07:00  --------------------------------------------------------  IN: 0 mL / OUT: 1700 mL / NET: -1700 mL      CAPILLARY BLOOD GLUCOSE          PHYSICAL EXAM:  General:   HEENT:   Lymph Nodes:  Neck:   Respiratory:   Cardiovascular:   Abdomen:   Extremities:   Skin:   Neurological:  Psychiatry:    HOSPITAL MEDICATIONS:  MEDICATIONS  (STANDING):  hydroxyurea (Non - oncologic) 2000 milliGRAM(s) Oral daily  sodium chloride 0.45%. 1000 milliLiter(s) (150 mL/Hr) IV Continuous <Continuous>  folic acid 1 milliGRAM(s) Oral daily  enoxaparin Injectable 40 milliGRAM(s) SubCutaneous daily  Jadenu (Deferasirox) 360 mg Tablet 1080 milliGRAM(s)   Oral daily  lidocaine   Patch 1 Patch Transdermal daily  aztreonam  IVPB   IV Intermittent   aztreonam  IVPB 2000 milliGRAM(s) IV Intermittent every 8 hours  azithromycin  IVPB   IV Intermittent   azithromycin  IVPB 500 milliGRAM(s) IV Intermittent every 24 hours  vancomycin  IVPB 1250 milliGRAM(s) IV Intermittent every 12 hours  HYDROmorphone PCA (1 mG/mL) 30 milliLiter(s) PCA Continuous PCA Continuous  potassium chloride    Tablet ER 40 milliEquivalent(s) Oral every 4 hours    MEDICATIONS  (PRN):  naloxone Injectable 0.1 milliGRAM(s) IV Push every 3 minutes PRN For ANY of the following changes in patient status:  A. RR LESS THAN 10 breaths per minute, B. Oxygen saturation LESS THAN 90%, C. Sedation score of 6  ondansetron Injectable 4 milliGRAM(s) IV Push every 6 hours PRN Nausea  acetaminophen   Tablet 650 milliGRAM(s) Oral every 6 hours PRN For Temp greater than 38 C (100.4 F)  petrolatum white Ointment 1 Application(s) Topical three times a day PRN dry lips      LABS:                        5.9    14.23 )-----------( 426      ( 13 Jul 2017 02:40 )             16.8     07-13    141  |  98  |  5<L>  ----------------------------<  107<H>  3.1<L>   |  32<H>  |  0.47<L>    Ca    8.8      13 Jul 2017 02:40  Phos  4.0     07-13  Mg     1.9     07-13          Arterial Blood Gas:  07-12 @ 14:15  7.37/62/41/33/76.3/9.2  ABG lactate: --        MICROBIOLOGY:     RADIOLOGY:  [ ] Reviewed and interpreted by me    PULMONARY FUNCTION TESTS:    EKG: CHIEF COMPLAINT: pain to arms legs chest and back stable, continues on PCA pump    Interval Events: transferred to RCU last evening    REVIEW OF SYSTEMS:  CV: c/o mild chest discomfort, decreased since admission  Resp: Denies SOB  MSK: pain to extremities  [x] All other systems negative      OBJECTIVE:  ICU Vital Signs Last 24 Hrs  T(C): 36.9 (13 Jul 2017 05:47), Max: 37 (12 Jul 2017 18:29)  T(F): 98.4 (13 Jul 2017 05:47), Max: 98.6 (12 Jul 2017 18:29)  HR: 59 (13 Jul 2017 06:58) (59 - 78)  BP: 115/65 (13 Jul 2017 05:47) (115/65 - 137/79)  RR: 18 (13 Jul 2017 06:58) (17 - 22)  SpO2: 100% (13 Jul 2017 06:58) (97% - 100%)            HOSPITAL MEDICATIONS:  MEDICATIONS  (STANDING):  hydroxyurea (Non - oncologic) 2000 milliGRAM(s) Oral daily  sodium chloride 0.45%. 1000 milliLiter(s) (150 mL/Hr) IV Continuous <Continuous>  folic acid 1 milliGRAM(s) Oral daily  enoxaparin Injectable 40 milliGRAM(s) SubCutaneous daily  Jadenu (Deferasirox) 360 mg Tablet 1080 milliGRAM(s)   Oral daily  lidocaine   Patch 1 Patch Transdermal daily  aztreonam  IVPB   IV Intermittent   aztreonam  IVPB 2000 milliGRAM(s) IV Intermittent every 8 hours  azithromycin  IVPB   IV Intermittent   azithromycin  IVPB 500 milliGRAM(s) IV Intermittent every 24 hours  vancomycin  IVPB 1250 milliGRAM(s) IV Intermittent every 12 hours  HYDROmorphone PCA (1 mG/mL) 30 milliLiter(s) PCA Continuous PCA Continuous  potassium chloride    Tablet ER 40 milliEquivalent(s) Oral every 4 hours    MEDICATIONS  (PRN):  naloxone Injectable 0.1 milliGRAM(s) IV Push every 3 minutes PRN For ANY of the following changes in patient status:  A. RR LESS THAN 10 breaths per minute, B. Oxygen saturation LESS THAN 90%, C. Sedation score of 6  ondansetron Injectable 4 milliGRAM(s) IV Push every 6 hours PRN Nausea  acetaminophen   Tablet 650 milliGRAM(s) Oral every 6 hours PRN For Temp greater than 38 C (100.4 F)  petrolatum white Ointment 1 Application(s) Topical three times a day PRN dry lips      LABS:                        5.9    14.23 )-----------( 426      ( 13 Jul 2017 02:40 )             16.8     07-13    141  |  98  |  5<L>  ----------------------------<  107<H>  3.1<L>   |  32<H>  |  0.47<L>    Ca    8.8      13 Jul 2017 02:40  Phos  4.0     07-13  Mg     1.9     07-13